# Patient Record
Sex: FEMALE | Race: WHITE | HISPANIC OR LATINO | Employment: FULL TIME | URBAN - METROPOLITAN AREA
[De-identification: names, ages, dates, MRNs, and addresses within clinical notes are randomized per-mention and may not be internally consistent; named-entity substitution may affect disease eponyms.]

---

## 2006-12-06 LAB — EXTERNAL HIV SCREEN: NORMAL

## 2019-04-25 ENCOUNTER — OFFICE VISIT (OUTPATIENT)
Dept: FAMILY MEDICINE CLINIC | Facility: CLINIC | Age: 34
End: 2019-04-25

## 2019-04-25 VITALS
HEART RATE: 75 BPM | TEMPERATURE: 97.4 F | SYSTOLIC BLOOD PRESSURE: 142 MMHG | RESPIRATION RATE: 18 BRPM | BODY MASS INDEX: 34.6 KG/M2 | WEIGHT: 188 LBS | HEIGHT: 62 IN | OXYGEN SATURATION: 97 % | DIASTOLIC BLOOD PRESSURE: 90 MMHG

## 2019-04-25 DIAGNOSIS — E66.9 OBESITY, UNSPECIFIED CLASSIFICATION, UNSPECIFIED OBESITY TYPE, UNSPECIFIED WHETHER SERIOUS COMORBIDITY PRESENT: ICD-10-CM

## 2019-04-25 DIAGNOSIS — R03.0 ELEVATED SYSTOLIC BLOOD PRESSURE READING WITHOUT DIAGNOSIS OF HYPERTENSION: ICD-10-CM

## 2019-04-25 DIAGNOSIS — K21.9 GASTROESOPHAGEAL REFLUX DISEASE WITHOUT ESOPHAGITIS: Primary | ICD-10-CM

## 2019-04-25 PROCEDURE — 99213 OFFICE O/P EST LOW 20 MIN: CPT | Performed by: FAMILY MEDICINE

## 2019-04-25 RX ORDER — PANTOPRAZOLE SODIUM 20 MG/1
20 TABLET, DELAYED RELEASE ORAL DAILY
Qty: 30 TABLET | Refills: 2 | Status: SHIPPED | OUTPATIENT
Start: 2019-04-25 | End: 2019-12-02

## 2019-04-25 RX ORDER — PANTOPRAZOLE SODIUM 20 MG/1
20 TABLET, DELAYED RELEASE ORAL DAILY
Qty: 30 TABLET | Refills: 2 | Status: SHIPPED | OUTPATIENT
Start: 2019-04-25 | End: 2019-04-25 | Stop reason: SDUPTHER

## 2019-04-26 ENCOUNTER — APPOINTMENT (OUTPATIENT)
Dept: LAB | Facility: CLINIC | Age: 34
End: 2019-04-26

## 2019-04-26 DIAGNOSIS — E66.9 OBESITY, UNSPECIFIED CLASSIFICATION, UNSPECIFIED OBESITY TYPE, UNSPECIFIED WHETHER SERIOUS COMORBIDITY PRESENT: ICD-10-CM

## 2019-04-26 LAB
ALBUMIN SERPL BCP-MCNC: 4 G/DL (ref 3.5–5)
ALP SERPL-CCNC: 53 U/L (ref 46–116)
ALT SERPL W P-5'-P-CCNC: 28 U/L (ref 12–78)
ANION GAP SERPL CALCULATED.3IONS-SCNC: 4 MMOL/L (ref 4–13)
AST SERPL W P-5'-P-CCNC: 14 U/L (ref 5–45)
BASOPHILS # BLD AUTO: 0.06 THOUSANDS/ΜL (ref 0–0.1)
BASOPHILS NFR BLD AUTO: 1 % (ref 0–1)
BILIRUB SERPL-MCNC: 0.43 MG/DL (ref 0.2–1)
BUN SERPL-MCNC: 13 MG/DL (ref 5–25)
CALCIUM SERPL-MCNC: 8.3 MG/DL (ref 8.3–10.1)
CHLORIDE SERPL-SCNC: 104 MMOL/L (ref 100–108)
CHOLEST SERPL-MCNC: 218 MG/DL (ref 50–200)
CO2 SERPL-SCNC: 29 MMOL/L (ref 21–32)
CREAT SERPL-MCNC: 0.7 MG/DL (ref 0.6–1.3)
EOSINOPHIL # BLD AUTO: 0.12 THOUSAND/ΜL (ref 0–0.61)
EOSINOPHIL NFR BLD AUTO: 3 % (ref 0–6)
ERYTHROCYTE [DISTWIDTH] IN BLOOD BY AUTOMATED COUNT: 12.3 % (ref 11.6–15.1)
GFR SERPL CREATININE-BSD FRML MDRD: 113 ML/MIN/1.73SQ M
GLUCOSE P FAST SERPL-MCNC: 90 MG/DL (ref 65–99)
HCT VFR BLD AUTO: 43.5 % (ref 34.8–46.1)
HDLC SERPL-MCNC: 48 MG/DL (ref 40–60)
HGB BLD-MCNC: 14.4 G/DL (ref 11.5–15.4)
IMM GRANULOCYTES # BLD AUTO: 0.01 THOUSAND/UL (ref 0–0.2)
IMM GRANULOCYTES NFR BLD AUTO: 0 % (ref 0–2)
LDLC SERPL CALC-MCNC: 141 MG/DL (ref 0–100)
LYMPHOCYTES # BLD AUTO: 1.67 THOUSANDS/ΜL (ref 0.6–4.47)
LYMPHOCYTES NFR BLD AUTO: 39 % (ref 14–44)
MCH RBC QN AUTO: 30.6 PG (ref 26.8–34.3)
MCHC RBC AUTO-ENTMCNC: 33.1 G/DL (ref 31.4–37.4)
MCV RBC AUTO: 92 FL (ref 82–98)
MONOCYTES # BLD AUTO: 0.34 THOUSAND/ΜL (ref 0.17–1.22)
MONOCYTES NFR BLD AUTO: 8 % (ref 4–12)
NEUTROPHILS # BLD AUTO: 2.06 THOUSANDS/ΜL (ref 1.85–7.62)
NEUTS SEG NFR BLD AUTO: 49 % (ref 43–75)
NONHDLC SERPL-MCNC: 170 MG/DL
NRBC BLD AUTO-RTO: 0 /100 WBCS
PLATELET # BLD AUTO: 242 THOUSANDS/UL (ref 149–390)
PMV BLD AUTO: 10.3 FL (ref 8.9–12.7)
POTASSIUM SERPL-SCNC: 3.9 MMOL/L (ref 3.5–5.3)
PROT SERPL-MCNC: 7.7 G/DL (ref 6.4–8.2)
RBC # BLD AUTO: 4.71 MILLION/UL (ref 3.81–5.12)
SODIUM SERPL-SCNC: 137 MMOL/L (ref 136–145)
TRIGL SERPL-MCNC: 143 MG/DL
TSH SERPL DL<=0.05 MIU/L-ACNC: 1.12 UIU/ML (ref 0.36–3.74)
WBC # BLD AUTO: 4.26 THOUSAND/UL (ref 4.31–10.16)

## 2019-04-26 PROCEDURE — 36415 COLL VENOUS BLD VENIPUNCTURE: CPT

## 2019-04-26 PROCEDURE — 80061 LIPID PANEL: CPT

## 2019-04-26 PROCEDURE — 80053 COMPREHEN METABOLIC PANEL: CPT

## 2019-04-26 PROCEDURE — 84443 ASSAY THYROID STIM HORMONE: CPT

## 2019-04-26 PROCEDURE — 85025 COMPLETE CBC W/AUTO DIFF WBC: CPT

## 2019-05-01 ENCOUNTER — OFFICE VISIT (OUTPATIENT)
Dept: OBGYN CLINIC | Facility: CLINIC | Age: 34
End: 2019-05-01

## 2019-05-01 VITALS
WEIGHT: 185 LBS | BODY MASS INDEX: 34.04 KG/M2 | DIASTOLIC BLOOD PRESSURE: 90 MMHG | HEIGHT: 62 IN | SYSTOLIC BLOOD PRESSURE: 140 MMHG

## 2019-05-01 DIAGNOSIS — Z12.4 SCREENING FOR CERVICAL CANCER: ICD-10-CM

## 2019-05-01 DIAGNOSIS — Z11.3 SCREENING EXAMINATION FOR SEXUALLY TRANSMITTED DISEASE: ICD-10-CM

## 2019-05-01 DIAGNOSIS — R10.2 PELVIC PAIN: ICD-10-CM

## 2019-05-01 DIAGNOSIS — Z01.419 ENCOUNTER FOR ANNUAL ROUTINE GYNECOLOGICAL EXAMINATION: Primary | ICD-10-CM

## 2019-05-01 PROCEDURE — G0145 SCR C/V CYTO,THINLAYER,RESCR: HCPCS | Performed by: FAMILY MEDICINE

## 2019-05-01 PROCEDURE — 99385 PREV VISIT NEW AGE 18-39: CPT | Performed by: FAMILY MEDICINE

## 2019-05-01 PROCEDURE — 87591 N.GONORRHOEAE DNA AMP PROB: CPT | Performed by: FAMILY MEDICINE

## 2019-05-01 PROCEDURE — 87624 HPV HI-RISK TYP POOLED RSLT: CPT | Performed by: FAMILY MEDICINE

## 2019-05-01 PROCEDURE — 87491 CHLMYD TRACH DNA AMP PROBE: CPT | Performed by: FAMILY MEDICINE

## 2019-05-02 LAB
C TRACH DNA SPEC QL NAA+PROBE: NEGATIVE
N GONORRHOEA DNA SPEC QL NAA+PROBE: NEGATIVE

## 2019-05-03 LAB
HPV HR 12 DNA CVX QL NAA+PROBE: POSITIVE
HPV16 DNA CVX QL NAA+PROBE: NEGATIVE
HPV18 DNA CVX QL NAA+PROBE: NEGATIVE

## 2019-05-06 LAB
LAB AP GYN PRIMARY INTERPRETATION: NORMAL
LAB AP LMP: NORMAL
Lab: NORMAL
PATH INTERP SPEC-IMP: NORMAL

## 2019-05-09 ENCOUNTER — OFFICE VISIT (OUTPATIENT)
Dept: FAMILY MEDICINE CLINIC | Facility: CLINIC | Age: 34
End: 2019-05-09

## 2019-05-09 VITALS
BODY MASS INDEX: 34.2 KG/M2 | HEART RATE: 82 BPM | RESPIRATION RATE: 16 BRPM | SYSTOLIC BLOOD PRESSURE: 146 MMHG | DIASTOLIC BLOOD PRESSURE: 90 MMHG | WEIGHT: 187 LBS | TEMPERATURE: 97.1 F | OXYGEN SATURATION: 98 %

## 2019-05-09 DIAGNOSIS — I10 HYPERTENSION, UNSPECIFIED TYPE: Primary | ICD-10-CM

## 2019-05-09 PROBLEM — E78.5 HYPERLIPIDEMIA: Status: ACTIVE | Noted: 2019-05-09

## 2019-05-09 PROBLEM — M54.9 BACK PAIN: Status: ACTIVE | Noted: 2019-05-09

## 2019-05-09 PROCEDURE — 99213 OFFICE O/P EST LOW 20 MIN: CPT | Performed by: FAMILY MEDICINE

## 2019-05-09 RX ORDER — AMLODIPINE BESYLATE 5 MG/1
5 TABLET ORAL DAILY
Qty: 30 TABLET | Refills: 2 | Status: SHIPPED | OUTPATIENT
Start: 2019-05-09 | End: 2019-08-22 | Stop reason: SDUPTHER

## 2019-05-24 ENCOUNTER — OFFICE VISIT (OUTPATIENT)
Dept: OBGYN CLINIC | Facility: CLINIC | Age: 34
End: 2019-05-24

## 2019-05-24 VITALS — DIASTOLIC BLOOD PRESSURE: 78 MMHG | BODY MASS INDEX: 34.39 KG/M2 | SYSTOLIC BLOOD PRESSURE: 120 MMHG | WEIGHT: 188 LBS

## 2019-05-24 DIAGNOSIS — N92.0 MENORRHAGIA WITH REGULAR CYCLE: Primary | ICD-10-CM

## 2019-05-24 PROCEDURE — 99213 OFFICE O/P EST LOW 20 MIN: CPT | Performed by: NURSE PRACTITIONER

## 2019-05-24 RX ORDER — NORETHINDRONE ACETATE AND ETHINYL ESTRADIOL 1MG-20(21)
1 KIT ORAL DAILY
Qty: 28 TABLET | Refills: 3 | Status: SHIPPED | OUTPATIENT
Start: 2019-05-24 | End: 2019-07-16 | Stop reason: CLARIF

## 2019-06-10 ENCOUNTER — OFFICE VISIT (OUTPATIENT)
Dept: FAMILY MEDICINE CLINIC | Facility: CLINIC | Age: 34
End: 2019-06-10

## 2019-06-10 VITALS
HEIGHT: 62 IN | RESPIRATION RATE: 18 BRPM | TEMPERATURE: 97.5 F | HEART RATE: 83 BPM | SYSTOLIC BLOOD PRESSURE: 122 MMHG | BODY MASS INDEX: 34.87 KG/M2 | OXYGEN SATURATION: 98 % | DIASTOLIC BLOOD PRESSURE: 80 MMHG | WEIGHT: 189.5 LBS

## 2019-06-10 DIAGNOSIS — G47.00 INSOMNIA, UNSPECIFIED TYPE: Primary | ICD-10-CM

## 2019-06-10 PROCEDURE — 99213 OFFICE O/P EST LOW 20 MIN: CPT | Performed by: FAMILY MEDICINE

## 2019-06-10 RX ORDER — LANOLIN ALCOHOL/MO/W.PET/CERES
3 CREAM (GRAM) TOPICAL
Qty: 30 TABLET | Refills: 2 | Status: SHIPPED | OUTPATIENT
Start: 2019-06-10 | End: 2019-07-16 | Stop reason: CLARIF

## 2019-07-05 ENCOUNTER — APPOINTMENT (EMERGENCY)
Dept: RADIOLOGY | Facility: HOSPITAL | Age: 34
End: 2019-07-05

## 2019-07-05 ENCOUNTER — HOSPITAL ENCOUNTER (EMERGENCY)
Facility: HOSPITAL | Age: 34
Discharge: HOME | End: 2019-07-05
Attending: EMERGENCY MEDICINE | Admitting: EMERGENCY MEDICINE

## 2019-07-05 VITALS
SYSTOLIC BLOOD PRESSURE: 109 MMHG | BODY MASS INDEX: 36.4 KG/M2 | WEIGHT: 199 LBS | OXYGEN SATURATION: 98 % | TEMPERATURE: 98.2 F | RESPIRATION RATE: 19 BRPM | HEART RATE: 62 BPM | DIASTOLIC BLOOD PRESSURE: 75 MMHG

## 2019-07-05 DIAGNOSIS — R07.89 CHEST DISCOMFORT: ICD-10-CM

## 2019-07-05 DIAGNOSIS — R91.1 LUNG NODULE: ICD-10-CM

## 2019-07-05 DIAGNOSIS — R07.9 CHEST PAIN: Primary | ICD-10-CM

## 2019-07-05 LAB
ALBUMIN SERPL BCP-MCNC: 3.9 G/DL (ref 3.5–5)
ALP SERPL-CCNC: 54 U/L (ref 46–116)
ALT SERPL W P-5'-P-CCNC: 23 U/L (ref 12–78)
ANION GAP SERPL CALCULATED.3IONS-SCNC: 6 MMOL/L (ref 4–13)
AST SERPL W P-5'-P-CCNC: 9 U/L (ref 5–45)
ATRIAL RATE: 64 BPM
ATRIAL RATE: 64 BPM
BASOPHILS # BLD AUTO: 0.06 THOUSANDS/ΜL (ref 0–0.1)
BASOPHILS NFR BLD AUTO: 1 % (ref 0–1)
BILIRUB SERPL-MCNC: 0.31 MG/DL (ref 0.2–1)
BUN SERPL-MCNC: 14 MG/DL (ref 5–25)
CALCIUM SERPL-MCNC: 8.8 MG/DL (ref 8.3–10.1)
CHLORIDE SERPL-SCNC: 107 MMOL/L (ref 100–108)
CO2 SERPL-SCNC: 24 MMOL/L (ref 21–32)
CREAT SERPL-MCNC: 0.62 MG/DL (ref 0.6–1.3)
EOSINOPHIL # BLD AUTO: 0.08 THOUSAND/ΜL (ref 0–0.61)
EOSINOPHIL NFR BLD AUTO: 2 % (ref 0–6)
ERYTHROCYTE [DISTWIDTH] IN BLOOD BY AUTOMATED COUNT: 12.1 % (ref 11.6–15.1)
GFR SERPL CREATININE-BSD FRML MDRD: 117 ML/MIN/1.73SQ M
GLUCOSE SERPL-MCNC: 95 MG/DL (ref 65–140)
HCT VFR BLD AUTO: 41.2 % (ref 34.8–46.1)
HGB BLD-MCNC: 14.3 G/DL (ref 11.5–15.4)
IMM GRANULOCYTES # BLD AUTO: 0.01 THOUSAND/UL (ref 0–0.2)
IMM GRANULOCYTES NFR BLD AUTO: 0 % (ref 0–2)
LYMPHOCYTES # BLD AUTO: 2.08 THOUSANDS/ΜL (ref 0.6–4.47)
LYMPHOCYTES NFR BLD AUTO: 39 % (ref 14–44)
MCH RBC QN AUTO: 31.2 PG (ref 26.8–34.3)
MCHC RBC AUTO-ENTMCNC: 34.7 G/DL (ref 31.4–37.4)
MCV RBC AUTO: 90 FL (ref 82–98)
MONOCYTES # BLD AUTO: 0.44 THOUSAND/ΜL (ref 0.17–1.22)
MONOCYTES NFR BLD AUTO: 8 % (ref 4–12)
NEUTROPHILS # BLD AUTO: 2.63 THOUSANDS/ΜL (ref 1.85–7.62)
NEUTS SEG NFR BLD AUTO: 50 % (ref 43–75)
NRBC BLD AUTO-RTO: 0 /100 WBCS
P AXIS: 26 DEGREES
P AXIS: 39 DEGREES
PLATELET # BLD AUTO: 232 THOUSANDS/UL (ref 149–390)
PMV BLD AUTO: 10 FL (ref 8.9–12.7)
POTASSIUM SERPL-SCNC: 4 MMOL/L (ref 3.5–5.3)
PR INTERVAL: 138 MS
PR INTERVAL: 152 MS
PROT SERPL-MCNC: 7.6 G/DL (ref 6.4–8.2)
QRS AXIS: 76 DEGREES
QRS AXIS: 77 DEGREES
QRSD INTERVAL: 72 MS
QRSD INTERVAL: 78 MS
QT INTERVAL: 398 MS
QT INTERVAL: 398 MS
QTC INTERVAL: 410 MS
QTC INTERVAL: 410 MS
RBC # BLD AUTO: 4.59 MILLION/UL (ref 3.81–5.12)
SODIUM SERPL-SCNC: 137 MMOL/L (ref 136–145)
T WAVE AXIS: 16 DEGREES
T WAVE AXIS: 20 DEGREES
TROPONIN I SERPL-MCNC: <0.02 NG/ML
TROPONIN I SERPL-MCNC: <0.02 NG/ML
VENTRICULAR RATE: 64 BPM
VENTRICULAR RATE: 64 BPM
WBC # BLD AUTO: 5.3 THOUSAND/UL (ref 4.31–10.16)

## 2019-07-05 PROCEDURE — 36415 COLL VENOUS BLD VENIPUNCTURE: CPT

## 2019-07-05 PROCEDURE — 71046 X-RAY EXAM CHEST 2 VIEWS: CPT

## 2019-07-05 PROCEDURE — 84484 ASSAY OF TROPONIN QUANT: CPT | Performed by: EMERGENCY MEDICINE

## 2019-07-05 PROCEDURE — 80053 COMPREHEN METABOLIC PANEL: CPT | Performed by: EMERGENCY MEDICINE

## 2019-07-05 PROCEDURE — 85025 COMPLETE CBC W/AUTO DIFF WBC: CPT | Performed by: EMERGENCY MEDICINE

## 2019-07-05 PROCEDURE — 93005 ELECTROCARDIOGRAM TRACING: CPT

## 2019-07-05 PROCEDURE — 99284 EMERGENCY DEPT VISIT MOD MDM: CPT | Performed by: EMERGENCY MEDICINE

## 2019-07-05 PROCEDURE — 99284 EMERGENCY DEPT VISIT MOD MDM: CPT

## 2019-07-05 PROCEDURE — 93010 ELECTROCARDIOGRAM REPORT: CPT | Performed by: INTERNAL MEDICINE

## 2019-07-05 RX ORDER — HYDROCHLOROTHIAZIDE 12.5 MG/1
12.5 TABLET ORAL ONCE
Status: DISCONTINUED | OUTPATIENT
Start: 2019-07-05 | End: 2019-07-05

## 2019-07-05 NOTE — ED PROVIDER NOTES
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 Taking? amLODIPine (NORVASC) 5 mg tablet 7/5/2019 at Unknown time  No Yes   Sig: Take 1 tablet (5 mg total) by mouth daily   melatonin 3 mg 7/4/2019 at Unknown time  No Yes   Sig: Take 1 tablet (3 mg total) by mouth daily at bedtime   norethindrone-ethinyl estradiol (JUNEL FE 1/20) 1-20 MG-MCG per tablet Not Taking at Unknown time  No No   Sig: Take 1 tablet by mouth daily   Patient not taking: Reported on 7/5/2019   pantoprazole (PROTONIX) 20 mg tablet Not Taking at Unknown time  No No   Sig: Take 1 tablet (20 mg total) by mouth daily   Patient not taking: Reported on 7/5/2019      Facility-Administered Medications: None       Past Medical History:   Diagnosis Date    GERD (gastroesophageal reflux disease)     Hypertension        Past Surgical History:   Procedure Laterality Date    CARPAL TUNNEL RELEASE      TUBAL LIGATION         Family History   Problem Relation Age of Onset    Asthma Mother     Breast cancer Neg Hx     Cancer Neg Hx      I have reviewed and agree with the history as documented      Social History     Tobacco Use    Smoking status: Never Smoker    Smokeless tobacco: Never Used   Substance Use Topics    Alcohol use: Yes     Frequency: 2-4 times a month     Drinks per session: 1 or 2    Drug use: Never        Review of Systems   Constitutional: Negative for chills, fatigue and fever  HENT: Negative for sore throat  Eyes: Negative for redness and visual disturbance  Respiratory: Negative for cough and shortness of breath  Cardiovascular: Negative for chest pain  Gastrointestinal: Negative for abdominal pain, diarrhea and nausea  Genitourinary: Negative for difficulty urinating, dysuria and pelvic pain  Musculoskeletal: Negative for back pain  Skin: Negative for rash  Neurological: Negative for syncope, weakness and headaches  All other systems reviewed and are negative  Physical Exam  ED Triage Vitals   Temperature Pulse Respirations Blood Pressure SpO2   07/05/19 1032 07/05/19 1032 07/05/19 1032 07/05/19 1032 07/05/19 1032   98 2 °F (36 8 °C) 84 18 146/94 96 %      Temp Source Heart Rate Source Patient Position - Orthostatic VS BP Location FiO2 (%)   07/05/19 1032 07/05/19 1130 07/05/19 1032 07/05/19 1032 --   Oral Monitor Sitting Right arm       Pain Score       07/05/19 1032       3             Orthostatic Vital Signs  Vitals:    07/05/19 1128 07/05/19 1130 07/05/19 1230 07/05/19 1330   BP: 112/75 122/78 114/74 109/75   Pulse:  68 66 62   Patient Position - Orthostatic VS:  Sitting  Lying       Physical Exam   Constitutional: She is oriented to person, place, and time  She appears well-developed and well-nourished  No distress  HENT:   Head: Normocephalic and atraumatic  Eyes: Pupils are equal, round, and reactive to light  Conjunctivae are normal    Neck: Normal range of motion  Cardiovascular: Normal rate, regular rhythm and normal heart sounds  No murmur heard  Pulmonary/Chest: Effort normal and breath sounds normal  No respiratory distress  Abdominal: Soft   Bowel sounds are normal  There is no tenderness  Musculoskeletal: Normal range of motion  Neurological: She is alert and oriented to person, place, and time  No cranial nerve deficit or sensory deficit  She exhibits normal muscle tone  Coordination normal    Skin: Skin is warm and dry  No rash noted  Psychiatric: She has a normal mood and affect  Nursing note and vitals reviewed        ED Medications  Medications - No data to display    Diagnostic Studies  Results Reviewed     Procedure Component Value Units Date/Time    Troponin I [[id number]]  (Normal) Collected:  07/05/19 1324    Lab Status:  Final result Specimen:  Blood from Arm, Right Updated:  07/05/19 1350     Troponin I <0 02 ng/mL     Troponin I [[id number]]  (Normal) Collected:  07/05/19 1100    Lab Status:  Final result Specimen:  Blood from Arm, Left Updated:  07/05/19 1127     Troponin I <0 02 ng/mL     Comprehensive metabolic panel [[id number]] Collected:  07/05/19 1100    Lab Status:  Final result Specimen:  Blood from Arm, Left Updated:  07/05/19 1126     Sodium 137 mmol/L      Potassium 4 0 mmol/L      Chloride 107 mmol/L      CO2 24 mmol/L      ANION GAP 6 mmol/L      BUN 14 mg/dL      Creatinine 0 62 mg/dL      Glucose 95 mg/dL      Calcium 8 8 mg/dL      AST 9 U/L      ALT 23 U/L      Alkaline Phosphatase 54 U/L      Total Protein 7 6 g/dL      Albumin 3 9 g/dL      Total Bilirubin 0 31 mg/dL      eGFR 117 ml/min/1 73sq m     Narrative:       [place] guidelines for Chronic Kidney Disease (CKD):     Stage 1 with normal or high GFR (GFR > 90 mL/min/1 73 square meters)    Stage 2 Mild CKD (GFR = 60-89 mL/min/1 73 square meters)    Stage 3A Moderate CKD (GFR = 45-59 mL/min/1 73 square meters)    Stage 3B Moderate CKD (GFR = 30-44 mL/min/1 73 square meters)    Stage 4 Severe CKD (GFR = 15-29 mL/min/1 73 square meters)    Stage 5 End Stage CKD (GFR <15 mL/min/1 73 square meters)  Note: GFR calculation is accurate only with a steady state creatinine    CBC and differential [[id number]] Collected:  07/05/19 1100    Lab Status:  Final result Specimen:  Blood from Arm, Left Updated:  07/05/19 1108     WBC 5 30 Thousand/uL      RBC 4 59 Million/uL      Hemoglobin 14 3 g/dL      Hematocrit 41 2 %      MCV 90 fL      MCH 31 2 pg      MCHC 34 7 g/dL      RDW 12 1 %      MPV 10 0 fL      Platelets 922 Thousands/uL      nRBC 0 /100 WBCs      Neutrophils Relative 50 %      Immat GRANS % 0 %      Lymphocytes Relative 39 %      Monocytes Relative 8 %      Eosinophils Relative 2 %      Basophils Relative 1 %      Neutrophils Absolute 2 63 Thousands/µL      Immature Grans Absolute 0 01 Thousand/uL      Lymphocytes Absolute 2 08 Thousands/µL      Monocytes Absolute 0 44 Thousand/µL      Eosinophils Absolute 0 08 Thousand/µL      Basophils Absolute 0 06 Thousands/µL                  XR chest 2 views   ED Interpretation by Crys Jarrell MD (07/05 1133)   No acute cardiopulmonary pathology      Final Result by Angi Tinsley MD (07/05 1209)      No acute cardiopulmonary disease  Anterior right upper lung nodular density  Recommend CT  The study was marked in EPIC for significant notification  Workstation performed: [id number]               Procedures  Procedures    ECG 12 Lead Documentation  Date/Time: today/date: 7/5/2019      ECG reviewed by me, the ED Provider: yes    Patient location:  ED   Previous ECG:  Compared to current, no change   Rate:  ECG rate assessment: normal    Rhythm: sinus rhythm    Ectopy:  : none    QRS axis:  Normal  Intervals: normal   Q waves: None   ST segments:  Normal  T waves: normal      Impression: Normal EKG    ED Course  ED Course as of Jul 05 1853 Fri Jul 05, 2019   1132 Troponin I: <0 02   1408 Troponin I: <0 02           [facility]    15-year-old female who presents to the ED for evaluation of chest discomfort as well as episode of possible hypertension in the setting of misplaced blood pressure cuff    Patient currently asymptomatic, refusing any analgesia  Patient will get delta troponin EKG  EKG troponin negative x2  Patient will be referred for stress test   Follow up with symptoms Cardiology  Patient also will be given referral for PCP  The patient was instructed to follow up as documented  Strict return precautions were discussed with the patient and the patient was instructed to return to the emergency department immediately if symptoms worsen  The patient/patient family member acknowledged and were in agreement with plan  Disposition  Final diagnoses:   Chest pain   Chest discomfort     Time reflects when diagnosis was documented in both MDM as applicable and the Disposition within this note     Time User Action Codes Description Comment    7/5/2019  3:01 PM Reece Dixon, [address] [R07 9] Chest pain     7/5/2019  3:02 PM Vinay Cooper Add [R07 89] Chest discomfort       ED Disposition     ED Disposition Condition Date/Time Comment    Discharge Stable Fri Jul 5, 2019  3:01 PM [id number] [place] [address] discharge to home/self care  Follow-up Information     Follow up With Specialties Details Why Contact Info Additional Information    [address] [facility] Cardiology Schedule an appointment as soon as possible for a visit in 1 week For follow up regarding your symptoms and recheck after stress test [address] [address]  [facility], [address], [place], [address], PA-C Internal Medicine, Physician Assistant Schedule an appointment as soon as possible for a visit in 3 days For follow up regarding your xray to get a CT scan  Or please go to your preferred PCP  [address]   16  [address]  [address] [facility] Emergency Department Emergency Medicine Go to  If symptoms worsen [address] [facility] ED, [address], [place], [place], [address]          Discharge Medication List as of 7/5/2019  3:09 PM      CONTINUE these medications which have NOT CHANGED    Details   amLODIPine (NORVASC) 5 mg tablet Take 1 tablet (5 mg total) by mouth daily, Starting Thu 5/9/2019, Normal      melatonin 3 mg Take 1 tablet (3 mg total) by mouth daily at bedtime, Starting Mon 6/10/2019, Normal      norethindrone-ethinyl estradiol (JUNEL FE 1/20) 1-20 MG-MCG per tablet Take 1 tablet by mouth daily, Starting Fri 5/24/2019, Normal      pantoprazole (PROTONIX) 20 mg tablet Take 1 tablet (20 mg total) by mouth daily, Starting Thu 4/25/2019, Normal           Outpatient Discharge Orders   Stress test only, exercise   Standing Status: Future Standing Exp  Date: 07/05/23       ED Provider  Attending physically available and evaluated Callie Betty SILVERIO managed the patient along with the ED Attending      Electronically Signed by         Manuela Sandoval MD  07/05/19 0555

## 2019-07-05 NOTE — ED ATTENDING ATTESTATION
Graciela Rodrigues MD, saw and evaluated the patient  All available labs and X-rays were ordered by me or the resident and have been reviewed by myself  I discussed the patient with the resident / non-physician and agree with the resident's / non-physician practitioner's findings and plan as documented in the resident's / non-physician practicitioner's note, except where noted  At this point, I agree with the current assessment done in the ED  I was present during key portions of all procedures performed unless otherwise stated  Chief Complaint   Patient presents with    High Blood Pressure     Patient states that she has hx of high BP, reports taking her 5mg of Amlodipine this morning at around 0700 with BP still in 200's  this is a 39-year-old female presenting for evaluation of chest heaviness  The patient states that she is restarted on blood pressure medication about 2 weeks ago  She has been taking it without missing any doses  She states that today she took her wrist blood pressure and found that was rather elevated and so came in for evaluation  No fevers chills nausea vomiting shortness of breath  She has some chest pain that was there yesterday and can use continues to today so wanted that evaluated  No shortness of breath dizziness lightheadedness no falls or injuries  No heavy lifting  No exertional component discomfort  PMH:  - amlopdipine  PSH:  - None  Denies smoking, drinking, drugs  PE:  Vitals:    07/05/19 1128 07/05/19 1130 07/05/19 1230 07/05/19 1330   BP: 112/75 122/78 114/74 109/75   BP Location:  Left arm Left arm Left arm   Pulse:  68 66 62   Resp:  17 18 19   Temp:       TempSrc:       SpO2:  98% 98% 98%   Weight:       General: VSS, NAD, awake, alert  Well-nourished, well-developed  Appears stated age  Speaking normally in full sentences  Head: Normocephalic, atraumatic, nontender  Eyes: PERRL, EOM-I  No diplopia  No hyphema     No subconjunctival hemorrhages  Symmetrical lids  ENT: Atraumatic external nose and ears  MMM  No malocclusion  No stridor  Normal phonation  No drooling  Normal swallowing  Neck: Symmetric, trachea midline  No JVD  CV: RRR  +S1/S2  No murmurs or gallops  Peripheral pulses +2 throughout  No chest wall tenderness  Lungs:   Unlabored No retractions  CTAB, lungs sounds equal bilateral    No tachypnea  Abd: +BS, soft, NT/ND    MSK:   FROM   Back:   No rashes  Skin: Dry, intact  Neuro: AAOx3, GCS 15, CN II-XII grossly intact  Motor grossly intact  Psychiatric/Behavioral: Appropriate mood and affect   Exam: deferred  A:  - Chest heaviness  P:  - cardiac  - DC  - 13 point ROS was performed and all are normal unless stated in the history above  - Nursing note reviewed  Vitals reviewed  - Orders placed by myself and/or advanced practitioner / resident     - Previous chart was reviewed  - No language barrier    - History obtained from patient  - There are no limitations to the history obtained  - Critical care time: Not applicable for this patient  Final Diagnosis:  1  Chest pain    2  Chest discomfort    3  Lung nodule           Medications - No data to display  XR chest 2 views   ED Interpretation   No acute cardiopulmonary pathology      Final Result      No acute cardiopulmonary disease  Anterior right upper lung nodular density  Recommend CT  The study was marked in EPIC for significant notification        Workstation performed: [id number]           Orders Placed This Encounter   Procedures    XR chest 2 views    CBC and differential    Comprehensive metabolic panel    Troponin I    Troponin I    Continuous cardiac monitoring    Continuous pulse oximetry    Stress test only, exercise    ECG 12 lead    ECG 12 lead    ECG 12 lead    ECG 12 lead     Labs Reviewed   TROPONIN I - Normal       Result Value Ref Range Status    Troponin I <0 02  <=0 04 ng/mL Final    Comment:   [employer] analyzer 99% cutoff is > 0 04 ng/mL in network labs     o cTnI 99% cutoff is useful only when applied to patients in the clinical setting of myocardial ischemia   o cTnI 99% cutoff should be interpreted in the context of clinical history, ECG findings and possibly cardiac imaging to establish correct diagnosis  o cTnI 99% cutoff may be suggestive but clearly not indicative of a coronary event without the clinical setting of myocardial ischemia  TROPONIN I - Normal    Troponin I <0 02  <=0 04 ng/mL Final    Comment:   [employer] analyzer 99% cutoff is > 0 04 ng/mL in network labs     o cTnI 99% cutoff is useful only when applied to patients in the clinical setting of myocardial ischemia   o cTnI 99% cutoff should be interpreted in the context of clinical history, ECG findings and possibly cardiac imaging to establish correct diagnosis  o cTnI 99% cutoff may be suggestive but clearly not indicative of a coronary event without the clinical setting of myocardial ischemia       CBC AND DIFFERENTIAL    WBC 5 30  4 31 - 10 16 Thousand/uL Final    RBC 4 59  3 81 - 5 12 Million/uL Final    Hemoglobin 14 3  11 5 - 15 4 g/dL Final    Hematocrit 41 2  34 8 - 46 1 % Final    MCV 90  82 - 98 fL Final    MCH 31 2  26 8 - 34 3 pg Final    MCHC 34 7  31 4 - 37 4 g/dL Final    RDW 12 1  11 6 - 15 1 % Final    MPV 10 0  8 9 - 12 7 fL Final    Platelets 922  921 - 390 Thousands/uL Final    nRBC 0  /100 WBCs Final    Neutrophils Relative 50  43 - 75 % Final    Immat GRANS % 0  0 - 2 % Final    Lymphocytes Relative 39  14 - 44 % Final    Monocytes Relative 8  4 - 12 % Final    Eosinophils Relative 2  0 - 6 % Final    Basophils Relative 1  0 - 1 % Final    Neutrophils Absolute 2 63  1 85 - 7 62 Thousands/µL Final    Immature Grans Absolute 0 01  0 00 - 0 20 Thousand/uL Final    Lymphocytes Absolute 2 08  0 60 - 4 47 Thousands/µL Final    Monocytes Absolute 0 44  0 17 - 1 22 Thousand/µL Final    Eosinophils Absolute 0 08 0 00 - 0 61 Thousand/µL Final    Basophils Absolute 0 06  0 00 - 0 10 Thousands/µL Final   COMPREHENSIVE METABOLIC PANEL    Sodium 993  136 - 145 mmol/L Final    Potassium 4 0  3 5 - 5 3 mmol/L Final    Chloride 107  100 - 108 mmol/L Final    CO2 24  21 - 32 mmol/L Final    ANION GAP 6  4 - 13 mmol/L Final    BUN 14  5 - 25 mg/dL Final    Creatinine 0 62  0 60 - 1 30 mg/dL Final    Comment: Standardized to IDMS reference method    Glucose 95  65 - 140 mg/dL Final    Comment:   If the patient is fasting, the ADA then defines impaired fasting glucose as > 100 mg/dL and diabetes as > or equal to 123 mg/dL  Specimen collection should occur prior to Sulfasalazine administration due to the potential for falsely depressed results  Specimen collection should occur prior to Sulfapyridine administration due to the potential for falsely elevated results  Calcium 8 8  8 3 - 10 1 mg/dL Final    AST 9  5 - 45 U/L Final    Comment:   Specimen collection should occur prior to Sulfasalazine administration due to the potential for falsely depressed results  ALT 23  12 - 78 U/L Final    Comment:   Specimen collection should occur prior to Sulfasalazine and/or Sulfapyridine administration due to the potential for falsely depressed results       Alkaline Phosphatase 54  46 - 116 U/L Final    Total Protein 7 6  6 4 - 8 2 g/dL Final    Albumin 3 9  3 5 - 5 0 g/dL Final    Total Bilirubin 0 31  0 20 - 1 00 mg/dL Final    eGFR 117  ml/min/1 73sq m Final    Narrative:     [place] guidelines for Chronic Kidney Disease (CKD):     Stage 1 with normal or high GFR (GFR > 90 mL/min/1 73 square meters)    Stage 2 Mild CKD (GFR = 60-89 mL/min/1 73 square meters)    Stage 3A Moderate CKD (GFR = 45-59 mL/min/1 73 square meters)    Stage 3B Moderate CKD (GFR = 30-44 mL/min/1 73 square meters)    Stage 4 Severe CKD (GFR = 15-29 mL/min/1 73 square meters)    Stage 5 End Stage CKD (GFR <15 mL/min/1 73 square meters)  Note: GFR calculation is accurate only with a steady state creatinine     Time reflects when diagnosis was documented in both MDM as applicable and the Disposition within this note     Time User Action Codes Description Comment    7/5/2019  3:01 PM Lilbryannae Randale Add [R07 9] Chest pain     7/5/2019  3:02 PM Lilbryannae Randale Add [R07 89] Chest discomfort     7/6/2019  7:02 AM [place] Hove Add [R91 1] Lung nodule       ED Disposition     ED Disposition Condition Date/Time Comment    Discharge Stable Fri Jul 5, 2019  3:01 PM [id number] [place] [address] discharge to home/self care  Follow-up Information     Follow up With Specialties Details Why Contact Info Additional Information    [address] [facility] Cardiology Schedule an appointment as soon as possible for a visit in 1 week For follow up regarding your symptoms and recheck after stress test [address] [facility] [address]  [address], [address] [facility], [place], [place], [address], PA-C Internal Medicine, Physician Assistant Schedule an appointment as soon as possible for a visit in 3 days For follow up regarding your xray to get a CT scan  Or please go to your preferred PCP  [address] [facility]  16  [phone]  [address] [facility] Emergency Department Emergency Medicine Go to  If symptoms worsen [address]  [phone]  ED, [address] [place] [address], [address]        Discharge Medication List as of 7/5/2019  3:09 PM      CONTINUE these medications which have NOT CHANGED    Details   amLODIPine (NORVASC) 5 mg tablet Take 1 tablet (5 mg total) by mouth daily, Starting Thu 5/9/2019, Normal      melatonin 3 mg Take 1 tablet (3 mg total) by mouth daily at bedtime, Starting Mon 6/10/2019, Normal      norethindrone-ethinyl estradiol (JUNEL FE 1/20) 1-20 MG-MCG per tablet Take 1 tablet by mouth daily, Starting Fri 5/24/2019, Normal      pantoprazole (PROTONIX) 20 mg tablet Take 1 tablet (20 mg total) by mouth daily, Starting Thu 4/25/2019, Normal           Outpatient Discharge Orders   Stress test only, exercise   Standing Status: Future Standing Exp  Date: 07/05/23     Prior to Admission Medications   Prescriptions Last Dose Informant Patient Reported? Taking? amLODIPine (NORVASC) 5 mg tablet 7/5/2019 at Unknown time  No Yes   Sig: Take 1 tablet (5 mg total) by mouth daily   melatonin 3 mg 7/4/2019 at Unknown time  No Yes   Sig: Take 1 tablet (3 mg total) by mouth daily at bedtime   norethindrone-ethinyl estradiol (JUNEL FE 1/20) 1-20 MG-MCG per tablet Not Taking at Unknown time  No No   Sig: Take 1 tablet by mouth daily   Patient not taking: Reported on 7/5/2019   pantoprazole (PROTONIX) 20 mg tablet Not Taking at Unknown time  No No   Sig: Take 1 tablet (20 mg total) by mouth daily   Patient not taking: Reported on 7/5/2019      Facility-Administered Medications: None       Portions of the record may have been created with voice recognition software  Occasional wrong word or "sound a like" substitutions may have occurred due to the inherent limitations of voice recognition software  Read the chart carefully and recognize, using context, where substitutions have occurred      Electronically signed by:  Myles Layne

## 2019-07-09 ENCOUNTER — HOSPITAL ENCOUNTER (OUTPATIENT)
Dept: NON INVASIVE DIAGNOSTICS | Facility: CLINIC | Age: 34
Discharge: HOME | End: 2019-07-09
Attending: EMERGENCY MEDICINE

## 2019-07-09 DIAGNOSIS — R07.9 CHEST PAIN: ICD-10-CM

## 2019-07-09 LAB
CHEST PAIN STATEMENT: NORMAL
MAX DIASTOLIC BP: 92 MMHG
MAX HEART RATE: 164 BPM
MAX PREDICTED HEART RATE: 185 BPM
MAX. SYSTOLIC BP: 178 MMHG
PROTOCOL NAME: NORMAL
TARGET HR FORMULA: NORMAL
TEST INDICATION: NORMAL
TIME IN EXERCISE PHASE: NORMAL

## 2019-07-09 PROCEDURE — 93016 CV STRESS TEST SUPVJ ONLY: CPT | Performed by: INTERNAL MEDICINE

## 2019-07-09 PROCEDURE — 93017 CV STRESS TEST TRACING ONLY: CPT

## 2019-07-09 PROCEDURE — 93018 CV STRESS TEST I&R ONLY: CPT | Performed by: INTERNAL MEDICINE

## 2019-07-16 ENCOUNTER — OFFICE VISIT (OUTPATIENT)
Dept: CARDIOLOGY CLINIC | Facility: CLINIC | Age: 34
End: 2019-07-16

## 2019-07-16 VITALS
HEIGHT: 62 IN | SYSTOLIC BLOOD PRESSURE: 124 MMHG | OXYGEN SATURATION: 99 % | DIASTOLIC BLOOD PRESSURE: 94 MMHG | WEIGHT: 187.3 LBS | HEART RATE: 76 BPM | BODY MASS INDEX: 34.47 KG/M2

## 2019-07-16 DIAGNOSIS — I10 HYPERTENSION, UNSPECIFIED TYPE: Primary | ICD-10-CM

## 2019-07-16 DIAGNOSIS — E66.09 CLASS 1 OBESITY DUE TO EXCESS CALORIES WITH BODY MASS INDEX (BMI) OF 34.0 TO 34.9 IN ADULT, UNSPECIFIED WHETHER SERIOUS COMORBIDITY PRESENT: ICD-10-CM

## 2019-07-16 DIAGNOSIS — E78.5 HYPERLIPIDEMIA, UNSPECIFIED HYPERLIPIDEMIA TYPE: ICD-10-CM

## 2019-07-16 PROCEDURE — 99243 OFF/OP CNSLTJ NEW/EST LOW 30: CPT | Performed by: NURSE PRACTITIONER

## 2019-07-16 RX ORDER — IBUPROFEN 200 MG
TABLET ORAL EVERY 6 HOURS PRN
COMMUNITY
End: 2019-07-16 | Stop reason: CLARIF

## 2019-07-16 NOTE — PATIENT INSTRUCTIONS
2gm sodium low fat low cholesterol diet, eating fresh is best, fresh fruits, fresh vegetables, avoid red meats,consume chicken and fish, slat free nuts, legumes    Exercise for 30 minutes daily  Track calories for weight loss with an I phone ALISON

## 2019-07-16 NOTE — PROGRESS NOTES
Cardiology Consultation     Marivel Burt  [id number]  1985  [facility] [address] [address]    Chest pain    Ms Tonja Quiroga presnted to the ED on 7/05/19 with chest pain  She was at home and experienced chest heaviness  She was anxious and took her BP at home; SBP >220  She called her PCP and was instructed to present to the ED  BP in /94  Troponin negative X 2  She was referred for a stress test and instructed to follow up with Cardiology  On 7/09/19 Rashid Tang underwent an exercise stress test which showed exercise 9 minutes target heart rate achieved  Resting hypertension with appropriate blood pressure response to stress  The patient experienced chest pain during stress pain resolved spontaneously  ECG was negative for ischemia  Normal study after maximal exercise with reproduction of symptoms  Ms Tonja Quiroga presents to our office for a cardiology consultations after the ED visit with CP and Hypertension  She continues to complain of fatigue, insomnia and left sided chest pressure  According to Rashid Tang the left sided chest pressure is most likely related to anxiety  I have reviewed stress test results and lipid panel results        HTN  HLD 4/26/19 Cholesterol 218, Triglycerides 143, HDL 48,   Patient Active Problem List   Diagnosis    GERD (gastroesophageal reflux disease)    Obesity    Encounter for annual routine gynecological examination    Hypertension    Hyperlipidemia    Back pain    Insomnia     Past Medical History:   Diagnosis Date    Chest pain     GERD (gastroesophageal reflux disease)     Hypertension      Social History     Socioeconomic History    Marital status: Single     Spouse name: Not on file    Number of children: Not on file    Years of education: Not on file    Highest education level: Not on file   Occupational History  Not on file   Social Needs    Financial resource strain: Not on file    Food insecurity:     Worry: Not on file     Inability: Not on file    Transportation needs:     Medical: Not on file     Non-medical: Not on file   Tobacco Use    Smoking status: Never Smoker    Smokeless tobacco: Never Used   Substance and Sexual Activity    Alcohol use: Yes     Frequency: 2-4 times a month     Drinks per session: 1 or 2    Drug use: Never    Sexual activity: Yes     Partners: Male     Birth control/protection: Female Sterilization   Lifestyle    Physical activity:     Days per week: Not on file     Minutes per session: Not on file    Stress: Not on file   Relationships    Social connections:     Talks on phone: Not on file     Gets together: Not on file     Attends [employer] service: Not on file     Active member of club or organization: Not on file     Attends meetings of clubs or organizations: Not on file     Relationship status: Not on file    Intimate partner violence:     Fear of current or ex partner: Not on file     Emotionally abused: Not on file     Physically abused: Not on file     Forced sexual activity: Not on file   Other Topics Concern    Not on file   Social History Narrative    Not on file      Family History   Problem Relation Age of Onset    Asthma Mother     Hypertension Mother     Breast cancer Neg Hx     Cancer Neg Hx      Past Surgical History:   Procedure Laterality Date    CARPAL TUNNEL RELEASE      TUBAL LIGATION     Denies family history of CAD  Denies Tobacco use  Admits to social ETOH use     Current Outpatient Medications:     amLODIPine (NORVASC) 5 mg tablet, Take 1 tablet (5 mg total) by mouth daily, Disp: 30 tablet, Rfl: 2    melatonin 3 mg, Take 1 tablet (3 mg total) by mouth daily at bedtime, Disp: 30 tablet, Rfl: 2    norethindrone-ethinyl estradiol (JUNEL FE 1/20) 1-20 MG-MCG per tablet, Take 1 tablet by mouth daily (Patient not taking: Reported on 7/5/2019), Disp: 28 tablet, Rfl: 3    pantoprazole (PROTONIX) 20 mg tablet, Take 1 tablet (20 mg total) by mouth daily (Patient not taking: Reported on 7/5/2019), Disp: 30 tablet, Rfl: 2  No Known Allergies  There were no vitals filed for this visit  Labs:  Hospital Outpatient Visit on 07/09/2019   Component Date Value    Protocol Name 07/09/2019 Karlo Balbuena Time In Exercise Phase 07/09/2019 00:09:00     MAX   SYSTOLIC BP 62/52/3361 520     Max Diastolic Bp 08/01/8070 92     Max Heart Rate 07/09/2019 164     Max Predicted Heart Rate 07/09/2019 185     Reason for Termination 07/09/2019                      Value:Target Heart Rate Achieved  Fatigue      Test Indication 07/09/2019 Screening for CAD     Target Hr Formular 07/09/2019 (220 - Age)*100%     Chest Pain Statement 07/09/2019 non-limiting    Admission on 07/05/2019, Discharged on 07/05/2019   Component Date Value    WBC 07/05/2019 5 30     RBC 07/05/2019 4 59     Hemoglobin 07/05/2019 14 3     Hematocrit 07/05/2019 41 2     MCV 07/05/2019 90     MCH 07/05/2019 31 2     MCHC 07/05/2019 34 7     RDW 07/05/2019 12 1     MPV 07/05/2019 10 0     Platelets 18/51/7037 232     nRBC 07/05/2019 0     Neutrophils Relative 07/05/2019 50     Immat GRANS % 07/05/2019 0     Lymphocytes Relative 07/05/2019 39     Monocytes Relative 07/05/2019 8     Eosinophils Relative 07/05/2019 2     Basophils Relative 07/05/2019 1     Neutrophils Absolute 07/05/2019 2 63     Immature Grans Absolute 07/05/2019 0 01     Lymphocytes Absolute 07/05/2019 2 08     Monocytes Absolute 07/05/2019 0 44     Eosinophils Absolute 07/05/2019 0 08     Basophils Absolute 07/05/2019 0 06     Sodium 07/05/2019 137     Potassium 07/05/2019 4 0     Chloride 07/05/2019 107     CO2 07/05/2019 24     ANION GAP 07/05/2019 6     BUN 07/05/2019 14     Creatinine 07/05/2019 0 62     Glucose 07/05/2019 95     Calcium 07/05/2019 8 8     AST 07/05/2019 9     ALT 07/05/2019 23     Alkaline Phosphatase 07/05/2019 54     Total Protein 07/05/2019 7 6     Albumin 07/05/2019 3 9     Total Bilirubin 07/05/2019 0 31     eGFR 07/05/2019 117     Troponin I 07/05/2019 <0 02     Troponin I 07/05/2019 <0 02     Ventricular Rate 07/05/2019 64     Atrial Rate 07/05/2019 64     SD Interval 07/05/2019 138     QRSD Interval 07/05/2019 72     QT Interval 07/05/2019 398     QTC Interval 07/05/2019 410     P Axis 07/05/2019 26     QRS Axis 07/05/2019 76     T Wave Axis 07/05/2019 16     Ventricular Rate 07/05/2019 64     Atrial Rate 07/05/2019 64     SD Interval 07/05/2019 152     QRSD Interval 07/05/2019 78     QT Interval 07/05/2019 398     QTC Interval 07/05/2019 410     P Axis 07/05/2019 39     QRS Axis 07/05/2019 77     T Wave Axis 07/05/2019 20      Imaging: Xr Chest 2 Views    Result Date: 7/5/2019  Narrative: CHEST INDICATION:   Chest Pain  COMPARISON:  None EXAM PERFORMED/VIEWS:  XR CHEST PA & LATERAL FINDINGS:  Anterior right upper lobe 9 mm slightly irregular nodular density  Cardiomediastinal silhouette appears upper normal in size  Slight right base atelectasis  The lungs are otherwise clear  No pneumothorax or pleural effusion  Osseous structures appear within normal limits for patient age  Impression: No acute cardiopulmonary disease  Anterior right upper lung nodular density  Recommend CT  The study was marked in EPIC for significant notification  Workstation performed: [id number]       Review of Systems:  Review of Systems   Constitutional: Positive for fatigue  Cardiovascular: Positive for chest pain  Psychiatric/Behavioral: Positive for dysphoric mood and sleep disturbance  All other systems reviewed and are negative  Physical Exam:  Physical Exam   Constitutional: She is oriented to person, place, and time  She appears well-developed  HENT:   Head: Normocephalic  Eyes: Pupils are equal, round, and reactive to light     Neck: Normal range of motion  Neck supple  Cardiovascular: Normal rate, regular rhythm and normal heart sounds  Pulmonary/Chest: Effort normal and breath sounds normal    Abdominal: Soft  Bowel sounds are normal    Obese    Musculoskeletal: Normal range of motion  Neurological: She is alert and oriented to person, place, and time  Skin: Skin is warm and dry  Capillary refill takes less than 2 seconds  Psychiatric: She has a normal mood and affect  Vitals reviewed  Discussion/Summary:  1  Hypertension- continue on Norvasc 5mg daily, hand out information provided on a 2 g sodium diet reading food labels, stop NSAID and  Melatonin  These medications can contribute to hypertension  2  Hyperlipidemia- -  Instructed on heart healthy diet handout information provided counseling on eating fresh is best using extra virgin olive oil  3  Obesity BMI 34 2- Calorie control, using an I Phone ALISON to control calorie consumption and aid in weight loss  Exercise 30 minutes 5 times a week

## 2019-08-07 ENCOUNTER — OFFICE VISIT (OUTPATIENT)
Dept: FAMILY MEDICINE CLINIC | Facility: CLINIC | Age: 34
End: 2019-08-07

## 2019-08-07 ENCOUNTER — HOSPITAL ENCOUNTER (EMERGENCY)
Facility: HOSPITAL | Age: 34
Discharge: HOME | End: 2019-08-07
Attending: EMERGENCY MEDICINE | Admitting: EMERGENCY MEDICINE

## 2019-08-07 ENCOUNTER — TRANSCRIBE ORDERS (OUTPATIENT)
Dept: ADMINISTRATIVE | Facility: HOSPITAL | Age: 34
End: 2019-08-07

## 2019-08-07 VITALS
SYSTOLIC BLOOD PRESSURE: 118 MMHG | DIASTOLIC BLOOD PRESSURE: 76 MMHG | HEIGHT: 62 IN | RESPIRATION RATE: 16 BRPM | WEIGHT: 185.8 LBS | OXYGEN SATURATION: 98 % | BODY MASS INDEX: 34.19 KG/M2 | HEART RATE: 91 BPM | TEMPERATURE: 98.6 F

## 2019-08-07 VITALS
TEMPERATURE: 98.5 F | OXYGEN SATURATION: 98 % | BODY MASS INDEX: 34.31 KG/M2 | DIASTOLIC BLOOD PRESSURE: 99 MMHG | WEIGHT: 187.61 LBS | RESPIRATION RATE: 18 BRPM | SYSTOLIC BLOOD PRESSURE: 137 MMHG | HEART RATE: 97 BPM

## 2019-08-07 DIAGNOSIS — N64.4 BREAST PAIN, RIGHT: Primary | ICD-10-CM

## 2019-08-07 LAB
EXT PREG TEST URINE: NEGATIVE
EXT. CONTROL ED NAV: NORMAL

## 2019-08-07 PROCEDURE — 99283 EMERGENCY DEPT VISIT LOW MDM: CPT | Performed by: EMERGENCY MEDICINE

## 2019-08-07 PROCEDURE — 99283 EMERGENCY DEPT VISIT LOW MDM: CPT

## 2019-08-07 PROCEDURE — 99213 OFFICE O/P EST LOW 20 MIN: CPT | Performed by: PHYSICIAN ASSISTANT

## 2019-08-07 PROCEDURE — 81025 URINE PREGNANCY TEST: CPT | Performed by: STUDENT IN AN ORGANIZED HEALTH CARE EDUCATION/TRAINING PROGRAM

## 2019-08-07 RX ORDER — COVID-19 ANTIGEN TEST
220 KIT MISCELLANEOUS 2 TIMES DAILY PRN
COMMUNITY
End: 2020-03-02

## 2019-08-07 NOTE — ED PROVIDER NOTES
History  Chief Complaint   Patient presents with    Breast Pain     Patient complains of gradual increase pain in her right breast       This is a 68-year-old female with a past medical history of GERD and hypertension who presents to the emergency department this morning with right breast pain that started yesterday  Patient states that the pain came on gradually and she denies any trauma to the area  The pain is got to the point where she can no longer sleep on her stomach and has difficulty wearing a seatbelt because of the pain  She denies any fevers, chills, nausea, vomiting, chest pain, shortness of breath  Patient was seen here last month for chest pain states that this feels completely different  Patient states that her menstrual period is due in four days and she denies any possibility of pregnancy  Patient does not take any birth control  She denies any previous history of breast problems and does not do any self breast exams although she does get the annual exam at her gynecologist   Patient denies any history of breast cancer or ovarian cancer in the family  She denies any nipple discharge  Prior to Admission Medications   Prescriptions Last Dose Informant Patient Reported? Taking?    amLODIPine (NORVASC) 5 mg tablet 8/6/2019 at Unknown time Self No Yes   Sig: Take 1 tablet (5 mg total) by mouth daily   pantoprazole (PROTONIX) 20 mg tablet Unknown at Unknown time Self No No   Sig: Take 1 tablet (20 mg total) by mouth daily   Patient taking differently: Take 20 mg by mouth as needed       Facility-Administered Medications: None       Past Medical History:   Diagnosis Date    Chest pain     GERD (gastroesophageal reflux disease)     Hypertension        Past Surgical History:   Procedure Laterality Date    CARPAL TUNNEL RELEASE      TUBAL LIGATION         Family History   Problem Relation Age of Onset    Asthma Mother     Hypertension Mother     Breast cancer Neg Hx     Cancer Neg Hx      I have reviewed and agree with the history as documented  Social History     Tobacco Use    Smoking status: Never Smoker    Smokeless tobacco: Never Used   Substance Use Topics    Alcohol use: Not Currently     Frequency: 2-4 times a month     Drinks per session: 1 or 2    Drug use: Never        Review of Systems   Constitutional: Negative for chills, fatigue and fever  HENT: Negative for congestion, rhinorrhea, sinus pressure and sore throat  Eyes: Negative for visual disturbance  Respiratory: Negative for cough and shortness of breath  Cardiovascular: Negative for chest pain  Gastrointestinal: Negative for abdominal pain, constipation, diarrhea, nausea and vomiting  Genitourinary: Negative for dysuria, frequency, hematuria and urgency  Breast pain on the right   Musculoskeletal: Negative for arthralgias and myalgias  Skin: Negative for color change and rash  Neurological: Negative for dizziness, light-headedness and numbness  Physical Exam  ED Triage Vitals   Temperature Pulse Respirations Blood Pressure SpO2   08/07/19 0742 08/07/19 0744 08/07/19 0744 08/07/19 0744 08/07/19 0744   98 5 °F (36 9 °C) 97 18 137/99 98 %      Temp Source Heart Rate Source Patient Position - Orthostatic VS BP Location FiO2 (%)   08/07/19 0742 08/07/19 0744 08/07/19 0744 08/07/19 0744 --   Oral Monitor Sitting Right arm       Pain Score       08/07/19 0744       Worst Possible Pain             Orthostatic Vital Signs  Vitals:    08/07/19 0744   BP: 137/99   Pulse: 97   Patient Position - Orthostatic VS: Sitting       Physical Exam   Constitutional: She is oriented to person, place, and time  She appears well-developed and well-nourished  No distress  HENT:   Head: Normocephalic and atraumatic  Eyes: Pupils are equal, round, and reactive to light  Conjunctivae and EOM are normal  Right eye exhibits no discharge  Left eye exhibits no discharge  No scleral icterus     Neck: Normal range of motion  Neck supple  No JVD present  Cardiovascular: Normal rate, regular rhythm and normal heart sounds  Exam reveals no gallop and no friction rub  No murmur heard  Pulmonary/Chest: Effort normal and breath sounds normal  No stridor  No respiratory distress  She has no wheezes  She has no rales  Abdominal: Soft  Bowel sounds are normal  She exhibits no distension  There is no tenderness  There is no guarding  Genitourinary:   Genitourinary Comments: Breast exam done with female ER nurse in the room as a standby  The bilateral breasts appear symmetric with no nipple inversion or changes  No axillary lymph nodes on the right side and no obvious nodules but she is very tender to palpation around the right nipple  No fluctuance  Musculoskeletal: Normal range of motion  She exhibits no edema, tenderness or deformity  Neurological: She is alert and oriented to person, place, and time  No cranial nerve deficit or sensory deficit  She exhibits normal muscle tone  Skin: Skin is warm and dry  No rash noted  She is not diaphoretic  No erythema  No pallor  No overlying skin changes on either breast    Psychiatric: She has a normal mood and affect  Her behavior is normal    Nursing note and vitals reviewed  ED Medications  Medications - No data to display    Diagnostic Studies  Results Reviewed     Procedure Component Value Units Date/Time    POCT pregnancy, urine [[id number]]  (Normal) Resulted:  08/07/19 0818    Lab Status:  Final result Updated:  08/07/19 0818     EXT PREG TEST UR (Ref: Negative) negative     Control valid                 No orders to display         Procedures  Procedures        ED Course                               MDM  Number of Diagnoses or Management Options  Breast pain, right:   Diagnosis management comments: Patient's breast pain likely secondary to fibrocystic changes of the breast   Do not suspect abscess at this time    Patient was discharged home in good condition with instructions to follow up with the breast clinic in [facility]  Patient expressed understanding and agreement with the plan and she was given return precautions should she develop any new or otherwise concerning symptoms  Disposition  Final diagnoses:   Breast pain, right     Time reflects when diagnosis was documented in both MDM as applicable and the Disposition within this note     Time User Action Codes Description Comment    8/7/2019  8:42 AM Donita Wolfe Add [N64 4] Breast pain, right       ED Disposition     ED Disposition Condition Date/Time Comment    Discharge Stable Wed Aug 7, 2019  8:42 AM Watson Kincaid discharge to home/self care  Follow-up Information     Follow up With Specialties Details Why Contact Info Additional Information    [address] [place] [address] Emergency Department Emergency Medicine   [address] [facility] [address]  [phone]  ED, [address], [place], [address] Lab   [address] [facility] [address]  [address] [address]  [address] LAB [facility], [place], [address]          Discharge Medication List as of 8/7/2019  8:49 AM      CONTINUE these medications which have NOT CHANGED    Details   amLODIPine (NORVASC) 5 mg tablet Take 1 tablet (5 mg total) by mouth daily, Starting Thu 5/9/2019, Normal      pantoprazole (PROTONIX) 20 mg tablet Take 1 tablet (20 mg total) by mouth daily, Starting Thu 4/25/2019, Normal           No discharge procedures on file  ED Provider  Attending physically available and evaluated Watson Kincaid I managed the patient along with the ED Attending      Electronically Signed by         Mikayla Valdez MD  08/07/19 [id number]

## 2019-08-07 NOTE — PROGRESS NOTES
Assessment/Plan:    No problem-specific Assessment & Plan notes found for this encounter  Problem List Items Addressed This Visit     None      Visit Diagnoses     Breast pain, right    -  Primary    Relevant Orders    US breast right limited (diagnostic)        continue with Aleve recommend 2 tablets twice a day  Discussed trying to avoid caffeine to help with breast pain  She will schedule ultrasound  Subjective:      Patient ID: Wing Maki is a 26 y o  female  HPI  22-year-old female here for a follow-up from the emergency room today for right breast pain  Pain began yesterday  She denies any nipple discharge or rashes  She is due for her menstrual cycle another 4 days  She took 3 tablets of Aleve to help with the pain  She feels that tenderness to the breast at 5 o'clock  She feels like the breasts have year there  She also notes she has been feeling tired for last 2 days  The following portions of the patient's history were reviewed and updated as appropriate:   She  has a past medical history of Chest pain, GERD (gastroesophageal reflux disease), and Hypertension  She   Patient Active Problem List    Diagnosis Date Noted    Insomnia 06/10/2019    Hypertension 05/09/2019    Hyperlipidemia 05/09/2019    Back pain 05/09/2019    Encounter for annual routine gynecological examination 05/01/2019    GERD (gastroesophageal reflux disease) 04/25/2019    Obesity 04/25/2019     She  has a past surgical history that includes Tubal ligation and Carpal tunnel release  Her family history includes Asthma in her mother; Hypertension in her mother  She  reports that she has never smoked  She has never used smokeless tobacco  She reports that she drank alcohol  She reports that she does not use drugs    Current Outpatient Medications   Medication Sig Dispense Refill    amLODIPine (NORVASC) 5 mg tablet Take 1 tablet (5 mg total) by mouth daily 30 tablet 2    Naproxen Sodium (ALEVE) 220 MG CAPS Take 220 mg by mouth 2 (two) times a day as needed (does not take everyday; 2 tablets at a time)      pantoprazole (PROTONIX) 20 mg tablet Take 1 tablet (20 mg total) by mouth daily (Patient taking differently: Take 20 mg by mouth as needed ) 30 tablet 2     No current facility-administered medications for this visit  Current Outpatient Medications on File Prior to Visit   Medication Sig    amLODIPine (NORVASC) 5 mg tablet Take 1 tablet (5 mg total) by mouth daily    Naproxen Sodium (ALEVE) 220 MG CAPS Take 220 mg by mouth 2 (two) times a day as needed (does not take everyday; 2 tablets at a time)    pantoprazole (PROTONIX) 20 mg tablet Take 1 tablet (20 mg total) by mouth daily (Patient taking differently: Take 20 mg by mouth as needed )     No current facility-administered medications on file prior to visit  She has No Known Allergies       Review of Systems   Constitutional: Positive for fatigue  Negative for chills and fever  Respiratory: Negative for shortness of breath  Cardiovascular: Negative for chest pain  Genitourinary: Negative for menstrual problem  Skin: Positive for rash  Neurological: Positive for headaches  Hematological: Negative for adenopathy  Objective:      /76 (BP Location: Right arm, Patient Position: Sitting, Cuff Size: Adult)   Pulse 91   Temp 98 6 °F (37 °C) (Tympanic)   Resp 16   Ht 5' 2" (1 575 m)   Wt 84 3 kg (185 lb 12 8 oz)   SpO2 98%   BMI 33 98 kg/m²          Physical Exam   Constitutional: She is oriented to person, place, and time  She appears well-developed and well-nourished  No distress  HENT:   Head: Normocephalic and atraumatic  Right Ear: External ear normal    Left Ear: External ear normal    Eyes: Conjunctivae are normal    Neck: Normal range of motion  Neck supple  No thyromegaly present  Cardiovascular: Normal rate, regular rhythm and normal heart sounds  No murmur heard    Pulmonary/Chest: Effort normal and breath sounds normal  No respiratory distress  She has no wheezes  Genitourinary:   Genitourinary Comments: Breast tenderness without mass at 5 o'clock right breast nipple   Lymphadenopathy:     She has no cervical adenopathy  Neurological: She is alert and oriented to person, place, and time  Skin: Skin is warm  Rash noted  Psychiatric: She has a normal mood and affect  Her behavior is normal    Nursing note and vitals reviewed

## 2019-08-08 ENCOUNTER — TELEPHONE (OUTPATIENT)
Dept: FAMILY MEDICINE CLINIC | Facility: CLINIC | Age: 34
End: 2019-08-08

## 2019-08-08 DIAGNOSIS — N64.4 BREAST PAIN: Primary | ICD-10-CM

## 2019-08-08 RX ORDER — SULFAMETHOXAZOLE AND TRIMETHOPRIM 800; 160 MG/1; MG/1
1 TABLET ORAL EVERY 12 HOURS SCHEDULED
Qty: 20 TABLET | Refills: 0 | Status: SHIPPED | OUTPATIENT
Start: 2019-08-08 | End: 2019-08-18

## 2019-08-08 NOTE — TELEPHONE ENCOUNTER
I am calling in bactrim for her to start taking   It is possible she may have infection under the skin that is causing the pain, if she wants work note for today and tomorrow I can write that out too

## 2019-08-08 NOTE — TELEPHONE ENCOUNTER
Patient called stating she has a red spot on her right breast now  Yesterday it was hot but not red  Now its red and painful and she wants to know what should she do  She would like a call back   She works two jobs and its hard for her with this pain    Call her at [phone]

## 2019-08-12 ENCOUNTER — HOSPITAL ENCOUNTER (OUTPATIENT)
Dept: MAMMOGRAPHY | Facility: CLINIC | Age: 34
Discharge: HOME | End: 2019-08-12

## 2019-08-12 ENCOUNTER — HOSPITAL ENCOUNTER (OUTPATIENT)
Dept: ULTRASOUND IMAGING | Facility: CLINIC | Age: 34
Discharge: HOME | End: 2019-08-12

## 2019-08-12 VITALS — WEIGHT: 185 LBS | BODY MASS INDEX: 34.04 KG/M2 | HEIGHT: 62 IN

## 2019-08-12 DIAGNOSIS — N64.4 BREAST PAIN: ICD-10-CM

## 2019-08-12 DIAGNOSIS — N64.4 BREAST PAIN, RIGHT: ICD-10-CM

## 2019-08-12 PROCEDURE — 76642 ULTRASOUND BREAST LIMITED: CPT

## 2019-08-12 PROCEDURE — G0279 TOMOSYNTHESIS, MAMMO: HCPCS

## 2019-08-12 PROCEDURE — 77066 DX MAMMO INCL CAD BI: CPT

## 2019-08-16 NOTE — RESULT ENCOUNTER NOTE
Breast ultrasound came back normal   It is possible she could be been getting infection and that was the pain she was getting  Has it resolved now?

## 2019-08-21 DIAGNOSIS — N64.4 BREAST PAIN, RIGHT: Primary | ICD-10-CM

## 2019-08-21 RX ORDER — SULFAMETHOXAZOLE AND TRIMETHOPRIM 800; 160 MG/1; MG/1
1 TABLET ORAL EVERY 12 HOURS SCHEDULED
Qty: 10 TABLET | Refills: 0 | Status: SHIPPED | OUTPATIENT
Start: 2019-08-21 | End: 2019-08-22 | Stop reason: SDUPTHER

## 2019-08-22 DIAGNOSIS — N64.4 BREAST PAIN, RIGHT: ICD-10-CM

## 2019-08-22 DIAGNOSIS — I10 HYPERTENSION, UNSPECIFIED TYPE: ICD-10-CM

## 2019-08-22 NOTE — TELEPHONE ENCOUNTER
Pt stated she had requested [facility] pharmacy be deleted from chart  It has been deleted  Can you please resend antibiotic to [facility]?  Thanks so much

## 2019-08-23 RX ORDER — AMLODIPINE BESYLATE 5 MG/1
TABLET ORAL
Qty: 30 TABLET | Refills: 2 | Status: SHIPPED | OUTPATIENT
Start: 2019-08-23 | End: 2019-12-24 | Stop reason: SDUPTHER

## 2019-08-23 RX ORDER — SULFAMETHOXAZOLE AND TRIMETHOPRIM 800; 160 MG/1; MG/1
1 TABLET ORAL EVERY 12 HOURS SCHEDULED
Qty: 10 TABLET | Refills: 0 | Status: SHIPPED | OUTPATIENT
Start: 2019-08-23 | End: 2019-08-28

## 2019-09-08 NOTE — PROGRESS NOTES
Assessment/Plan:    Breast pain, right  Treat with Keflex 500mg BID  Warm Compresses  Refer to breast surgeon for possible drainage  Reassured patient that mammo and US do not show evidence of malignancy  Continue with Tylenol for pain control  Diagnoses and all orders for this visit:    Breast pain, right  -     cephalexin (KEFLEX) 500 mg capsule; Take 1 capsule (500 mg total) by mouth every 12 (twelve) hours for 7 days  -     Ambulatory referral to General Surgery; Future          Subjective:      Patient ID: Khoa Alves is a 26 y o  female  Patient is a 42-year-old female with past medical history significant for HLD, GERD, obesity, HTN who presents for follow-up  R Breast pain  She has been worked up by her PCP via mammograms and ultrasound (both completed in August and both been normal)  Patient was then started on Bactrim for breast lesion which she also completed however patient continues to report breast discomfort and pain  Denies nipple discharge  Denies personal or Fhx of breast cancer  Now reports fevers, chills, accompanied with intermittent nausea  Denies vomiting  Review of Systems   Constitutional: Negative for chills and fever  Respiratory: Negative for cough and shortness of breath  Cardiovascular: Negative for chest pain and palpitations  Gastrointestinal: Positive for nausea  Endocrine: Negative for polyphagia and polyuria  Musculoskeletal: Negative for back pain  Neurological: Negative for light-headedness, numbness and headaches  Objective:      /80 (BP Location: Right arm, Patient Position: Sitting, Cuff Size: Large)   Pulse 85   Temp 98 °F (36 7 °C) (Temporal)   Resp 16   Wt 87 1 kg (192 lb)   SpO2 98%   BMI 35 12 kg/m²          Physical Exam   Constitutional: She appears well-developed and well-nourished  HENT:   Head: Normocephalic and atraumatic  Eyes: EOM are normal    Neck: Normal range of motion  Neck supple  Cardiovascular: Normal rate and normal heart sounds  Pulmonary/Chest: Effort normal and breath sounds normal  No respiratory distress  Right breast exhibits mass and tenderness  Right breast exhibits no nipple discharge  Left breast exhibits no inverted nipple, no mass and no nipple discharge  There is breast swelling  Breast exam performed with chaperone Dr Jessica Luo     Right breast with engorged, erythematous and cracked nipple  Underneath the nipple palpated 0 5 x 0 5 cm soft lesion  No evidence of nipple discharge    Left breast:  No evidence of erythema, discharge or engorged nipple  Abdominal: Soft  Bowel sounds are normal  She exhibits no distension  Neurological: She is alert  Skin: Skin is warm and dry  Psychiatric: She has a normal mood and affect

## 2019-09-09 ENCOUNTER — OFFICE VISIT (OUTPATIENT)
Dept: FAMILY MEDICINE CLINIC | Facility: CLINIC | Age: 34
End: 2019-09-09

## 2019-09-09 VITALS
RESPIRATION RATE: 16 BRPM | WEIGHT: 192 LBS | TEMPERATURE: 98 F | OXYGEN SATURATION: 98 % | SYSTOLIC BLOOD PRESSURE: 112 MMHG | HEART RATE: 85 BPM | BODY MASS INDEX: 35.12 KG/M2 | DIASTOLIC BLOOD PRESSURE: 80 MMHG

## 2019-09-09 DIAGNOSIS — N64.4 BREAST PAIN, RIGHT: Primary | ICD-10-CM

## 2019-09-09 PROBLEM — M54.9 BACK PAIN: Status: RESOLVED | Noted: 2019-05-09 | Resolved: 2019-09-09

## 2019-09-09 PROBLEM — Z01.419 ENCOUNTER FOR ANNUAL ROUTINE GYNECOLOGICAL EXAMINATION: Status: RESOLVED | Noted: 2019-05-01 | Resolved: 2019-09-09

## 2019-09-09 PROCEDURE — 99213 OFFICE O/P EST LOW 20 MIN: CPT | Performed by: FAMILY MEDICINE

## 2019-09-09 RX ORDER — CEPHALEXIN 500 MG/1
500 CAPSULE ORAL EVERY 12 HOURS SCHEDULED
Qty: 14 CAPSULE | Refills: 0 | Status: SHIPPED | OUTPATIENT
Start: 2019-09-09 | End: 2019-09-16

## 2019-09-09 NOTE — ASSESSMENT & PLAN NOTE
Treat with Keflex 500mg BID  Warm Compresses  Refer to breast surgeon for possible drainage  Reassured patient that mammo and US do not show evidence of malignancy  Continue with Tylenol for pain control

## 2019-09-16 ENCOUNTER — CONSULT (OUTPATIENT)
Dept: SURGERY | Facility: CLINIC | Age: 34
End: 2019-09-16

## 2019-09-16 VITALS
TEMPERATURE: 98.7 F | DIASTOLIC BLOOD PRESSURE: 78 MMHG | WEIGHT: 188.2 LBS | HEIGHT: 62 IN | BODY MASS INDEX: 34.63 KG/M2 | HEART RATE: 80 BPM | RESPIRATION RATE: 16 BRPM | SYSTOLIC BLOOD PRESSURE: 118 MMHG

## 2019-09-16 DIAGNOSIS — K58.9 IBS (IRRITABLE BOWEL SYNDROME): ICD-10-CM

## 2019-09-16 DIAGNOSIS — R10.9 ABDOMINAL PAIN, UNSPECIFIED ABDOMINAL LOCATION: ICD-10-CM

## 2019-09-16 DIAGNOSIS — N64.4 BREAST PAIN, RIGHT: Primary | ICD-10-CM

## 2019-09-16 PROCEDURE — 99244 OFF/OP CNSLTJ NEW/EST MOD 40: CPT | Performed by: PHYSICIAN ASSISTANT

## 2019-09-16 NOTE — PROGRESS NOTES
Assessment/Plan:   Melanie Louie is a 26 y  o female who is here for a breast concern  Patient with bilateral breast pain for 1 month  Right worse than left   Right breast with skin changes and redness  Patient was started on oral antibiotics with improvement  On exam and upon review of recent breast imaging: [facility] 1, no abnormality noted  Some fibrous cystic changes both breasts but no discrete mass    Patient also c/o abdominal bloating and loose stool after eating  Occasionally associated with nausea    Plan: Probable mastitis that is resolving  Continue to monitor for changes if reoccurs return to office with follow up    GI referal for evaluation of possible acid reflux and irritable bowel syndrome    Physical Exam   Pulmonary/Chest:           ___________  HPI:  Melanie Louie is a 26 y  o female who was referred for evaluation of No chief complaint on file       Currently: breast pain  Duration symptoms:1 month     Symptoms:   negative for breast lumps    Most recent mammogram: Bi-RAD[facility] 1    Abdominal pain associated with nausea, abdominal bloating and loose stool    Previous breast biopsy: None reported    Family history: None    ROS:  General ROS: negative  negative for - chills, fatigue, fever or night sweats, weight loss  Respiratory ROS: no cough, shortness of breath, or wheezing  Cardiovascular ROS: no chest pain or dyspnea on exertion  Genito-Urinary ROS: no dysuria, trouble voiding, or hematuria  Musculoskeletal ROS: negative for - gait disturbance, joint pain or muscle pain  Neurological ROS: no TIA or stroke symptoms  Breast ROS: see HPI  GI ROS: see HPI  Skin ROS: no new rashes or lesions   Lymphatic ROS: no new adenopathy noted by pt     GYN ROS: see HPI, no new GYN history or bleeding noted  Psy ROS: no new mental or behavioral disturbances               Patient Active Problem List   Diagnosis    GERD (gastroesophageal reflux disease)    Obesity    Hypertension    Hyperlipidemia  Insomnia    Breast pain, right         Allergies: Patient has no known allergies  Meds:   Current Outpatient Medications:     amLODIPine (NORVASC) 5 mg tablet, TAKE 1 TABLET BY MOUTH EVERY DAY, Disp: 30 tablet, Rfl: 2    cephalexin (KEFLEX) 500 mg capsule, Take 1 capsule (500 mg total) by mouth every 12 (twelve) hours for 7 days, Disp: 14 capsule, Rfl: 0    Naproxen Sodium (ALEVE) 220 MG CAPS, Take 220 mg by mouth 2 (two) times a day as needed (does not take everyday; 2 tablets at a time), Disp: , Rfl:     pantoprazole (PROTONIX) 20 mg tablet, Take 1 tablet (20 mg total) by mouth daily (Patient not taking: Reported on 9/9/2019), Disp: 30 tablet, Rfl: 2    PMH:   Past Medical History:   Diagnosis Date    Chest pain     GERD (gastroesophageal reflux disease)     Hypertension        PSHx:   Past Surgical History:   Procedure Laterality Date    CARPAL TUNNEL RELEASE      TUBAL LIGATION         Family History:   Family History   Problem Relation Age of Onset    Asthma Mother     Hypertension Mother     No Known Problems Sister     No Known Problems Daughter     No Known Problems Maternal Grandmother     No Known Problems Paternal Grandmother     No Known Problems Maternal Aunt     No Known Problems Maternal Aunt     No Known Problems Paternal Aunt     No Known Problems Paternal Aunt     No Known Problems Paternal Aunt     Diabetes Paternal Aunt     Breast cancer Neg Hx     Cancer Neg Hx        Social History:  reports that she has never smoked  She has never used smokeless tobacco  She reports that she drank alcohol  She reports that she does not use drugs  Imaging: No new pertinent imaging studies         Lab Results   Component Value Date    WBC 5 30 07/05/2019    HGB 14 3 07/05/2019    HCT 41 2 07/05/2019    MCV 90 07/05/2019     07/05/2019     Lab Results   Component Value Date    K 4 0 07/05/2019     07/05/2019    CO2 24 07/05/2019    BUN 14 07/05/2019 CREATININE 0 62 07/05/2019    GLUF 90 04/26/2019    CALCIUM 8 8 07/05/2019    AST 9 07/05/2019    ALT 23 07/05/2019    ALKPHOS 54 07/05/2019    EGFR 117 07/05/2019       Vitals:    09/16/19 0800   BP: 118/78   Pulse: 80   Resp: 16   Temp: 98 7 °F (37 1 °C)          PHYSICAL EXAM  General Appearance:    Alert, cooperative, no distress,   Head:    Normocephalic without obvious abnormality   Eyes:    PERRL, conjunctiva/corneas clear, EOM's intact        Neck:   Supple, no adenopathy, no JVD   Back:     Symmetric, no spinal or CVA tenderness   Lungs:     Clear to auscultation bilaterally, no wheezing or rhonchi   Heart:  Breast:    Regular rate and rhythm, S1 and S2 normal, no murmur    normal appearance, no masses or tenderness, breasts appear normal, no suspicious masses, no skin or nipple changes or axillary nodes, symmetric fibrous changes in both upper outer quadrants  Abdomen:     Soft, nontender   Extremities:   Extremities normal  No clubbing, cyanosis or edema   Psych:   Normal Affect, AOx3  Neurologic:  Skin:   CNII-XII intact  Strength symmetric, speech intact    Warm, dry, intact, no visible rashes or lesions                       Signature:     Bhavin Alexander MD    Date: 9/16/2019 Time: 8:49 AM                 Some portions of this record may have been generated with voice recognition software  There may be translation, syntax,  or grammatical errors  Occasional wrong word or "sound-a-like" substitutions may have occurred due to the inherent limitations of the voice recognition software  Read the chart carefully and recognize, using context, where substitutions may have occurred  If you have any questions, please contact the dictating provider for clarification or correction, as needed  This encounter has been coded by a physician, non certified coder

## 2019-10-18 NOTE — PROGRESS NOTES
Assessment/Plan:    Breast pain  US to r/o abscess specifically to R breast 12 o clock position  Treat with Clindamycin 4 times daily for 14 days  Take with probiotics  Warned about C  Diff  Consult Gen  Surgery  Diagnoses and all orders for this visit:    Breast pain  -     US breast right limited (diagnostic); Future  -     US breast left limited (diagnostic); Future  -     clindamycin (CLEOCIN) 300 MG capsule; Take 1 capsule (300 mg total) by mouth 4 (four) times a day for 14 days  -     Ambulatory referral to General Surgery; Future  -     saccharomyces boulardii (FLORASTOR) 250 mg capsule; Take 1 capsule (250 mg total) by mouth 2 (two) times a day          Subjective:      Patient ID: Atiya Schaffer is a 26 y o  female  Patient is a 18-year-old female who returns to office with chief complaint of right and left breast pain  Right breast pain  Patient was seen by me in September, 2019 due to this problem  Patient was previously seen by her PCP and given Bactrim for right breast infection  She also underwent MRI in August, 2019 which did not show evidence of cancer  After patient saw me in September, 2019 patient was also referred to General surgery for evaluation  Review General surgery's note:  Resolving mastitis and continue with antibiotics as prescribed  Bilateral breast pain  Today patient reports pain and warmth to both breasts  Reports completing the course of Keflex  Denies any injury or trauma to the breast   Reports pain wakes her up at night  Denies fevers, chills  + nausea  Denies vomiting  Denies risky sexual behavior  Does not breast feed  Review of Systems   Constitutional: Negative for chills and fever  Respiratory: Negative for cough and shortness of breath  Cardiovascular: Negative for chest pain and palpitations  Endocrine: Negative for polyphagia and polyuria  Musculoskeletal: Negative for back pain     Neurological: Negative for light-headedness, numbness and headaches  Objective:      /80 (BP Location: Right arm, Patient Position: Sitting, Cuff Size: Standard)   Pulse 80   Temp 97 5 °F (36 4 °C) (Temporal)   Resp 18   Ht 5' 2" (1 575 m)   Wt 86 2 kg (190 lb)   LMP 10/19/2019 (Exact Date)   SpO2 98%   BMI 34 75 kg/m²          Physical Exam   Constitutional: She appears well-developed and well-nourished  HENT:   Head: Normocephalic and atraumatic  Eyes: EOM are normal    Pulmonary/Chest: Effort normal  No respiratory distress  Breast exam performed with chaperone July    Right breast  Tenderness to palpation at 12:00 position with hard induration  +warmth and erythema of nipple  No nipple discharge/dimpling or streaking  Left breast  No TTP  +warmth and erythema of nipple  No nipple discharge/dimpling or streaking  Neurological: She is alert  Skin: Skin is warm  Capillary refill takes less than 2 seconds

## 2019-10-21 ENCOUNTER — OFFICE VISIT (OUTPATIENT)
Dept: FAMILY MEDICINE CLINIC | Facility: CLINIC | Age: 34
End: 2019-10-21

## 2019-10-21 VITALS
WEIGHT: 190 LBS | OXYGEN SATURATION: 98 % | HEART RATE: 80 BPM | SYSTOLIC BLOOD PRESSURE: 122 MMHG | RESPIRATION RATE: 18 BRPM | HEIGHT: 62 IN | DIASTOLIC BLOOD PRESSURE: 80 MMHG | BODY MASS INDEX: 34.96 KG/M2 | TEMPERATURE: 97.5 F

## 2019-10-21 DIAGNOSIS — N64.4 BREAST PAIN: Primary | ICD-10-CM

## 2019-10-21 PROCEDURE — 99213 OFFICE O/P EST LOW 20 MIN: CPT | Performed by: FAMILY MEDICINE

## 2019-10-21 RX ORDER — CLINDAMYCIN HYDROCHLORIDE 300 MG/1
300 CAPSULE ORAL 4 TIMES DAILY
Qty: 56 CAPSULE | Refills: 0 | Status: SHIPPED | OUTPATIENT
Start: 2019-10-21 | End: 2019-11-04

## 2019-10-21 RX ORDER — SACCHAROMYCES BOULARDII 250 MG
250 CAPSULE ORAL 2 TIMES DAILY
Qty: 60 CAPSULE | Refills: 0 | Status: SHIPPED | OUTPATIENT
Start: 2019-10-21 | End: 2019-11-20

## 2019-10-21 NOTE — ASSESSMENT & PLAN NOTE
US to r/o abscess specifically to R breast 12 o clock position  Treat with Clindamycin 4 times daily for 14 days  Take with probiotics  Warned about C  Diff  Consult Gen  Surgery

## 2019-10-23 ENCOUNTER — OFFICE VISIT (OUTPATIENT)
Dept: GASTROENTEROLOGY | Facility: CLINIC | Age: 34
End: 2019-10-23

## 2019-10-23 VITALS
HEIGHT: 62 IN | HEART RATE: 73 BPM | TEMPERATURE: 98.7 F | DIASTOLIC BLOOD PRESSURE: 95 MMHG | WEIGHT: 190.4 LBS | SYSTOLIC BLOOD PRESSURE: 139 MMHG | BODY MASS INDEX: 35.04 KG/M2

## 2019-10-23 DIAGNOSIS — K58.9 IBS (IRRITABLE BOWEL SYNDROME): ICD-10-CM

## 2019-10-23 PROCEDURE — 99204 OFFICE O/P NEW MOD 45 MIN: CPT | Performed by: INTERNAL MEDICINE

## 2019-10-23 NOTE — PROGRESS NOTES
[facility] - Outpatient Consultation  Hardeep Leon 26 y o  female MRN: [id number]  Encounter: [id number]          ASSESSMENT AND PLAN:      20-year-old with history of hyperlipidemia, GERD, obesity, hypertension presents for evaluation of altered bowel function  1  Irritable bowel syndrome constipation type  Patient has no alarm signs or symptoms and her labs are normal   Will direct management to words relief of constipation since that helps relieve other symptoms as well  Counseled patient to increase fiber in diet by taking green leafy vegetables like spinach and lettuce and avoid red meats and fried foods  She should walk 30-60 minutes daily  Help passage of stool by getting squatty potty, taking at least 8-10 cups of water per day to improve hydration and take Dulcolax  Probiotics and peppermint could help with bloating and abdominal pain symptoms as well as avoiding lactose containing dairy products     Patient agreeable with plan of care  2  GERD  It is occasional symptoms and patient takes Pepcid p r n     Continue current management as well as help loose weight which should relieve heartburn symptoms as well  3  Obesity   Counseled patient to increase fiber in diet by taking green leafy vegetables like spinach and lettuce and avoid red meats and fried foods  She should walk 30-60 minutes daily  4  Headaches  Likely tension type or stress related  Counseled patient to change lifestyle as given above  And to avoid NSAIDs as much as possible  She can take Tylenol as needed to relieve headache but keep daily dose less than 3-4 g       Preventative healthcare  Will discuss getting screening for hepatitis C per USPSTF of guidelines at next visit  Encourage patient to get flu shot  Patient seen and staffed with attending, Dr Clay Bahena MD  Gastroenterology Fellow  [address]  Date: October 23, 2019      ______________________________________________________________________    HPI: 61-year-old with history of hyperlipidemia, GERD, obesity, hypertension presents for evaluation of altered bowel function  Patient says that for the last 5-6 months she has been having abdominal pain which is in the periumbilical area and radiates to the epigastrium and to the back  The episodes happen daily and unrelated to meal intake  She also feels bloated during the episodes  She has difficulty passing stool because they are hard consistency  No blood in stools  She takes MiraLax as needed which sometimes helps her  She denies any nausea, vomiting, weight loss  She has occasional heartburn which is controlled with Pepcid at on as-needed basis  She has bloating and diarrhea when she takes dairy products  She denies any family history of GI or liver cancer  She takes Advil about 4 times every week for headaches  She denies smoking cigarettes or drinking alcohol or using recreational drugs  No previous endoscopies done  On review of records patient has normal CBC and CMP done in July 2019  TSH was normal in April 2019  REVIEW OF SYSTEMS:    CONSTITUTIONAL: Denies any fever, chills, rigors, and weight loss  HEENT: No earache or tinnitus  Denies hearing loss or visual disturbances  CARDIOVASCULAR: No chest pain or palpitations  RESPIRATORY: Denies any cough, hemoptysis, shortness of breath or dyspnea on exertion  GASTROINTESTINAL: As noted in the History of Present Illness  GENITOURINARY: No problems with urination  Denies any hematuria or dysuria  NEUROLOGIC: No dizziness or vertigo, denies headaches  MUSCULOSKELETAL: Denies any muscle or joint pain  SKIN: Denies skin rashes or itching  ENDOCRINE: Denies excessive thirst  Denies intolerance to heat or cold  PSYCHOSOCIAL: Denies depression or anxiety  Denies any recent memory loss         Historical Information   Past Medical History: Diagnosis Date    Chest pain     GERD (gastroesophageal reflux disease)     Hypertension      Past Surgical History:   Procedure Laterality Date    CARPAL TUNNEL RELEASE      TUBAL LIGATION       Social History   Social History     Substance and Sexual Activity   Alcohol Use Not Currently    Frequency: 2-4 times a month    Drinks per session: 1 or 2     Social History     Substance and Sexual Activity   Drug Use Never     Social History     Tobacco Use   Smoking Status Never Smoker   Smokeless Tobacco Never Used     Family History   Problem Relation Age of Onset    Asthma Mother     Hypertension Mother     No Known Problems Sister     No Known Problems Daughter     No Known Problems Maternal Grandmother     No Known Problems Paternal Grandmother     No Known Problems Maternal Aunt     No Known Problems Maternal Aunt     No Known Problems Paternal Aunt     No Known Problems Paternal Aunt     No Known Problems Paternal Aunt     Diabetes Paternal Aunt     Breast cancer Neg Hx     Cancer Neg Hx        Meds/Allergies       Current Outpatient Medications:     amLODIPine (NORVASC) 5 mg tablet    clindamycin (CLEOCIN) 300 MG capsule    saccharomyces boulardii (FLORASTOR) 250 mg capsule    Naproxen Sodium (ALEVE) 220 MG CAPS    pantoprazole (PROTONIX) 20 mg tablet    Allergies   Allergen Reactions    No Known Allergies            Objective     Blood pressure 139/95, pulse 73, temperature 98 7 °F (37 1 °C), temperature source Tympanic, height 5' 2" (1 575 m), weight 86 4 kg (190 lb 6 4 oz), last menstrual period 10/19/2019, not currently breastfeeding  Body mass index is 34 82 kg/m²          PHYSICAL EXAM:      General Appearance:   Alert, cooperative, no distress   HEENT:   Normocephalic, atraumatic, anicteric      Neck:  Supple, symmetrical, trachea midline   Lungs:   Clear to auscultation bilaterally; no rales, rhonchi or wheezing; respirations unlabored    Heart[de-identified]   Regular rate and rhythm; no murmur, rub, or gallop  Abdomen:   Soft, non-tender, non-distended; normal bowel sounds; no masses, no organomegaly    Genitalia:   Deferred    Rectal:   Deferred    Extremities:  No cyanosis, clubbing or edema    Pulses:  2+ and symmetric    Skin:  No jaundice, rashes, or lesions    Lymph nodes:  No palpable cervical lymphadenopathy        Lab Results:   No visits with results within 1 Day(s) from this visit  Latest known visit with results is:   Admission on 08/07/2019, Discharged on 08/07/2019   Component Date Value    EXT PREG TEST UR (Ref: N* 08/07/2019 negative     Control 08/07/2019 valid          Radiology Results:   No results found

## 2019-11-01 ENCOUNTER — HOSPITAL ENCOUNTER (OUTPATIENT)
Dept: ULTRASOUND IMAGING | Facility: CLINIC | Age: 34
Discharge: HOME | End: 2019-11-01
Payer: COMMERCIAL

## 2019-11-01 ENCOUNTER — APPOINTMENT (OUTPATIENT)
Dept: ULTRASOUND IMAGING | Facility: CLINIC | Age: 34
End: 2019-11-01
Payer: COMMERCIAL

## 2019-11-01 DIAGNOSIS — N64.4 BREAST PAIN: ICD-10-CM

## 2019-11-01 PROCEDURE — 76642 ULTRASOUND BREAST LIMITED: CPT

## 2019-11-07 ENCOUNTER — OFFICE VISIT (OUTPATIENT)
Dept: SURGERY | Facility: CLINIC | Age: 34
End: 2019-11-07

## 2019-11-07 VITALS
WEIGHT: 190 LBS | HEART RATE: 85 BPM | DIASTOLIC BLOOD PRESSURE: 74 MMHG | HEIGHT: 62 IN | SYSTOLIC BLOOD PRESSURE: 116 MMHG | BODY MASS INDEX: 34.96 KG/M2

## 2019-11-07 DIAGNOSIS — N64.4 BREAST PAIN: Primary | ICD-10-CM

## 2019-11-07 PROBLEM — N60.19 FIBROCYSTIC BREAST DISEASE: Status: ACTIVE | Noted: 2019-11-07

## 2019-11-07 PROCEDURE — 99213 OFFICE O/P EST LOW 20 MIN: CPT | Performed by: SURGERY

## 2019-11-07 NOTE — PROGRESS NOTES
Assessment/Plan:  Fibrocystic change and breast pain bilaterally  Explained her the results of her ultrasound and mammogram, no concern for malignancy  On exam she does have fibrous breast consistent with fibrocystic change  Advised that there is no surgical treatment for this disease but lifestyle modifications can help including weight loss, limiting of caffeine and homeopathic remedies such as evening Primrose oil  She can follow up with her primary care physician, no surgical intervention necessary     Diagnoses and all orders for this visit:    Breast pain          Subjective:      Patient ID: Atiya Schaffer is a 26 y o  female  Ms Manuel Montana presents with breast pain since July  Initially the pain was mainly on the right side and was associated with erythema and pain  She saw her primary care doctor and was given a prescription for antibiotics  She saw a different family doctor and was given another prescription of antibiotics for an approximately 2 week course  She says the pain is not significantly improved but she does feel something on the left side as well as the right  The pain varies throughout the month and the lump in her breasts also change throughout the month  She was sent for a mammogram and bilateral ultrasounds which did not reveal any suspicious findings, both were BI-RADS 1  She denies any family history of breast cancer  She does say that she gained some weight recently and thinks the pain may have gotten worse since that time  The following portions of the patient's history were reviewed and updated as appropriate: allergies, current medications, past family history, past medical history, past social history, past surgical history and problem list     Review of Systems   Skin: Negative for color change and wound  All other systems reviewed and are negative  Imaging results were reviewed as well as previous office visit notes    Objective:      /74 (BP Location: Right arm, Patient Position: Sitting, Cuff Size: Standard)   Pulse 85   Ht 5' 2" (1 575 m)   Wt 86 2 kg (190 lb)   LMP 10/19/2019 (Exact Date)   Breastfeeding? No   BMI 34 75 kg/m²          Physical Exam   Constitutional: She is oriented to person, place, and time  She appears well-developed and well-nourished  HENT:   Head: Normocephalic  Right Ear: External ear normal    Left Ear: External ear normal    Nose: Nose normal    Mouth/Throat: Oropharynx is clear and moist    Eyes: Pupils are equal, round, and reactive to light  Conjunctivae and EOM are normal    Neck: Normal range of motion  Neck supple  Cardiovascular: Normal rate and regular rhythm  Pulmonary/Chest: Effort normal and breath sounds normal  No respiratory distress  She exhibits no deformity  Right breast exhibits no inverted nipple, no mass, no nipple discharge and no skin change  Left breast exhibits no inverted nipple, no mass, no nipple discharge and no skin change  No breast swelling or tenderness  Breasts are symmetrical        Abdominal: Soft  She exhibits no distension  There is no tenderness  Neurological: She is alert and oriented to person, place, and time  Skin: Skin is warm and dry  Psychiatric: She has a normal mood and affect   Her behavior is normal

## 2019-12-02 ENCOUNTER — OFFICE VISIT (OUTPATIENT)
Dept: FAMILY MEDICINE CLINIC | Facility: CLINIC | Age: 34
End: 2019-12-02

## 2019-12-02 VITALS
HEART RATE: 95 BPM | OXYGEN SATURATION: 98 % | SYSTOLIC BLOOD PRESSURE: 118 MMHG | RESPIRATION RATE: 18 BRPM | TEMPERATURE: 97.6 F | BODY MASS INDEX: 35.7 KG/M2 | HEIGHT: 62 IN | DIASTOLIC BLOOD PRESSURE: 72 MMHG | WEIGHT: 194 LBS

## 2019-12-02 DIAGNOSIS — J00 ACUTE RHINITIS: Primary | ICD-10-CM

## 2019-12-02 PROCEDURE — 99213 OFFICE O/P EST LOW 20 MIN: CPT | Performed by: FAMILY MEDICINE

## 2019-12-02 RX ORDER — FLUTICASONE PROPIONATE 50 MCG
1 SPRAY, SUSPENSION (ML) NASAL DAILY
Qty: 1 BOTTLE | Refills: 0 | Status: SHIPPED | OUTPATIENT
Start: 2019-12-02 | End: 2020-01-24

## 2019-12-02 NOTE — ASSESSMENT & PLAN NOTE
Flonase QD; 3 weeks on, 1 week off  Discussed potential s/e of steroids  Advised allergy precautions such as vaccum cleaning the carpets weekly, washing bed sheets and changing pillows yearly  No soft toys in bed  Advised to shower immediately after coming home from outside for the day

## 2019-12-02 NOTE — PROGRESS NOTES
Assessment/Plan:    Acute rhinitis    Flonase QD; 3 weeks on, 1 week off  Discussed potential s/e of steroids  Advised allergy precautions such as vaccum cleaning the carpets weekly, washing bed sheets and changing pillows yearly  No soft toys in bed  Advised to shower immediately after coming home from outside for the day  Diagnoses and all orders for this visit:    Acute rhinitis  -     fluticasone (FLONASE) 50 mcg/act nasal spray; 1 spray into each nostril daily          Subjective:      Patient ID: Halle Rainey is a 26 y o  female  Patient is a 38-year-old female with no significant past medical history who returns for follow-up  Bilateral breast pain  Patient was seen by me previously in regards to breast pain  She was started on antibiotics and referred to General surgery  Today patient reports feeling a lot better  Denies any complaints regards to her breast pain  Sore throat/snoring  Patient is reporting 2 week history of snoring in her sleep  The symptoms are accompanied with sore throat and difficulty swallowing  She is also reporting runny nose and cough that is nonproductive  Denies fevers, chills  Denies any sick contacts  Denies prolonged history of snoring or choking in her sleep  Denies daytime fatigue  Review of Systems   Constitutional: Negative for chills and fever  HENT: Positive for rhinorrhea and sore throat  Respiratory: Negative for cough and shortness of breath  Cardiovascular: Negative for chest pain and palpitations  Endocrine: Negative for polyphagia and polyuria  Musculoskeletal: Negative for back pain  Neurological: Negative for light-headedness, numbness and headaches           Objective:      /72 (BP Location: Right arm, Patient Position: Sitting, Cuff Size: Standard)   Pulse 95   Temp 97 6 °F (36 4 °C) (Temporal)   Resp 18   Ht 5' 2" (1 575 m)   Wt 88 kg (194 lb)   LMP 11/10/2019 (Approximate)   SpO2 98% BMI 35 48 kg/m²          Physical Exam   Constitutional: She appears well-developed and well-nourished  HENT:   Head: Normocephalic and atraumatic  Right Ear: Hearing normal    Left Ear: Hearing normal    Nose: Mucosal edema and rhinorrhea present  Mouth/Throat: Uvula is midline, oropharynx is clear and moist and mucous membranes are normal  No oropharyngeal exudate  Erythematous, boggy nasal mucosa bilaterally  Clear nasal discharge  No facial tenderness on palpation    Unable to visualize bilateral ears due to near complete wax impaction   Eyes: EOM are normal    Neck: Normal range of motion  Neck supple  Cardiovascular: Normal rate and normal heart sounds  Pulmonary/Chest: Effort normal and breath sounds normal  No respiratory distress  Abdominal: Soft  Bowel sounds are normal  She exhibits no distension  Neurological: She is alert  Skin: Skin is warm and dry  Psychiatric: She has a normal mood and affect

## 2019-12-24 DIAGNOSIS — I10 HYPERTENSION, UNSPECIFIED TYPE: ICD-10-CM

## 2019-12-24 RX ORDER — AMLODIPINE BESYLATE 5 MG/1
TABLET ORAL
Qty: 30 TABLET | Refills: 2 | Status: SHIPPED | OUTPATIENT
Start: 2019-12-24 | End: 2020-03-25

## 2020-01-24 DIAGNOSIS — J00 ACUTE RHINITIS: ICD-10-CM

## 2020-01-24 RX ORDER — FLUTICASONE PROPIONATE 50 MCG
SPRAY, SUSPENSION (ML) NASAL
Qty: 16 ML | Refills: 1 | Status: SHIPPED | OUTPATIENT
Start: 2020-01-24 | End: 2020-03-24

## 2020-03-02 ENCOUNTER — HOSPITAL ENCOUNTER (EMERGENCY)
Facility: HOSPITAL | Age: 35
Discharge: HOME | End: 2020-03-02
Attending: EMERGENCY MEDICINE | Admitting: EMERGENCY MEDICINE

## 2020-03-02 ENCOUNTER — APPOINTMENT (EMERGENCY)
Dept: CT IMAGING | Facility: HOSPITAL | Age: 35
End: 2020-03-02

## 2020-03-02 VITALS
OXYGEN SATURATION: 99 % | DIASTOLIC BLOOD PRESSURE: 80 MMHG | SYSTOLIC BLOOD PRESSURE: 142 MMHG | HEART RATE: 87 BPM | RESPIRATION RATE: 16 BRPM | TEMPERATURE: 97.3 F | WEIGHT: 195.77 LBS | BODY MASS INDEX: 35.81 KG/M2

## 2020-03-02 DIAGNOSIS — R11.2 NAUSEA AND VOMITING: ICD-10-CM

## 2020-03-02 DIAGNOSIS — R10.31 RIGHT LOWER QUADRANT ABDOMINAL PAIN: Primary | ICD-10-CM

## 2020-03-02 LAB
ALBUMIN SERPL BCP-MCNC: 3.9 G/DL (ref 3.5–5)
ALP SERPL-CCNC: 56 U/L (ref 46–116)
ALT SERPL W P-5'-P-CCNC: 30 U/L (ref 12–78)
ANION GAP SERPL CALCULATED.3IONS-SCNC: 9 MMOL/L (ref 4–13)
AST SERPL W P-5'-P-CCNC: 19 U/L (ref 5–45)
BACTERIA UR QL AUTO: ABNORMAL /HPF
BASOPHILS # BLD AUTO: 0.06 THOUSANDS/ΜL (ref 0–0.1)
BASOPHILS NFR BLD AUTO: 1 % (ref 0–1)
BILIRUB SERPL-MCNC: 0.44 MG/DL (ref 0.2–1)
BILIRUB UR QL STRIP: NEGATIVE
BUN SERPL-MCNC: 11 MG/DL (ref 5–25)
CALCIUM SERPL-MCNC: 8.8 MG/DL (ref 8.3–10.1)
CHLORIDE SERPL-SCNC: 101 MMOL/L (ref 100–108)
CLARITY UR: CLEAR
CO2 SERPL-SCNC: 27 MMOL/L (ref 21–32)
COLOR UR: YELLOW
COLOR, POC: YELLOW
CREAT SERPL-MCNC: 0.65 MG/DL (ref 0.6–1.3)
EOSINOPHIL # BLD AUTO: 0.1 THOUSAND/ΜL (ref 0–0.61)
EOSINOPHIL NFR BLD AUTO: 2 % (ref 0–6)
ERYTHROCYTE [DISTWIDTH] IN BLOOD BY AUTOMATED COUNT: 11.9 % (ref 11.6–15.1)
EXT PREG TEST URINE: NEGATIVE
EXT. CONTROL ED NAV: NORMAL
GFR SERPL CREATININE-BSD FRML MDRD: 115 ML/MIN/1.73SQ M
GLUCOSE SERPL-MCNC: 104 MG/DL (ref 65–140)
GLUCOSE UR STRIP-MCNC: NEGATIVE MG/DL
HCT VFR BLD AUTO: 41.3 % (ref 34.8–46.1)
HGB BLD-MCNC: 13.9 G/DL (ref 11.5–15.4)
HGB UR QL STRIP.AUTO: ABNORMAL
IMM GRANULOCYTES # BLD AUTO: 0.01 THOUSAND/UL (ref 0–0.2)
IMM GRANULOCYTES NFR BLD AUTO: 0 % (ref 0–2)
KETONES UR STRIP-MCNC: NEGATIVE MG/DL
LEUKOCYTE ESTERASE UR QL STRIP: ABNORMAL
LIPASE SERPL-CCNC: 147 U/L (ref 73–393)
LYMPHOCYTES # BLD AUTO: 1.74 THOUSANDS/ΜL (ref 0.6–4.47)
LYMPHOCYTES NFR BLD AUTO: 32 % (ref 14–44)
MCH RBC QN AUTO: 30.1 PG (ref 26.8–34.3)
MCHC RBC AUTO-ENTMCNC: 33.7 G/DL (ref 31.4–37.4)
MCV RBC AUTO: 89 FL (ref 82–98)
MONOCYTES # BLD AUTO: 0.47 THOUSAND/ΜL (ref 0.17–1.22)
MONOCYTES NFR BLD AUTO: 9 % (ref 4–12)
NEUTROPHILS # BLD AUTO: 3.02 THOUSANDS/ΜL (ref 1.85–7.62)
NEUTS SEG NFR BLD AUTO: 56 % (ref 43–75)
NITRITE UR QL STRIP: NEGATIVE
NON-SQ EPI CELLS URNS QL MICRO: ABNORMAL /HPF
NRBC BLD AUTO-RTO: 0 /100 WBCS
PH UR STRIP.AUTO: 7 [PH] (ref 4.5–8)
PLATELET # BLD AUTO: 252 THOUSANDS/UL (ref 149–390)
PMV BLD AUTO: 10.1 FL (ref 8.9–12.7)
POTASSIUM SERPL-SCNC: 4.1 MMOL/L (ref 3.5–5.3)
PROT SERPL-MCNC: 7.6 G/DL (ref 6.4–8.2)
PROT UR STRIP-MCNC: NEGATIVE MG/DL
RBC # BLD AUTO: 4.62 MILLION/UL (ref 3.81–5.12)
RBC #/AREA URNS AUTO: ABNORMAL /HPF
SODIUM SERPL-SCNC: 137 MMOL/L (ref 136–145)
SP GR UR STRIP.AUTO: 1.01 (ref 1–1.03)
UROBILINOGEN UR QL STRIP.AUTO: 0.2 E.U./DL
WBC # BLD AUTO: 5.4 THOUSAND/UL (ref 4.31–10.16)
WBC #/AREA URNS AUTO: ABNORMAL /HPF

## 2020-03-02 PROCEDURE — 96375 TX/PRO/DX INJ NEW DRUG ADDON: CPT

## 2020-03-02 PROCEDURE — 99284 EMERGENCY DEPT VISIT MOD MDM: CPT | Performed by: EMERGENCY MEDICINE

## 2020-03-02 PROCEDURE — 36415 COLL VENOUS BLD VENIPUNCTURE: CPT | Performed by: EMERGENCY MEDICINE

## 2020-03-02 PROCEDURE — 96361 HYDRATE IV INFUSION ADD-ON: CPT

## 2020-03-02 PROCEDURE — 74177 CT ABD & PELVIS W/CONTRAST: CPT

## 2020-03-02 PROCEDURE — 81001 URINALYSIS AUTO W/SCOPE: CPT

## 2020-03-02 PROCEDURE — 80053 COMPREHEN METABOLIC PANEL: CPT | Performed by: EMERGENCY MEDICINE

## 2020-03-02 PROCEDURE — 83690 ASSAY OF LIPASE: CPT | Performed by: EMERGENCY MEDICINE

## 2020-03-02 PROCEDURE — 85025 COMPLETE CBC W/AUTO DIFF WBC: CPT | Performed by: EMERGENCY MEDICINE

## 2020-03-02 PROCEDURE — 99284 EMERGENCY DEPT VISIT MOD MDM: CPT

## 2020-03-02 PROCEDURE — 96374 THER/PROPH/DIAG INJ IV PUSH: CPT

## 2020-03-02 PROCEDURE — 81025 URINE PREGNANCY TEST: CPT | Performed by: EMERGENCY MEDICINE

## 2020-03-02 RX ORDER — DICYCLOMINE HCL 20 MG
20 TABLET ORAL 4 TIMES DAILY PRN
Qty: 12 TABLET | Refills: 0 | Status: SHIPPED | OUTPATIENT
Start: 2020-03-02 | End: 2020-04-23

## 2020-03-02 RX ORDER — ONDANSETRON 2 MG/ML
4 INJECTION INTRAMUSCULAR; INTRAVENOUS ONCE
Status: COMPLETED | OUTPATIENT
Start: 2020-03-02 | End: 2020-03-02

## 2020-03-02 RX ORDER — KETOROLAC TROMETHAMINE 30 MG/ML
15 INJECTION, SOLUTION INTRAMUSCULAR; INTRAVENOUS ONCE
Status: COMPLETED | OUTPATIENT
Start: 2020-03-02 | End: 2020-03-02

## 2020-03-02 RX ORDER — ONDANSETRON 4 MG/1
4 TABLET, ORALLY DISINTEGRATING ORAL EVERY 6 HOURS PRN
Qty: 8 TABLET | Refills: 0 | Status: SHIPPED | OUTPATIENT
Start: 2020-03-02 | End: 2020-04-23

## 2020-03-02 RX ORDER — FAMOTIDINE 20 MG/1
20 TABLET, FILM COATED ORAL 2 TIMES DAILY
Qty: 28 TABLET | Refills: 0 | Status: SHIPPED | OUTPATIENT
Start: 2020-03-02 | End: 2020-04-23

## 2020-03-02 RX ADMIN — ONDANSETRON 4 MG: 2 INJECTION INTRAMUSCULAR; INTRAVENOUS at 10:14

## 2020-03-02 RX ADMIN — KETOROLAC TROMETHAMINE 15 MG: 30 INJECTION, SOLUTION INTRAMUSCULAR at 10:14

## 2020-03-02 RX ADMIN — IOHEXOL 100 ML: 350 INJECTION, SOLUTION INTRAVENOUS at 11:06

## 2020-03-02 RX ADMIN — SODIUM CHLORIDE 1000 ML: 0.9 INJECTION, SOLUTION INTRAVENOUS at 10:15

## 2020-03-02 NOTE — ED PROVIDER NOTES
History  Chief Complaint   Patient presents with    Abdominal Pain     Pt c/o RLQ abdominal pain and nausea for past 2-3 days, worse today  Denies V/D  History provided by:  Patient  Abdominal Pain   Pain location:  RLQ  Pain quality: aching    Pain radiates to:  Does not radiate  Pain severity:  Moderate  Onset quality:  Gradual  Duration:  3 days  Timing:  Constant  Progression:  Worsening  Chronicity:  New  Relieved by:  Nothing  Worsened by:  Nothing  Ineffective treatments:  NSAIDs  Associated symptoms: nausea    Associated symptoms: no chest pain, no chills, no cough, no diarrhea, no dysuria, no fever, no hematemesis, no hematochezia, no hematuria, no shortness of breath, no sore throat and no vomiting    Risk factors: has not had multiple surgeries (BTL)        Prior to Admission Medications   Prescriptions Last Dose Informant Patient Reported? Taking?    amLODIPine (NORVASC) 5 mg tablet 3/2/2020 at Unknown time  No Yes   Sig: TAKE 1 TABLET BY MOUTH EVERY DAY   fluticasone (FLONASE) 50 mcg/act nasal spray   No Yes   Sig: SPRAY 1 SPRAY INTO EACH NOSTRIL EVERY DAY      Facility-Administered Medications: None       Past Medical History:   Diagnosis Date    Chest pain     GERD (gastroesophageal reflux disease)     Hypertension        Past Surgical History:   Procedure Laterality Date    CARPAL TUNNEL RELEASE      TUBAL LIGATION         Family History   Problem Relation Age of Onset    Asthma Mother     Hypertension Mother     No Known Problems Sister     No Known Problems Daughter     No Known Problems Maternal Grandmother     No Known Problems Paternal Grandmother     No Known Problems Maternal Aunt     No Known Problems Maternal Aunt     No Known Problems Paternal Aunt     No Known Problems Paternal Aunt     No Known Problems Paternal Aunt     Diabetes Paternal Aunt     Breast cancer Neg Hx     Cancer Neg Hx      I have reviewed and agree with the history as documented  E-Cigarette/Vaping    E-Cigarette Use Never User      E-Cigarette/Vaping Substances     Social History     Tobacco Use    Smoking status: Never Smoker    Smokeless tobacco: Never Used   Substance Use Topics    Alcohol use: Not Currently     Frequency: 2-4 times a month     Drinks per session: 1 or 2    Drug use: Never       Review of Systems   Constitutional: Positive for appetite change  Negative for chills and fever  HENT: Negative for sore throat  Respiratory: Negative for cough, shortness of breath and wheezing  Cardiovascular: Negative for chest pain and palpitations  Gastrointestinal: Positive for abdominal pain and nausea  Negative for diarrhea, hematemesis, hematochezia and vomiting  Genitourinary: Negative for dysuria and hematuria  Musculoskeletal: Negative for neck pain  Skin: Negative for rash  Neurological: Negative for dizziness, weakness and headaches  Psychiatric/Behavioral: Negative for suicidal ideas  All other systems reviewed and are negative  Physical Exam  Physical Exam   Abdominal: Soft  Bowel sounds are normal  There is tenderness in the right lower quadrant  There is no rigidity, no rebound and no guarding  Nursing note and vitals reviewed        Vital Signs  ED Triage Vitals [03/02/20 0901]   Temperature Pulse Respirations Blood Pressure SpO2   (!) 97 3 °F (36 3 °C) 92 18 148/87 98 %      Temp Source Heart Rate Source Patient Position - Orthostatic VS BP Location FiO2 (%)   Oral Monitor Sitting Left arm --      Pain Score       8           Vitals:    03/02/20 0901   BP: 148/87   Pulse: 92   Patient Position - Orthostatic VS: Sitting         Visual Acuity      ED Medications  Medications   ondansetron (ZOFRAN) injection 4 mg (4 mg Intravenous Given 3/2/20 1014)   ketorolac (TORADOL) injection 15 mg (15 mg Intravenous Given 3/2/20 1014)   sodium chloride 0 9 % bolus 1,000 mL (1,000 mL Intravenous New Bag 3/2/20 1015)   iohexol (OMNIPAQUE) 350 MG/ML injection (SINGLE-DOSE) 100 mL (100 mL Intravenous Given 3/2/20 1106)       Diagnostic Studies  Results Reviewed     Procedure Component Value Units Date/Time    Comprehensive metabolic panel [[id number]] Collected:  03/02/20 1016    Lab Status:  Final result Specimen:  Blood from Arm, Left Updated:  03/02/20 1047     Sodium 137 mmol/L      Potassium 4 1 mmol/L      Chloride 101 mmol/L      CO2 27 mmol/L      ANION GAP 9 mmol/L      BUN 11 mg/dL      Creatinine 0 65 mg/dL      Glucose 104 mg/dL      Calcium 8 8 mg/dL      AST 19 U/L      ALT 30 U/L      Alkaline Phosphatase 56 U/L      Total Protein 7 6 g/dL      Albumin 3 9 g/dL      Total Bilirubin 0 44 mg/dL      eGFR 115 ml/min/1 73sq m     Narrative:       National Kidney Disease Foundation guidelines for Chronic Kidney Disease (CKD):     Stage 1 with normal or high GFR (GFR > 90 mL/min/1 73 square meters)    Stage 2 Mild CKD (GFR = 60-89 mL/min/1 73 square meters)    Stage 3A Moderate CKD (GFR = 45-59 mL/min/1 73 square meters)    Stage 3B Moderate CKD (GFR = 30-44 mL/min/1 73 square meters)    Stage 4 Severe CKD (GFR = 15-29 mL/min/1 73 square meters)    Stage 5 End Stage CKD (GFR <15 mL/min/1 73 square meters)  Note: GFR calculation is accurate only with a steady state creatinine    Lipase [[id number]]  (Normal) Collected:  03/02/20 1016    Lab Status:  Final result Specimen:  Blood from Arm, Left Updated:  03/02/20 1047     Lipase 147 u/L     CBC and differential [[id number]] Collected:  03/02/20 1016    Lab Status:  Final result Specimen:  Blood from Arm, Left Updated:  03/02/20 1023     WBC 5 40 Thousand/uL      RBC 4 62 Million/uL      Hemoglobin 13 9 g/dL      Hematocrit 41 3 %      MCV 89 fL      MCH 30 1 pg      MCHC 33 7 g/dL      RDW 11 9 %      MPV 10 1 fL      Platelets 215 Thousands/uL      nRBC 0 /100 WBCs      Neutrophils Relative 56 %      Immat GRANS % 0 %      Lymphocytes Relative 32 %      Monocytes Relative 9 %      Eosinophils Relative 2 %      Basophils Relative 1 %      Neutrophils Absolute 3 02 Thousands/µL      Immature Grans Absolute 0 01 Thousand/uL      Lymphocytes Absolute 1 74 Thousands/µL      Monocytes Absolute 0 47 Thousand/µL      Eosinophils Absolute 0 10 Thousand/µL      Basophils Absolute 0 06 Thousands/µL     Urine Microscopic [[id number]]  (Abnormal) Collected:  03/02/20 0906    Lab Status:  Final result Specimen:  Urine, Clean Catch Updated:  03/02/20 0936     RBC, UA 2-4 /hpf      WBC, UA 4-10 /hpf      Epithelial Cells Occasional /hpf      Bacteria, UA Occasional /hpf     POCT urinalysis dipstick [[id number]]  (Abnormal) Resulted:  03/02/20 0908    Lab Status:  Final result Updated:  03/02/20 0908     Color, UA yellow    POCT pregnancy, urine [[id number]]  (Normal) Resulted:  03/02/20 0908    Lab Status:  Final result Updated:  03/02/20 0908     EXT PREG TEST UR (Ref: Negative) negative     Control valid    Urine Macroscopic, POC [[id number]]  (Abnormal) Collected:  03/02/20 0906    Lab Status:  Final result Specimen:  Urine Updated:  03/02/20 0907     Color, UA Yellow     Clarity, UA Clear     pH, UA 7 0     Leukocytes, UA Moderate     Nitrite, UA Negative     Protein, UA Negative mg/dl      Glucose, UA Negative mg/dl      Ketones, UA Negative mg/dl      Urobilinogen, UA 0 2 E U /dl      Bilirubin, UA Negative     Blood, UA Trace     Specific [place], UA 1 010    Narrative:       [facility] RESULT                 CT abdomen pelvis with contrast   Final Result by Latoya Pereira MD (03/02 1130)      No acute abnormality within the abdomen or pelvis  Workstation performed: [id number]                    Procedures  Procedures         ED Course  ED Course as of Mar 02 1149   Mon Mar 02, 2020   1132 Normal CT, no surgical findings   CT abdomen pelvis with contrast   1147 Reviewed results with patient at bedside and updated on the plan   Discomfort is improved, no findings on CT requiring intervention, she can be treated symptomatically and discussed return precautions                                  MDM      Disposition  Final diagnoses:   Right lower quadrant abdominal pain   Nausea and vomiting     Time reflects when diagnosis was documented in both MDM as applicable and the Disposition within this note     Time User Action Codes Description Comment    3/2/2020 11:47 AM Kary Eth L Add [R10 31] Right lower quadrant abdominal pain     3/2/2020 11:47 AM Kary Eth L Add [R11 2] Nausea and vomiting       ED Disposition     ED Disposition Condition Date/Time Comment    Discharge Good Mon Mar 2, 2020 11:48 AM Radha Flores discharge to home/self care  Follow-up Information     Follow up With Specialties Details Why Contact Info    [employer]  Call  For followup [phone]            Patient's Medications   Discharge Prescriptions    DICYCLOMINE (BENTYL) 20 MG TABLET    Take 1 tablet (20 mg total) by mouth 4 (four) times a day as needed (abdominal cramping, diarrhea)       Start Date: 3/2/2020  End Date: --       Order Dose: 20 mg       Quantity: 12 tablet    Refills: 0    FAMOTIDINE (PEPCID) 20 MG TABLET    Take 1 tablet (20 mg total) by mouth 2 (two) times a day       Start Date: 3/2/2020  End Date: --       Order Dose: 20 mg       Quantity: 28 tablet    Refills: 0    ONDANSETRON (ZOFRAN-ODT) 4 MG DISINTEGRATING TABLET    Take 1 tablet (4 mg total) by mouth every 6 (six) hours as needed for nausea or vomiting       Start Date: 3/2/2020  End Date: --       Order Dose: 4 mg       Quantity: 8 tablet    Refills: 0     No discharge procedures on file      PDMP Review     None          ED Provider  Electronically Signed by           Jus Hernandez MD  03/02/20 5033

## 2020-03-11 ENCOUNTER — OFFICE VISIT (OUTPATIENT)
Dept: FAMILY MEDICINE CLINIC | Facility: CLINIC | Age: 35
End: 2020-03-11

## 2020-03-11 VITALS
SYSTOLIC BLOOD PRESSURE: 140 MMHG | TEMPERATURE: 98.2 F | DIASTOLIC BLOOD PRESSURE: 80 MMHG | RESPIRATION RATE: 18 BRPM | WEIGHT: 196 LBS | HEART RATE: 88 BPM | OXYGEN SATURATION: 99 % | BODY MASS INDEX: 35.85 KG/M2

## 2020-03-11 DIAGNOSIS — R10.31 RIGHT LOWER QUADRANT ABDOMINAL PAIN: ICD-10-CM

## 2020-03-11 DIAGNOSIS — Z23 NEED FOR VACCINATION: Primary | ICD-10-CM

## 2020-03-11 PROCEDURE — 90471 IMMUNIZATION ADMIN: CPT | Performed by: FAMILY MEDICINE

## 2020-03-11 PROCEDURE — 3079F DIAST BP 80-89 MM HG: CPT | Performed by: FAMILY MEDICINE

## 2020-03-11 PROCEDURE — 3077F SYST BP >= 140 MM HG: CPT | Performed by: FAMILY MEDICINE

## 2020-03-11 PROCEDURE — 90686 IIV4 VACC NO PRSV 0.5 ML IM: CPT | Performed by: FAMILY MEDICINE

## 2020-03-11 PROCEDURE — 1036F TOBACCO NON-USER: CPT | Performed by: FAMILY MEDICINE

## 2020-03-11 PROCEDURE — 99213 OFFICE O/P EST LOW 20 MIN: CPT | Performed by: FAMILY MEDICINE

## 2020-03-11 NOTE — PROGRESS NOTES
Assessment/Plan:    Right lower quadrant abdominal pain  Resolved - most likely secondary to viral infection given sick contacts at home  Lab work including CT scan discussed with patient  She is already tolerating diet  Advised to remain hydrated  Diagnoses and all orders for this visit:    Need for vaccination  -     influenza vaccine, [id number], quadrivalent, 0 5 mL, preservative-free, for adult and pediatric patients 6 mos+ (AFLURIA, FLUARIX, FLULAVAL, FLUZONE)    Right lower quadrant abdominal pain          Subjective:      Patient ID: Chapis Hendricks is a 37 y o  female  Patient is a 78-year-old female who is presenting to office for ER follow-up  She was seen in emergency room yesterday with complaint of right side abdominal pain in the setting of nausea and vomiting  Lab work discussed with patient today in depth including normal lipase, normal CBC and CMP  She also underwent CT scan of her abdomen with contrast which was also WNL  Today she is reporting feeling well  Denies any episodes of nausea or vomiting  She is tolerating diet well  Denies fevers or chills  States son at home had also similar symptoms was also feeling better now  Denies any other complaints  Review of Systems   Constitutional: Negative for chills and fever  Respiratory: Negative for cough and shortness of breath  Cardiovascular: Negative for chest pain and palpitations  Endocrine: Negative for polyphagia and polyuria  Musculoskeletal: Negative for back pain  Neurological: Negative for light-headedness, numbness and headaches  Objective:      /80 (BP Location: Left arm, Patient Position: Sitting, Cuff Size: Adult)   Pulse 88   Temp 98 2 °F (36 8 °C) (Temporal)   Resp 18   Wt 88 9 kg (196 lb)   LMP 02/20/2020   SpO2 99%   BMI 35 85 kg/m²          Physical Exam   Constitutional: She appears well-developed and well-nourished  HENT:   Head: Normocephalic and atraumatic  Eyes: EOM are normal    Neck: Normal range of motion  Neck supple  Cardiovascular: Normal rate and normal heart sounds  Pulmonary/Chest: Effort normal and breath sounds normal  No respiratory distress  Abdominal: Soft  Bowel sounds are normal  She exhibits no distension  There is no tenderness  There is no guarding  Neurological: She is alert  Skin: Skin is warm and dry  Psychiatric: She has a normal mood and affect

## 2020-03-11 NOTE — ASSESSMENT & PLAN NOTE
Resolved - most likely secondary to viral infection given sick contacts at home  Lab work including CT scan discussed with patient  She is already tolerating diet  Advised to remain hydrated

## 2020-03-24 DIAGNOSIS — J00 ACUTE RHINITIS: ICD-10-CM

## 2020-03-24 RX ORDER — FLUTICASONE PROPIONATE 50 MCG
SPRAY, SUSPENSION (ML) NASAL
Qty: 16 ML | Refills: 1 | Status: SHIPPED | OUTPATIENT
Start: 2020-03-24 | End: 2020-04-23

## 2020-03-25 DIAGNOSIS — I10 HYPERTENSION, UNSPECIFIED TYPE: ICD-10-CM

## 2020-03-25 RX ORDER — AMLODIPINE BESYLATE 5 MG/1
TABLET ORAL
Qty: 30 TABLET | Refills: 2 | Status: SHIPPED | OUTPATIENT
Start: 2020-03-25 | End: 2022-01-25

## 2020-04-20 ENCOUNTER — TELEPHONE (OUTPATIENT)
Dept: FAMILY MEDICINE CLINIC | Facility: CLINIC | Age: 35
End: 2020-04-20

## 2020-04-20 ENCOUNTER — TELEMEDICINE (OUTPATIENT)
Dept: FAMILY MEDICINE CLINIC | Facility: CLINIC | Age: 35
End: 2020-04-20

## 2020-04-20 DIAGNOSIS — M54.42 ACUTE LEFT-SIDED LOW BACK PAIN WITH LEFT-SIDED SCIATICA: Primary | ICD-10-CM

## 2020-04-20 PROBLEM — N64.4 BREAST PAIN: Status: RESOLVED | Noted: 2019-09-09 | Resolved: 2020-04-20

## 2020-04-20 PROCEDURE — G2012 BRIEF CHECK IN BY MD/QHP: HCPCS | Performed by: FAMILY MEDICINE

## 2020-04-21 ENCOUNTER — TELEMEDICINE (OUTPATIENT)
Dept: FAMILY MEDICINE CLINIC | Facility: CLINIC | Age: 35
End: 2020-04-21

## 2020-04-21 DIAGNOSIS — J39.2 DRY THROAT: ICD-10-CM

## 2020-04-21 DIAGNOSIS — R00.2 INTERMITTENT PALPITATIONS: Primary | ICD-10-CM

## 2020-04-21 PROCEDURE — G2012 BRIEF CHECK IN BY MD/QHP: HCPCS | Performed by: FAMILY MEDICINE

## 2020-04-23 ENCOUNTER — TELEMEDICINE (OUTPATIENT)
Dept: FAMILY MEDICINE CLINIC | Facility: CLINIC | Age: 35
End: 2020-04-23

## 2020-04-23 DIAGNOSIS — R00.2 INTERMITTENT PALPITATIONS: Primary | ICD-10-CM

## 2020-04-23 PROBLEM — J39.2 DRY THROAT: Status: RESOLVED | Noted: 2020-04-21 | Resolved: 2020-04-23

## 2020-04-23 PROBLEM — R10.31 RIGHT LOWER QUADRANT ABDOMINAL PAIN: Status: RESOLVED | Noted: 2020-03-11 | Resolved: 2020-04-23

## 2020-04-23 PROCEDURE — G2012 BRIEF CHECK IN BY MD/QHP: HCPCS | Performed by: INTERNAL MEDICINE

## 2020-04-24 ENCOUNTER — TELEMEDICINE (OUTPATIENT)
Dept: OBGYN CLINIC | Facility: CLINIC | Age: 35
End: 2020-04-24

## 2020-04-24 ENCOUNTER — TELEPHONE (OUTPATIENT)
Dept: FAMILY MEDICINE CLINIC | Facility: CLINIC | Age: 35
End: 2020-04-24

## 2020-04-24 DIAGNOSIS — L29.2 VULVAR ITCHING: ICD-10-CM

## 2020-04-24 DIAGNOSIS — N89.8 VAGINAL DISCHARGE: Primary | ICD-10-CM

## 2020-04-24 PROCEDURE — G2012 BRIEF CHECK IN BY MD/QHP: HCPCS | Performed by: NURSE PRACTITIONER

## 2020-04-24 RX ORDER — FLUCONAZOLE 150 MG/1
150 TABLET ORAL ONCE
Qty: 1 TABLET | Refills: 1 | Status: SHIPPED | OUTPATIENT
Start: 2020-04-24 | End: 2020-04-24

## 2020-04-27 ENCOUNTER — APPOINTMENT (OUTPATIENT)
Dept: LAB | Facility: HOSPITAL | Age: 35
End: 2020-04-27

## 2020-04-27 DIAGNOSIS — R00.2 INTERMITTENT PALPITATIONS: ICD-10-CM

## 2020-04-27 LAB — TSH SERPL DL<=0.05 MIU/L-ACNC: 3.1 UIU/ML (ref 0.47–4.68)

## 2020-04-27 PROCEDURE — 36415 COLL VENOUS BLD VENIPUNCTURE: CPT

## 2020-04-27 PROCEDURE — 84443 ASSAY THYROID STIM HORMONE: CPT

## 2020-04-30 ENCOUNTER — TELEMEDICINE (OUTPATIENT)
Dept: FAMILY MEDICINE CLINIC | Facility: CLINIC | Age: 35
End: 2020-04-30

## 2020-04-30 DIAGNOSIS — F33.0 MILD EPISODE OF RECURRENT MAJOR DEPRESSIVE DISORDER (HCC): ICD-10-CM

## 2020-04-30 DIAGNOSIS — R06.83 SNORING: Primary | ICD-10-CM

## 2020-04-30 PROBLEM — N89.8 VAGINAL DISCHARGE: Status: RESOLVED | Noted: 2020-04-24 | Resolved: 2020-04-30

## 2020-04-30 PROBLEM — L29.2 VULVAR ITCHING: Status: RESOLVED | Noted: 2020-04-24 | Resolved: 2020-04-30

## 2020-04-30 PROCEDURE — G2012 BRIEF CHECK IN BY MD/QHP: HCPCS | Performed by: FAMILY MEDICINE

## 2020-04-30 RX ORDER — FLUOXETINE HYDROCHLORIDE 20 MG/1
20 CAPSULE ORAL DAILY
Qty: 30 CAPSULE | Refills: 0 | Status: SHIPPED | OUTPATIENT
Start: 2020-04-30 | End: 2020-05-22

## 2020-05-18 ENCOUNTER — TELEMEDICINE (OUTPATIENT)
Dept: FAMILY MEDICINE CLINIC | Facility: CLINIC | Age: 35
End: 2020-05-18

## 2020-05-18 DIAGNOSIS — F33.0 MILD EPISODE OF RECURRENT MAJOR DEPRESSIVE DISORDER (HCC): Primary | ICD-10-CM

## 2020-05-18 PROCEDURE — G2012 BRIEF CHECK IN BY MD/QHP: HCPCS | Performed by: INTERNAL MEDICINE

## 2020-05-22 DIAGNOSIS — F33.0 MILD EPISODE OF RECURRENT MAJOR DEPRESSIVE DISORDER (HCC): ICD-10-CM

## 2020-05-22 RX ORDER — FLUOXETINE HYDROCHLORIDE 20 MG/1
CAPSULE ORAL
Qty: 30 CAPSULE | Refills: 0 | Status: SHIPPED | OUTPATIENT
Start: 2020-05-22 | End: 2020-06-08

## 2020-06-01 ENCOUNTER — TELEMEDICINE (OUTPATIENT)
Dept: FAMILY MEDICINE CLINIC | Facility: CLINIC | Age: 35
End: 2020-06-01

## 2020-06-01 DIAGNOSIS — F33.0 MILD EPISODE OF RECURRENT MAJOR DEPRESSIVE DISORDER (HCC): Primary | ICD-10-CM

## 2020-06-01 PROCEDURE — 99213 OFFICE O/P EST LOW 20 MIN: CPT | Performed by: FAMILY MEDICINE

## 2020-06-04 ENCOUNTER — TELEPHONE (OUTPATIENT)
Dept: FAMILY MEDICINE CLINIC | Facility: CLINIC | Age: 35
End: 2020-06-04

## 2020-06-05 DIAGNOSIS — F33.0 MILD EPISODE OF RECURRENT MAJOR DEPRESSIVE DISORDER (HCC): ICD-10-CM

## 2020-06-08 RX ORDER — FLUOXETINE HYDROCHLORIDE 20 MG/1
CAPSULE ORAL
Qty: 90 CAPSULE | Refills: 1 | Status: SHIPPED | OUTPATIENT
Start: 2020-06-08 | End: 2020-07-20

## 2020-07-09 ENCOUNTER — TELEPHONE (OUTPATIENT)
Dept: OBGYN CLINIC | Facility: CLINIC | Age: 35
End: 2020-07-09

## 2020-07-10 ENCOUNTER — ANNUAL EXAM (OUTPATIENT)
Dept: OBGYN CLINIC | Facility: CLINIC | Age: 35
End: 2020-07-10

## 2020-07-10 VITALS
HEART RATE: 67 BPM | SYSTOLIC BLOOD PRESSURE: 134 MMHG | HEIGHT: 62 IN | DIASTOLIC BLOOD PRESSURE: 95 MMHG | TEMPERATURE: 97.6 F | WEIGHT: 189.8 LBS | BODY MASS INDEX: 34.93 KG/M2

## 2020-07-10 DIAGNOSIS — Z01.419 ENCOUNTER FOR ANNUAL ROUTINE GYNECOLOGICAL EXAMINATION: Primary | ICD-10-CM

## 2020-07-10 PROCEDURE — G0145 SCR C/V CYTO,THINLAYER,RESCR: HCPCS | Performed by: PATHOLOGY

## 2020-07-10 PROCEDURE — 87624 HPV HI-RISK TYP POOLED RSLT: CPT | Performed by: NURSE PRACTITIONER

## 2020-07-10 PROCEDURE — 99395 PREV VISIT EST AGE 18-39: CPT | Performed by: NURSE PRACTITIONER

## 2020-07-10 PROCEDURE — G0124 SCREEN C/V THIN LAYER BY MD: HCPCS | Performed by: PATHOLOGY

## 2020-07-10 NOTE — PATIENT INSTRUCTIONS
PAP results will be available in 2 weeks  Call with needs or concerns  Return in 1 year    COVID-19 Instructions    If you are having any of the following:  Cough   Shortness of breath   Fever  If traveled within past 2 weeks internationally or to high risk [place] states  Or been in contact with someone that has     Please call either:   Your PCP office  [phone], option [phone]    They will screen you over the phone and direct you to the nearest appropriate testing location    DO NOT go to your PCP or OB office without calling first

## 2020-07-10 NOTE — PROGRESS NOTES
Annual Exam    Assessment   1  Encounter for annual routine gynecological examination  Liquid-based pap, screening     well woman       Plan       All questions answered  Breast self exam technique reviewed and patient encouraged to perform self-exam monthly  Contraception: tubal ligation  Discussed healthy lifestyle modifications  Follow up in 1 year  Thin prep Pap smear  Patient Instructions   PAP results will be available in 2 weeks  Call with needs or concerns  Return in 1 year      Pt verbalized understanding of all discussed  Subjective      Carmen Malcolm is a 37 y o   female who presents for annual well woman exam  Periods are regular every 28-30 days, lasting 3 days  No intermenstrual bleeding, spotting, or discharge  Last PAP 2019 WNL, HPV high risk other positive  1 partner x 10 years, denies domestic violence    Current contraception: tubal ligation  History of abnormal Pap smear: yes - WNL PAP HPV other positive  Family history of uterine or ovarian cancer: no  Regular self breast exam: yes  History of abnormal mammogram:  no  Family history of breast cancer: no  History of abnormal lipids: unknown  Menstrual History:     Menarche age: 6  No LMP recorded  2020       The following portions of the patient's history were reviewed and updated as appropriate: allergies, current medications, past family history, past medical history, past social history, past surgical history and problem list   Last PAP WNL ansd positive high risk other 2019  1 Partner x 10 years, denies domestic violence     Review of Systems  Pertinent items are noted in HPI        Objective      /95 (BP Location: Left arm, Patient Position: Sitting, Cuff Size: Adult)   Pulse 67   Temp 97 6 °F (36 4 °C) (Tympanic)   Ht 5' 2" (1 575 m)   Wt 86 1 kg (189 lb 12 8 oz)   BMI 34 71 kg/m²     General: alert and oriented, in no acute distress, alert, appears stated age and cooperative   Heart: regular rate and rhythm, S1, S2 normal, no murmur, click, rub or gallop   Lungs: clear to auscultation bilaterally, WNL respiratory effort, negative cough or SOB   Thyroid: Negative masses   Abdomen: soft, non-tender, without masses or organomegaly   Vulva: normal   Vagina: normal mucosa   Cervix: no bleeding following Pap, no cervical motion tenderness and no lesions   Uterus: normal size, non-tender, normal shape and consistency   Adnexa: normal adnexa   Urethra: normal   Breasts: NT,negative masses, discharge, or dimpling  Negative fever

## 2020-07-17 PROBLEM — J00 ACUTE RHINITIS: Status: RESOLVED | Noted: 2019-12-02 | Resolved: 2020-07-17

## 2020-07-17 NOTE — PROGRESS NOTES
Assessment/Plan:    Bilateral impacted cerumen  Significant cerumen observed in right and left ears on otoscopy  No pain or bleeding  No signs of inflammation      - Ear cerumen removal procedure done on both left and right ears  - Patient told to use Debrox in each ear every night  Hypertension  Patient's blood pressure 130/82 during today's visit  She has no complaints  - Continue Norvasc 5 mg PO OD  Left otitis media  Noted in left ear  - Told to use Flonase once a day  Left temporal headache  Present for last 3 months  - Use Tylenol or Advil as needed  Diagnoses and all orders for this visit:    Left temporal headache    Ear pain, left  -     fluticasone (FLONASE) 50 mcg/act nasal spray; 1 spray into each nostril daily  -     Ear cerumen removal    Bilateral impacted cerumen    Class 1 obesity due to excess calories with body mass index (BMI) of 34 0 to 34 9 in adult, unspecified whether serious comorbidity present    Essential hypertension          Subjective:      Patient ID: Clara Tomlinson is a 37 y o  female  A 37year old female presented to the office with a complaint of left ear pain 3 weeks ago one day after swimming in a pool, which has now resolved after she used ear drops  She said she has had decreased hearing in her left ear since then  However, she now complains of a left-sided temporal headache that started at the same time  She describes the pain as 7/10 but sometimes a 10/10  She says it starts in the morning and comes and goes throughout the day  It is pulsating in nature, and relieved by laying down on her left side and taking 3 Advil in the evening  She has not had any previous episodes of ear pain or headache  She said her previous PCP Dr Alanna Lopez prescribed her Flonase which she stopped using  She has complaints of fever, nausea, vomiting, rash, chest pain, cough, sore throat, shortness of breath, or edema  She has no history of allergies  Review of Systems   Constitutional: Negative for activity change, appetite change, chills, fatigue and fever  HENT: Positive for ear pain (occurred 3 weeks ago, says it has now resolved ) and hearing loss (decreased hearing in left ear  )  Negative for congestion, drooling, ear discharge, facial swelling, postnasal drip, rhinorrhea, sinus pressure, sinus pain, sneezing, sore throat, trouble swallowing and voice change  Respiratory: Negative for apnea, cough, choking, shortness of breath and wheezing  Cardiovascular: Negative for chest pain and leg swelling  Palpitations: occasionally  Gastrointestinal: Negative for constipation, diarrhea, nausea and vomiting  Genitourinary: Negative for difficulty urinating  Musculoskeletal: Negative for arthralgias, myalgias and neck pain  Allergic/Immunologic: Negative for environmental allergies, food allergies and immunocompromised state  Neurological: Positive for headaches (left temporal region  )  Negative for dizziness, seizures, speech difficulty and light-headedness  Hematological: Negative for adenopathy  Objective:      /82 (BP Location: Left arm, Patient Position: Sitting, Cuff Size: Large)   Pulse 91   Temp (!) 96 5 °F (35 8 °C) (Temporal)   Resp 20   Ht 5' 2" (1 575 m)   Wt 84 9 kg (187 lb 1 6 oz)   LMP 07/13/2020   SpO2 99%   BMI 34 22 kg/m²          Physical Exam   Constitutional: She is oriented to person, place, and time  She appears well-developed and well-nourished  No distress  HENT:   Head: Normocephalic and atraumatic  Mouth/Throat: No oropharyngeal exudate  Unable to visualize bilateral ears due to near complete wax impaction  Mucosal edema present in the nose  No facial tenderness on palpation  No mastoid tenderness on palpation  Eyes: Conjunctivae and EOM are normal    Neck: Normal range of motion  Neck supple  Cardiovascular: Normal rate, regular rhythm, normal heart sounds and intact distal pulses  Exam reveals no gallop and no friction rub  No murmur heard  Pulmonary/Chest: Effort normal and breath sounds normal  No respiratory distress  She has no wheezes  She has no rales  Abdominal: Soft  Bowel sounds are normal  She exhibits no distension and no mass  There is no tenderness  There is no guarding  Musculoskeletal: Normal range of motion  She exhibits no edema, tenderness or deformity  Lymphadenopathy:     She has no cervical adenopathy  Neurological: She is alert and oriented to person, place, and time  Skin: Skin is warm and dry  She is not diaphoretic  Psychiatric: She has a normal mood and affect  Vitals reviewed  Ear cerumen removal  Date/Time: 7/20/2020 8:40 AM  Performed by: Miguel Henriquez  Authorized by: Miguel Henriquez     Patient location:  Clinic  Other Assisting Provider: Yes (comment)    Consent:     Consent obtained:  Verbal    Consent given by:  Patient    Risks discussed:  Dizziness  Universal protocol:     Procedure explained and questions answered to patient or proxy's satisfaction: yes    Procedure details:     Local anesthetic:  None    Location:  L ear and R ear    Procedure type: irrigation with instrumentation      Instrumentation: curette      Approach:  External  Post-procedure details:     Post-procedure ear inspection: vertigo and nausea  Hearing quality:  Normal    Patient tolerance of procedure: Tolerated well, no immediate complications  Comments:      Hydrogen peroxide placed in each ear 15 minutes prior to procedure  Excessive amounts of cerumen removed from both ears  The cerumen in right ear was not able to be completely removed as patient experienced vertigo and nausea  The right ear was slightly erythematous  Both eardrums were dull and scarred when visualized via otoscope

## 2020-07-20 ENCOUNTER — OFFICE VISIT (OUTPATIENT)
Dept: FAMILY MEDICINE CLINIC | Facility: CLINIC | Age: 35
End: 2020-07-20

## 2020-07-20 VITALS
HEIGHT: 62 IN | OXYGEN SATURATION: 99 % | RESPIRATION RATE: 20 BRPM | SYSTOLIC BLOOD PRESSURE: 130 MMHG | BODY MASS INDEX: 34.43 KG/M2 | WEIGHT: 187.1 LBS | HEART RATE: 91 BPM | DIASTOLIC BLOOD PRESSURE: 82 MMHG | TEMPERATURE: 96.5 F

## 2020-07-20 DIAGNOSIS — H61.23 BILATERAL IMPACTED CERUMEN: ICD-10-CM

## 2020-07-20 DIAGNOSIS — I10 ESSENTIAL HYPERTENSION: ICD-10-CM

## 2020-07-20 DIAGNOSIS — E66.09 CLASS 1 OBESITY DUE TO EXCESS CALORIES WITH BODY MASS INDEX (BMI) OF 34.0 TO 34.9 IN ADULT, UNSPECIFIED WHETHER SERIOUS COMORBIDITY PRESENT: ICD-10-CM

## 2020-07-20 DIAGNOSIS — H92.02 EAR PAIN, LEFT: ICD-10-CM

## 2020-07-20 DIAGNOSIS — R51.9 LEFT TEMPORAL HEADACHE: Primary | ICD-10-CM

## 2020-07-20 PROBLEM — H66.92 LEFT OTITIS MEDIA: Status: ACTIVE | Noted: 2020-07-20

## 2020-07-20 PROBLEM — F33.0 MILD EPISODE OF RECURRENT MAJOR DEPRESSIVE DISORDER (HCC): Status: RESOLVED | Noted: 2020-04-30 | Resolved: 2020-07-20

## 2020-07-20 LAB
LAB AP GYN PRIMARY INTERPRETATION: ABNORMAL
Lab: ABNORMAL
PATH INTERP SPEC-IMP: ABNORMAL

## 2020-07-20 PROCEDURE — 3079F DIAST BP 80-89 MM HG: CPT | Performed by: INTERNAL MEDICINE

## 2020-07-20 PROCEDURE — 3075F SYST BP GE 130 - 139MM HG: CPT | Performed by: INTERNAL MEDICINE

## 2020-07-20 PROCEDURE — 99213 OFFICE O/P EST LOW 20 MIN: CPT | Performed by: INTERNAL MEDICINE

## 2020-07-20 PROCEDURE — 3008F BODY MASS INDEX DOCD: CPT | Performed by: INTERNAL MEDICINE

## 2020-07-20 PROCEDURE — 1036F TOBACCO NON-USER: CPT | Performed by: INTERNAL MEDICINE

## 2020-07-20 PROCEDURE — 69210 REMOVE IMPACTED EAR WAX UNI: CPT | Performed by: INTERNAL MEDICINE

## 2020-07-20 RX ORDER — FLUTICASONE PROPIONATE 50 MCG
1 SPRAY, SUSPENSION (ML) NASAL DAILY
Qty: 1 BOTTLE | Refills: 1 | Status: SHIPPED | OUTPATIENT
Start: 2020-07-20 | End: 2021-04-03 | Stop reason: ALTCHOICE

## 2020-07-20 NOTE — ASSESSMENT & PLAN NOTE
Patient's blood pressure 130/82 during today's visit  She has no complaints  - Continue Norvasc 5 mg PO OD

## 2020-07-20 NOTE — ASSESSMENT & PLAN NOTE
Significant cerumen observed in right and left ears on otoscopy  No pain or bleeding  No signs of inflammation      - Ear cerumen removal procedure done on both left and right ears  - Patient told to use Debrox in each ear every night

## 2020-07-20 NOTE — PATIENT INSTRUCTIONS
Otitis Media   WHAT YOU NEED TO KNOW:   Otitis media is an ear infection  DISCHARGE INSTRUCTIONS:     * Use Flonase nasal spray, one spray in each nostril, once a day  * Use Debrox in each ear every night  Medicines:  · Ibuprofen or acetaminophen  helps decrease your pain and fever  They are available without a doctor's order  Ask your healthcare provider which medicine is right for you  Ask how much to take and how often to take it  These medicines can cause stomach bleeding if not taken correctly  Ibuprofen can cause kidney damage  Do not take ibuprofen if you have kidney disease, an ulcer, or allergies to aspirin  Acetaminophen can cause liver damage  Do not drink alcohol if you take acetaminophen  Ear drops  help treat your ear pain  · Take your medicine as directed  Contact your healthcare provider if you think your medicine is not helping or if you have side effects  Tell him or her if you are allergic to any medicine  Keep a list of the medicines, vitamins, and herbs you take  Include the amounts, and when and why you take them  Bring the list or the pill bottles to follow-up visits  Carry your medicine list with you in case of an emergency  Heat or ice:   · Heat  may be used to decrease your pain  Place a warm, moist washcloth on your ear  Apply for 15 to 20 minutes, 3 to 4 times a day    · Ice  helps decrease swelling and pain  Use an ice pack or put crushed ice in a plastic bag  Cover the ice pack with a towel and place it on your ear for 15 to 20 minutes, 3 to 4 times a day for 2 days  Prevent otitis media:   · Wash your hands often  Use soap and water  Wash your hands after you use the bathroom, change a child's diapers, or sneeze  Wash your hands before you prepare or eat food  · Stay away from people who are ill  Some germs are easily and quickly spread through contact  Return to work or school: You may return to work or school when your fever is gone     Follow up with your healthcare provider as directed:  Write down your questions so you remember to ask them during your visits  Contact your healthcare provider if:   · Your ear pain gets worse or does not go away, even after treatment  · The outside of your ear is red or swollen  · You have vomiting or diarrhea  · You have fluid coming from your ear  · You have questions or concerns about your condition or care  Return to the emergency department if:   · You have a seizure  · You have a fever and a stiff neck  © 2017 [facility] [facility] Information is for End User's use only and may not be sold, redistributed or otherwise used for commercial purposes  All illustrations and images included in CareNotes® are the copyrighted property of A D A M , Inc  or Jeromy Elias  The above information is an  only  It is not intended as medical advice for individual conditions or treatments  Talk to your doctor, nurse or pharmacist before following any medical regimen to see if it is safe and effective for you

## 2020-07-21 ENCOUNTER — TELEPHONE (OUTPATIENT)
Dept: OBGYN CLINIC | Facility: CLINIC | Age: 35
End: 2020-07-21

## 2020-07-21 NOTE — RESULT ENCOUNTER NOTE
Abnormal pap smear: ASCUS and HPV positive  Recommend colpsocopy  Please call patient to notify her and arrange appointment, thank you

## 2020-07-22 ENCOUNTER — TELEPHONE (OUTPATIENT)
Dept: OBGYN CLINIC | Facility: CLINIC | Age: 35
End: 2020-07-22

## 2020-07-22 NOTE — TELEPHONE ENCOUNTER
Spoke to patient on the phone regarding pap results, she is to get a colpo done  She currently does not have insurance  [address] [place] [address],[address] are going to call the [place] [facility] to get the colpo covered   Colpo scheduled for 8/12

## 2020-08-03 ENCOUNTER — OFFICE VISIT (OUTPATIENT)
Dept: FAMILY MEDICINE CLINIC | Facility: CLINIC | Age: 35
End: 2020-08-03

## 2020-08-03 VITALS
WEIGHT: 187.1 LBS | DIASTOLIC BLOOD PRESSURE: 80 MMHG | HEART RATE: 86 BPM | TEMPERATURE: 97.7 F | OXYGEN SATURATION: 99 % | SYSTOLIC BLOOD PRESSURE: 114 MMHG | RESPIRATION RATE: 18 BRPM | HEIGHT: 62 IN | BODY MASS INDEX: 34.43 KG/M2

## 2020-08-03 DIAGNOSIS — F41.9 ANXIETY: Primary | ICD-10-CM

## 2020-08-03 DIAGNOSIS — N60.19 FIBROCYSTIC BREAST DISEASE (FCBD), UNSPECIFIED LATERALITY: ICD-10-CM

## 2020-08-03 DIAGNOSIS — H92.02 LEFT EAR PAIN: ICD-10-CM

## 2020-08-03 DIAGNOSIS — N64.4 BREAST PAIN, LEFT: ICD-10-CM

## 2020-08-03 DIAGNOSIS — Z13.31 DEPRESSION SCREEN: ICD-10-CM

## 2020-08-03 PROCEDURE — 3074F SYST BP LT 130 MM HG: CPT | Performed by: PHYSICIAN ASSISTANT

## 2020-08-03 PROCEDURE — 3008F BODY MASS INDEX DOCD: CPT | Performed by: PHYSICIAN ASSISTANT

## 2020-08-03 PROCEDURE — 99214 OFFICE O/P EST MOD 30 MIN: CPT | Performed by: PHYSICIAN ASSISTANT

## 2020-08-03 PROCEDURE — 3079F DIAST BP 80-89 MM HG: CPT | Performed by: PHYSICIAN ASSISTANT

## 2020-08-03 PROCEDURE — 1036F TOBACCO NON-USER: CPT | Performed by: PHYSICIAN ASSISTANT

## 2020-08-03 NOTE — PROGRESS NOTES
Assessment/Plan:    Left ear pain  - Symptoms have resolved  Advised to call the office if symptoms recur  Left breast pain  - No evidence of infection on physical exam today  - Explained to patient her symptoms are most likely due to her fibrocystic breast disease  Explained to patient her increased anxiety can also be contributing to her symptoms   - Advised to call the office if symptoms worsen or new symptoms arise such as redness, warmth, or swelling  Anxiety  - Symptoms have increased recently secondary to issues with her boyfriend and his condescending personality     - Patient does not wish to restart anxiety medication at this time because she would rather work through her anxiety on her own  - Offered patient referral to behavior health therapy, but patient declines at this time  Depression Screen  - Depression Screening Follow-up Plan: Patient's depression screening was positive with a PHQ-2 score of 5  Their PHQ-9 score was 8  Patient assessed for underlying major depression  They have no active suicidal ideations  Brief counseling provided and recommend additional follow-up/re-evaluation next office visit  Diagnoses and all orders for this visit:    Anxiety    Breast pain, left    Depression screen    Fibrocystic breast disease (FCBD), unspecified laterality    Left ear pain          All of patients questions were answered  Patient understands and agrees with the above plan  Return as needed  Patrice Ochoa PA-C  08/03/20  [facility] [place]           Subjective:     Patient ID: Nuria Clarity  is a 37 y o  female with known PMH of hypertension, fibrocystic breast disease, anxiety who presents today in office for ear pain follow-up  - Patient is a 37 y o  female who presents today for ear pain follow-up  Patient was last seen in office on 07/20/2020 with a different provider due to left ear pain    Patient was found to have impacted cerumen bilaterally upon exam   Patient had her ears flushed during that office visit  Today, patient notes she is no longer experiencing any ear pain  Patient denies any discharge from the ears  - Patient notes for the past few days she has been experiencing some left breast pain  Patient denies any warmth, redness, bruising of the overlying skin  Patient denies any palpable lumps or bumps of the breast   Patient notes she is due to have her next menstrual cycle starting on 08/13/2020  Patient does have history of fibrocystic breast disease  Patient also has history of probable mastitis  Patient had previous mammogram and ultrasound completed bilateral breasts which revealed fibrocystic breast disease     - Patient notes recently she has been experiencing more stress and anxiety than previously  Patient was previously taking Prozac to help with her anxiety, but patient does not want to restart medications at this time  Patient notes she would rather work through her anxiety on her own instead of relying on medication to improve her symptoms  Patient notes she currently lives with her boyfriend of 10 years along with her 3 kids  Patient notes her current boyfriend is the father of one of the kids and the other two kids are from a different father  Patient notes her boyfriend is very condescending and is constantly criticizing the patient, specifically about her weight  Patient notes she does have an exercise bike and weights at home  Patient notes she does like to exercise at home, but she feels offered exercising at home when her boyfriend is air  Patient notes previously her boyfriend was working 3:30 p m -11 p m , so she was able to come home after work and be on her own without anybody at home  Patient notes that was her time to be to herself and she could cook and exercise without having anyone judging her  However, patient notes her boyfriend has now switched his work shift to overnight    Therefore, patient notes her boyfriend is now home all the time when she is there  Patient notes she has really been actively trying to lose weight, but it is difficult to do so if she feels uncomfortable exercising at home  Patient notes she is able to go on walks outside, but she notes it does not have the same effect as doing weights and going on the exercise bike  Patient notes she has realized that she does not need her boyfriend in order to be happy and in order to live  Patient notes she has tried to stay with him and has given him any chances to make it work because she does not want to be a single parent  However, patient notes she basically is a single parent now because she does all of the work  Patient denies any suicidal or homicidal ideations  The following portions of the patient's history were reviewed and updated as appropriate: allergies, current medications, past family history, past medical history, past social history, past surgical history and problem list         Review of Systems   Constitutional: Negative for chills, fatigue and fever  HENT: Negative for congestion, ear discharge, ear pain, rhinorrhea and sore throat  Eyes: Negative for pain and visual disturbance  Respiratory: Negative for cough, chest tightness and shortness of breath  Cardiovascular: Negative for chest pain  Gastrointestinal: Negative for abdominal pain, constipation, diarrhea, nausea and vomiting  Genitourinary: Negative for difficulty urinating and dysuria  Musculoskeletal: Negative for arthralgias  Skin: Negative for rash  Left breast pain   Neurological: Negative for dizziness  Psychiatric/Behavioral: Negative for behavioral problems and suicidal ideas  The patient is nervous/anxious                    Objective:   Vitals:    08/03/20 1027   BP: 114/80   BP Location: Left arm   Patient Position: Sitting   Cuff Size: Large   Pulse: 86   Resp: 18   Temp: 97 7 °F (36 5 °C)   TempSrc: Temporal   SpO2: 99%   Weight: 84 9 kg (187 lb 1 6 oz)   Height: 5' 2"         Physical Exam   Constitutional: She is oriented to person, place, and time  She appears well-developed  HENT:   Head: Normocephalic and atraumatic  Right Ear: Tympanic membrane, external ear and ear canal normal    Left Ear: Tympanic membrane, external ear and ear canal normal    Nose: Nose normal    Mouth/Throat: Uvula is midline  Eyes: Conjunctivae are normal    Neck: Normal range of motion  Neck supple  Cardiovascular: Normal rate, regular rhythm and normal heart sounds  No murmur heard  Pulmonary/Chest: Effort normal and breath sounds normal  She has no wheezes  Right breast exhibits no mass, no nipple discharge, no skin change and no tenderness  Left breast exhibits no mass, no nipple discharge, no skin change and no tenderness (Currently nontender to palpation  )  Neurological: She is alert and oriented to person, place, and time  Skin: Skin is warm and dry  Psychiatric: Her speech is normal and behavior is normal  Mood normal    Nursing note and vitals reviewed  PHQ-9 Depression Screening    PHQ-9:    Frequency of the following problems over the past two weeks:       Little interest or pleasure in doing things:  2 - more than half the days  Feeling down, depressed, or hopeless:  3 - nearly every day  Trouble falling or staying asleep, or sleeping too much:  2 - more than half the days  Feeling tired or having little energy:  0 - not at all  Poor appetite or overeatin - not at all  Feeling bad about yourself - or that you are a failure or have let yourself or your family down:  1 - several days  Trouble concentrating on things, such as reading the newspaper or watching television:  0 - not at all  Moving or speaking so slowly that other people could have noticed   Or the opposite - being so fidgety or restless that you have been moving around a lot more than usual:  0 - not at all  Thoughts that you would be better off dead, or of hurting yourself in some way:  0 - not at all  PHQ-2 Score:  5  PHQ-9 Score:  8

## 2020-08-25 ENCOUNTER — TELEPHONE (OUTPATIENT)
Dept: OBGYN CLINIC | Facility: CLINIC | Age: 35
End: 2020-08-25

## 2020-08-26 ENCOUNTER — PROCEDURE VISIT (OUTPATIENT)
Dept: OBGYN CLINIC | Facility: CLINIC | Age: 35
End: 2020-08-26

## 2020-08-26 VITALS
BODY MASS INDEX: 33.8 KG/M2 | SYSTOLIC BLOOD PRESSURE: 147 MMHG | TEMPERATURE: 98.2 F | HEART RATE: 79 BPM | WEIGHT: 184.8 LBS | DIASTOLIC BLOOD PRESSURE: 84 MMHG

## 2020-08-26 DIAGNOSIS — R87.610 ATYPICAL SQUAMOUS CELLS OF UNDETERMINED SIGNIFICANCE ON CYTOLOGIC SMEAR OF CERVIX (ASC-US): Primary | ICD-10-CM

## 2020-08-26 PROCEDURE — 88305 TISSUE EXAM BY PATHOLOGIST: CPT | Performed by: PATHOLOGY

## 2020-08-26 PROCEDURE — 3079F DIAST BP 80-89 MM HG: CPT | Performed by: OBSTETRICS & GYNECOLOGY

## 2020-08-26 PROCEDURE — 3077F SYST BP >= 140 MM HG: CPT | Performed by: OBSTETRICS & GYNECOLOGY

## 2020-08-26 PROCEDURE — 1036F TOBACCO NON-USER: CPT | Performed by: OBSTETRICS & GYNECOLOGY

## 2020-08-26 PROCEDURE — 57454 BX/CURETT OF CERVIX W/SCOPE: CPT | Performed by: OBSTETRICS & GYNECOLOGY

## 2020-08-26 PROCEDURE — 99214 OFFICE O/P EST MOD 30 MIN: CPT | Performed by: OBSTETRICS & GYNECOLOGY

## 2020-08-26 NOTE — PROGRESS NOTES
Colposcopy  Nicki Washburn is a 35y/o  who presents for colposcopy  She reports that she is feeling well at this time  Risks and benefits of procedure were discussed with patient and all questions were answered  Her pap history is as follows:    2019: Pap WNL, HPV other+  2020: ASCUS (cannot exclude HSIL), HPV other +     Colposcopy    Date/Time: 2020 9:15 PM  Performed by: Richard King MD  Authorized by: Richard King MD     Consent:     Consent obtained:  Verbal and written    Consent given by:  Patient    Procedural risks discussed:  Bleeding, failure rate, repeat procedure and infection    Patient questions answered: yes      Patient agrees, verbalizes understanding, and wants to proceed: yes    Pre-procedure:     Prepped with: acetic acid    Indication:     Indication:  ASC-US  Procedure:     Procedure: Colposcopy w/ cervical biopsy and ECC      Casco speculum was placed in the vagina: yes      Under colposcopic examination the transition zone was seen in entirety: yes      Endocervix was curetted using a Kevorkian curette: yes      Cervical biopsy performed with a cervical biopsy punch: yes      Monsel's solution was applied: yes      Biopsy(s): yes      Location:  12 and 7    Specimen to pathology: yes    Post-procedure:     Findings: Bleeding, Mosaicism and White epithelium      Impression: Low grade cervical dysplasia      Patient tolerance of procedure:   Tolerated well, no immediate complications    Richard King MD, PGY-3  2020  9:16 PM

## 2020-08-31 ENCOUNTER — TELEPHONE (OUTPATIENT)
Dept: OBGYN CLINIC | Facility: CLINIC | Age: 35
End: 2020-08-31

## 2020-08-31 DIAGNOSIS — N89.8 VAGINA ITCHING: Primary | ICD-10-CM

## 2020-08-31 DIAGNOSIS — N89.8 VAGINAL ODOR: ICD-10-CM

## 2020-08-31 RX ORDER — FLUCONAZOLE 150 MG/1
150 TABLET ORAL ONCE
Qty: 1 TABLET | Refills: 0 | Status: SHIPPED | OUTPATIENT
Start: 2020-08-31 | End: 2020-08-31

## 2020-08-31 RX ORDER — METRONIDAZOLE 500 MG/1
500 TABLET ORAL EVERY 12 HOURS SCHEDULED
Qty: 14 TABLET | Refills: 0 | Status: SHIPPED | OUTPATIENT
Start: 2020-08-31 | End: 2020-08-31

## 2020-08-31 RX ORDER — METRONIDAZOLE 500 MG/1
500 TABLET ORAL EVERY 12 HOURS SCHEDULED
Qty: 14 TABLET | Refills: 0 | Status: SHIPPED | OUTPATIENT
Start: 2020-08-31 | End: 2020-09-07

## 2020-08-31 NOTE — TELEPHONE ENCOUNTER
Spoke to patient on the phone, she is having vaginal discharge, foul odor and itching  She was here for a colpo last week  Discussed with Dr Laquita Roberson  We will send Flagyl and fluconazole to the pharmacy for her

## 2020-09-10 ENCOUNTER — OFFICE VISIT (OUTPATIENT)
Dept: OBGYN CLINIC | Facility: CLINIC | Age: 35
End: 2020-09-10

## 2020-09-10 VITALS
HEART RATE: 92 BPM | DIASTOLIC BLOOD PRESSURE: 90 MMHG | TEMPERATURE: 98.2 F | WEIGHT: 186.2 LBS | SYSTOLIC BLOOD PRESSURE: 149 MMHG | BODY MASS INDEX: 34.06 KG/M2

## 2020-09-10 DIAGNOSIS — F41.9 ANXIETY: Primary | ICD-10-CM

## 2020-09-10 DIAGNOSIS — N87.1 CIN II (CERVICAL INTRAEPITHELIAL NEOPLASIA II): ICD-10-CM

## 2020-09-10 PROCEDURE — 99213 OFFICE O/P EST LOW 20 MIN: CPT | Performed by: OBSTETRICS & GYNECOLOGY

## 2020-09-10 NOTE — ASSESSMENT & PLAN NOTE
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

## 2020-09-10 NOTE — PATIENT INSTRUCTIONS
Procedimiento de escisión electroquirúrgica con asa   CUIDADO AMBULATORIO:   Procedimiento de escisión electroquirúrgica con asa (LEEP)  El LEEP es un procedimiento para extirpar el tejido anormal del odilia uterino o la vagina  El tejido se puede evaluar para detectar cáncer o infección  Puede que necesite un LEEP si en otras pruebas se barnes encontrado células anormales en el odilia uterino o en la vagina  Cómo prepararse para un LEEP:  Owens médico hablará con usted acerca de cómo prepararse para owens procedimiento  No realice lavados vaginales, ni use tampones ni tenga relaciones sexuales 24 horas antes del procedimiento  No se coloque medicamentos vaginales 24 horas antes del procedimiento  Llame a owens médico si tiene owens período menstrual el día del procedimiento  Deberá esperar hasta que termine owens período para someterse a beverly procedimiento  [place] a alguien que lo lleve a casa y se quede con usted después de owens procedimiento  Reinier How ade un LEEP:   · Owens médico le colocará un espéculo dentro de la vagina  Un espéculo es un instrumento que mantiene la vagina abierta para que el médico pueda jaki el odilia uterino  Se le administrará anestesia local para adormecer el odilia del útero  Con la anestesia local, usted todavía podría sentir presión o molestias ade el procedimiento, nicky no debería sentir dolor  · Owens médico insertará en owens útero un tubo pequeño con abdias cámara en la punta  Utilizará beverly instrumento para extraer Sharolyn [place] de tejido del odilia uterino  Usted puede sentir dolor o calambres cuando se merry la [place]  Es posible también que se sienta mareado  Dígale a woens médico si el [employer]  El médico puede usar abdias pasta o herramientas para controlar el sangrado  Qué sucederá después de un LEEP:  El médico la controlará si tiene sangrado abundante  Puede tener calambres, sangrado o flujo marrón oscuro después del procedimiento  Los síntomas podrían durar hasta 4 semanas     Riesgos de un LEEP:  Usted podría sangrar más de lo esperado o contraer abdias infección  Kavitha períodos [employer] pueden ser más dolorosos después del procedimiento  Puede tener problemas para quedar embarazada o tener riesgo de aborto espontáneo o parto prematuro  Si [facility], owens bebé puede estar bajo de [place]  Busque atención médica de inmediato si:   · Tiene dolor intenso en la parte inferior del abdomen  · Usted empapa 1 toalla sanitaria en 1 hora o menos  · Usted se siente mareado, débil o tiene sensación de [place]  Pregúntele a owens Cherylann Bimler vitaminas y minerales son adecuados para usted  · [place], escalofríos o secreción de mal olor  · Tiene sangrado con coágulos  · El sangrado es más abundante que owens período menstrual      · Owens dolor empeora o no mejora después de duane el medicamento para el dolor  · Usted tiene preguntas o inquietudes acerca de owens condición o cuidado  Medicamentos:   · AINEs (Analgésicos antiinflamatorios no esteroides) ruchi el ibuprofeno, ayudan a disminuir la inflamación, el dolor y la fiebre  Los AINEs pueden causar sangrado estomacal o problemas renales en ciertas personas  Si usted merry un medicamento anticoagulante, siempre pregúntele a owens médico si los FAWN son seguros para usted  Siempre kiley la etiqueta de beverly medicamento y [place] instrucciones  · [place] kavitha medicamentos ruchi se le haya indicado  Consulte con owens médico si usted sandhya que owens medicamento no le está ayudando o si presenta efectos secundarios  Infórmele si es alérgico a cualquier medicamento  Mantenga abdias lista actualizada de los [place], las vitaminas y los productos herbales que merry  Incluya los siguientes datos de los medicamentos: cantidad, frecuencia y motivo de administración  Traiga con usted la lista o los envases de la píldoras a kavitha citas de seguimiento  Lleve la lista de los medicamentos con usted en patrick de abdias emergencia    Actividad:  [place] reposo ade 50 horas o según lo indicado  No rd ejercicio ni practique deportes ni levante nada que pese más de 5 libras  No vaya a nadar ni use abdias bañera de hidromasaje  Pregunte a owens médico cuándo puede regresar a realizar kavitha actividades normales  Baño:  Solamente dúchese después de owens procedimiento  No tome susanne de inmersión  Los susanne de inmersión pueden aumentar owens riesgo de contraer abdias infección  Pregunte a owens médico por cuánto tiempo necesita seguir estas indicaciones  No introduzca nada en owens vagina ade 2 semanas:  No tome duchas vaginales, ni use medicamentos vaginales ni tenga relaciones sexuales  No use tampones  En cambio, use abdias almohadilla sanitaria para absorber el sangrado  Programe un Papanicolaou según las indicaciones  Abdias prueba de Papanicolaou puede ayudar a diagnosticar el cáncer de odilia uterino temprano  El cáncer de odilia uterino que se diagnostica a tiempo es más fácil de tratar  No fume:  La nicotina y otros químicos de los cigarrillos y cigarros pueden aumentar el riesgo de cáncer de odilia uterino  Pida información a owens médico si usted actualmente fuma y necesita ayuda para dejar de fumar  Los cigarrillos electrónicos o tabaco sin humo todavía contienen nicotina  Consulte con owens médico antes de [employer]  Practique relaciones sexuales seguras:  El sexo seguro puede ayudar a disminuir el riesgo de infecciones de transmisión sexual (ITS)  Las ITS pueden causar cáncer de odilia uterino  Limite la cantidad de parejas sexuales  Use condones y métodos de barker en todo tipo de contacto sexual  Use un condón nuevo o abdias barker de látex cada vez que tenga relaciones sexuales  [place] incluye el sexo oral, vaginal y anal  Asegúrese que el condón se ajuste y esté viry colocado  Acuda a kavitha consultas de control con owens médico según le indicaron  Anote kavitha preguntas para que se acuerde de hacerlas ade kavitha visitas     © 2017 2600 Jaquan Mathew Information is for End User's use only and may not be sold, redistributed or otherwise used for commercial purposes  All illustrations and images included in CareNotes® are the copyrighted property of A D A M , Inc  or Jeromy Elias  Esta información es sólo para uso en educación  Owens intención no es darle un consejo médico sobre enfermedades o tratamientos  Colsulte con owens Charna Heckler farmacéutico antes de seguir cualquier régimen médico para saber si es seguro y efectivo para usted

## 2020-09-10 NOTE — PROGRESS NOTES
Gynecology   Raina Ye 37 y o  female MRN: [id number]  Unit/Bed#:  Encounter: [id number]      History of Present Illness     HPI:  Raina Ye is a 37 y o  female who presents to discuss results from her recent Colposcopy  She has not had any issues  Review of Systems   Constitutional: Negative for chills and fever  Respiratory: Negative for shortness of breath  Cardiovascular: Negative for chest pain and palpitations  Gastrointestinal: Negative for abdominal pain, constipation, diarrhea, nausea and vomiting  Genitourinary: Negative for difficulty urinating and vaginal bleeding  Neurological: Negative for dizziness, light-headedness and headaches         Historical Information   Past Medical History:   Diagnosis Date    Chest pain - Resolved (anxiety related)      GERD (gastroesophageal reflux disease)- no medications     Hypertension - Norvasc      Past Surgical History:   Procedure Laterality Date    CARPAL TUNNEL RELEASE      TUBAL LIGATION       OB History    Para Term  AB Living   3 3 3 0   3   SAB TAB Ectopic Multiple Live Births   0 0 0 0 3      # Outcome Date GA Lbr Lucio/2nd Weight Sex Delivery Anes PTL Lv   3 Term            2 Term            1 Term              Family History   Problem Relation Age of Onset    Asthma Mother     Hypertension Mother     No Known Problems Sister     No Known Problems Daughter     No Known Problems Maternal Grandmother     No Known Problems Paternal Grandmother     No Known Problems Maternal Aunt     No Known Problems Maternal Aunt     No Known Problems Paternal Aunt     No Known Problems Paternal Aunt     No Known Problems Paternal Aunt     Diabetes Paternal Aunt     Breast cancer Neg Hx     Cancer Neg Hx      Social History   Social History     Substance and Sexual Activity   Alcohol Use Not Currently    Frequency: 2-4 times a month    Drinks per session: 1 or 2   No more don't want it   Social History Substance and Sexual Activity   Drug Use Never     Social History     Tobacco Use   Smoking Status Never Smoker   Smokeless Tobacco Never Used       Meds/Allergies   (Not in a hospital admission)    Allergies   Allergen Reactions    No Known Allergies        Objective   /90   Pulse 92   Temp 98 2 °F (36 8 °C)   Wt 84 5 kg (186 lb 3 2 oz)   LMP 09/04/2020 (Exact Date)   BMI 34 06 kg/m²     Physical Exam  Constitutional:       General: She is not in acute distress  Appearance: She is well-developed  She is not diaphoretic  HENT:      Head: Normocephalic and atraumatic  Cardiovascular:      Rate and Rhythm: Normal rate and regular rhythm  Heart sounds: Normal heart sounds  No murmur  No friction rub  No gallop  Pulmonary:      Effort: Pulmonary effort is normal  No respiratory distress  Breath sounds: Normal breath sounds  No wheezing or rales  Abdominal:      General: There is no distension  Palpations: Abdomen is soft  Tenderness: There is no abdominal tenderness  There is no guarding or rebound  Skin:     General: Skin is warm and dry  Neurological:      Mental Status: She is alert and oriented to person, place, and time  Psychiatric:         Behavior: Behavior normal          Thought Content: Thought content normal          Judgment: Judgment normal          Lab Results:   No visits with results within 1 Day(s) from this visit     Latest known visit with results is:   Procedure visit on 08/26/2020   Component Date Value    Case Report 08/26/2020                      Value:Surgical Pathology Report                         Case: [id number]                                   Authorizing Provider:  Summer Harris MD         Collected:           08/26/2020 1435              Ordering Location:     [facility]       Received:            08/26/2020 [address] [place] [address] Pathologist:           Naveed Barcenas MD                                                         Specimens:   A) - Cervix, 12 o'clock                                                                             B) - Cervix, 7 o'clock                                                                              C) - Endocervical, ECC                                                                     Final Diagnosis 08/26/2020                      Value: This result contains rich text formatting which cannot be displayed here   Additional Information 08/26/2020                      Value: This result contains rich text formatting which cannot be displayed here  Huy Perez Description 08/26/2020                      Value: This result contains rich text formatting which cannot be displayed here  Assessment/Plan     Problem List Items Addressed This Visit        Unprioritized    Anxiety - Primary     Discussed her anxiety today  She has had many recent changes including losing her job due to [employer] and 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

## 2020-09-10 NOTE — ASSESSMENT & PLAN NOTE
Disease history:   7/10/20 - HPV other pos, ASCUS  5/2019 - HPV other pos, NILM   8/26/20 - HSIL, RONALDO II     Discussed results today and consented for LEEP  Informed consent obtained and consent form signed

## 2020-09-15 ENCOUNTER — DOCUMENTATION (OUTPATIENT)
Dept: OBGYN CLINIC | Facility: CLINIC | Age: 35
End: 2020-09-15

## 2020-09-15 NOTE — PROGRESS NOTES
[facility]  [address]  Phone# [phone]  Fax# [phone]    September 15, 2020    Letter of Medical Necessity:     Patient: Mark Burgos   MRN: [id number]   YOB: 1985       To whom it may concern: On behalf of the OB/GYN team, it is recommended that the patient undergo loop electrical excision procedure due to her current diagnosis of RONALDO II  She has a history of positive high risk HPV test since 5/2019 at which time her pap test was negative for intraepithelial lesion or malignancy  In 7/2020, she was found to still be high risk HPV positive with atypical squamous cells of undetermined significance, cannot exclude high-grade squamous intraepithelial lesion on pap  Subsequent colposcopy on 8/2020 revealed RONALDO II/ moderate dysplasia  She was therefore consented for LEEP per ASCCP guidelines  This is medically necessary as without treatment this condition could progress to cervical cancer  Thank you for your consideration,           Leslie Carranza MD  OB/GYN  9/15/2020

## 2020-09-22 ENCOUNTER — TELEPHONE (OUTPATIENT)
Dept: OBGYN CLINIC | Facility: CLINIC | Age: 35
End: 2020-09-22

## 2020-09-22 ENCOUNTER — PREP FOR PROCEDURE (OUTPATIENT)
Dept: OBGYN CLINIC | Facility: CLINIC | Age: 35
End: 2020-09-22

## 2020-09-22 DIAGNOSIS — N87.1 CIN II (CERVICAL INTRAEPITHELIAL NEOPLASIA II): Primary | ICD-10-CM

## 2020-09-22 DIAGNOSIS — Z01.818 PRE-OP TESTING: ICD-10-CM

## 2020-09-22 NOTE — TELEPHONE ENCOUNTER
Patient was informed of her surgery date 10/9/20  H&P completed  Lab slip mailed to patient  Patient aware to have labs done 5 to 7 days prior to surgery  Left a message on  [facility] voice mail concerning coverage for leep procedure

## 2020-09-28 ENCOUNTER — TELEPHONE (OUTPATIENT)
Dept: OBGYN CLINIC | Facility: CLINIC | Age: 35
End: 2020-09-28

## 2020-09-30 ENCOUNTER — TELEPHONE (OUTPATIENT)
Dept: LABOR AND DELIVERY | Facility: HOSPITAL | Age: 35
End: 2020-09-30

## 2020-09-30 ENCOUNTER — APPOINTMENT (OUTPATIENT)
Dept: LAB | Facility: HOSPITAL | Age: 35
End: 2020-09-30
Attending: OBSTETRICS & GYNECOLOGY

## 2020-09-30 ENCOUNTER — TELEPHONE (OUTPATIENT)
Dept: OBGYN CLINIC | Facility: CLINIC | Age: 35
End: 2020-09-30

## 2020-09-30 DIAGNOSIS — N87.1 CIN II (CERVICAL INTRAEPITHELIAL NEOPLASIA II): ICD-10-CM

## 2020-09-30 DIAGNOSIS — Z01.818 PRE-OP TESTING: ICD-10-CM

## 2020-09-30 LAB
ANION GAP SERPL CALCULATED.3IONS-SCNC: 9 MMOL/L (ref 4–13)
BASOPHILS # BLD AUTO: 0.06 THOUSANDS/ΜL (ref 0–0.1)
BASOPHILS NFR BLD AUTO: 1 % (ref 0–1)
BUN SERPL-MCNC: 12 MG/DL (ref 5–25)
CALCIUM SERPL-MCNC: 8.8 MG/DL (ref 8.3–10.1)
CHLORIDE SERPL-SCNC: 101 MMOL/L (ref 100–108)
CO2 SERPL-SCNC: 27 MMOL/L (ref 21–32)
CREAT SERPL-MCNC: 0.59 MG/DL (ref 0.6–1.3)
EOSINOPHIL # BLD AUTO: 0.11 THOUSAND/ΜL (ref 0–0.61)
EOSINOPHIL NFR BLD AUTO: 2 % (ref 0–6)
ERYTHROCYTE [DISTWIDTH] IN BLOOD BY AUTOMATED COUNT: 12.4 % (ref 11.6–15.1)
GFR SERPL CREATININE-BSD FRML MDRD: 136 ML/MIN/1.73SQ M
GLUCOSE SERPL-MCNC: 88 MG/DL (ref 65–140)
HCT VFR BLD AUTO: 43.4 % (ref 34.8–46.1)
HGB BLD-MCNC: 14.4 G/DL (ref 11.5–15.4)
IMM GRANULOCYTES # BLD AUTO: 0.03 THOUSAND/UL (ref 0–0.2)
IMM GRANULOCYTES NFR BLD AUTO: 1 % (ref 0–2)
LYMPHOCYTES # BLD AUTO: 2.31 THOUSANDS/ΜL (ref 0.6–4.47)
LYMPHOCYTES NFR BLD AUTO: 36 % (ref 14–44)
MCH RBC QN AUTO: 30.2 PG (ref 26.8–34.3)
MCHC RBC AUTO-ENTMCNC: 33.2 G/DL (ref 31.4–37.4)
MCV RBC AUTO: 91 FL (ref 82–98)
MONOCYTES # BLD AUTO: 0.48 THOUSAND/ΜL (ref 0.17–1.22)
MONOCYTES NFR BLD AUTO: 8 % (ref 4–12)
NEUTROPHILS # BLD AUTO: 3.39 THOUSANDS/ΜL (ref 1.85–7.62)
NEUTS SEG NFR BLD AUTO: 52 % (ref 43–75)
NRBC BLD AUTO-RTO: 0 /100 WBCS
PLATELET # BLD AUTO: 261 THOUSANDS/UL (ref 149–390)
PMV BLD AUTO: 10 FL (ref 8.9–12.7)
POTASSIUM SERPL-SCNC: 3.8 MMOL/L (ref 3.5–5.3)
RBC # BLD AUTO: 4.77 MILLION/UL (ref 3.81–5.12)
SODIUM SERPL-SCNC: 137 MMOL/L (ref 136–145)
WBC # BLD AUTO: 6.38 THOUSAND/UL (ref 4.31–10.16)

## 2020-09-30 PROCEDURE — 36415 COLL VENOUS BLD VENIPUNCTURE: CPT

## 2020-09-30 PROCEDURE — 80048 BASIC METABOLIC PNL TOTAL CA: CPT

## 2020-09-30 PROCEDURE — 85025 COMPLETE CBC W/AUTO DIFF WBC: CPT

## 2020-09-30 RX ORDER — IBUPROFEN 200 MG
TABLET ORAL EVERY 6 HOURS PRN
Status: ON HOLD | COMMUNITY
End: 2020-10-02 | Stop reason: SDUPTHER

## 2020-09-30 NOTE — TELEPHONE ENCOUNTER
Pt called requesting "please remove my entire cervix [during LEEP]  " I explained we will use fluid to detect abnormal areas and will attempt to remove a "larger" portion per her request, but complete removal is not possible  Pt is satisfied with this and will come for surgery as scheduled on 10/2/2020

## 2020-10-01 ENCOUNTER — ANESTHESIA EVENT (OUTPATIENT)
Dept: PERIOP | Facility: HOSPITAL | Age: 35
End: 2020-10-01

## 2020-10-02 ENCOUNTER — HOSPITAL ENCOUNTER (OUTPATIENT)
Facility: HOSPITAL | Age: 35
Setting detail: OUTPATIENT SURGERY
Discharge: HOME | End: 2020-10-02
Attending: OBSTETRICS & GYNECOLOGY | Admitting: OBSTETRICS & GYNECOLOGY
Payer: COMMERCIAL

## 2020-10-02 ENCOUNTER — ANESTHESIA (OUTPATIENT)
Dept: PERIOP | Facility: HOSPITAL | Age: 35
End: 2020-10-02

## 2020-10-02 VITALS — HEART RATE: 73 BPM

## 2020-10-02 VITALS
BODY MASS INDEX: 33.86 KG/M2 | HEIGHT: 62 IN | DIASTOLIC BLOOD PRESSURE: 76 MMHG | WEIGHT: 184 LBS | OXYGEN SATURATION: 99 % | HEART RATE: 74 BPM | RESPIRATION RATE: 13 BRPM | SYSTOLIC BLOOD PRESSURE: 120 MMHG | TEMPERATURE: 97.9 F

## 2020-10-02 DIAGNOSIS — Z98.890 S/P LEEP (LOOP ELECTROSURGICAL EXCISION PROCEDURE): Primary | ICD-10-CM

## 2020-10-02 DIAGNOSIS — N87.1 CIN II (CERVICAL INTRAEPITHELIAL NEOPLASIA II): ICD-10-CM

## 2020-10-02 LAB
EXT PREGNANCY TEST URINE: NEGATIVE
EXT. CONTROL: NORMAL

## 2020-10-02 PROCEDURE — 81025 URINE PREGNANCY TEST: CPT | Performed by: ANESTHESIOLOGY

## 2020-10-02 PROCEDURE — 88307 TISSUE EXAM BY PATHOLOGIST: CPT | Performed by: PATHOLOGY

## 2020-10-02 PROCEDURE — 57522 CONIZATION OF CERVIX: CPT | Performed by: OBSTETRICS & GYNECOLOGY

## 2020-10-02 RX ORDER — MIDAZOLAM HYDROCHLORIDE 2 MG/2ML
INJECTION, SOLUTION INTRAMUSCULAR; INTRAVENOUS AS NEEDED
Status: DISCONTINUED | OUTPATIENT
Start: 2020-10-02 | End: 2020-10-02

## 2020-10-02 RX ORDER — KETOROLAC TROMETHAMINE 30 MG/ML
INJECTION, SOLUTION INTRAMUSCULAR; INTRAVENOUS AS NEEDED
Status: DISCONTINUED | OUTPATIENT
Start: 2020-10-02 | End: 2020-10-02

## 2020-10-02 RX ORDER — SENNOSIDES 8.6 MG
650 CAPSULE ORAL EVERY 8 HOURS PRN
Qty: 30 TABLET | Refills: 0 | Status: SHIPPED | OUTPATIENT
Start: 2020-10-02

## 2020-10-02 RX ORDER — DEXAMETHASONE SODIUM PHOSPHATE 10 MG/ML
INJECTION, SOLUTION INTRAMUSCULAR; INTRAVENOUS AS NEEDED
Status: DISCONTINUED | OUTPATIENT
Start: 2020-10-02 | End: 2020-10-02

## 2020-10-02 RX ORDER — FENTANYL CITRATE/PF 50 MCG/ML
25 SYRINGE (ML) INJECTION
Status: DISCONTINUED | OUTPATIENT
Start: 2020-10-02 | End: 2020-10-02 | Stop reason: HOSPADM

## 2020-10-02 RX ORDER — ACETAMINOPHEN 325 MG/1
650 TABLET ORAL EVERY 6 HOURS PRN
Status: DISCONTINUED | OUTPATIENT
Start: 2020-10-02 | End: 2020-10-02 | Stop reason: HOSPADM

## 2020-10-02 RX ORDER — IBUPROFEN 200 MG
600 TABLET ORAL EVERY 6 HOURS PRN
Qty: 30 TABLET | Refills: 0 | Status: SHIPPED | OUTPATIENT
Start: 2020-10-02 | End: 2021-04-03 | Stop reason: ALTCHOICE

## 2020-10-02 RX ORDER — IBUPROFEN 600 MG/1
600 TABLET ORAL EVERY 6 HOURS PRN
Status: DISCONTINUED | OUTPATIENT
Start: 2020-10-02 | End: 2020-10-02 | Stop reason: HOSPADM

## 2020-10-02 RX ORDER — IODINE SOLUTION STRONG 5% (LUGOL'S) 5 %
SOLUTION ORAL AS NEEDED
Status: DISCONTINUED | OUTPATIENT
Start: 2020-10-02 | End: 2020-10-02 | Stop reason: HOSPADM

## 2020-10-02 RX ORDER — ONDANSETRON 2 MG/ML
4 INJECTION INTRAMUSCULAR; INTRAVENOUS ONCE AS NEEDED
Status: DISCONTINUED | OUTPATIENT
Start: 2020-10-02 | End: 2020-10-02 | Stop reason: HOSPADM

## 2020-10-02 RX ORDER — ONDANSETRON 2 MG/ML
INJECTION INTRAMUSCULAR; INTRAVENOUS AS NEEDED
Status: DISCONTINUED | OUTPATIENT
Start: 2020-10-02 | End: 2020-10-02

## 2020-10-02 RX ORDER — LIDOCAINE HYDROCHLORIDE 20 MG/ML
INJECTION, SOLUTION EPIDURAL; INFILTRATION; INTRACAUDAL; PERINEURAL AS NEEDED
Status: DISCONTINUED | OUTPATIENT
Start: 2020-10-02 | End: 2020-10-02

## 2020-10-02 RX ORDER — LIDOCAINE HYDROCHLORIDE 10 MG/ML
INJECTION, SOLUTION EPIDURAL; INFILTRATION; INTRACAUDAL; PERINEURAL AS NEEDED
Status: DISCONTINUED | OUTPATIENT
Start: 2020-10-02 | End: 2020-10-02

## 2020-10-02 RX ORDER — FENTANYL CITRATE 50 UG/ML
INJECTION, SOLUTION INTRAMUSCULAR; INTRAVENOUS AS NEEDED
Status: DISCONTINUED | OUTPATIENT
Start: 2020-10-02 | End: 2020-10-02

## 2020-10-02 RX ORDER — MAGNESIUM HYDROXIDE 1200 MG/15ML
LIQUID ORAL AS NEEDED
Status: DISCONTINUED | OUTPATIENT
Start: 2020-10-02 | End: 2020-10-02 | Stop reason: HOSPADM

## 2020-10-02 RX ORDER — ONDANSETRON 2 MG/ML
4 INJECTION INTRAMUSCULAR; INTRAVENOUS EVERY 6 HOURS PRN
Status: CANCELLED | OUTPATIENT
Start: 2020-10-02

## 2020-10-02 RX ORDER — PROPOFOL 10 MG/ML
INJECTION, EMULSION INTRAVENOUS AS NEEDED
Status: DISCONTINUED | OUTPATIENT
Start: 2020-10-02 | End: 2020-10-02

## 2020-10-02 RX ORDER — SODIUM CHLORIDE 9 MG/ML
125 INJECTION, SOLUTION INTRAVENOUS CONTINUOUS
Status: DISCONTINUED | OUTPATIENT
Start: 2020-10-02 | End: 2020-10-02 | Stop reason: HOSPADM

## 2020-10-02 RX ADMIN — FENTANYL CITRATE 25 MCG: 50 INJECTION, SOLUTION INTRAMUSCULAR; INTRAVENOUS at 12:46

## 2020-10-02 RX ADMIN — PROPOFOL 200 MG: 10 INJECTION, EMULSION INTRAVENOUS at 12:42

## 2020-10-02 RX ADMIN — FENTANYL CITRATE 25 MCG: 50 INJECTION, SOLUTION INTRAMUSCULAR; INTRAVENOUS at 12:50

## 2020-10-02 RX ADMIN — FENTANYL CITRATE 25 MCG: 50 INJECTION, SOLUTION INTRAMUSCULAR; INTRAVENOUS at 12:48

## 2020-10-02 RX ADMIN — KETOROLAC TROMETHAMINE 30 MG: 30 INJECTION, SOLUTION INTRAMUSCULAR at 12:54

## 2020-10-02 RX ADMIN — ONDANSETRON 4 MG: 2 INJECTION INTRAMUSCULAR; INTRAVENOUS at 12:54

## 2020-10-02 RX ADMIN — DEXAMETHASONE SODIUM PHOSPHATE 8 MG: 10 INJECTION, SOLUTION INTRAMUSCULAR; INTRAVENOUS at 12:54

## 2020-10-02 RX ADMIN — SODIUM CHLORIDE 125 ML/HR: 0.9 INJECTION, SOLUTION INTRAVENOUS at 08:32

## 2020-10-02 RX ADMIN — LIDOCAINE HYDROCHLORIDE 5 ML: 20 INJECTION, SOLUTION EPIDURAL; INFILTRATION; INTRACAUDAL; PERINEURAL at 12:41

## 2020-10-02 RX ADMIN — MIDAZOLAM 2 MG: 1 INJECTION INTRAMUSCULAR; INTRAVENOUS at 12:40

## 2020-10-02 RX ADMIN — SODIUM CHLORIDE: 0.9 INJECTION, SOLUTION INTRAVENOUS at 13:02

## 2020-10-04 ENCOUNTER — ANESTHESIA (EMERGENCY)
Dept: PERIOP | Facility: HOSPITAL | Age: 35
End: 2020-10-04

## 2020-10-04 ENCOUNTER — HOSPITAL ENCOUNTER (OUTPATIENT)
Facility: HOSPITAL | Age: 35
Setting detail: OUTPATIENT SURGERY
Discharge: HOME | End: 2020-10-04
Attending: EMERGENCY MEDICINE | Admitting: OBSTETRICS & GYNECOLOGY
Payer: COMMERCIAL

## 2020-10-04 ENCOUNTER — ANESTHESIA EVENT (EMERGENCY)
Dept: PERIOP | Facility: HOSPITAL | Age: 35
End: 2020-10-04

## 2020-10-04 VITALS
WEIGHT: 184.75 LBS | SYSTOLIC BLOOD PRESSURE: 110 MMHG | OXYGEN SATURATION: 98 % | RESPIRATION RATE: 18 BRPM | BODY MASS INDEX: 33.79 KG/M2 | HEART RATE: 69 BPM | TEMPERATURE: 97.4 F | DIASTOLIC BLOOD PRESSURE: 69 MMHG

## 2020-10-04 VITALS — HEART RATE: 87 BPM

## 2020-10-04 DIAGNOSIS — Z98.890 S/P LEEP: ICD-10-CM

## 2020-10-04 DIAGNOSIS — N93.9 VAGINAL BLEEDING: Primary | ICD-10-CM

## 2020-10-04 LAB
ABO GROUP BLD: NORMAL
ABO GROUP BLD: NORMAL
ANION GAP SERPL CALCULATED.3IONS-SCNC: 10 MMOL/L (ref 4–13)
BASOPHILS # BLD AUTO: 0.06 THOUSANDS/ΜL (ref 0–0.1)
BASOPHILS NFR BLD AUTO: 1 % (ref 0–1)
BLD GP AB SCN SERPL QL: NEGATIVE
BUN SERPL-MCNC: 11 MG/DL (ref 5–25)
CALCIUM SERPL-MCNC: 8.3 MG/DL (ref 8.3–10.1)
CHLORIDE SERPL-SCNC: 98 MMOL/L (ref 100–108)
CO2 SERPL-SCNC: 27 MMOL/L (ref 21–32)
CREAT SERPL-MCNC: 0.78 MG/DL (ref 0.6–1.3)
EOSINOPHIL # BLD AUTO: 0.05 THOUSAND/ΜL (ref 0–0.61)
EOSINOPHIL NFR BLD AUTO: 1 % (ref 0–6)
ERYTHROCYTE [DISTWIDTH] IN BLOOD BY AUTOMATED COUNT: 12.6 % (ref 11.6–15.1)
ERYTHROCYTE [DISTWIDTH] IN BLOOD BY AUTOMATED COUNT: 12.6 % (ref 11.6–15.1)
GFR SERPL CREATININE-BSD FRML MDRD: 113 ML/MIN/1.73SQ M
GLUCOSE SERPL-MCNC: 134 MG/DL (ref 65–140)
HCT VFR BLD AUTO: 37.9 % (ref 34.8–46.1)
HCT VFR BLD AUTO: 38.2 % (ref 34.8–46.1)
HGB BLD-MCNC: 12.6 G/DL (ref 11.5–15.4)
HGB BLD-MCNC: 12.7 G/DL (ref 11.5–15.4)
IMM GRANULOCYTES # BLD AUTO: 0.03 THOUSAND/UL (ref 0–0.2)
IMM GRANULOCYTES NFR BLD AUTO: 0 % (ref 0–2)
LYMPHOCYTES # BLD AUTO: 2.92 THOUSANDS/ΜL (ref 0.6–4.47)
LYMPHOCYTES NFR BLD AUTO: 30 % (ref 14–44)
MCH RBC QN AUTO: 30.2 PG (ref 26.8–34.3)
MCH RBC QN AUTO: 30.3 PG (ref 26.8–34.3)
MCHC RBC AUTO-ENTMCNC: 33 G/DL (ref 31.4–37.4)
MCHC RBC AUTO-ENTMCNC: 33.5 G/DL (ref 31.4–37.4)
MCV RBC AUTO: 91 FL (ref 82–98)
MCV RBC AUTO: 92 FL (ref 82–98)
MONOCYTES # BLD AUTO: 0.47 THOUSAND/ΜL (ref 0.17–1.22)
MONOCYTES NFR BLD AUTO: 5 % (ref 4–12)
NEUTROPHILS # BLD AUTO: 6.06 THOUSANDS/ΜL (ref 1.85–7.62)
NEUTS SEG NFR BLD AUTO: 63 % (ref 43–75)
NRBC BLD AUTO-RTO: 0 /100 WBCS
PLATELET # BLD AUTO: 248 THOUSANDS/UL (ref 149–390)
PLATELET # BLD AUTO: 289 THOUSANDS/UL (ref 149–390)
PMV BLD AUTO: 9.6 FL (ref 8.9–12.7)
PMV BLD AUTO: 9.8 FL (ref 8.9–12.7)
POTASSIUM SERPL-SCNC: 3.2 MMOL/L (ref 3.5–5.3)
RBC # BLD AUTO: 4.17 MILLION/UL (ref 3.81–5.12)
RBC # BLD AUTO: 4.19 MILLION/UL (ref 3.81–5.12)
RH BLD: POSITIVE
RH BLD: POSITIVE
SODIUM SERPL-SCNC: 135 MMOL/L (ref 136–145)
SPECIMEN EXPIRATION DATE: NORMAL
WBC # BLD AUTO: 10.58 THOUSAND/UL (ref 4.31–10.16)
WBC # BLD AUTO: 9.59 THOUSAND/UL (ref 4.31–10.16)

## 2020-10-04 PROCEDURE — 99282 EMERGENCY DEPT VISIT SF MDM: CPT | Performed by: PHYSICIAN ASSISTANT

## 2020-10-04 PROCEDURE — 86850 RBC ANTIBODY SCREEN: CPT | Performed by: OBSTETRICS & GYNECOLOGY

## 2020-10-04 PROCEDURE — 85025 COMPLETE CBC W/AUTO DIFF WBC: CPT | Performed by: PHYSICIAN ASSISTANT

## 2020-10-04 PROCEDURE — 99284 EMERGENCY DEPT VISIT MOD MDM: CPT

## 2020-10-04 PROCEDURE — 99214 OFFICE O/P EST MOD 30 MIN: CPT | Performed by: OBSTETRICS & GYNECOLOGY

## 2020-10-04 PROCEDURE — 86900 BLOOD TYPING SEROLOGIC ABO: CPT | Performed by: OBSTETRICS & GYNECOLOGY

## 2020-10-04 PROCEDURE — 96365 THER/PROPH/DIAG IV INF INIT: CPT

## 2020-10-04 PROCEDURE — 80048 BASIC METABOLIC PNL TOTAL CA: CPT | Performed by: PHYSICIAN ASSISTANT

## 2020-10-04 PROCEDURE — 86901 BLOOD TYPING SEROLOGIC RH(D): CPT | Performed by: OBSTETRICS & GYNECOLOGY

## 2020-10-04 PROCEDURE — 36415 COLL VENOUS BLD VENIPUNCTURE: CPT | Performed by: PHYSICIAN ASSISTANT

## 2020-10-04 PROCEDURE — 85027 COMPLETE CBC AUTOMATED: CPT | Performed by: OBSTETRICS & GYNECOLOGY

## 2020-10-04 PROCEDURE — 57410 PELVIC EXAMINATION: CPT | Performed by: OBSTETRICS & GYNECOLOGY

## 2020-10-04 RX ORDER — SUCCINYLCHOLINE/SOD CL,ISO/PF 100 MG/5ML
SYRINGE (ML) INTRAVENOUS AS NEEDED
Status: DISCONTINUED | OUTPATIENT
Start: 2020-10-04 | End: 2020-10-04

## 2020-10-04 RX ORDER — ONDANSETRON 2 MG/ML
4 INJECTION INTRAMUSCULAR; INTRAVENOUS EVERY 6 HOURS PRN
Status: DISCONTINUED | OUTPATIENT
Start: 2020-10-04 | End: 2020-10-05 | Stop reason: HOSPADM

## 2020-10-04 RX ORDER — FENTANYL CITRATE/PF 50 MCG/ML
25 SYRINGE (ML) INJECTION
Status: DISCONTINUED | OUTPATIENT
Start: 2020-10-04 | End: 2020-10-04 | Stop reason: HOSPADM

## 2020-10-04 RX ORDER — ACETAMINOPHEN 325 MG/1
650 TABLET ORAL EVERY 6 HOURS PRN
Status: DISCONTINUED | OUTPATIENT
Start: 2020-10-04 | End: 2020-10-05 | Stop reason: HOSPADM

## 2020-10-04 RX ORDER — FENTANYL CITRATE 50 UG/ML
INJECTION, SOLUTION INTRAMUSCULAR; INTRAVENOUS AS NEEDED
Status: DISCONTINUED | OUTPATIENT
Start: 2020-10-04 | End: 2020-10-04

## 2020-10-04 RX ORDER — ONDANSETRON 2 MG/ML
INJECTION INTRAMUSCULAR; INTRAVENOUS AS NEEDED
Status: DISCONTINUED | OUTPATIENT
Start: 2020-10-04 | End: 2020-10-04

## 2020-10-04 RX ORDER — PROPOFOL 10 MG/ML
INJECTION, EMULSION INTRAVENOUS AS NEEDED
Status: DISCONTINUED | OUTPATIENT
Start: 2020-10-04 | End: 2020-10-04

## 2020-10-04 RX ORDER — OXYCODONE HYDROCHLORIDE 5 MG/1
5 TABLET ORAL ONCE AS NEEDED
Status: DISCONTINUED | OUTPATIENT
Start: 2020-10-04 | End: 2020-10-05 | Stop reason: HOSPADM

## 2020-10-04 RX ORDER — LIDOCAINE HYDROCHLORIDE 20 MG/ML
INJECTION, SOLUTION EPIDURAL; INFILTRATION; INTRACAUDAL; PERINEURAL AS NEEDED
Status: DISCONTINUED | OUTPATIENT
Start: 2020-10-04 | End: 2020-10-04

## 2020-10-04 RX ORDER — SODIUM CHLORIDE, SODIUM LACTATE, POTASSIUM CHLORIDE, CALCIUM CHLORIDE 600; 310; 30; 20 MG/100ML; MG/100ML; MG/100ML; MG/100ML
INJECTION, SOLUTION INTRAVENOUS CONTINUOUS PRN
Status: DISCONTINUED | OUTPATIENT
Start: 2020-10-04 | End: 2020-10-04

## 2020-10-04 RX ORDER — IBUPROFEN 600 MG/1
600 TABLET ORAL EVERY 6 HOURS PRN
Status: DISCONTINUED | OUTPATIENT
Start: 2020-10-04 | End: 2020-10-05 | Stop reason: HOSPADM

## 2020-10-04 RX ORDER — MIDAZOLAM HYDROCHLORIDE 2 MG/2ML
INJECTION, SOLUTION INTRAMUSCULAR; INTRAVENOUS AS NEEDED
Status: DISCONTINUED | OUTPATIENT
Start: 2020-10-04 | End: 2020-10-04

## 2020-10-04 RX ADMIN — FERRIC SUBSULFATE: 259 SOLUTION TOPICAL at 16:57

## 2020-10-04 RX ADMIN — SODIUM CHLORIDE, SODIUM LACTATE, POTASSIUM CHLORIDE, AND CALCIUM CHLORIDE 1000 ML: .6; .31; .03; .02 INJECTION, SOLUTION INTRAVENOUS at 18:23

## 2020-10-04 RX ADMIN — SODIUM CHLORIDE, SODIUM LACTATE, POTASSIUM CHLORIDE, AND CALCIUM CHLORIDE: .6; .31; .03; .02 INJECTION, SOLUTION INTRAVENOUS at 19:22

## 2020-10-04 RX ADMIN — ONDANSETRON 4 MG: 2 INJECTION INTRAMUSCULAR; INTRAVENOUS at 19:27

## 2020-10-04 RX ADMIN — ONDANSETRON 4 MG: 2 INJECTION INTRAMUSCULAR; INTRAVENOUS at 20:00

## 2020-10-04 RX ADMIN — Medication 100 MG: at 19:27

## 2020-10-04 RX ADMIN — LIDOCAINE HYDROCHLORIDE 100 MG: 20 INJECTION, SOLUTION EPIDURAL; INFILTRATION; INTRACAUDAL; PERINEURAL at 19:27

## 2020-10-04 RX ADMIN — FENTANYL CITRATE 100 MCG: 50 INJECTION, SOLUTION INTRAMUSCULAR; INTRAVENOUS at 19:27

## 2020-10-04 RX ADMIN — SILVER NITRATE APPLICATORS 1 APPLICATOR: 25; 75 STICK TOPICAL at 16:57

## 2020-10-04 RX ADMIN — PROPOFOL 200 MG: 10 INJECTION, EMULSION INTRAVENOUS at 19:27

## 2020-10-04 RX ADMIN — MIDAZOLAM 2 MG: 1 INJECTION INTRAMUSCULAR; INTRAVENOUS at 19:23

## 2020-10-04 RX ADMIN — IBUPROFEN 600 MG: 600 TABLET ORAL at 21:38

## 2020-10-08 ENCOUNTER — OFFICE VISIT (OUTPATIENT)
Dept: OBGYN CLINIC | Facility: CLINIC | Age: 35
End: 2020-10-08

## 2020-10-08 VITALS
BODY MASS INDEX: 33.29 KG/M2 | HEART RATE: 88 BPM | DIASTOLIC BLOOD PRESSURE: 78 MMHG | SYSTOLIC BLOOD PRESSURE: 127 MMHG | TEMPERATURE: 98.2 F | WEIGHT: 182 LBS

## 2020-10-08 DIAGNOSIS — Z98.890 S/P LEEP (LOOP ELECTROSURGICAL EXCISION PROCEDURE): Primary | ICD-10-CM

## 2020-10-08 PROCEDURE — 99213 OFFICE O/P EST LOW 20 MIN: CPT | Performed by: OBSTETRICS & GYNECOLOGY

## 2020-10-09 ENCOUNTER — TELEPHONE (OUTPATIENT)
Dept: OBGYN CLINIC | Facility: CLINIC | Age: 35
End: 2020-10-09

## 2020-10-22 ENCOUNTER — OFFICE VISIT (OUTPATIENT)
Dept: OBGYN CLINIC | Facility: CLINIC | Age: 35
End: 2020-10-22

## 2020-10-22 VITALS
DIASTOLIC BLOOD PRESSURE: 86 MMHG | BODY MASS INDEX: 33.58 KG/M2 | WEIGHT: 183.6 LBS | HEART RATE: 81 BPM | TEMPERATURE: 98.7 F | SYSTOLIC BLOOD PRESSURE: 119 MMHG

## 2020-10-22 DIAGNOSIS — D06.9 HIGH GRADE SQUAMOUS INTRAEPITHELIAL LESION (HGSIL), GRADE 3 CIN, ON BIOPSY OF CERVIX: Primary | ICD-10-CM

## 2020-10-22 DIAGNOSIS — Z98.890 S/P LEEP (LOOP ELECTROSURGICAL EXCISION PROCEDURE): ICD-10-CM

## 2020-10-22 PROCEDURE — 99213 OFFICE O/P EST LOW 20 MIN: CPT | Performed by: OBSTETRICS & GYNECOLOGY

## 2020-10-22 PROCEDURE — 90651 9VHPV VACCINE 2/3 DOSE IM: CPT

## 2020-10-22 PROCEDURE — 90471 IMMUNIZATION ADMIN: CPT

## 2020-11-20 ENCOUNTER — TELEPHONE (OUTPATIENT)
Dept: OBGYN CLINIC | Facility: CLINIC | Age: 35
End: 2020-11-20

## 2020-11-23 ENCOUNTER — TELEPHONE (OUTPATIENT)
Dept: OBGYN CLINIC | Facility: CLINIC | Age: 35
End: 2020-11-23

## 2020-11-24 ENCOUNTER — TELEPHONE (OUTPATIENT)
Dept: OBGYN CLINIC | Facility: CLINIC | Age: 35
End: 2020-11-24

## 2020-11-25 ENCOUNTER — CLINICAL SUPPORT (OUTPATIENT)
Dept: OBGYN CLINIC | Facility: CLINIC | Age: 35
End: 2020-11-25

## 2020-11-25 VITALS
BODY MASS INDEX: 33.47 KG/M2 | WEIGHT: 183 LBS | SYSTOLIC BLOOD PRESSURE: 130 MMHG | HEART RATE: 71 BPM | DIASTOLIC BLOOD PRESSURE: 80 MMHG

## 2020-11-25 DIAGNOSIS — Z23 HUMAN PAPILLOMA VIRUS (HPV) TYPE 9 VACCINE ADMINISTERED: Primary | ICD-10-CM

## 2020-11-25 PROCEDURE — 90471 IMMUNIZATION ADMIN: CPT

## 2020-11-25 PROCEDURE — 90651 9VHPV VACCINE 2/3 DOSE IM: CPT

## 2021-01-11 ENCOUNTER — TELEPHONE (OUTPATIENT)
Dept: OBGYN CLINIC | Facility: CLINIC | Age: 36
End: 2021-01-11

## 2021-01-12 ENCOUNTER — CLINICAL SUPPORT (OUTPATIENT)
Dept: OBGYN CLINIC | Facility: CLINIC | Age: 36
End: 2021-01-12

## 2021-01-12 VITALS
DIASTOLIC BLOOD PRESSURE: 93 MMHG | HEART RATE: 90 BPM | HEIGHT: 62 IN | BODY MASS INDEX: 34.3 KG/M2 | SYSTOLIC BLOOD PRESSURE: 139 MMHG | WEIGHT: 186.4 LBS

## 2021-01-12 DIAGNOSIS — Z00.00 HEALTH CARE MAINTENANCE: Primary | ICD-10-CM

## 2021-01-12 PROCEDURE — 90471 IMMUNIZATION ADMIN: CPT

## 2021-01-12 PROCEDURE — 96372 THER/PROPH/DIAG INJ SC/IM: CPT

## 2021-01-12 PROCEDURE — 90651 9VHPV VACCINE 2/3 DOSE IM: CPT

## 2021-02-10 ENCOUNTER — OFFICE VISIT (OUTPATIENT)
Dept: FAMILY MEDICINE CLINIC | Facility: CLINIC | Age: 36
End: 2021-02-10

## 2021-02-10 VITALS
WEIGHT: 189.9 LBS | OXYGEN SATURATION: 99 % | RESPIRATION RATE: 20 BRPM | TEMPERATURE: 98 F | DIASTOLIC BLOOD PRESSURE: 88 MMHG | HEIGHT: 62 IN | SYSTOLIC BLOOD PRESSURE: 140 MMHG | HEART RATE: 85 BPM | BODY MASS INDEX: 34.95 KG/M2

## 2021-02-10 DIAGNOSIS — R10.9 ABDOMINAL CRAMPS: ICD-10-CM

## 2021-02-10 DIAGNOSIS — M54.50 ACUTE BILATERAL LOW BACK PAIN WITHOUT SCIATICA: ICD-10-CM

## 2021-02-10 DIAGNOSIS — E66.09 CLASS 1 OBESITY DUE TO EXCESS CALORIES WITH BODY MASS INDEX (BMI) OF 34.0 TO 34.9 IN ADULT, UNSPECIFIED WHETHER SERIOUS COMORBIDITY PRESENT: ICD-10-CM

## 2021-02-10 DIAGNOSIS — F41.9 ANXIETY: Primary | ICD-10-CM

## 2021-02-10 PROCEDURE — 99213 OFFICE O/P EST LOW 20 MIN: CPT | Performed by: PHYSICIAN ASSISTANT

## 2021-02-10 NOTE — ASSESSMENT & PLAN NOTE
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

## 2021-02-10 NOTE — PROGRESS NOTES
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  Patient does not have much time to herself  - Offered patient medications to take to help control the anxiety, but patient declines  - Offered patient referral to Behavioral therapy, but patient declines  Acute bilateral low back pain/abdominal cramps   - Symptoms started after patient underwent LEEP procedure in October 2020   - Advised to continue taking Tylenol as needed for pain  - Advised patient to apply heating pad / warm washcloth as needed to the affected areas  - Continue follow-up with OBGYN as scheduled for next week  Diagnoses and all orders for this visit:    Anxiety    Acute bilateral low back pain without sciatica    Abdominal cramps    Class 1 obesity due to excess calories with body mass index (BMI) of 34 0 to 34 9 in adult, unspecified whether serious comorbidity present          All of patients questions were answered  Patient understands and agrees with the above plan  Return in about 6 months (around 8/10/2021) for Next scheduled follow up anxiety, back pain  Abel Handy PA-C  02/10/21  [facility]           Subjective:     Patient ID: Kaelyn Alejo  is a 38 y o  female  has a past medical history of Anxiety, Chest pain, Dental crown present, GERD (gastroesophageal reflux disease), Hypertension, PONV (postoperative nausea and vomiting), and Precancerous changes of the cervix  who presents today in office for anxiety follow up  - Patient is a 38 y o  female who presents today for anxiety follow up  Patient notes she continues to have anxiety  Patient notes her children have been completing virtual schooling, so they have been home  Patient notes she has to help her 10year-old daughter with her studies every day    Patient notes as soon as she is finished with this, she then has to go to work  Patient notes she then comes home around 10:30 p m  [facility] Patient notes she has to work 7 days a week currently since she is unable to work full days Monday -Friday due to helping her daughter with school  Patient notes she never has time to herself and does not have much time to relax  -  In addition, patient has been experiencing lower back pain in addition to abdominal cramping and nausea  Patient notes the symptoms started after she had LEEP procedure completed in October 2020  Two days after her procedure, patient was taken to the OR due to vaginal bleeding  Patient notes she has been taking high-dose Tylenol to help with the pain  Patient notes she experiences the abdominal pain and back pain every day  Patient notes coughing makes the back pain worse  Patient notes also it is difficult to lay down and twist due to pain  Patient notes she does have an appointment next week with her OBGYN and she will discuss her symptoms further at that time  Patient denies any abnormal vaginal bleeding  Patient notes her menstrual cycle is regular  Patient notes the pain could sometimes be even worse when she has her menstrual cycle  Patient notes prior to having the LEEP procedure completed, she never experienced pain like this  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, 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, push, and pull using her lower back  The following portions of the patient's history were reviewed and updated as appropriate: allergies, current medications, past family history, past medical history, past social history, past surgical history and problem list         Review of Systems   Constitutional: Negative for chills and fever  HENT: Negative for congestion, ear discharge, ear pain, rhinorrhea and sore throat  Eyes: Negative for pain  Respiratory: Negative for cough, chest tightness and shortness of breath  Cardiovascular: Negative for chest pain     Gastrointestinal: Positive for abdominal pain and nausea  Negative for constipation, diarrhea and vomiting  Genitourinary: Negative for difficulty urinating, dysuria and vaginal bleeding  Musculoskeletal: Positive for back pain  Skin: Negative for rash  Neurological: Negative for dizziness  Psychiatric/Behavioral: Negative for behavioral problems and suicidal ideas  The patient is nervous/anxious  BMI Counseling: Body mass index is 34 73 kg/m²  The BMI is above normal  Nutrition recommendations include decreasing portion sizes, encouraging healthy choices of fruits and vegetables, consuming healthier snacks, limiting drinks that contain sugar, moderation in carbohydrate intake and reducing intake of cholesterol  Exercise recommendations include moderate physical activity 150 minutes/week  No pharmacotherapy was ordered  Objective:   Vitals:    02/10/21 1129   BP: 140/88   BP Location: Right arm   Patient Position: Sitting   Cuff Size: Standard   Pulse: 85   Resp: 20   Temp: 98 °F (36 7 °C)   TempSrc: Temporal   SpO2: 99%   Weight: 86 1 kg (189 lb 14 4 oz)   Height: 5' 2" (1 575 m)         Physical Exam  Vitals signs and nursing note reviewed  Constitutional:       Appearance: She is well-developed  HENT:      Head: Normocephalic and atraumatic  Right Ear: External ear normal       Left Ear: External ear normal       Nose: Nose normal       Mouth/Throat:      Pharynx: Uvula midline  Eyes:      Conjunctiva/sclera: Conjunctivae normal    Neck:      Musculoskeletal: Normal range of motion and neck supple  Cardiovascular:      Rate and Rhythm: Normal rate and regular rhythm  Heart sounds: Normal heart sounds  No murmur  Pulmonary:      Effort: Pulmonary effort is normal       Breath sounds: Normal breath sounds  No wheezing  Abdominal:      General: Bowel sounds are normal       Palpations: Abdomen is soft  Tenderness: There is no abdominal tenderness     Musculoskeletal: Normal range of motion  Lumbar back: She exhibits tenderness (Diffuse lower back)  She exhibits normal range of motion and no edema  Skin:     General: Skin is warm and dry  Neurological:      Mental Status: She is alert and oriented to person, place, and time     Psychiatric:         Mood and Affect: Mood normal          Speech: Speech normal          Behavior: Behavior normal

## 2021-02-16 ENCOUNTER — OFFICE VISIT (OUTPATIENT)
Dept: OBGYN CLINIC | Facility: CLINIC | Age: 36
End: 2021-02-16

## 2021-02-16 VITALS
SYSTOLIC BLOOD PRESSURE: 134 MMHG | HEART RATE: 79 BPM | DIASTOLIC BLOOD PRESSURE: 85 MMHG | BODY MASS INDEX: 34.73 KG/M2 | HEIGHT: 62 IN

## 2021-02-16 DIAGNOSIS — R10.2 PELVIC PAIN: Primary | ICD-10-CM

## 2021-02-16 PROCEDURE — 99395 PREV VISIT EST AGE 18-39: CPT | Performed by: OBSTETRICS & GYNECOLOGY

## 2021-02-16 RX ORDER — NORETHINDRONE ACETATE AND ETHINYL ESTRADIOL 1; .02 MG/1; MG/1
1 TABLET ORAL DAILY
Qty: 28 TABLET | Refills: 4 | Status: SHIPPED | OUTPATIENT
Start: 2021-02-16 | End: 2021-05-18 | Stop reason: SDUPTHER

## 2021-02-16 NOTE — PROGRESS NOTES
Subjective Sheryle Handing is a 38 y o  female who presents for annual well woman exam  Periods are regular every 28-30 days, lasting 5 days  No intermenstrual bleeding, spotting, or discharge  Patient states she has had daily pelvic pain and cramping since LEEP in October 2020  She takes Tylenol arthritis, which will sometimes help improve pain  She also is using heating pad and ice to alleviate symptoms  She states she sometimes has dizziness/lightheadedness associated with the pain  Pelvic pain is unrelated to diet  Patient states in the past she was told she had ovarian cysts  She has been unable to get an ultrasound because she does not have insurance  She has questions whether her pain is secondary to enlarging cysts  She previously was on hormonal contraception in the past for pelvic pain and cysts  GYN:   Denies vaginal discharge, labial erythema or lesions, dyspareunia  Menarche at 4  Menses are regular, q 30 days, lasting 5 days  Contraception: tubal in 2014 November   Patient is sexually active with one partner  LEEP in October 2020 - RONALDO III, ectocervical margin negative for dysplasia  She is scheduled for repeat pap in October 2020 2014 tubal ligation  OB:   [id number] female   Pregnancies were uncomplicated  :   Denies dysuria, urinary frequency or urgency  Denies hematuria, flank pain, incontinence  Breast:   Denies breast mass, skin changes, dimpling, reddening, nipple retraction  Denies breast discharge  Last mammogram was in 2019 secondary to breast pain  Results were normal  She does not have any recurrent breast pain symptoms  Bilateral breast ultrasounds in November 2019 were benign   Patient does not have a family history of breast, endometrial, or ovarian ca  General:   Diet: sometimes    Patient reports increased anxiety and depression  Decreased energy secondary to pelvic pain   Gained 5lbs since surgery    Exercise: none   Work: [employer] provider   ETOH use: no   Tobacco use: no   Recreational drug use: none    Screening:   Cervical cancer: see above   Breast cancer: future mammograms not indicated at this time until routine screening age   Colon cancer: screening not indicated at this age   STD screening: declines    Review of Systems   Constitutional: Positive for fatigue  Negative for chills and fever  HENT: Negative  Eyes: Negative for visual disturbance  Respiratory: Negative for chest tightness, shortness of breath and wheezing  Cardiovascular: Negative for chest pain and palpitations  Gastrointestinal: Positive for abdominal pain, nausea and vomiting  Negative for blood in stool, constipation and diarrhea  Endocrine: Negative  Genitourinary: Positive for frequency  Negative for vaginal bleeding, vaginal discharge and vaginal pain  Musculoskeletal: Positive for arthralgias and back pain  Skin: Negative  Neurological: Positive for dizziness, light-headedness and headaches  Hematological: Negative  Psychiatric/Behavioral: Positive for sleep disturbance  The patient is nervous/anxious  Objective      /85   Pulse 79   LMP 01/25/2021 (Within Days)     Physical Exam  Vitals signs and nursing note reviewed  Exam conducted with a chaperone present  Constitutional:       General: She is not in acute distress  Appearance: She is well-developed  HENT:      Head: Normocephalic and atraumatic  Eyes:      Conjunctiva/sclera: Conjunctivae normal    Neck:      Musculoskeletal: Neck supple  Cardiovascular:      Rate and Rhythm: Normal rate and regular rhythm  Heart sounds: No murmur  Pulmonary:      Effort: Pulmonary effort is normal  No respiratory distress  Breath sounds: Normal breath sounds  Abdominal:      General: Abdomen is flat  There is no distension  Palpations: Abdomen is soft  There is no mass  Tenderness: There is no abdominal tenderness  There is no guarding  Genitourinary:     General: Normal vulva  Uterus: Normal  Not enlarged and not tender  Adnexa: Right adnexa normal and left adnexa normal         Right: No mass or tenderness  Left: No mass or tenderness  Skin:     General: Skin is warm and dry  Neurological:      General: No focal deficit present  Mental Status: She is alert and oriented to person, place, and time  Psychiatric:         Mood and Affect: Mood normal          Behavior: Behavior normal          Bedside TAUS: uterus appears normal  Mildly-thickened endometrial stripe consistent with impending menstrual cycle  Bilateral ovaries appear normal      Assessment/Plan:     1) Well woman exam  I have discussed the importance of monthly self-breast exams, exercise and healthy diet as well as adequate intake of calcium and vitamin D  The patient declines  STD testing  I emphasized the importance of an annual pelvic and breast exam  She has received the Gardasil vaccine series  A yearly mammogram is recommended for breast cancer screening starting at age 36  All questions have been answered to her satisfaction   - Repeat pap with co-testing in October 2020    2) Pelvic Pain  - Reviewed with patient that ovaries appeared grossly normal on brief bedside ultrasound exam  Discussed that pelvic pain could be secondary to ovulation, but source of pelvic pain may not be identified at this time  - Discussed with patient that hormonal therapy could control symptoms associated with cyclic hormones related to menstrual cycle  Patient would like to start OCPs at this time    - Microgestin sent to pharmacy    F/u in 3 months for revaluation of pelvic pain       D/w Dr Jw Salazar MD  OB/GYN PGY-1  2:11 PM  02/16/21

## 2021-03-31 DIAGNOSIS — Z23 ENCOUNTER FOR IMMUNIZATION: ICD-10-CM

## 2021-04-03 ENCOUNTER — HOSPITAL ENCOUNTER (EMERGENCY)
Facility: HOSPITAL | Age: 36
Discharge: HOME | End: 2021-04-03
Attending: EMERGENCY MEDICINE

## 2021-04-03 VITALS
SYSTOLIC BLOOD PRESSURE: 181 MMHG | OXYGEN SATURATION: 99 % | DIASTOLIC BLOOD PRESSURE: 100 MMHG | TEMPERATURE: 97.8 F | HEART RATE: 89 BPM | RESPIRATION RATE: 17 BRPM

## 2021-04-03 DIAGNOSIS — B37.3 YEAST VAGINITIS: Primary | ICD-10-CM

## 2021-04-03 LAB
BACTERIA UR QL AUTO: ABNORMAL /HPF
BILIRUB UR QL STRIP: NEGATIVE
CLARITY UR: CLEAR
COLOR UR: YELLOW
EXT PREG TEST URINE: NEGATIVE
EXT. CONTROL ED NAV: NORMAL
GLUCOSE UR STRIP-MCNC: NEGATIVE MG/DL
HGB UR QL STRIP.AUTO: ABNORMAL
KETONES UR STRIP-MCNC: NEGATIVE MG/DL
LEUKOCYTE ESTERASE UR QL STRIP: ABNORMAL
NITRITE UR QL STRIP: NEGATIVE
NON-SQ EPI CELLS URNS QL MICRO: ABNORMAL /HPF
PH UR STRIP.AUTO: 6 [PH] (ref 4.5–8)
PROT UR STRIP-MCNC: NEGATIVE MG/DL
RBC #/AREA URNS AUTO: ABNORMAL /HPF
SP GR UR STRIP.AUTO: <=1.005 (ref 1–1.03)
UROBILINOGEN UR QL STRIP.AUTO: 0.2 E.U./DL
WBC #/AREA URNS AUTO: ABNORMAL /HPF

## 2021-04-03 PROCEDURE — 87491 CHLMYD TRACH DNA AMP PROBE: CPT | Performed by: EMERGENCY MEDICINE

## 2021-04-03 PROCEDURE — 99283 EMERGENCY DEPT VISIT LOW MDM: CPT

## 2021-04-03 PROCEDURE — 81001 URINALYSIS AUTO W/SCOPE: CPT

## 2021-04-03 PROCEDURE — 81025 URINE PREGNANCY TEST: CPT | Performed by: EMERGENCY MEDICINE

## 2021-04-03 PROCEDURE — 99284 EMERGENCY DEPT VISIT MOD MDM: CPT | Performed by: EMERGENCY MEDICINE

## 2021-04-03 PROCEDURE — 87086 URINE CULTURE/COLONY COUNT: CPT

## 2021-04-03 PROCEDURE — 87591 N.GONORRHOEAE DNA AMP PROB: CPT | Performed by: EMERGENCY MEDICINE

## 2021-04-03 RX ORDER — FLUCONAZOLE 150 MG/1
150 TABLET ORAL ONCE
Status: COMPLETED | OUTPATIENT
Start: 2021-04-03 | End: 2021-04-03

## 2021-04-03 RX ADMIN — FLUCONAZOLE 150 MG: 150 TABLET ORAL at 12:33

## 2021-04-03 NOTE — ED PROVIDER NOTES
History  Chief Complaint   Patient presents with    Vaginal Itching     pt reports vaginal itching since thursday has tried over the counter ointment without relief  Pt also reports suprapubic pain  38 y o  F p/w vaginal itching x 2 days  Having vaginal itching and burning  Has appt on Monday with OBGYN for the same complaint but didn't want to wait  Pt states she bought a cream to put inside her vagina but it didn't help  Having urinary frequency and suprapubic discomfort  Denies F/C, N/V  History provided by:  Patient   used: No    Vaginal Itching  Location:  Vagina  Duration:  2 days  Timing:  Constant  Progression:  Unchanged  Chronicity:  New  Relieved by:  Nothing  Worsened by:  Nothing  Ineffective treatments:  Tylenol, vaginal cream  Associated symptoms: abdominal pain (Suprapubic)    Associated symptoms: no diarrhea, no fever, no nausea and no vomiting        Prior to Admission Medications   Prescriptions Last Dose Informant Patient Reported? Taking?   acetaminophen (TYLENOL) 650 mg CR tablet 4/3/2021 at 0900  No Yes   Sig: Take 1 tablet (650 mg total) by mouth every 8 (eight) hours as needed for mild pain   amLODIPine (NORVASC) 5 mg tablet 4/3/2021 at 0900  No Yes   Sig: TAKE 1 TABLET BY MOUTH EVERY DAY   norethindrone-ethinyl estradiol (MICROGESTIN 1/20) 1-20 MG-MCG per tablet 4/3/2021 at 0900  No Yes   Sig: Take 1 tablet by mouth daily      Facility-Administered Medications: None       Past Medical History:   Diagnosis Date    Anxiety     Chest pain     pt denies today//saw cardiology and everything was "normal"/saw cardiology 7/2019 Dr CHARLES Bustos/BENITO MENESES    Dental crown present     GERD (gastroesophageal reflux disease)     Hypertension     PONV (postoperative nausea and vomiting)     Precancerous changes of the cervix        Past Surgical History:   Procedure Laterality Date    CARPAL TUNNEL RELEASE Right     EXAMINATION UNDER ANESTHESIA N/A 10/4/2020    Procedure: EXAM UNDER ANESTHESIA (EUA), CAUTERIZATION OF CERVIX;  Surgeon: Juan Manuel Guevara MD;  [facility];  Service: Gynecology    WV COLPOSCOPY,CERVIX W/ADJ VAG,W/LOOP BX N/A 10/2/2020    Procedure: BIOPSY LEEP CERVIX;  Surgeon: Cassy Rey MD;  [facility];  Service: Gynecology    TUBAL LIGATION         Family History   Problem Relation Age of Onset    Asthma Mother     Hypertension Mother     No Known Problems Sister     No Known Problems Daughter     No Known Problems Maternal Grandmother     No Known Problems Paternal Grandmother     No Known Problems Maternal Aunt     No Known Problems Maternal Aunt     No Known Problems Paternal Aunt     No Known Problems Paternal Aunt     No Known Problems Paternal Aunt     Diabetes Paternal Aunt     Breast cancer Neg Hx     Cancer Neg Hx      I have reviewed and agree with the history as documented  E-Cigarette/Vaping    E-Cigarette Use Never User      E-Cigarette/Vaping Substances    Nicotine No     THC No     CBD No     Flavoring No     Other No     Unknown No      Social History     Tobacco Use    Smoking status: Never Smoker    Smokeless tobacco: Never Used   Substance Use Topics    Alcohol use: Not Currently     Frequency: 2-4 times a month     Drinks per session: 1 or 2    Drug use: Never       Review of Systems   Constitutional: Negative for fever  Gastrointestinal: Positive for abdominal pain (Suprapubic)  Negative for diarrhea, nausea and vomiting  Genitourinary: Positive for frequency, vaginal discharge and vaginal pain  Negative for dysuria  Vaginal itching     All other systems reviewed and are negative  Physical Exam  Physical Exam  Vitals signs and nursing note reviewed  Constitutional:       General: She is not in acute distress  Appearance: Normal appearance  She is well-developed  She is not ill-appearing, toxic-appearing or diaphoretic     HENT:      Head: Normocephalic and atraumatic  Eyes:      General: No scleral icterus  Neck:      Musculoskeletal: Normal range of motion  Vascular: No JVD  Trachea: Trachea normal    Cardiovascular:      Rate and Rhythm: Normal rate and regular rhythm  Heart sounds: Normal heart sounds  No murmur  No friction rub  Pulmonary:      Effort: Pulmonary effort is normal  No accessory muscle usage or respiratory distress  Breath sounds: Normal breath sounds  No stridor  No wheezing, rhonchi or rales  Abdominal:      General: There is no distension  Palpations: Abdomen is soft  Abdomen is not rigid  There is no mass  Tenderness: There is no abdominal tenderness  There is no guarding or rebound  Negative signs include Apodaca's sign and McBurney's sign  Genitourinary:     Vagina: Vaginal discharge ([address] appearing) present  Comments: No external lesions  Skin:     General: Skin is warm and dry  Coloration: Skin is not pale  Findings: No rash  Neurological:      Mental Status: She is alert  GCS: GCS eye subscore is 4  GCS verbal subscore is 5  GCS motor subscore is 6     Psychiatric:         Behavior: Behavior normal          Vital Signs  ED Triage Vitals [04/03/21 1022]   Temperature Pulse Respirations Blood Pressure SpO2   97 8 °F (36 6 °C) 89 17 (!) 181/100 99 %      Temp Source Heart Rate Source Patient Position - Orthostatic VS BP Location FiO2 (%)   Oral Monitor Sitting Right arm --      Pain Score       --           Vitals:    04/03/21 1022   BP: (!) 181/100   Pulse: 89   Patient Position - Orthostatic VS: Sitting         Visual Acuity      ED Medications  Medications   fluconazole (DIFLUCAN) tablet 150 mg (150 mg Oral Given 4/3/21 1233)       Diagnostic Studies  Results Reviewed     Procedure Component Value Units Date/Time    Urine Microscopic [[id number]]  (Abnormal) Collected: 04/03/21 1042    Lab Status: Final result Specimen: Urine, Other Updated: 04/03/21 1201 RBC, UA 4-10 /hpf      WBC, UA 10-20 /hpf      Epithelial Cells Occasional /hpf      Bacteria, UA Occasional /hpf     Urine culture [[id number]] Collected: 04/03/21 1042    Lab Status: In process Specimen: Urine, Other Updated: 04/03/21 1201    [address] amplified DNA by PCR [[id number]] Collected: 04/03/21 1045    Lab Status: In process Specimen: Urine, Other Updated: 04/03/21 1125    POCT pregnancy, urine [[id number]]  (Normal) Resulted: 04/03/21 1044    Lab Status: Final result Updated: 04/03/21 1045     EXT PREG TEST UR (Ref: Negative) negative     Control valid    Urine Macroscopic, POC [[id number]]  (Abnormal) Collected: 04/03/21 1042    Lab Status: Final result Specimen: Urine Updated: 04/03/21 1043     Color, UA Yellow     Clarity, UA Clear     pH, UA 6 0     Leukocytes, UA Moderate     Nitrite, UA Negative     Protein, UA Negative mg/dl      Glucose, UA Negative mg/dl      Ketones, UA Negative mg/dl      Urobilinogen, UA 0 2 E U /dl      Bilirubin, UA Negative     Blood, UA Small     Specific [place], UA <=1 005    Narrative:      CLINITEK RESULT                 No orders to display              Procedures  Procedures         ED Course  ED Course as of Apr 03 1240   Sat Apr 03, 2021   1204 Updated pt on results  Will treat with Diflucan for yeast infection  Pt has f/u with OBGYN already scheduled  SBIRT 20yo+      Most Recent Value   SBIRT (24 yo +)   In order to provide better care to our patients, we are screening all of our patients for alcohol and drug use  Would it be okay to ask you these screening questions?   No Filed at: 04/03/2021 1036                    MDM    Disposition  Final diagnoses:   Yeast vaginitis     Time reflects when diagnosis was documented in both MDM as applicable and the Disposition within this note     Time User Action Codes Description Comment    4/3/2021 11:11 AM Petrona Henry Add [B37 3] Yeast vaginitis       ED Disposition     ED Disposition Condition Date/Time Comment    Discharge Stable Sat Apr 3, 2021 12:04 PM [facility] discharge to home/self care  Follow-up Information     Follow up With Specialties Details Why Contact Info    Selena Mix PA-C Family Medicine Schedule an appointment as soon as possible for a visit  To recheck your blood pressure [address]  [address] [place] [phone]      Your OBGYN  Go to  As scheduled           Discharge Medication List as of 4/3/2021 12:04 PM      CONTINUE these medications which have NOT CHANGED    Details   acetaminophen (TYLENOL) 650 mg CR tablet Take 1 tablet (650 mg total) by mouth every 8 (eight) hours as needed for mild pain, Starting Fri 10/2/2020, Normal      amLODIPine (NORVASC) 5 mg tablet TAKE 1 TABLET BY MOUTH EVERY DAY, Normal      norethindrone-ethinyl estradiol (MICROGESTIN 1/20) 1-20 MG-MCG per tablet Take 1 tablet by mouth daily, Starting Tue 2/16/2021, Normal           No discharge procedures on file      PDMP Review     None          ED Provider  Electronically Signed by           Tanika To, DO  04/03/21 2860

## 2021-04-03 NOTE — ED NOTES
Pt reports vaginal itching and burning  Pt denies vaginal discharge  Pt used OTC medication intended for yeast infection (Kyrgyz version of medication per pt), using yesterday and this morning but pt states pain persists  Pt reports pain with urination and itching all other times         Nikki Lombard, RN  04/03/21 4141

## 2021-04-04 LAB — BACTERIA UR CULT: NORMAL

## 2021-04-06 LAB
C TRACH DNA SPEC QL NAA+PROBE: NEGATIVE
N GONORRHOEA DNA SPEC QL NAA+PROBE: NEGATIVE

## 2021-04-16 ENCOUNTER — TELEPHONE (OUTPATIENT)
Dept: OBGYN CLINIC | Facility: CLINIC | Age: 36
End: 2021-04-16

## 2021-04-20 ENCOUNTER — TELEPHONE (OUTPATIENT)
Dept: FAMILY MEDICINE CLINIC | Facility: CLINIC | Age: 36
End: 2021-04-20

## 2021-04-20 NOTE — TELEPHONE ENCOUNTER
Patient is calling to see if it is safe to get the COVID vaccine - can someone call her to let her know   She is getting vaccinated 04/23    Please advise

## 2021-04-21 NOTE — TELEPHONE ENCOUNTER
Yes, I would recommend patient to receive the COVID-19 vaccine  Please make patient aware it is common to experience side effects such as fevers, body aches, fatigue, etc for a few days after receiving the injection  Thanks!

## 2021-05-18 ENCOUNTER — OFFICE VISIT (OUTPATIENT)
Dept: OBGYN CLINIC | Facility: CLINIC | Age: 36
End: 2021-05-18

## 2021-05-18 VITALS
WEIGHT: 192.4 LBS | DIASTOLIC BLOOD PRESSURE: 94 MMHG | HEART RATE: 74 BPM | BODY MASS INDEX: 35.19 KG/M2 | SYSTOLIC BLOOD PRESSURE: 144 MMHG

## 2021-05-18 DIAGNOSIS — R10.2 PELVIC PAIN: ICD-10-CM

## 2021-05-18 PROCEDURE — 99213 OFFICE O/P EST LOW 20 MIN: CPT | Performed by: OBSTETRICS & GYNECOLOGY

## 2021-05-18 RX ORDER — NORETHINDRONE ACETATE AND ETHINYL ESTRADIOL 1; .02 MG/1; MG/1
1 TABLET ORAL DAILY
Qty: 28 TABLET | Refills: 3 | Status: SHIPPED | OUTPATIENT
Start: 2021-05-18 | End: 2021-08-15

## 2021-05-18 NOTE — PROGRESS NOTES
OB/GYN VISIT  Jessiekarie Saucedo ESTRELLA Meade  2021  11:45 AM      Subjective:     Heather Lockhart is a 38 y o   female who presents for 3 month follow up for OCPs and pelvic pain  Patient states her pelvic pain has significantly improved however, her periods have not improved  On further questioning it sounds like the patient is not taking the OCPs as prescribed  She does not take them at the same time every day and when she misses a dose she does not take a second pill the next day  She has missed more than 1 pill once and did not restart a pill pack  Patient also complains of vulvar irritation  She does shave, uses scented pads and vagisil  She denies new sexual partners, painful lesions or sores, vaginal discharge  She also noted a "bump" at her introitus that she would like to be examined  Objective:    Vitals: Blood pressure 144/94, pulse 74, weight 87 3 kg (192 lb 6 4 oz), last menstrual period 2021, not currently breastfeeding  Body mass index is 35 19 kg/m²  Past Medical History:   Diagnosis Date    Anxiety     Chest pain     pt denies today//saw cardiology and everything was "normal"/saw cardiology 2019 Dr CHARLES Bustos/BENITO MENESES    Dental crown present     GERD (gastroesophageal reflux disease)     Hypertension     PONV (postoperative nausea and vomiting)     Precancerous changes of the cervix      Past Surgical History:   Procedure Laterality Date    CARPAL TUNNEL RELEASE Right     EXAMINATION UNDER ANESTHESIA N/A 10/4/2020    Procedure: EXAM UNDER ANESTHESIA (EUA), CAUTERIZATION OF CERVIX;  Surgeon: Miranda Levy MD;  [facility];  Service: Gynecology    WV COLPOSCOPY,CERVIX W/ADJ VAG,W/LOOP BX N/A 10/2/2020    Procedure: BIOPSY LEEP CERVIX;  Surgeon: Michael Michael MD;  [facility];  Service: Gynecology    TUBAL LIGATION         Physical Exam  Constitutional:       Appearance: Normal appearance     HENT:      Head: Normocephalic and atraumatic  Genitourinary:     Pubic Area: No rash  Labia:         Right: No rash, tenderness or lesion  Left: No rash, tenderness or lesion  Vagina: Normal       Cervix: Normal       Comments: Inclusion cyst noted at the introitus at the 6 o'clock position   Musculoskeletal: Normal range of motion  Neurological:      General: No focal deficit present  Mental Status: She is alert  Mental status is at baseline  Psychiatric:         Mood and Affect: Mood normal            Assessment/Plan:    Candace Hernandez is a 38 y o   female here for f/u on pelvic pain and pill check  Pelvic Pain  Patient's pain has improved with OCPs   We discussed taking the pill at the same time everyday  Setting an alarm on her phone would be helpful as she often forgets  We discussed if she misses a pill to take it ASAP  If it's the following day, she can take 2 pills together  If she misses more than 2 pills she should restart a pill pack  Will follow up in 3 months for pill check     Discussed vulvar hygiene including not using scented pads, changing her pad frequently when she's on her period, using unscented soaps, avoiding vagisil or pH balancing soaps, and wearing cotton underwear       D/w Dr Wendy Person MD  2021  11:45 AM

## 2021-08-09 ENCOUNTER — TELEPHONE (OUTPATIENT)
Dept: OBGYN CLINIC | Facility: CLINIC | Age: 36
End: 2021-08-09

## 2021-08-15 DIAGNOSIS — R10.2 PELVIC PAIN: ICD-10-CM

## 2021-08-15 RX ORDER — NORETHINDRONE ACETATE AND ETHINYL ESTRADIOL 1; 20 MG/1; UG/1
TABLET ORAL
Qty: 84 TABLET | Refills: 2 | Status: SHIPPED | OUTPATIENT
Start: 2021-08-15 | End: 2022-01-25

## 2021-09-23 ENCOUNTER — TELEPHONE (OUTPATIENT)
Dept: OBGYN CLINIC | Facility: CLINIC | Age: 36
End: 2021-09-23

## 2021-09-23 NOTE — TELEPHONE ENCOUNTER
Called patient to offer Annual appointment  Left message via voicemail asking patient to call office

## 2021-10-25 ENCOUNTER — ANNUAL EXAM (OUTPATIENT)
Dept: OBGYN CLINIC | Facility: CLINIC | Age: 36
End: 2021-10-25

## 2021-10-25 ENCOUNTER — PATIENT OUTREACH (OUTPATIENT)
Dept: OBGYN CLINIC | Facility: CLINIC | Age: 36
End: 2021-10-25

## 2021-10-25 VITALS
BODY MASS INDEX: 35.51 KG/M2 | DIASTOLIC BLOOD PRESSURE: 88 MMHG | HEART RATE: 73 BPM | SYSTOLIC BLOOD PRESSURE: 134 MMHG | HEIGHT: 62 IN | WEIGHT: 193 LBS

## 2021-10-25 DIAGNOSIS — F41.9 ANXIETY: ICD-10-CM

## 2021-10-25 DIAGNOSIS — Z98.890 S/P LEEP (LOOP ELECTROSURGICAL EXCISION PROCEDURE): ICD-10-CM

## 2021-10-25 DIAGNOSIS — Z13.31 POSITIVE DEPRESSION SCREENING: Primary | ICD-10-CM

## 2021-10-25 DIAGNOSIS — Z01.419 ENCOUNTER FOR ANNUAL ROUTINE GYNECOLOGICAL EXAMINATION: Primary | ICD-10-CM

## 2021-10-25 DIAGNOSIS — D06.9 CARCINOMA IN SITU OF CERVIX, UNSPECIFIED LOCATION: ICD-10-CM

## 2021-10-25 PROCEDURE — 99395 PREV VISIT EST AGE 18-39: CPT | Performed by: NURSE PRACTITIONER

## 2021-11-01 ENCOUNTER — PATIENT OUTREACH (OUTPATIENT)
Dept: OBGYN CLINIC | Facility: CLINIC | Age: 36
End: 2021-11-01

## 2021-11-08 ENCOUNTER — PATIENT OUTREACH (OUTPATIENT)
Dept: OBGYN CLINIC | Facility: CLINIC | Age: 36
End: 2021-11-08

## 2021-11-15 ENCOUNTER — PATIENT OUTREACH (OUTPATIENT)
Dept: OBGYN CLINIC | Facility: CLINIC | Age: 36
End: 2021-11-15

## 2022-01-25 ENCOUNTER — OFFICE VISIT (OUTPATIENT)
Dept: OBGYN CLINIC | Facility: CLINIC | Age: 37
End: 2022-01-25

## 2022-01-25 VITALS
HEART RATE: 79 BPM | HEIGHT: 62 IN | DIASTOLIC BLOOD PRESSURE: 84 MMHG | WEIGHT: 195 LBS | SYSTOLIC BLOOD PRESSURE: 131 MMHG | BODY MASS INDEX: 35.88 KG/M2

## 2022-01-25 DIAGNOSIS — R10.2 PELVIC PAIN: Primary | ICD-10-CM

## 2022-01-25 DIAGNOSIS — Z98.890 S/P LEEP (LOOP ELECTROSURGICAL EXCISION PROCEDURE): ICD-10-CM

## 2022-01-25 PROCEDURE — 3725F SCREEN DEPRESSION PERFORMED: CPT | Performed by: OBSTETRICS & GYNECOLOGY

## 2022-01-25 PROCEDURE — 3008F BODY MASS INDEX DOCD: CPT | Performed by: OBSTETRICS & GYNECOLOGY

## 2022-01-25 PROCEDURE — 99213 OFFICE O/P EST LOW 20 MIN: CPT | Performed by: OBSTETRICS & GYNECOLOGY

## 2022-01-25 PROCEDURE — 1036F TOBACCO NON-USER: CPT | Performed by: OBSTETRICS & GYNECOLOGY

## 2022-01-25 NOTE — PROGRESS NOTES
Assessment/Plan:     No problem-specific Assessment & Plan notes found for this encounter  Diagnoses and all orders for this visit:    Pelvic pain  -     US pelvis complete w transvaginal; Future    S/P LEEP (loop electrosurgical excision procedure)    RTO for results review  Subjective:      Patient ID: Erin Montaño is a 43 y o  female who presents for pelvic pain  She reports pain since her LEEP on  10/04/20  It is a low level pain not related to menses  She had a pap on 10/2521 and was negative with negative HPV  Periods are normal monthly and last 3 days  Tylenol and latasha tea make it better  She is with the same partner x 10 years  She has a tubal ligation for birth control  HPI    The following portions of the patient's history were reviewed and updated as appropriate: allergies, current medications, past family history, past medical history, past social history, past surgical history and problem list     Review of Systems   Gastrointestinal: Negative for constipation, diarrhea, nausea and vomiting  Genitourinary: Positive for pelvic pain  Negative for difficulty urinating, dysuria, urgency and vaginal bleeding  Objective:      /84   Pulse 79   Ht 5' 2" (1 575 m)   Wt 88 5 kg (195 lb)   LMP 01/01/2022 (Exact Date)   BMI 35 67 kg/m²          Physical Exam  Vitals and nursing note reviewed  Constitutional:       Appearance: Normal appearance  Pulmonary:      Effort: Pulmonary effort is normal    Abdominal:      Palpations: Abdomen is soft  Tenderness: There is abdominal tenderness  Neurological:      General: No focal deficit present  Mental Status: She is alert and oriented to person, place, and time     Psychiatric:         Mood and Affect: Mood normal          Behavior: Behavior normal

## 2022-01-28 ENCOUNTER — PATIENT OUTREACH (OUTPATIENT)
Dept: OBGYN CLINIC | Facility: CLINIC | Age: 37
End: 2022-01-28

## 2022-01-28 DIAGNOSIS — Z78.9 NEED FOR FOLLOW-UP BY SOCIAL WORKER: Primary | ICD-10-CM
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
## 2022-01-31 ENCOUNTER — TELEPHONE (OUTPATIENT)
Dept: OBGYN CLINIC | Facility: CLINIC | Age: 37
End: 2022-01-31

## 2022-02-02 ENCOUNTER — PATIENT OUTREACH (OUTPATIENT)
Dept: OBGYN CLINIC | Facility: CLINIC | Age: 37
End: 2022-02-02
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
## 2022-02-10 NOTE — TELEPHONE ENCOUNTER
Patient had her ultra sound completed at [facility]  Patient states the results are available and she does not understand them  Please review results and contact patient

## 2022-02-14 ENCOUNTER — PATIENT OUTREACH (OUTPATIENT)
Dept: OBGYN CLINIC | Facility: CLINIC | Age: 37
End: 2022-02-14

## 2022-02-22 ENCOUNTER — OFFICE VISIT (OUTPATIENT)
Dept: OBGYN CLINIC | Facility: CLINIC | Age: 37
End: 2022-02-22

## 2022-02-22 VITALS
HEIGHT: 62 IN | WEIGHT: 194 LBS | HEART RATE: 87 BPM | BODY MASS INDEX: 35.7 KG/M2 | SYSTOLIC BLOOD PRESSURE: 136 MMHG | DIASTOLIC BLOOD PRESSURE: 93 MMHG

## 2022-02-22 DIAGNOSIS — R10.2 PELVIC PAIN: ICD-10-CM

## 2022-02-22 DIAGNOSIS — Z71.2 ENCOUNTER TO DISCUSS TEST RESULTS: Primary | ICD-10-CM

## 2022-02-22 PROCEDURE — 99213 OFFICE O/P EST LOW 20 MIN: CPT | Performed by: OBSTETRICS & GYNECOLOGY

## 2022-02-22 PROCEDURE — 3008F BODY MASS INDEX DOCD: CPT | Performed by: OBSTETRICS & GYNECOLOGY

## 2022-02-22 RX ORDER — NORGESTIMATE AND ETHINYL ESTRADIOL 0.25-0.035
1 KIT ORAL DAILY
Qty: 28 TABLET | Refills: 11 | Status: SHIPPED | OUTPATIENT
Start: 2022-02-22 | End: 2022-07-06

## 2022-02-22 NOTE — PROGRESS NOTES
Assessment/Plan:     No problem-specific Assessment & Plan notes found for this encounter  Diagnoses and all orders for this visit:    Encounter to discuss test results    Pelvic pain  -     norgestimate-ethinyl estradiol (Sprintec 28) 0 25-35 MG-MCG per tablet; Take 1 tablet by mouth daily    RTO in 3 months for pill check and follow up  Subjective:      Patient ID: Alma Daly is a 43 y o  female presents for results review  Her US is below: The uterus measures 9 2 x 5 2 x 6 2 cm  Nabothian cysts seen measuring up to 0 9   cm  There are also appears to be a cluster of calcification measuring up to 1 5   x 0 7 x 0 8 cm within the uterus however no discrete fibroid visualized  Uterus   appears extremely heterogeneous  The uterus appears heterogeneous in   echotexture  The endometrial cavity echo measures 14 mm  There is no free fluid  The right adnexa measures 4 2 x 1 6 x 2 6 cm  There is arterial and venous blood   flow to the right adnexa  Corpus luteum cyst within the right ovary measures up   to 1 4 cm  The left adnexa measures 2 6 x 1 6 x 1 5 cm   There is arterial and venous blood   flow to the left adnexa  Prominent periuterine vessels noted within the left ovary  IMPRESSION:   IMPRESSION:   1  Endometrium measures up to 14 mm and is at the upper limits of normal    2  Heterogeneous appearance of the uterus with cluster of calcifications that   could represent dystrophic calcifications  No discrete focal fibroid identified   however given the limitations of this study, if clinically indicated, MRI can be   obtained for further more definitive evaluation  3  Nonspecific prominent periuterine vessels in the left adnexal region  She reports the same symptoms with no change  She states in the past she had improvement of her symptoms with OCPs but she discontinued them due to abnormal bleeding pattern  She is amenable to trying a different pill  She also c/o some labial and gluteal irritation from wearing a pad  She used OTC hydrocortisone  I advised to avoid scented products  HPI    The following portions of the patient's history were reviewed and updated as appropriate: allergies, current medications, past family history, past medical history, past social history, past surgical history and problem list     Review of Systems      Objective:      /93   Pulse 87   Ht 5' 2" (1 575 m)   Wt 88 kg (194 lb)   BMI 35 48 kg/m²          Physical Exam  Vitals and nursing note reviewed  Constitutional:       Appearance: Normal appearance  Pulmonary:      Effort: Pulmonary effort is normal    Neurological:      General: No focal deficit present  Mental Status: She is alert and oriented to person, place, and time     Psychiatric:         Mood and Affect: Mood normal          Behavior: Behavior normal

## 2022-02-23 ENCOUNTER — PATIENT OUTREACH (OUTPATIENT)
Dept: OBGYN CLINIC | Facility: CLINIC | Age: 37
End: 2022-02-23

## 2022-02-28 ENCOUNTER — PATIENT OUTREACH (OUTPATIENT)
Dept: OBGYN CLINIC | Facility: CLINIC | Age: 37
End: 2022-02-28

## 2022-04-01 ENCOUNTER — OFFICE VISIT (OUTPATIENT)
Dept: FAMILY MEDICINE CLINIC | Facility: CLINIC | Age: 37
End: 2022-04-01

## 2022-04-01 VITALS
DIASTOLIC BLOOD PRESSURE: 86 MMHG | BODY MASS INDEX: 35.7 KG/M2 | HEART RATE: 100 BPM | OXYGEN SATURATION: 98 % | TEMPERATURE: 98 F | HEIGHT: 62 IN | RESPIRATION RATE: 16 BRPM | SYSTOLIC BLOOD PRESSURE: 128 MMHG | WEIGHT: 194 LBS

## 2022-04-01 DIAGNOSIS — I10 PRIMARY HYPERTENSION: ICD-10-CM

## 2022-04-01 DIAGNOSIS — H61.22 IMPACTED CERUMEN OF LEFT EAR: ICD-10-CM

## 2022-04-01 DIAGNOSIS — R51.9 LEFT TEMPORAL HEADACHE: Primary | ICD-10-CM

## 2022-04-01 PROCEDURE — 99213 OFFICE O/P EST LOW 20 MIN: CPT | Performed by: FAMILY MEDICINE

## 2022-04-01 PROCEDURE — 69210 REMOVE IMPACTED EAR WAX UNI: CPT | Performed by: FAMILY MEDICINE

## 2022-04-01 RX ORDER — FAMOTIDINE 20 MG/1
20 TABLET, FILM COATED ORAL 2 TIMES DAILY
COMMUNITY
Start: 2021-11-19 | End: 2022-11-19

## 2022-04-01 RX ORDER — HYDROXYZINE PAMOATE 50 MG/1
50 CAPSULE ORAL 2 TIMES DAILY PRN
COMMUNITY
Start: 2022-01-19 | End: 2022-06-15 | Stop reason: SDUPTHER

## 2022-04-01 RX ORDER — OMEPRAZOLE 20 MG/1
1 CAPSULE, DELAYED RELEASE ORAL DAILY
COMMUNITY
Start: 2022-02-14

## 2022-04-01 RX ORDER — IBUPROFEN 400 MG/1
400 TABLET ORAL EVERY 8 HOURS PRN
Qty: 15 TABLET | Refills: 0 | Status: SHIPPED | OUTPATIENT
Start: 2022-04-01 | End: 2022-04-06

## 2022-04-01 RX ORDER — KETOROLAC TROMETHAMINE 30 MG/ML
15 INJECTION, SOLUTION INTRAMUSCULAR; INTRAVENOUS ONCE
Status: COMPLETED | OUTPATIENT
Start: 2022-04-01 | End: 2022-04-01

## 2022-04-01 RX ORDER — DIPHENOXYLATE HYDROCHLORIDE AND ATROPINE SULFATE 2.5; .025 MG/1; MG/1
1 TABLET ORAL DAILY
COMMUNITY

## 2022-04-01 RX ADMIN — KETOROLAC TROMETHAMINE 15 MG: 30 INJECTION, SOLUTION INTRAMUSCULAR; INTRAVENOUS at 15:37

## 2022-04-01 NOTE — PROGRESS NOTES
Assessment/Plan:    Hypertension  Thinking headache due to blood pressure however Blood Pressure: 128/86  Normal today and has not been on medications for more than a month  Recommended to continue to monitor with home BP readings and once remains under 130/80 then no pharmacological therapy needed at this time  May continue management with stress avoidance, LS diet  Left temporal headache  Present x 2 days  Tylenol taken with some relief  Tension like and located left temporal, retroorbital and occipital and radiating down neck  Under stress trying to provide financially to extended family  Denies light or auditory sensitivity  Neuro exam normal  Will give toradol 15mg IM and follow with ibuprofen 400mg TID/PRN  Advised stress reduction  Impacted cerumen of left ear  Irrigation done in office with complete removal  Well tolerated  Advised no qtips  Diagnoses and all orders for this visit:    Left temporal headache  -     ketorolac (TORADOL) 60 mg/2 mL IM injection 15 mg  -     ibuprofen (MOTRIN) 400 mg tablet; Take 1 tablet (400 mg total) by mouth every 8 (eight) hours as needed for mild pain for up to 5 days    Primary hypertension    Impacted cerumen of left ear    Other orders  -     omeprazole (PriLOSEC) 20 mg delayed release capsule; Take 1 capsule by mouth daily  -     famotidine (PEPCID) 20 mg tablet; Take 20 mg by mouth 2 (two) times a day  -     multivitamin (THERAGRAN) TABS; Take 1 tablet by mouth daily  -     hydrOXYzine pamoate (VISTARIL) 50 mg capsule; Take 50 mg by mouth 2 (two) times a day as needed          Subjective:      Patient ID: Amy Gayle is a 43 y o  female  Amy Gayle is a very pleasant 43 y o  female who presents today at same day clinic with complaints of left sided headache x 2 days  Denies fever, N?V, visual disturbance or sensitivity to light or sound  Admits to a lot of stress         The following portions of the patient's history were reviewed and updated as appropriate: allergies, current medications, past medical history, past social history and problem list     Review of Systems   Constitutional: Negative for fever  HENT: Positive for ear pain  Negative for congestion and sore throat  Eyes: Positive for pain (left)  Negative for visual disturbance  Respiratory: Negative for cough and shortness of breath  Cardiovascular: Negative for chest pain and palpitations  Gastrointestinal: Negative for nausea and vomiting  Musculoskeletal: Positive for neck pain  Negative for arthralgias and back pain  Skin: Negative for rash  Neurological: Positive for headaches  Negative for dizziness, syncope, weakness and numbness  Objective:      /86 (BP Location: Left arm, Patient Position: Sitting, Cuff Size: Large)   Pulse 100   Temp 98 °F (36 7 °C) (Temporal)   Resp 16   Ht 5' 2" (1 575 m)   Wt 88 kg (194 lb)   SpO2 98%   BMI 35 48 kg/m²          Physical Exam  Vitals reviewed  Constitutional:       General: She is not in acute distress  Appearance: She is obese  HENT:      Head: Atraumatic  Right Ear: Tympanic membrane, ear canal and external ear normal       Left Ear: External ear normal  There is impacted cerumen  Nose: Nose normal       Mouth/Throat:      Mouth: Mucous membranes are moist       Pharynx: Oropharynx is clear  Eyes:      Extraocular Movements: Extraocular movements intact  Conjunctiva/sclera: Conjunctivae normal       Pupils: Pupils are equal, round, and reactive to light  Cardiovascular:      Rate and Rhythm: Normal rate  Musculoskeletal:         General: Normal range of motion  Cervical back: Normal range of motion  Skin:     General: Skin is warm and dry  Findings: No rash  Neurological:      General: No focal deficit present  Mental Status: She is alert  Cranial Nerves: Cranial nerves are intact  Motor: Motor function is intact  No weakness  Gait: Gait is intact  Deep Tendon Reflexes: Reflexes normal    Psychiatric:         Behavior: Behavior normal        Ear cerumen removal    Date/Time: 4/1/2022 3:00 PM  Performed by: Rafi Reed MD  Authorized by: Lana Rowley MD   Universal Protocol:  Consent: Verbal consent obtained  Risks and benefits: risks, benefits and alternatives were discussed  Consent given by: patient  Patient understanding: patient states understanding of the procedure being performed  Patient consent: the patient's understanding of the procedure matches consent given  Patient identity confirmed: verbally with patient      Patient location:  Clinic  Procedure details:     Location:  L ear    Procedure type: irrigation with instrumentation      Instrumentation: curette      Approach:  External    Visualization (free text):  100% occlusion prior to procedure with complete removal of wax noted on otoscopy after procedure    Equipment used:  Plastic ear curette  Post-procedure details:     Complication:  None    Hearing quality:  Normal    Patient tolerance of procedure:   Tolerated well, no immediate complications

## 2022-04-02 PROBLEM — H61.22 IMPACTED CERUMEN OF LEFT EAR: Status: ACTIVE | Noted: 2020-07-20

## 2022-04-02 PROBLEM — H66.92 LEFT OTITIS MEDIA: Status: RESOLVED | Noted: 2020-07-20 | Resolved: 2022-04-02

## 2022-04-02 NOTE — ASSESSMENT & PLAN NOTE
Thinking headache due to blood pressure however Blood Pressure: 128/86  Normal today and has not been on medications for more than a month  Recommended to continue to monitor with home BP readings and once remains under 130/80 then no pharmacological therapy needed at this time  May continue management with stress avoidance, LS diet

## 2022-04-02 NOTE — ASSESSMENT & PLAN NOTE
Present x 2 days  Tylenol taken with some relief  Tension like and located left temporal, retroorbital and occipital and radiating down neck  Under stress trying to provide financially to extended family  Denies light or auditory sensitivity  Neuro exam normal  Will give toradol 15mg IM and follow with ibuprofen 400mg TID/PRN  Advised stress reduction

## 2022-05-31 ENCOUNTER — TELEPHONE (OUTPATIENT)
Dept: OBGYN CLINIC | Facility: CLINIC | Age: 37
End: 2022-05-31

## 2022-06-15 ENCOUNTER — OFFICE VISIT (OUTPATIENT)
Dept: FAMILY MEDICINE CLINIC | Facility: CLINIC | Age: 37
End: 2022-06-15

## 2022-06-15 VITALS
WEIGHT: 187 LBS | HEIGHT: 62 IN | RESPIRATION RATE: 18 BRPM | HEART RATE: 81 BPM | TEMPERATURE: 97.7 F | SYSTOLIC BLOOD PRESSURE: 136 MMHG | OXYGEN SATURATION: 98 % | DIASTOLIC BLOOD PRESSURE: 94 MMHG | BODY MASS INDEX: 34.41 KG/M2

## 2022-06-15 DIAGNOSIS — F41.9 ANXIETY: ICD-10-CM

## 2022-06-15 DIAGNOSIS — N93.9 VAGINAL BLEEDING: ICD-10-CM

## 2022-06-15 DIAGNOSIS — I10 PRIMARY HYPERTENSION: ICD-10-CM

## 2022-06-15 DIAGNOSIS — K64.4 EXTERNAL HEMORRHOID: ICD-10-CM

## 2022-06-15 DIAGNOSIS — G24.5 BLEPHAROSPASM: Primary | ICD-10-CM

## 2022-06-15 PROCEDURE — 99214 OFFICE O/P EST MOD 30 MIN: CPT

## 2022-06-15 RX ORDER — HYDROXYZINE PAMOATE 50 MG/1
50 CAPSULE ORAL 3 TIMES DAILY PRN
Qty: 30 CAPSULE | Refills: 1 | Status: SHIPPED | OUTPATIENT
Start: 2022-06-15

## 2022-06-15 RX ORDER — HYDROCORTISONE 25 MG/G
CREAM TOPICAL 2 TIMES DAILY
Qty: 28 G | Refills: 1 | Status: SHIPPED | OUTPATIENT
Start: 2022-06-15

## 2022-06-15 NOTE — ASSESSMENT & PLAN NOTE
Pt recently stopped BP medications due to good control however BP elevated today in office at 136/94, repeat 138/92  - Attempt to manage stressors, Vistaril to aid with sleep  - Home BP readings x2 weeks, pt to send readings through [facility] to assess need to resume medication

## 2022-06-15 NOTE — ASSESSMENT & PLAN NOTE
1 cm non-thrombosed external hemorrhoid  - Anusol 2 5% cream BID  - Witch hazel such as Tucks pads as needed

## 2022-06-15 NOTE — ASSESSMENT & PLAN NOTE
Pt with vaginal bleeding different in character from normal menses, states it is lighter in amount, darker in color  Pt missed last scheduled appt with OB/GYN on 5/31/22, will call to reschedule

## 2022-06-15 NOTE — ASSESSMENT & PLAN NOTE
Blepharospasm left eye, likely related to increased stressors and lack of sleep  Cranial nerve examination normal  Pt previously prescribed Vistaril 50 mg PRN for anxiety but does not currently have any of this medication   - Refill Vistaril, take before bed to promote sleep and during the day PRN for anxiety  Pt aware not to take before driving as the medication can cause drowsiness   - Reduce caffeine and any other stimulants

## 2022-06-15 NOTE — PROGRESS NOTES
Assessment/Plan:    Blepharospasm  Blepharospasm left eye, likely related to increased stressors and lack of sleep  Cranial nerve examination normal  Pt previously prescribed Vistaril 50 mg PRN for anxiety but does not currently have any of this medication   - Refill Vistaril, take before bed to promote sleep and during the day PRN for anxiety  Pt aware not to take before driving as the medication can cause drowsiness   - Reduce caffeine and any other stimulants  External hemorrhoid  1 cm non-thrombosed external hemorrhoid  - Anusol 2 5% cream BID  - Witch hazel such as Tucks pads as needed  Hypertension  Pt recently stopped BP medications due to good control however BP elevated today in office at 136/94, repeat 138/92  - Attempt to manage stressors, Vistaril to aid with sleep  - Home BP readings x2 weeks, pt to send readings through [employer] [facility] to assess need to resume medication  Vaginal bleeding  Pt with vaginal bleeding different in character from normal menses, states it is lighter in amount, darker in color  Pt missed last scheduled appt with OB/GYN on 5/31/22, will call to reschedule  Diagnoses and all orders for this visit:    Blepharospasm  -     hydrOXYzine pamoate (VISTARIL) 50 mg capsule; Take 1 capsule (50 mg total) by mouth 3 (three) times a day as needed for anxiety    Anxiety  -     hydrOXYzine pamoate (VISTARIL) 50 mg capsule; Take 1 capsule (50 mg total) by mouth 3 (three) times a day as needed for anxiety    External hemorrhoid  -     hydrocortisone (ANUSOL-HC) 2 5 % rectal cream; Apply topically 2 (two) times a day    Primary hypertension    Vaginal bleeding          Subjective:      Patient ID: Ruchi Negro is a 43 y o  female  HPI    [facility] presented to the office today for two concerns:  1  Intermittent left eyelid twitching started yesterday   Pt has had this problem before, states she was given a shot for anxiety in the past  No associated symptoms, no change in vision  Pt admits to numerous stressors in her life: money, problem with son, etc  Pt not sleeping well at night  2  Recent onset of pulsating pressure sensation around anus, especially after bowel movements  Denies pain or bleeding  No change in stools, typically has 4 soft, formed stools daily since she has been consuming Erendira Detox Tea  Does not strain to defecate  Reports mild, intermittent abdominal pain that varies in location and abdominal bloating that has improved since she started taking the tea  The following portions of the patient's history were reviewed and updated as appropriate: allergies, current medications, past family history, past medical history, past social history, past surgical history and problem list     Review of Systems   Constitutional: Negative for appetite change, chills, fatigue and fever  Eyes: Negative for photophobia, pain, discharge, redness, itching and visual disturbance  Left eyelid twitching since yesterday  Respiratory: Negative for cough, chest tightness and shortness of breath  Cardiovascular: Negative for chest pain and palpitations  Gastrointestinal: Positive for abdominal pain (intermittent, location varies)  Negative for anal bleeding, blood in stool, constipation, diarrhea, nausea, rectal pain (pulsating pressure sensation) and vomiting  Genitourinary: Positive for menstrual problem  Neurological: Negative for dizziness, tremors and headaches  Psychiatric/Behavioral: Positive for sleep disturbance  The patient is nervous/anxious  All other systems reviewed and are negative  Objective:      /94 (BP Location: Left arm, Patient Position: Sitting, Cuff Size: Standard)   Pulse 81   Temp 97 7 °F (36 5 °C) (Temporal)   Resp 18   Ht 5' 2" (1 575 m)   Wt 84 8 kg (187 lb)   LMP 05/20/2022 (Exact Date)   SpO2 98%   Breastfeeding No   BMI 34 20 kg/m²          Physical Exam  Vitals reviewed   Exam conducted with a chaperone present  Constitutional:       General: She is not in acute distress  Appearance: She is obese  She is not ill-appearing or diaphoretic  HENT:      Head: Normocephalic and atraumatic  Nose: Nose normal       Mouth/Throat:      Mouth: Mucous membranes are moist       Pharynx: Oropharynx is clear  Eyes:      General: Lids are normal  No visual field deficit  Extraocular Movements: Extraocular movements intact  Right eye: No nystagmus  Left eye: No nystagmus  Conjunctiva/sclera: Conjunctivae normal       Pupils: Pupils are equal, round, and reactive to light  Comments: One brief, 1-2 second episode of visible left eyelid twitching during exam     Cardiovascular:      Rate and Rhythm: Normal rate and regular rhythm  Heart sounds: Normal heart sounds  No murmur heard  Pulmonary:      Effort: Pulmonary effort is normal  No tachypnea  Breath sounds: Normal breath sounds  No decreased breath sounds or wheezing  Abdominal:      General: Bowel sounds are normal  There is no distension  Palpations: Abdomen is soft  There is no hepatomegaly or mass  Tenderness: There is abdominal tenderness (mild) in the right lower quadrant and left lower quadrant  There is no guarding or rebound  Genitourinary:     Exam position: Knee-chest position  Rectum: External hemorrhoid present  Musculoskeletal:      Right lower leg: No edema  Left lower leg: No edema  Skin:     General: Skin is warm and dry  Neurological:      General: No focal deficit present  Mental Status: She is alert and oriented to person, place, and time  Cranial Nerves: Cranial nerves are intact  No facial asymmetry  Motor: Motor function is intact  Gait: Gait is intact     Psychiatric:         Attention and Perception: Attention normal          Mood and Affect: Mood and affect normal          Speech: Speech normal          Behavior: Behavior normal          Thought Content:  Thought content normal

## 2022-06-23 ENCOUNTER — OFFICE VISIT (OUTPATIENT)
Dept: FAMILY MEDICINE CLINIC | Facility: CLINIC | Age: 37
End: 2022-06-23

## 2022-06-23 VITALS
TEMPERATURE: 97.8 F | OXYGEN SATURATION: 98 % | HEIGHT: 62 IN | HEART RATE: 88 BPM | RESPIRATION RATE: 18 BRPM | SYSTOLIC BLOOD PRESSURE: 132 MMHG | BODY MASS INDEX: 34.72 KG/M2 | DIASTOLIC BLOOD PRESSURE: 88 MMHG | WEIGHT: 188.7 LBS

## 2022-06-23 DIAGNOSIS — K21.9 GASTROESOPHAGEAL REFLUX DISEASE, UNSPECIFIED WHETHER ESOPHAGITIS PRESENT: ICD-10-CM

## 2022-06-23 DIAGNOSIS — E66.09 CLASS 1 OBESITY DUE TO EXCESS CALORIES WITH BODY MASS INDEX (BMI) OF 34.0 TO 34.9 IN ADULT, UNSPECIFIED WHETHER SERIOUS COMORBIDITY PRESENT: ICD-10-CM

## 2022-06-23 DIAGNOSIS — K64.4 EXTERNAL HEMORRHOID: ICD-10-CM

## 2022-06-23 DIAGNOSIS — F41.9 ANXIETY: Primary | ICD-10-CM

## 2022-06-23 DIAGNOSIS — I10 PRIMARY HYPERTENSION: ICD-10-CM

## 2022-06-23 PROCEDURE — 99214 OFFICE O/P EST MOD 30 MIN: CPT | Performed by: PHYSICIAN ASSISTANT

## 2022-06-23 NOTE — ASSESSMENT & PLAN NOTE
- Blood pressure currently stable without medication   - Will continue to hold blood pressure medication at this time  - Will continue to monitor

## 2022-06-23 NOTE — PROGRESS NOTES
Assessment/Plan:    Hypertension  - Blood pressure currently stable without medication   - Will continue to hold blood pressure medication at this time  - Will continue to monitor  Anxiety  - Patient has noticed slight improvement since starting hydroxyzine  Continue hydroxyzine daily bedtime as needed  - Continue coping methods  GERD (gastroesophageal reflux disease)  - Continue Pepcid as needed  - Reviewed the causes of heartburn  Discussed importance of diet and lifestyle modifications to control GERD symptoms  Avoid things which worsen heartburn (Ex: Caffiene, tomato based products, spicy foods, tobacco, alcohol, obesity, tight-fitting clothing ) Discussed importance of eating small frequent meals instead of large meals  While laying down/sleeping instructed to refrain from laying flat and instead, prop pillows up behind their back  Instructed to not lay down 2-3 hours following a meal      External hemorrhoid  - Pain has much improved since using hydrocortisone cream   - Continue hydrocortisone 2 5% rectal cream twice daily as needed  Diagnoses and all orders for this visit:    Anxiety    Class 1 obesity due to excess calories with body mass index (BMI) of 34 0 to 34 9 in adult, unspecified whether serious comorbidity present    Primary hypertension    Gastroesophageal reflux disease, unspecified whether esophagitis present    External hemorrhoid          All of patients questions were answered  Patient understands and agrees with the above plan  Return in about 2 months (around 8/23/2022) for Next scheduled follow up anxiety  Dee Dee Velasco PA-C  06/23/22  [facility] [address] [facility]          Subjective:     Patient ID: Abbi Wise  is a 43 y o  female with known PHM of hypertension, anxiety, GERD who presents today in office for blood pressure follow-up  - Patient is a 43 y o  female who presents today for blood pressure follow-up    Patient notes she has not been taking her blood pressure medication for a few weeks now  Patient denies any headaches, dizziness, chest pain  Patient notes she has started taking hydroxyzine at night which does help to calm her down and helps to sleep  Patient notes she does not take it during the day because it does cause her to be tired  - Patient notes when her anxiety is bad, she often over eats and then becomes sad because she ate some much  Patient notes since her anxiety has been under better control, she is not as hungry and drinks tea at night as an alternative  Patient does take Pepcid as needed  Patient notes she does have a hemorrhoid and has been using the hydrocortisone cream which has been helpful  The following portions of the patient's history were reviewed and updated as appropriate: allergies, current medications, past family history, past medical history, past social history, past surgical history and problem list         Review of Systems   Constitutional: Negative for chills, fever and unexpected weight change  HENT: Negative for congestion and sore throat  Eyes: Negative for pain  Respiratory: Negative for cough, chest tightness and shortness of breath  Cardiovascular: Negative for chest pain, palpitations and leg swelling  Gastrointestinal: Negative for abdominal pain, constipation, diarrhea, nausea and vomiting  Genitourinary: Negative for difficulty urinating and dysuria  Musculoskeletal: Negative for arthralgias  Skin: Negative for rash  Neurological: Negative for dizziness and headaches  Psychiatric/Behavioral: Negative for suicidal ideas  The patient is nervous/anxious  BMI Counseling: Body mass index is 34 51 kg/m²   The BMI is above normal  Nutrition recommendations include decreasing portion sizes, encouraging healthy choices of fruits and vegetables, consuming healthier snacks, limiting drinks that contain sugar, moderation in carbohydrate intake, increasing intake of lean protein and reducing intake of cholesterol  Exercise recommendations include moderate physical activity 150 minutes/week  No pharmacotherapy was ordered  Rationale for BMI follow-up plan is due to patient being overweight or obese  Objective:   Vitals:    06/23/22 0938   BP: 132/88   BP Location: Left arm   Patient Position: Sitting   Cuff Size: Adult   Pulse: 88   Resp: 18   Temp: 97 8 °F (36 6 °C)   TempSrc: Temporal   SpO2: 98%   Weight: 85 6 kg (188 lb 11 2 oz)   Height: 5' 2" (1 575 m)         Physical Exam  Vitals and nursing note reviewed  Constitutional:       General: She is not in acute distress  Appearance: She is well-developed  HENT:      Head: Normocephalic and atraumatic  Right Ear: External ear normal       Left Ear: External ear normal       Nose: Nose normal    Eyes:      Conjunctiva/sclera: Conjunctivae normal    Cardiovascular:      Rate and Rhythm: Normal rate and regular rhythm  Pulses: Normal pulses  Heart sounds: Normal heart sounds  Pulmonary:      Effort: Pulmonary effort is normal  No respiratory distress  Breath sounds: Normal breath sounds  No wheezing  Musculoskeletal:      Cervical back: Normal range of motion and neck supple  Skin:     General: Skin is warm and dry  Neurological:      Mental Status: She is alert and oriented to person, place, and time     Psychiatric:         Behavior: Behavior normal

## 2022-06-23 NOTE — ASSESSMENT & PLAN NOTE
- Patient has noticed slight improvement since starting hydroxyzine  Continue hydroxyzine daily bedtime as needed  - Continue coping methods

## 2022-06-24 ENCOUNTER — TELEPHONE (OUTPATIENT)
Dept: ADMINISTRATIVE | Facility: OTHER | Age: 37
End: 2022-06-24

## 2022-06-24 ENCOUNTER — TELEPHONE (OUTPATIENT)
Dept: FAMILY MEDICINE CLINIC | Facility: CLINIC | Age: 37
End: 2022-06-24

## 2022-06-24 NOTE — TELEPHONE ENCOUNTER
Upon review of the In Basket request we were able to locate, review, and update the patient chart as requested for HIV  Any additional questions or concerns should be emailed to the Practice Liaisons via [email or web address] email, please do not reply via In Basket      Thank you  Wilma Delgado

## 2022-06-24 NOTE — TELEPHONE ENCOUNTER
----- Message from Vicente Polo PA-C sent at 6/23/2022  5:18 PM EDT -----  Regarding: HIV Screening  06/23/22 5:18 PM    Hello, our patient Yousif Orona has had HIV completed/performed  Please assist in updating the patient chart by pulling the Care Everywhere (CE) document  The date of service is 12/6/2006       Thank you,  ARYAN Quinonez

## 2022-06-24 NOTE — ASSESSMENT & PLAN NOTE
- Continue Pepcid as needed  - Reviewed the causes of heartburn  Discussed importance of diet and lifestyle modifications to control GERD symptoms  Avoid things which worsen heartburn (Ex: Caffiene, tomato based products, spicy foods, tobacco, alcohol, obesity, tight-fitting clothing ) Discussed importance of eating small frequent meals instead of large meals  While laying down/sleeping instructed to refrain from laying flat and instead, prop pillows up behind their back   Instructed to not lay down 2-3 hours following a meal

## 2022-06-24 NOTE — ASSESSMENT & PLAN NOTE
- Pain has much improved since using hydrocortisone cream   - Continue hydrocortisone 2 5% rectal cream twice daily as needed

## 2022-07-06 ENCOUNTER — OFFICE VISIT (OUTPATIENT)
Dept: OBGYN CLINIC | Facility: CLINIC | Age: 37
End: 2022-07-06

## 2022-07-06 VITALS
DIASTOLIC BLOOD PRESSURE: 81 MMHG | WEIGHT: 188.8 LBS | BODY MASS INDEX: 34.53 KG/M2 | SYSTOLIC BLOOD PRESSURE: 121 MMHG | HEART RATE: 88 BPM

## 2022-07-06 DIAGNOSIS — R10.2 PELVIC PAIN: Primary | ICD-10-CM

## 2022-07-06 PROCEDURE — 99213 OFFICE O/P EST LOW 20 MIN: CPT | Performed by: OBSTETRICS & GYNECOLOGY

## 2022-07-06 NOTE — PROGRESS NOTES
Assessment/Plan:     No problem-specific Assessment & Plan notes found for this encounter  Diagnoses and all orders for this visit:    Pelvic pain      Reassured pt, no acute pathology suspected  Manages with tylenol prn  Subjective:      Patient ID: Bella Woodward is a 43 y o  female who presents for pelvic pain  She states she has had intermittent pain since her LEEP in 2020  I had done an US and we did a trial of OCPs but she bled twice per month on them  Her pain is not necessarily related to her menses  Her pap is UTD and negative  HPI    The following portions of the patient's history were reviewed and updated as appropriate: allergies, current medications, past family history, past medical history, past social history, past surgical history and problem list     Review of Systems   Gastrointestinal: Negative for constipation, diarrhea, nausea and vomiting  Genitourinary: Negative for difficulty urinating  Objective:      /81   Pulse 88   Wt 85 6 kg (188 lb 12 8 oz)   LMP 06/15/2022 (Within Days)   BMI 34 53 kg/m²          Physical Exam  Vitals and nursing note reviewed  Constitutional:       Appearance: Normal appearance  Pulmonary:      Effort: Pulmonary effort is normal    Abdominal:      Palpations: Abdomen is soft  Tenderness: There is abdominal tenderness in the suprapubic area  Neurological:      General: No focal deficit present  Mental Status: She is alert and oriented to person, place, and time     Psychiatric:         Mood and Affect: Mood normal          Behavior: Behavior normal

## 2022-09-20 ENCOUNTER — OFFICE VISIT (OUTPATIENT)
Dept: OBGYN CLINIC | Facility: CLINIC | Age: 37
End: 2022-09-20

## 2022-09-20 VITALS
HEART RATE: 65 BPM | DIASTOLIC BLOOD PRESSURE: 97 MMHG | WEIGHT: 185 LBS | HEIGHT: 62 IN | SYSTOLIC BLOOD PRESSURE: 149 MMHG | BODY MASS INDEX: 34.04 KG/M2

## 2022-09-20 DIAGNOSIS — R10.2 PELVIC PAIN: Primary | ICD-10-CM

## 2022-09-20 DIAGNOSIS — K64.9 HEMORRHOIDS, UNSPECIFIED HEMORRHOID TYPE: ICD-10-CM

## 2022-09-20 DIAGNOSIS — K59.00 CONSTIPATION, UNSPECIFIED CONSTIPATION TYPE: ICD-10-CM

## 2022-09-20 PROCEDURE — 99213 OFFICE O/P EST LOW 20 MIN: CPT | Performed by: OBSTETRICS & GYNECOLOGY

## 2022-09-20 RX ORDER — IBUPROFEN 200 MG
1 CAPSULE ORAL DAILY
COMMUNITY

## 2022-09-20 RX ORDER — POLYETHYLENE GLYCOL 3350 17 G/17G
17 POWDER, FOR SOLUTION ORAL DAILY
Qty: 17 G | Refills: 2 | Status: SHIPPED | OUTPATIENT
Start: 2022-09-20 | End: 2022-10-11

## 2022-09-20 NOTE — PROGRESS NOTES
PROBLEM GYNECOLOGICAL VISIT    Kris Coleman is a 43 y o  female who presents today with complaint of pelvic pain  Her general medical history has been reviewed and she reports it as follows:    Past Medical History:   Diagnosis Date    Anxiety     Chest pain     pt denies today//saw cardiology and everything was "normal"/saw cardiology 2019 Dr CHARLES Bustos/C   [address] [facility]    Dental crown present     GERD (gastroesophageal reflux disease)     Hypertension     PONV (postoperative nausea and vomiting)     Precancerous changes of the cervix      Past Surgical History:   Procedure Laterality Date    CARPAL TUNNEL RELEASE Right     EXAMINATION UNDER ANESTHESIA N/A 10/4/2020    Procedure: EXAM UNDER ANESTHESIA (EUA), CAUTERIZATION OF CERVIX;  Surgeon: Audrie Riedel, MD;  [facility];  Service: Gynecology    NM COLPOSCOPY,CERVIX W/ADJ VAG,W/LOOP BX N/A 10/2/2020    Procedure: BIOPSY LEEP CERVIX;  Surgeon: Flaquita Bernstein MD;  [facility];  Service: Gynecology    TUBAL LIGATION       OB History        3    Para   3    Term   3       0    AB        Living   3       SAB   0    IAB   0    Ectopic   0    Multiple   0    Live Births   3               Social History     Tobacco Use    Smoking status: Never Smoker    Smokeless tobacco: Never Used   Vaping Use    Vaping Use: Never used   Substance Use Topics    Alcohol use: Not Currently    Drug use: Never     Social History     Substance and Sexual Activity   Sexual Activity Yes    Partners: Male    Birth control/protection: Female Sterilization    Comment: Tubal       Current Outpatient Medications   Medication Instructions    acetaminophen (TYLENOL) 650 mg, Oral, Every 8 hours PRN    calcium citrate (CALCITRATE) 950 (200 Ca) MG tablet 1 tablet, Oral, Daily    famotidine (PEPCID) 20 mg, 2 times daily    GoodSense ClearLax 17 g, Oral, Daily    hydrocortisone (ANUSOL-HC) 2 5 % rectal cream Topical, 2 times daily    hydrOXYzine pamoate (VISTARIL) 50 mg, Oral, 3 times daily PRN    ibuprofen (MOTRIN) 400 mg, Oral, Every 8 hours PRN    multivitamin (THERAGRAN) TABS 1 tablet, Oral, Daily    omeprazole (PriLOSEC) 20 mg delayed release capsule 1 capsule, Daily       History of Present Illness:   Paint presents with c/o pelvic radiating from the right to the left and to her low back  Patient also states that she has been constipated for a couple of days when the pain started along with flare up of her hemorrhoids  Review of Systems:  Review of Systems   Gastrointestinal: Positive for constipation  Flare up of hemorrhoids   Genitourinary: Positive for pelvic pain  All other systems reviewed and are negative  Physical Exam:  /97   Pulse 65   Ht 5' 2" (1 575 m)   Wt 83 9 kg (185 lb)   LMP 08/01/2022 (Approximate)   BMI 33 84 kg/m²   Physical Exam  Constitutional:       Appearance: Normal appearance  Genitourinary:      Bladder and urethral meatus normal       No lesions in the vagina  Right Labia: No rash, tenderness or lesions  Left Labia: No tenderness, lesions or rash  No vaginal discharge, bleeding or ulceration  Right Adnexa: not tender, not full and no mass present  Left Adnexa: not tender, not full and no mass present  No cervical motion tenderness, discharge or lesion  Uterus is not tender  No uterine mass detected  No urethral tenderness or mass present  Neurological:      Mental Status: She is alert  Vitals reviewed  Discussion:  Discuss with patient after examination that her pain is from the chronic constipation  Recommend miralax and will get a pelvic sonogram     Assessment:   1  Pelvic pain   2  Constipation   3  Hemorrhoids    Plan:   1  Escribe miralax   2  Imaging ordered: pelvic sonogram   3  Return to office 2-3wks         Reviewed with patient that test results are available in [facility] immediately, but that they will not necessarily be reviewed by me immediately  Explained that I will review results at my earliest opportunity and contact patient appropriately

## 2022-09-20 NOTE — PATIENT INSTRUCTIONS
Thank you for your confidence in our team    We appreciate you and welcome your feedback  If you receive a survey from us, please take a few moments to let us know how we are doing     Sincerely,  [facility], DO

## 2022-09-22 ENCOUNTER — HOSPITAL ENCOUNTER (OUTPATIENT)
Dept: ULTRASOUND IMAGING | Facility: HOSPITAL | Age: 37
End: 2022-09-22
Payer: COMMERCIAL

## 2022-09-22 DIAGNOSIS — R10.2 PELVIC PAIN: ICD-10-CM

## 2022-09-22 PROCEDURE — 76830 TRANSVAGINAL US NON-OB: CPT

## 2022-09-22 PROCEDURE — 76856 US EXAM PELVIC COMPLETE: CPT

## 2022-10-06 ENCOUNTER — OFFICE VISIT (OUTPATIENT)
Dept: FAMILY MEDICINE CLINIC | Facility: CLINIC | Age: 37
End: 2022-10-06

## 2022-10-06 VITALS
OXYGEN SATURATION: 99 % | TEMPERATURE: 97.6 F | HEIGHT: 62 IN | HEART RATE: 70 BPM | BODY MASS INDEX: 33.8 KG/M2 | SYSTOLIC BLOOD PRESSURE: 130 MMHG | DIASTOLIC BLOOD PRESSURE: 78 MMHG | RESPIRATION RATE: 14 BRPM | WEIGHT: 183.7 LBS

## 2022-10-06 DIAGNOSIS — G47.00 INSOMNIA, UNSPECIFIED TYPE: ICD-10-CM

## 2022-10-06 DIAGNOSIS — F41.9 ANXIETY: Primary | ICD-10-CM

## 2022-10-06 DIAGNOSIS — R10.31 RIGHT LOWER QUADRANT ABDOMINAL PAIN: ICD-10-CM

## 2022-10-06 PROCEDURE — 99214 OFFICE O/P EST MOD 30 MIN: CPT | Performed by: PHYSICIAN ASSISTANT

## 2022-10-06 RX ORDER — TRAZODONE HYDROCHLORIDE 50 MG/1
50 TABLET ORAL
Qty: 60 TABLET | Refills: 2 | Status: SHIPPED | OUTPATIENT
Start: 2022-10-06

## 2022-10-06 NOTE — PROGRESS NOTES
Assessment/Plan:  - I have recommended that this patient have a flu shot but she declines at this time  I have discussed the risks and benefits of this examination with her  The patient verbalized understanding  Anxiety  - Patient self discontinued hydroxyzine due to feeling groggy in the morning  Patient does not wish to try SSRI at this time    - Recommend patient try to establish again with Psychiatry, but she declines at this time  Insomnia  - Patient self discontinued hydroxyzine due to feeling groggy in the morning  Will trial trazodone 50 mg daily bedtime  Right lower quadrant abdominal pain  - Low concern for appendicitis, however patient is nervous this could be causing her pain since her daughter had a similar experience recently  - Will order ultrasound pending X to further evaluate  - ED parameters discussed with patient  Diagnoses and all orders for this visit:    Anxiety    Insomnia, unspecified type  -     traZODone (DESYREL) 50 mg tablet; Take 1 tablet (50 mg total) by mouth daily at bedtime    Right lower quadrant abdominal pain  -     US appendix; Future          All of patients questions were answered  Patient understands and agrees with the above plan  Return in about 2 months (around 12/6/2022) for Next scheduled follow up anxiety, insomnia  Bellaashley Deenéstor ESTRELLA Morelos  10/06/22  [address] [place]          Subjective:     Patient ID: Leandro Sorensen  is a 43 y o  female with known PMH of anxiety, insomnia, hypertension, hyperlipidemia who presents today in office for anxiety follow up  - Patient is a 43 y o  female who presents today for anxiety follow up  Patient notes she continues to struggle with anxiety  Patient notes she does have to move but she currently does not have the money to do so  Patient notes she is still living with her ex-partner   Patient notes she has tried to start following with [facility] but they were asking a lot of questions which made patient feel uncomfortable so she hasnt gone back  Patient notes she does have 3 kids which makes it difficult as well  Patient notes she continues with difficulty sleeping  She did try hydroxyzine but made her groggy still in the morning  Patient notes her constipation is currently well controlled and denies any recent hemorrhoids flare ups  Patient notes she has been experiencing intermittent right lower qudrant abdominal pain for the past 3-4 weeks  Patient notes sometimes she has some nausea with it  Patient denies any fevers, chills, diarrhea  Patient notes her daughter recently had her appendix taken out and she is nervous her appendix is the reason for her pain  The following portions of the patient's history were reviewed and updated as appropriate: allergies, current medications, past family history, past medical history, past social history, past surgical history and problem list         Review of Systems   Constitutional: Negative for chills, fever and unexpected weight change  HENT: Negative for congestion and sore throat  Eyes: Negative for pain  Respiratory: Negative for cough, chest tightness and shortness of breath  Cardiovascular: Negative for chest pain, palpitations and leg swelling  Gastrointestinal: Positive for abdominal pain  Negative for constipation, diarrhea, nausea and vomiting  Genitourinary: Negative for difficulty urinating and dysuria  Musculoskeletal: Negative for arthralgias  Skin: Negative for rash  Neurological: Negative for dizziness and headaches  Psychiatric/Behavioral: Positive for sleep disturbance  Negative for self-injury and suicidal ideas  The patient is nervous/anxious                    Objective:   Vitals:    10/06/22 1006   BP: 130/78   BP Location: Right arm   Patient Position: Sitting   Cuff Size: Adult   Pulse: 70   Resp: 14   Temp: 97 6 °F (36 4 °C)   TempSrc: Temporal   SpO2: 99%   Weight: 83 3 kg (183 lb 11 2 oz)   Height: 5' 2" (1 575 m)         Physical Exam  Vitals and nursing note reviewed  Constitutional:       General: She is not in acute distress  Appearance: She is well-developed  HENT:      Head: Normocephalic and atraumatic  Right Ear: External ear normal       Left Ear: External ear normal       Nose: Nose normal    Eyes:      Conjunctiva/sclera: Conjunctivae normal    Cardiovascular:      Rate and Rhythm: Normal rate and regular rhythm  Pulses: Normal pulses  Heart sounds: Normal heart sounds  Pulmonary:      Effort: Pulmonary effort is normal  No respiratory distress  Breath sounds: Normal breath sounds  No wheezing  Abdominal:      General: Bowel sounds are normal       Palpations: Abdomen is soft  Tenderness: There is abdominal tenderness (RLQ)  There is no guarding or rebound  Musculoskeletal:      Cervical back: Normal range of motion and neck supple  Skin:     General: Skin is warm and dry  Neurological:      Mental Status: She is alert and oriented to person, place, and time     Psychiatric:         Behavior: Behavior normal

## 2022-10-07 ENCOUNTER — HOSPITAL ENCOUNTER (OUTPATIENT)
Dept: ULTRASOUND IMAGING | Facility: HOSPITAL | Age: 37
End: 2022-10-07
Payer: COMMERCIAL

## 2022-10-07 DIAGNOSIS — R10.31 RIGHT LOWER QUADRANT ABDOMINAL PAIN: ICD-10-CM

## 2022-10-07 PROCEDURE — 76705 ECHO EXAM OF ABDOMEN: CPT

## 2022-10-08 NOTE — ASSESSMENT & PLAN NOTE
- Patient self discontinued hydroxyzine due to feeling groggy in the morning  Patient does not wish to try SSRI at this time    - Recommend patient try to establish again with Psychiatry, but she declines at this time

## 2022-10-08 NOTE — ASSESSMENT & PLAN NOTE
- Patient self discontinued hydroxyzine due to feeling groggy in the morning  Will trial trazodone 50 mg daily bedtime

## 2022-10-11 ENCOUNTER — OFFICE VISIT (OUTPATIENT)
Dept: OBGYN CLINIC | Facility: CLINIC | Age: 37
End: 2022-10-11

## 2022-10-11 VITALS
BODY MASS INDEX: 34.12 KG/M2 | WEIGHT: 185.4 LBS | HEART RATE: 87 BPM | SYSTOLIC BLOOD PRESSURE: 165 MMHG | HEIGHT: 62 IN | DIASTOLIC BLOOD PRESSURE: 91 MMHG

## 2022-10-11 DIAGNOSIS — R93.89 THICKENED ENDOMETRIUM: ICD-10-CM

## 2022-10-11 DIAGNOSIS — D21.9 FIBROID: ICD-10-CM

## 2022-10-11 DIAGNOSIS — Z71.2 ENCOUNTER TO DISCUSS TEST RESULTS: Primary | ICD-10-CM

## 2022-10-11 PROBLEM — H61.22 IMPACTED CERUMEN OF LEFT EAR: Status: RESOLVED | Noted: 2020-07-20 | Resolved: 2022-10-11

## 2022-10-11 PROCEDURE — 88305 TISSUE EXAM BY PATHOLOGIST: CPT | Performed by: PATHOLOGY

## 2022-10-11 PROCEDURE — 58100 BIOPSY OF UTERUS LINING: CPT | Performed by: OBSTETRICS & GYNECOLOGY

## 2022-10-11 PROCEDURE — 99213 OFFICE O/P EST LOW 20 MIN: CPT | Performed by: OBSTETRICS & GYNECOLOGY

## 2022-10-11 NOTE — PROGRESS NOTES
PROBLEM GYNECOLOGICAL VISIT    Alma Daly is a 43 y o  female who presents today for follow up  Her general medical history has been reviewed and she reports it as follows:    Past Medical History:   Diagnosis Date   • Anxiety    • Chest pain     pt denies today//saw cardiology and everything was "normal"/saw cardiology 2019 Dr CHARLES Bustos/BENITO MENESES   • Dental crown present    • GERD (gastroesophageal reflux disease)    • Hypertension    • PONV (postoperative nausea and vomiting)    • Precancerous changes of the cervix      Past Surgical History:   Procedure Laterality Date   • CARPAL TUNNEL RELEASE Right    • EXAMINATION UNDER ANESTHESIA N/A 10/4/2020    Procedure: EXAM UNDER ANESTHESIA (EUA), CAUTERIZATION OF CERVIX;  Surgeon: Sukhi Camp MD;  [facility];  Service: Gynecology   • [place] COLPOSCOPY,CERVIX W/ADJ VAG,W/LOOP BX N/A 10/2/2020    Procedure: BIOPSY LEEP CERVIX;  Surgeon: Andre Becerra MD;  [facility];  Service: Gynecology   • TUBAL LIGATION       OB History        3    Para   3    Term   3       0    AB        Living   3       SAB   0    IAB   0    Ectopic   0    Multiple   0    Live Births   3               Social History     Tobacco Use   • Smoking status: Never Smoker   • Smokeless tobacco: Never Used   Vaping Use   • Vaping Use: Never used   Substance Use Topics   • Alcohol use: Not Currently   • Drug use: Never     Social History     Substance and Sexual Activity   Sexual Activity Yes   • Partners: Male   • Birth control/protection: Female Sterilization    Comment: Tubal       Current Outpatient Medications   Medication Instructions   • acetaminophen (TYLENOL) 650 mg, Oral, Every 8 hours PRN   • calcium citrate (CALCITRATE) 950 (200 Ca) MG tablet 1 tablet, Oral, Daily   • famotidine (PEPCID) 20 mg, 2 times daily   • GoodSense ClearLax 17 g, Oral, Daily   • hydrocortisone (ANUSOL-HC) 2 5 % rectal cream Topical, 2 times daily   • hydrOXYzine pamoate (VISTARIL) 50 mg, Oral, 3 times daily PRN   • ibuprofen (MOTRIN) 400 mg, Oral, Every 8 hours PRN   • multivitamin (THERAGRAN) TABS 1 tablet, Oral, Daily   • omeprazole (PriLOSEC) 20 mg delayed release capsule 1 capsule, Daily   • traZODone (DESYREL) 50 mg, Oral, Daily at bedtime       History of Present Illness:   Patient presents for follow up s/p pelvic sonogram for pelvic pain with no new complaints  Review of Systems:  Review of Systems   All other systems reviewed and are negative  Physical Exam:  /91   Pulse 87   Ht 5' 2" (1 575 m)   Wt 84 1 kg (185 lb 6 4 oz)   LMP 09/15/2022 (Within Days)   BMI 33 91 kg/m²   Physical Exam  Constitutional:       Appearance: Normal appearance  Genitourinary:      Bladder and urethral meatus normal       No lesions in the vagina  Right Labia: No rash, tenderness or lesions  Left Labia: No tenderness, lesions or rash  No vaginal discharge or bleeding  No urethral tenderness or mass present  Neurological:      Mental Status: She is alert  Vitals reviewed  Discussion:  Discuss with patient sonogram consist of thickened endometrium and small fibroid  Recommend an endometrial biopsy for further evaluation  Patient agrees with endometrial biopsy  Consent signed  Assessment:   1  Thickened Endometrium   2  Fibroid uterus    Plan:   1  Recommend endometrial biopsy   2  Endometrial tissue to pathology    3  Return to office 3wks  4  Patient's depression screening was assessed with a PHQ-2 score of 4  Their PHQ-9 score was 14  Reviewed with patient that test results are available in [facility] immediately, but that they will not necessarily be reviewed by me immediately  Explained that I will review results at my earliest opportunity and contact patient appropriately

## 2022-10-11 NOTE — PROGRESS NOTES
Endometrial biopsy    Date/Time: 10/11/2022 10:15 AM  Performed by: Josee Gatica DO  Authorized by: Josee Gatica DO   Universal Protocol:  Consent: Verbal consent obtained  Written consent obtained  Risks and benefits: risks, benefits and alternatives were discussed  Consent given by: patient  Time out: Immediately prior to procedure a "time out" was called to verify the correct patient, procedure, equipment, support staff and site/side marked as required  Timeout called at: 10/11/2022 10:05 AM   Patient understanding: patient states understanding of the procedure being performed  Patient consent: the patient's understanding of the procedure matches consent given  Procedure consent: procedure consent matches procedure scheduled  Relevant documents: relevant documents present and verified  Test results: test results available and properly labeled  Patient identity confirmed: verbally with patient and provided demographic data      Indication:     Indications:  Other disorder of menstruation and other abnormal bleeding from female genital tract    Procedure:     Procedure: endometrial biopsy with Pipelle      A bivalve speculum was placed in the vagina: yes      Cervix cleaned and prepped: yes      Uterus sounded: yes      Uterus sound depth (cm):  8    Specimen collected: specimen collected and sent to pathology

## 2022-10-13 ENCOUNTER — TELEPHONE (OUTPATIENT)
Dept: OBGYN CLINIC | Facility: CLINIC | Age: 37
End: 2022-10-13

## 2022-11-02 ENCOUNTER — OFFICE VISIT (OUTPATIENT)
Dept: OBGYN CLINIC | Facility: CLINIC | Age: 37
End: 2022-11-02

## 2022-11-02 VITALS
BODY MASS INDEX: 34.2 KG/M2 | WEIGHT: 187 LBS | DIASTOLIC BLOOD PRESSURE: 100 MMHG | HEART RATE: 80 BPM | SYSTOLIC BLOOD PRESSURE: 153 MMHG

## 2022-11-02 DIAGNOSIS — N93.9 ABNORMAL UTERINE BLEEDING DUE TO ENDOMETRIAL POLYP: ICD-10-CM

## 2022-11-02 DIAGNOSIS — N84.0 ABNORMAL UTERINE BLEEDING DUE TO ENDOMETRIAL POLYP: ICD-10-CM

## 2022-11-02 DIAGNOSIS — Z71.2 ENCOUNTER TO DISCUSS TEST RESULTS: Primary | ICD-10-CM

## 2022-11-02 NOTE — PATIENT INSTRUCTIONS
Thank you for your confidence in our team    We appreciate you and welcome your feedback  If you receive a survey from us, please take a few moments to let us know how we are doing     Sincerely,  Donovan Kendrick

## 2022-11-02 NOTE — PROGRESS NOTES
PROBLEM GYNECOLOGICAL VISIT    Kenisha Patel is a 43 y o  female who presents today for follow up  Her general medical history has been reviewed and she reports it as follows:    Past Medical History:   Diagnosis Date   • Anxiety    • Chest pain     pt denies today//saw cardiology and everything was "normal"/saw cardiology 2019 Dr CHARLES Bustos/BENITO MENESES   • Dental crown present    • GERD (gastroesophageal reflux disease)    • Hypertension    • PONV (postoperative nausea and vomiting)    • Precancerous changes of the cervix      Past Surgical History:   Procedure Laterality Date   • CARPAL TUNNEL RELEASE Right    • EXAMINATION UNDER ANESTHESIA N/A 10/4/2020    Procedure: EXAM UNDER ANESTHESIA (EUA), CAUTERIZATION OF CERVIX;  Surgeon: Jeannine Dugan MD;  [facility];  Service: Gynecology   • NM COLPOSCOPY,CERVIX W/ADJ VAG,W/LOOP BX N/A 10/2/2020    Procedure: BIOPSY LEEP CERVIX;  Surgeon: Fabienne Moore MD;  [facility];  Service: Gynecology   • TUBAL LIGATION       OB History        3    Para   3    Term   3       0    AB        Living   3       SAB   0    IAB   0    Ectopic   0    Multiple   0    Live Births   3               Social History     Tobacco Use   • Smoking status: Never Smoker   • Smokeless tobacco: Never Used   Vaping Use   • Vaping Use: Never used   Substance Use Topics   • Alcohol use: Not Currently   • Drug use: Never     Social History     Substance and Sexual Activity   Sexual Activity Yes   • Partners: Male   • Birth control/protection: Female Sterilization    Comment: Tubal       Current Outpatient Medications   Medication Instructions   • acetaminophen (TYLENOL) 650 mg, Oral, Every 8 hours PRN   • calcium citrate (CALCITRATE) 950 (200 Ca) MG tablet 1 tablet, Oral, Daily   • famotidine (PEPCID) 20 mg, 2 times daily   • GoodSense ClearLax 17 g, Oral, Daily   • hydrocortisone (ANUSOL-HC) 2 5 % rectal cream Topical, 2 times daily   • hydrOXYzine pamoate (VISTARIL) 50 mg, Oral, 3 times daily PRN   • ibuprofen (MOTRIN) 400 mg, Oral, Every 8 hours PRN   • multivitamin (THERAGRAN) TABS 1 tablet, Oral, Daily   • omeprazole (PriLOSEC) 20 mg delayed release capsule 1 capsule, Daily   • traZODone (DESYREL) 50 mg, Oral, Daily at bedtime       History of Present Illness:   Patient presents for follow up s/p EMB with no new complaints  Review of Systems:  Review of Systems   All other systems reviewed and are negative  Physical Exam:  /100   Pulse 80   Wt 84 8 kg (187 lb)   BMI 34 20 kg/m²   Physical Exam  Constitutional:       Appearance: Normal appearance  Neurological:      Mental Status: She is alert  Vitals reviewed  Discussion:  Discuss with patient pathology consist of endometrial polyps no dysplasia or carcinoma  Reviewed BP and informed patient that it was high will need to make appt with PCP  Recommendation treatment options for endometrial AUB caused by the polyps either OCP, IUD, or D&C hysteroscopy  Patient states will see how this up coming menses is prior to making a decision  Assessment:   1  Endometrial polyp    Plan:   1  Recommendations given   2  Return to office end of November of annual/pap  Reviewed with patient that [facility] immediately, but that they will not necessarily be reviewed by me immediately  Explained that I will review results at my earliest opportunity and contact patient appropriately

## 2023-01-18 ENCOUNTER — OFFICE VISIT (OUTPATIENT)
Dept: FAMILY MEDICINE CLINIC | Facility: CLINIC | Age: 38
End: 2023-01-18

## 2023-01-18 VITALS
OXYGEN SATURATION: 96 % | TEMPERATURE: 97.8 F | HEIGHT: 62 IN | HEART RATE: 74 BPM | WEIGHT: 185.6 LBS | RESPIRATION RATE: 16 BRPM | SYSTOLIC BLOOD PRESSURE: 118 MMHG | DIASTOLIC BLOOD PRESSURE: 72 MMHG | BODY MASS INDEX: 34.16 KG/M2

## 2023-01-18 DIAGNOSIS — G47.00 INSOMNIA, UNSPECIFIED TYPE: ICD-10-CM

## 2023-01-18 DIAGNOSIS — K21.9 GASTROESOPHAGEAL REFLUX DISEASE, UNSPECIFIED WHETHER ESOPHAGITIS PRESENT: Primary | ICD-10-CM

## 2023-01-18 DIAGNOSIS — M54.9 MID BACK PAIN ON LEFT SIDE: ICD-10-CM

## 2023-01-18 RX ORDER — OMEPRAZOLE 20 MG/1
20 CAPSULE, DELAYED RELEASE ORAL DAILY
Qty: 60 CAPSULE | Refills: 1 | Status: SHIPPED | OUTPATIENT
Start: 2023-01-18

## 2023-01-18 RX ORDER — BACLOFEN 10 MG/1
10 TABLET ORAL 3 TIMES DAILY PRN
Qty: 30 TABLET | Refills: 1 | Status: SHIPPED | OUTPATIENT
Start: 2023-01-18

## 2023-01-18 RX ORDER — TRAZODONE HYDROCHLORIDE 50 MG/1
50 TABLET ORAL
Qty: 90 TABLET | Refills: 1 | Status: SHIPPED | OUTPATIENT
Start: 2023-01-18

## 2023-01-18 NOTE — ASSESSMENT & PLAN NOTE
- We will prescribe baclofen 10 mg, to be taken twice daily as needed  - Advised to apply ice/heat pad/topical therapies as needed to affected area  - Provided list of exercises and stretches to perform daily

## 2023-01-18 NOTE — PROGRESS NOTES
Assessment/Plan:    Insomnia  - Much improved since starting trazodone   - Continue trazodone 50 mg daily at bedtime  GERD (gastroesophageal reflux disease)  - Currently taking Pepcid, but has not been helping  Will discontinue  - Will start omeprazole 20 mg daily   - Advised to avoid trigger foods  Mid back pain on left side  - We will prescribe baclofen 10 mg, to be taken twice daily as needed  - Advised to apply ice/heat pad/topical therapies as needed to affected area  - Provided list of exercises and stretches to perform daily  Diagnoses and all orders for this visit:    Gastroesophageal reflux disease, unspecified whether esophagitis present  -     omeprazole (PriLOSEC) 20 mg delayed release capsule; Take 1 capsule (20 mg total) by mouth daily    Mid back pain on left side  -     baclofen 10 mg tablet; Take 1 tablet (10 mg total) by mouth 3 (three) times a day as needed for muscle spasms    Insomnia, unspecified type  -     traZODone (DESYREL) 50 mg tablet; Take 1 tablet (50 mg total) by mouth daily at bedtime        All of patients questions were answered  Patient understands and agrees with the above plan  Return in about 2 months (around 3/18/2023) for Annual physical     Wilhemena Rush  01/18/23  [facility]          Subjective:     Patient ID: Matt Strauss  is a 42 y o  female with known PMH of anxiety, insomnia, hypertension, hyperlipidemia who presents today in office for anxiety follow-up  - Patient is a 42 y o  female who presents today for anxiety follow-up  Patient notes her anxiety has been improving  Patient notes she continues to have a lot of stress, but she has been trying to cope with it the best she could  Patient notes she has been sleeping much better since starting trazodone  Patient notes she is able to take it, fall asleep, and wakes up in the morning without feeling groggy    Patient notes she does take Pepcid for her acid reflux, but it is no longer helping     - Patient notes for the past year she has been experiencing pain of her left mid back area  Patient notes it radiates up into her left shoulder and into her chest and left rib area  Patient notes the pain is worse when she is using her arms a lot at work and with bending movements  Patient did take ibuprofen yesterday with little relief  Patient notes she also has been experiencing some neck pain which she believes is tension from the stress she is having  Patient did apply some Bengay which helped  The following portions of the patient's history were reviewed and updated as appropriate: allergies, current medications, past family history, past medical history, past social history, past surgical history and problem list         Review of Systems   Constitutional: Negative for chills, fever and unexpected weight change  HENT: Negative for congestion and sore throat  Eyes: Negative for pain  Respiratory: Negative for cough, chest tightness and shortness of breath  Cardiovascular: Negative for chest pain, palpitations and leg swelling  Gastrointestinal: Positive for abdominal pain  Negative for constipation, diarrhea, nausea and vomiting  Acid reflux   Genitourinary: Negative for difficulty urinating and dysuria  Musculoskeletal: Positive for back pain  Skin: Negative for rash  Neurological: Negative for dizziness and headaches  Psychiatric/Behavioral: Negative for self-injury, sleep disturbance and suicidal ideas  The patient is nervous/anxious (Improving)  Objective:   Vitals:    01/18/23 0933   BP: 118/72   BP Location: Left arm   Patient Position: Sitting   Cuff Size: Adult   Pulse: 74   Resp: 16   Temp: 97 8 °F (36 6 °C)   TempSrc: Temporal   SpO2: 96%   Weight: 84 2 kg (185 lb 9 6 oz)   Height: 5' 2" (1 575 m)         Physical Exam  Vitals and nursing note reviewed  Constitutional:       General: She is not in acute distress  Appearance: She is well-developed  HENT:      Head: Normocephalic and atraumatic  Right Ear: External ear normal       Left Ear: External ear normal       Nose: Nose normal    Eyes:      Conjunctiva/sclera: Conjunctivae normal    Cardiovascular:      Rate and Rhythm: Normal rate and regular rhythm  Pulses: Normal pulses  Heart sounds: Normal heart sounds  Pulmonary:      Effort: Pulmonary effort is normal  No respiratory distress  Breath sounds: Normal breath sounds  No wheezing  Musculoskeletal:      Cervical back: Normal range of motion and neck supple  Thoracic back: Tenderness (Left parathoracic area) present  No swelling  Normal range of motion  Skin:     General: Skin is warm and dry  Neurological:      Mental Status: She is alert and oriented to person, place, and time     Psychiatric:         Behavior: Behavior normal

## 2023-01-18 NOTE — ASSESSMENT & PLAN NOTE
- Currently taking Pepcid, but has not been helping  Will discontinue  - Will start omeprazole 20 mg daily   - Advised to avoid trigger foods

## 2023-01-31 ENCOUNTER — APPOINTMENT (OUTPATIENT)
Dept: LAB | Facility: CLINIC | Age: 38
End: 2023-01-31

## 2023-01-31 ENCOUNTER — ANNUAL EXAM (OUTPATIENT)
Dept: OBGYN CLINIC | Facility: CLINIC | Age: 38
End: 2023-01-31

## 2023-01-31 ENCOUNTER — APPOINTMENT (EMERGENCY)
Dept: CT IMAGING | Facility: HOSPITAL | Age: 38
End: 2023-01-31

## 2023-01-31 ENCOUNTER — HOSPITAL ENCOUNTER (EMERGENCY)
Facility: HOSPITAL | Age: 38
Discharge: HOME | End: 2023-01-31
Attending: EMERGENCY MEDICINE

## 2023-01-31 VITALS
WEIGHT: 184.8 LBS | HEART RATE: 76 BPM | SYSTOLIC BLOOD PRESSURE: 148 MMHG | DIASTOLIC BLOOD PRESSURE: 95 MMHG | BODY MASS INDEX: 34.01 KG/M2 | HEIGHT: 62 IN

## 2023-01-31 VITALS
HEART RATE: 72 BPM | DIASTOLIC BLOOD PRESSURE: 96 MMHG | OXYGEN SATURATION: 99 % | SYSTOLIC BLOOD PRESSURE: 157 MMHG | TEMPERATURE: 97.8 F | RESPIRATION RATE: 16 BRPM

## 2023-01-31 DIAGNOSIS — R10.9 ABDOMINAL PAIN: Primary | ICD-10-CM

## 2023-01-31 DIAGNOSIS — Z20.2 POSSIBLE EXPOSURE TO STD: ICD-10-CM

## 2023-01-31 DIAGNOSIS — Z11.3 SCREEN FOR STD (SEXUALLY TRANSMITTED DISEASE): ICD-10-CM

## 2023-01-31 DIAGNOSIS — N93.9 ABNORMAL UTERINE BLEEDING (AUB): ICD-10-CM

## 2023-01-31 DIAGNOSIS — K76.89 HEPATIC CYST: ICD-10-CM

## 2023-01-31 DIAGNOSIS — Z01.419 ROUTINE GYNECOLOGICAL EXAMINATION: Primary | ICD-10-CM

## 2023-01-31 DIAGNOSIS — N64.4 PAIN OF RIGHT BREAST: ICD-10-CM

## 2023-01-31 DIAGNOSIS — R11.0 NAUSEA: ICD-10-CM

## 2023-01-31 DIAGNOSIS — R10.2 PELVIC PAIN: ICD-10-CM

## 2023-01-31 LAB
ALBUMIN SERPL BCP-MCNC: 4.6 G/DL (ref 3.5–5)
ALP SERPL-CCNC: 44 U/L (ref 34–104)
ALT SERPL W P-5'-P-CCNC: 16 U/L (ref 7–52)
ANION GAP SERPL CALCULATED.3IONS-SCNC: 6 MMOL/L (ref 4–13)
AST SERPL W P-5'-P-CCNC: 14 U/L (ref 13–39)
BACTERIA UR QL AUTO: ABNORMAL /HPF
BASOPHILS # BLD AUTO: 0.05 THOUSANDS/ÂΜL (ref 0–0.1)
BASOPHILS NFR BLD AUTO: 1 % (ref 0–1)
BILIRUB SERPL-MCNC: 0.55 MG/DL (ref 0.2–1)
BILIRUB UR QL STRIP: NEGATIVE
BUN SERPL-MCNC: 10 MG/DL (ref 5–25)
CALCIUM SERPL-MCNC: 9.4 MG/DL (ref 8.4–10.2)
CHLORIDE SERPL-SCNC: 104 MMOL/L (ref 96–108)
CLARITY UR: CLEAR
CO2 SERPL-SCNC: 26 MMOL/L (ref 21–32)
COLOR UR: YELLOW
CREAT SERPL-MCNC: 0.68 MG/DL (ref 0.6–1.3)
EOSINOPHIL # BLD AUTO: 0.11 THOUSAND/ÂΜL (ref 0–0.61)
EOSINOPHIL NFR BLD AUTO: 2 % (ref 0–6)
ERYTHROCYTE [DISTWIDTH] IN BLOOD BY AUTOMATED COUNT: 12 % (ref 11.6–15.1)
EXT PREGNANCY TEST URINE: NEGATIVE
EXT. CONTROL: NORMAL
GFR SERPL CREATININE-BSD FRML MDRD: 110 ML/MIN/1.73SQ M
GLUCOSE SERPL-MCNC: 92 MG/DL (ref 65–140)
GLUCOSE UR STRIP-MCNC: NEGATIVE MG/DL
HBV SURFACE AG SER QL: NORMAL
HCT VFR BLD AUTO: 42.6 % (ref 34.8–46.1)
HCV AB SER QL: NORMAL
HGB BLD-MCNC: 14.9 G/DL (ref 11.5–15.4)
HGB UR QL STRIP.AUTO: NEGATIVE
IMM GRANULOCYTES # BLD AUTO: 0.02 THOUSAND/UL (ref 0–0.2)
IMM GRANULOCYTES NFR BLD AUTO: 0 % (ref 0–2)
KETONES UR STRIP-MCNC: NEGATIVE MG/DL
LEUKOCYTE ESTERASE UR QL STRIP: ABNORMAL
LIPASE SERPL-CCNC: 27 U/L (ref 11–82)
LYMPHOCYTES # BLD AUTO: 2.19 THOUSANDS/ÂΜL (ref 0.6–4.47)
LYMPHOCYTES NFR BLD AUTO: 39 % (ref 14–44)
MCH RBC QN AUTO: 31.3 PG (ref 26.8–34.3)
MCHC RBC AUTO-ENTMCNC: 35 G/DL (ref 31.4–37.4)
MCV RBC AUTO: 90 FL (ref 82–98)
MONOCYTES # BLD AUTO: 0.52 THOUSAND/ÂΜL (ref 0.17–1.22)
MONOCYTES NFR BLD AUTO: 9 % (ref 4–12)
NEUTROPHILS # BLD AUTO: 2.8 THOUSANDS/ÂΜL (ref 1.85–7.62)
NEUTS SEG NFR BLD AUTO: 49 % (ref 43–75)
NITRITE UR QL STRIP: NEGATIVE
NON-SQ EPI CELLS URNS QL MICRO: ABNORMAL /HPF
NRBC BLD AUTO-RTO: 0 /100 WBCS
PH UR STRIP.AUTO: 6 [PH] (ref 4.5–8)
PLATELET # BLD AUTO: 242 THOUSANDS/UL (ref 149–390)
PMV BLD AUTO: 9.9 FL (ref 8.9–12.7)
POTASSIUM SERPL-SCNC: 3.7 MMOL/L (ref 3.5–5.3)
PROT SERPL-MCNC: 7.5 G/DL (ref 6.4–8.4)
PROT UR STRIP-MCNC: NEGATIVE MG/DL
RBC # BLD AUTO: 4.76 MILLION/UL (ref 3.81–5.12)
RBC #/AREA URNS AUTO: ABNORMAL /HPF
SODIUM SERPL-SCNC: 136 MMOL/L (ref 135–147)
SP GR UR STRIP.AUTO: 1.02 (ref 1–1.03)
UROBILINOGEN UR QL STRIP.AUTO: 0.2 E.U./DL
WBC # BLD AUTO: 5.69 THOUSAND/UL (ref 4.31–10.16)
WBC #/AREA URNS AUTO: ABNORMAL /HPF

## 2023-01-31 RX ORDER — NAPROXEN 500 MG/1
500 TABLET ORAL 2 TIMES DAILY WITH MEALS
Qty: 30 TABLET | Refills: 0 | Status: SHIPPED | OUTPATIENT
Start: 2023-01-31

## 2023-01-31 RX ORDER — KETOROLAC TROMETHAMINE 30 MG/ML
15 INJECTION, SOLUTION INTRAMUSCULAR; INTRAVENOUS ONCE
Status: COMPLETED | OUTPATIENT
Start: 2023-01-31 | End: 2023-01-31

## 2023-01-31 RX ADMIN — KETOROLAC TROMETHAMINE 15 MG: 30 INJECTION, SOLUTION INTRAMUSCULAR; INTRAVENOUS at 11:58

## 2023-01-31 RX ADMIN — IOHEXOL 100 ML: 350 INJECTION, SOLUTION INTRAVENOUS at 13:15

## 2023-01-31 NOTE — ED PROVIDER NOTES
History  Chief Complaint   Patient presents with   • Abdominal Pain     Lower abdominal pain x3 months  Patient said OBGYN told her pain is d/t polyps in uterus  Referred to ed by gynecologist  C/o nausea     37y  o female with PMH of anxiety, GERD, HTN and precancerous changes of the cervix presents to the ER for ongoing LLQ pain  Patient reports pain for the last 3 months  She has been taking Tylenol for pain  She describes her pain as cramping and radiating to her back  Pain comes and goes  Associated symptoms: nausea  She denies fever, chills, URI symptoms, chest pain, dyspnea, vomiting, diarrhea, urinary symptoms, vaginal discharge/bleeding, weakness or paresthesias  Patient thought her symptoms were due to uterine polyps she has but she saw her OBGYN today who referred her to the ER because the patient states her OBGYN does not think symptoms are related to her uterus rather maybe her intestines  History provided by:  Patient   used: No        Prior to Admission Medications   Prescriptions Last Dose Informant Patient Reported?  Taking?   acetaminophen (TYLENOL) 650 mg CR tablet   No No   Sig: Take 1 tablet (650 mg total) by mouth every 8 (eight) hours as needed for mild pain   baclofen 10 mg tablet 1/30/2023  No Yes   Sig: Take 1 tablet (10 mg total) by mouth 3 (three) times a day as needed for muscle spasms   calcium citrate (CALCITRATE) 950 (200 Ca) MG tablet   Yes No   Sig: Take 1 tablet by mouth daily   hydrOXYzine pamoate (VISTARIL) 50 mg capsule   No No   Sig: Take 1 capsule (50 mg total) by mouth 3 (three) times a day as needed for anxiety   Patient not taking: No sig reported   hydrocortisone (ANUSOL-HC) 2 5 % rectal cream Not Taking  No No   Sig: Apply topically 2 (two) times a day   Patient not taking: Reported on 10/11/2022   ibuprofen (MOTRIN) 400 mg tablet   No No   Sig: Take 1 tablet (400 mg total) by mouth every 8 (eight) hours as needed for mild pain for up to 5 days multivitamin [facility]) TABS 1/30/2023  Yes Yes   Sig: Take 1 tablet by mouth daily   omeprazole (PriLOSEC) 20 mg delayed release capsule 1/30/2023  Yes Yes   Sig: Take 1 capsule by mouth daily   omeprazole (PriLOSEC) 20 mg delayed release capsule 1/30/2023  No Yes   Sig: Take 1 capsule (20 mg total) by mouth daily   polyethylene glycol (GoodSense ClearLax) 17 GM/SCOOP powder   No No   Sig: Take 17 g by mouth daily for 14 days   traZODone (DESYREL) 50 mg tablet 1/30/2023  No Yes   Sig: Take 1 tablet (50 mg total) by mouth daily at bedtime      Facility-Administered Medications: None       Past Medical History:   Diagnosis Date   • Anxiety    • Chest pain     pt denies today//saw cardiology and everything was "normal"/saw cardiology 7/2019 Dr CHARLES Bustos/BENITO MENESES   • Dental crown present    • GERD (gastroesophageal reflux disease)    • Hypertension    • PONV (postoperative nausea and vomiting)    • Precancerous changes of the cervix        Past Surgical History:   Procedure Laterality Date   • CARPAL TUNNEL RELEASE Right    • EXAMINATION UNDER ANESTHESIA N/A 10/4/2020    Procedure: EXAM UNDER ANESTHESIA (EUA), CAUTERIZATION OF CERVIX;  Surgeon: Milli Kamara MD;  Location: [facility];  Service: Gynecology   • ME COLPOSCOPY CERVIX VAG LOOP ELTRD BX CERVIX N/A 10/2/2020    Procedure: BIOPSY LEEP CERVIX;  Surgeon: Melissa Thorne MD;  Location: [facility];  Service: Gynecology   • TUBAL LIGATION         Family History   Problem Relation Age of Onset   • Asthma Mother    • Hypertension Mother    • No Known Problems Sister    • No Known Problems Daughter    • No Known Problems Maternal Grandmother    • No Known Problems Paternal Grandmother    • No Known Problems Maternal Aunt    • No Known Problems Maternal Aunt    • No Known Problems Paternal Aunt    • No Known Problems Paternal Aunt    • No Known Problems Paternal Aunt    • Diabetes Paternal Aunt    • Breast cancer Neg Hx    • Cancer Neg Hx I have reviewed and agree with the history as documented  E-Cigarette/Vaping   • E-Cigarette Use Never User      E-Cigarette/Vaping Substances   • Nicotine No    • THC No    • CBD No    • Flavoring No    • Other No    • Unknown No      Social History     Tobacco Use   • Smoking status: Never   • Smokeless tobacco: Never   Vaping Use   • Vaping Use: Never used   Substance Use Topics   • Alcohol use: Not Currently   • Drug use: Never       Review of Systems   Constitutional: Negative for activity change, appetite change, chills and fever  HENT: Negative for congestion, drooling, ear discharge, ear pain, facial swelling, rhinorrhea and sore throat  Eyes: Negative for redness  Respiratory: Negative for cough and shortness of breath  Cardiovascular: Negative for chest pain  Gastrointestinal: Positive for abdominal pain and nausea  Negative for diarrhea and vomiting  Genitourinary: Negative for dysuria, frequency, hematuria, urgency, vaginal bleeding and vaginal discharge  Musculoskeletal: Negative for neck stiffness  Skin: Negative for rash  Allergic/Immunologic: Negative for food allergies  Neurological: Negative for weakness and numbness  Physical Exam  Physical Exam  Vitals and nursing note reviewed  Constitutional:       General: She is not in acute distress  Appearance: She is not toxic-appearing  HENT:      Head: Normocephalic and atraumatic  Eyes:      Conjunctiva/sclera: Conjunctivae normal    Neck:      Trachea: No tracheal deviation  Cardiovascular:      Rate and Rhythm: Normal rate and regular rhythm  Heart sounds: Normal heart sounds, S1 normal and S2 normal  No murmur heard  No friction rub  No gallop  Pulmonary:      Effort: Pulmonary effort is normal  No respiratory distress  Breath sounds: Normal breath sounds  No decreased breath sounds, wheezing, rhonchi or rales  Chest:      Chest wall: No tenderness     Abdominal:      General: Bowel sounds are normal  There is no distension  Palpations: Abdomen is soft  Tenderness: There is abdominal tenderness in the left lower quadrant  There is no guarding or rebound  Musculoskeletal:      Cervical back: Normal range of motion and neck supple  Skin:     General: Skin is warm and dry  Findings: No rash  Neurological:      Mental Status: She is alert  GCS: GCS eye subscore is 4  GCS verbal subscore is 5  GCS motor subscore is 6     Psychiatric:         Mood and Affect: Mood normal          Vital Signs  ED Triage Vitals   Temperature Pulse Respirations Blood Pressure SpO2   01/31/23 1125 01/31/23 1125 01/31/23 1125 01/31/23 1125 01/31/23 1125   97 8 °F (36 6 °C) 84 18 (!) 170/117 99 %      Temp Source Heart Rate Source Patient Position - Orthostatic VS BP Location FiO2 (%)   01/31/23 1125 01/31/23 1125 01/31/23 1125 01/31/23 1125 --   Oral Monitor Sitting Right arm       Pain Score       01/31/23 1158       6           Vitals:    01/31/23 1125 01/31/23 1136 01/31/23 1233   BP: (!) 170/117 (!) 167/104 157/96   Pulse: 84  72   Patient Position - Orthostatic VS: Sitting           Visual Acuity      ED Medications  Medications   ketorolac (TORADOL) injection 15 mg (15 mg Intravenous Given 1/31/23 1158)   iohexol (OMNIPAQUE) 350 MG/ML injection (SINGLE-DOSE) 100 mL (100 mL Intravenous Given 1/31/23 1315)       Diagnostic Studies  Results Reviewed     Procedure Component Value Units Date/Time    Urine Microscopic [[id number]]  (Abnormal) Collected: 01/31/23 1146    Lab Status: Final result Specimen: Urine, Clean Catch Updated: 01/31/23 1246     RBC, UA 0-1 /hpf      WBC, UA 2-4 /hpf      Epithelial Cells Occasional /hpf      Bacteria, UA Occasional /hpf     Comprehensive metabolic panel [[id number]] Collected: 01/31/23 1157    Lab Status: Final result Specimen: Blood from Arm, Left Updated: 01/31/23 1237     Sodium 136 mmol/L      Potassium 3 7 mmol/L      Chloride 104 mmol/L      CO2 26 mmol/L ANION GAP 6 mmol/L      BUN 10 mg/dL      Creatinine 0 68 mg/dL      Glucose 92 mg/dL      Calcium 9 4 mg/dL      AST 14 U/L      ALT 16 U/L      Alkaline Phosphatase 44 U/L      Total Protein 7 5 g/dL      Albumin 4 6 g/dL      Total Bilirubin 0 55 mg/dL      eGFR 110 ml/min/1 73sq m     Narrative:      National Kidney Disease Foundation guidelines for Chronic Kidney Disease (CKD):   •  Stage 1 with normal or high GFR (GFR > 90 mL/min/1 73 square meters)  •  Stage 2 Mild CKD (GFR = 60-89 mL/min/1 73 square meters)  •  Stage 3A Moderate CKD (GFR = 45-59 mL/min/1 73 square meters)  •  Stage 3B Moderate CKD (GFR = 30-44 mL/min/1 73 square meters)  •  Stage 4 Severe CKD (GFR = 15-29 mL/min/1 73 square meters)  •  Stage 5 End Stage CKD (GFR <15 mL/min/1 73 square meters)  Note: GFR calculation is accurate only with a steady state creatinine    Lipase [[id number]]  (Normal) Collected: 01/31/23 1157    Lab Status: Final result Specimen: Blood from Arm, Left Updated: 01/31/23 1237     Lipase 27 u/L     CBC and differential [[id number]] Collected: 01/31/23 1157    Lab Status: Final result Specimen: Blood from Arm, Left Updated: 01/31/23 1213     WBC 5 69 Thousand/uL      RBC 4 76 Million/uL      Hemoglobin 14 9 g/dL      Hematocrit 42 6 %      MCV 90 fL      MCH 31 3 pg      MCHC 35 0 g/dL      RDW 12 0 %      MPV 9 9 fL      Platelets 174 Thousands/uL      nRBC 0 /100 WBCs      Neutrophils Relative 49 %      Immat GRANS % 0 %      Lymphocytes Relative 39 %      Monocytes Relative 9 %      Eosinophils Relative 2 %      Basophils Relative 1 %      Neutrophils Absolute 2 80 Thousands/µL      Immature Grans Absolute 0 02 Thousand/uL      Lymphocytes Absolute 2 19 Thousands/µL      Monocytes Absolute 0 52 Thousand/µL      Eosinophils Absolute 0 11 Thousand/µL      Basophils Absolute 0 05 Thousands/µL     POCT pregnancy, urine [[id number]]  (Normal) Resulted: 01/31/23 1149    Lab Status: Final result Updated: 01/31/23 1149 EXT Preg Test, Ur Negative     Control Valid    Urine Macroscopic, POC [[id number]]  (Abnormal) Collected: 01/31/23 1146    Lab Status: Final result Specimen: Urine Updated: 01/31/23 1147     Color, UA Yellow     Clarity, UA Clear     pH, UA 6 0     Leukocytes, UA Trace     Nitrite, UA Negative     Protein, UA Negative mg/dl      Glucose, UA Negative mg/dl      Ketones, UA Negative mg/dl      Urobilinogen, UA 0 2 E U /dl      Bilirubin, UA Negative     Occult Blood, UA Negative     Specific Gravity, UA 1 025    Narrative:      [facility] RESULT                 CT abdomen pelvis with contrast   Final Result by Trista Garnett MD (01/31 1407)      No acute abnormality in the abdomen or pelvis  Workstation performed: [id number]                    Procedures  Procedures         ED Course  ED Course as of 01/31/23 1439   Tue Jan 31, 2023   1225 WBC: 5 69   1225 Hemoglobin: 14 9   1225 Platelet Count: 777   1225 Leukocytes, UA(!): Trace   1225 Nitrite, UA: Negative   1225 Blood, UA: Negative   1225 PREGNANCY TEST URINE: Negative   1246 Lipase: 27   1246 Comprehensive metabolic panel  Normal    1252 Blood Pressure: 157/96  Improving  SBIRT 20yo+    Flowsheet Row Most Recent Value   SBIRT (25 yo +)    In order to provide better care to our patients, we are screening all of our patients for alcohol and drug use  Would it be okay to ask you these screening questions? Unable to answer at this time Filed at: 01/31/2023 1221                    Medical Decision Making  37y  o female presents to the ER for ongoing LLQ pain for the last 3 months  Patient hypertensive initially  Will monitor  Otherwise vitals are stable  On exam, lungs are clear  Heart is regular rate and rhythm  Abdomen is soft but tender in the LLQ  No distention, guarding or rigidity  No pulsatile masses palpated  Will check labs and imaging      1225 WBC: 5 69    1225 Hemoglobin: 14 9    1225 Platelet Count: 242    1225 Leukocytes, UA(!): Trace    1225 Nitrite, UA: Negative    1225 Blood, UA: Negative    1225 PREGNANCY TEST URINE: Negative    1246 Lipase: 27    1246 Comprehensive metabolic panel - Normal     1252 Blood Pressure: 157/96 - Improving  1400    Informed patient of lab and imaging findings  Patient reports improvement in symptoms with Toradol  Will discharge  Patient agreeable  The management plan was discussed in detail with the patient at bedside and all questions were answered  Prior to discharge, we provided both verbal and written instructions  We discussed with the patient the signs and symptoms for which to return to the emergency department  All questions were answered and patient was comfortable with the plan of care and discharged to home  Instructed the patient to follow up with the primary care provider and/or specialist provided and their written instructions  The patient verbalized understanding of our discussion and plan of care, and agrees to return to the Emergency Department for concerns and progression of illness  At discharge, I instructed the patient to:  -follow up with pcp  -take Naproxen as prescribed  -follow up with GI  -follow up with OBGYN  -rest and drink plenty of fluids  -return to the ER if symptoms worsened or new symptoms arose  Patient agreed to this plan and was stable at time of discharge  Abdominal pain: acute illness or injury  Hepatic cyst: acute illness or injury  Nausea: acute illness or injury  Amount and/or Complexity of Data Reviewed  Independent Historian:      Details: Patient is historian  Labs: ordered  Decision-making details documented in ED Course  Radiology: ordered  Risk  Prescription drug management            Disposition  Final diagnoses:   Abdominal pain   Nausea   Hepatic cyst     Time reflects when diagnosis was documented in both MDM as applicable and the Disposition within this note     Time User Action Codes Description Comment    1/31/2023  2:09 PM Nohemy Sep A Add [R10 9] Abdominal pain     1/31/2023  2:09 PM Nohemy Sep A Add [R11 0] Nausea     1/31/2023  2:10 PM Nohemy Sep A Add [K76 89] Hepatic cyst       ED Disposition     ED Disposition   Discharge    Condition   Stable    Date/Time   Tue Jan 31, 2023  2:09 PM    [place] discharge to home/self care  Follow-up Information     Follow up With Specialties Details Why Contact Info Additional Information    [facility] Schedule an appointment as soon as possible for a visit  As needed [address]  [address] [facility] [phone] [facility] Schedule an appointment as soon as possible for a visit   [address] [facility] [address]  [address] [facility] [address]  [address] [facility], [address] [facility] [address], [place], [place], [id number] [phone]          Patient's Medications   Discharge Prescriptions    NAPROXEN (NAPROSYN) 500 MG TABLET    Take 1 tablet (500 mg total) by mouth 2 (two) times a day with meals       Start Date: 1/31/2023 End Date: --       Order Dose: 500 mg       Quantity: 30 tablet    Refills: 0       No discharge procedures on file      PDMP Review     None          ED Provider  Electronically Signed by           Humera Vega PA-C  01/31/23 1743

## 2023-01-31 NOTE — DISCHARGE INSTRUCTIONS
DISCHARGE INSTRUCTIONS:    FOLLOW UP WITH YOUR PRIMARY CARE PROVIDER OR THE [address] [place] [address]  MAKE AN APPOINTMENT TO BE SEEN  FOLLOW UP WITH THE RECOMMENDED GASTROENTEROLOGIST  FOLLOW UP WITH YOUR OBGYN  TAKE MEDICATION AS PRESCRIBED FOR PAIN  IF RASH, SHORTNESS OF BREATH OR TROUBLE SWALLOWING, STOP TAKING THE MEDICATION AND BE SEEN  REST AND DRINK PLENTY OF FLUIDS  IF SYMPTOMS WORSEN OR NEW SYMPTOMS ARISE, RETURN TO THE ER TO BE SEEN

## 2023-01-31 NOTE — PATIENT INSTRUCTIONS
Higinio por owens confianza en nuestro equipo  Eulas Crooked y agradecemos kavitha comentarios  Si recibe abdias encuesta nuestra, tómese unos momentos para informarnos cómo estamos     Sinceramente,  [facility], DO

## 2023-01-31 NOTE — PROGRESS NOTES
Maulik Celestin is a 42 y o  female who presents today for annual GYN exam with c/o bad taste in her mouth, nausea, and pain across her lower abdomen radiating to her back and shooting pain down her left leg  Patient also states has been having right breast pain with heat sensation on the outer quadrant  Her last pap smear was performed  and result was negative  She reports no history of abnormal pap smears in her past     She reports menses as irrregular  Patient's last menstrual period was 2023 (exact date)  Her general medical history has been reviewed and she reports it as follows:    Past Medical History:   Diagnosis Date   • Anxiety    • Chest pain     pt denies today//saw cardiology and everything was "normal"/saw cardiology 2019 Dr CHARLES Bustos/BENITO MENESES   • Dental crown present    • GERD (gastroesophageal reflux disease)    • Hypertension    • PONV (postoperative nausea and vomiting)    • Precancerous changes of the cervix      Past Surgical History:   Procedure Laterality Date   • CARPAL TUNNEL RELEASE Right    • EXAMINATION UNDER ANESTHESIA N/A 10/4/2020    Procedure: EXAM UNDER ANESTHESIA (EUA), CAUTERIZATION OF CERVIX;  Surgeon: Milli Kamara MD;  [facility];  Service: Gynecology   • IA COLPOSCOPY CERVIX VAG LOOP ELTRD [address] CERVIX N/A 10/2/2020    Procedure: BIOPSY LEEP CERVIX;  Surgeon: Melissa Thorne MD;  Location: [facility];  Service: Gynecology   • TUBAL LIGATION       OB History        3    Para   3    Term   3       0    AB        Living   3       SAB   0    IAB   0    Ectopic   0    Multiple   0    Live Births   3               Social History     Tobacco Use   • Smoking status: Never   • Smokeless tobacco: Never   Vaping Use   • Vaping Use: Never used   Substance Use Topics   • Alcohol use: Not Currently   • Drug use: Never     Social History     Substance and Sexual Activity   Sexual Activity Not Currently   • Partners: Male   • Birth control/protection: Female Sterilization    Comment: Tubal     Cancer-related family history is negative for Breast cancer and Cancer  Current Outpatient Medications   Medication Instructions   • acetaminophen (TYLENOL) 650 mg, Oral, Every 8 hours PRN   • baclofen 10 mg, Oral, 3 times daily PRN   • calcium citrate (CALCITRATE) 950 (200 Ca) MG tablet 1 tablet, Oral, Daily   • GoodSense ClearLax 17 g, Oral, Daily   • hydrocortisone (ANUSOL-HC) 2 5 % rectal cream Topical, 2 times daily   • hydrOXYzine pamoate (VISTARIL) 50 mg, Oral, 3 times daily PRN   • ibuprofen (MOTRIN) 400 mg, Oral, Every 8 hours PRN   • multivitamin (THERAGRAN) TABS 1 tablet, Oral, Daily   • omeprazole (PriLOSEC) 20 mg delayed release capsule 1 capsule, Daily   • omeprazole (PRILOSEC) 20 mg, Oral, Daily   • traZODone (DESYREL) 50 mg, Oral, Daily at bedtime       Review of Systems:  Review of Systems   Gastrointestinal: Positive for abdominal pain and nausea  Genitourinary: Positive for pelvic pain  All other systems reviewed and are negative  Physical Exam:  /95   Pulse 76   Ht 5' 2" (1 575 m)   Wt 83 8 kg (184 lb 12 8 oz)   LMP 01/03/2023 (Exact Date)   BMI 33 80 kg/m²   Physical Exam  Constitutional:       Appearance: Normal appearance  Genitourinary:      Bladder and urethral meatus normal       No lesions in the vagina  Right Labia: No rash, tenderness or lesions  Left Labia: No tenderness, lesions or rash  No inguinal adenopathy present in the right or left side  No vaginal discharge or bleeding  Right Adnexa: not tender, not full and no mass present  Left Adnexa: not tender, not full and no mass present  No cervical motion tenderness, discharge or lesion  Uterus is not enlarged or tender  No uterine mass detected  No urethral tenderness or mass present  Breasts:     Right: Tenderness present   No swelling, inverted nipple, mass, nipple discharge or skin change  Left: No swelling, inverted nipple, mass, nipple discharge, skin change or tenderness  HENT:      Head: Normocephalic  Cardiovascular:      Rate and Rhythm: Normal rate and regular rhythm  Pulmonary:      Effort: Pulmonary effort is normal       Breath sounds: Normal breath sounds  Chest:       Abdominal:      General: Bowel sounds are normal  There is no distension  Palpations: Abdomen is soft  There is no mass  Tenderness: There is abdominal tenderness  Hernia: There is no hernia in the left inguinal area or right inguinal area  Musculoskeletal:         General: Normal range of motion  Cervical back: Normal range of motion  Lymphadenopathy:      Upper Body:      Right upper body: No supraclavicular or axillary adenopathy  Left upper body: No supraclavicular or axillary adenopathy  Lower Body: No right inguinal adenopathy  No left inguinal adenopathy  Neurological:      Mental Status: She is alert and oriented to person, place, and time  Skin:     General: Skin is warm and dry  Psychiatric:         Mood and Affect: Mood normal    Vitals reviewed  Assessment/Plan:   1  Normal well-woman GYN exam   2  Cervical cancer screening:  Normal cervical exam    3  STD screening:    Orders placed for vaginal GC/CT cultures  Orders placed for serum anti-HIV, anti-HCV, HbsAg, RPR, HSV  4  Breast cancer screening:  Tenderness on palpation of right breast  Order placed for right breast sonogram   Reviewed breast self-awareness  5  Depression Screening: Patient's depression screening was assessed with a PHQ-2 score of 2  Their PHQ-9 score was 6  Clinically patient does not have depression  No treatment is required  6  BMI Counseling: Body mass index is 33 8 kg/m²  Discussed the patient's BMI with her  The BMI is above normal  Nutrition recommendations include decreasing overall calorie intake     7  Contraception: Tubal   8  Pelvic sonogram   9  Return to office 3wks  Reviewed with patient that test results are available in [facility] immediately, but that they will not necessarily be reviewed by me immediately  Explained that I will review results at my earliest opportunity and contact patient appropriately

## 2023-01-31 NOTE — Clinical Note
Ty Galaviz was seen and treated in our emergency department on 1/31/2023  No restrictions            Diagnosis:     Tyra    She may return on this date: 02/01/2023         If you have any questions or concerns, please don't hesitate to call        Sheree Ramos PA-C    ______________________________           _______________          _______________  Hospital Representative                              Date                                Time

## 2023-02-01 LAB
C TRACH DNA SPEC QL NAA+PROBE: NEGATIVE
HIV 1+2 AB+HIV1 P24 AG SERPL QL IA: NORMAL
HIV 2 AB SERPL QL IA: NORMAL
HIV1 AB SERPL QL IA: NORMAL
HIV1 P24 AG SERPL QL IA: NORMAL
HSV1 IGG SER IA-ACNC: >62.2 INDEX (ref 0–0.9)
HSV2 IGG SER IA-ACNC: <0.91 INDEX (ref 0–0.9)
N GONORRHOEA DNA SPEC QL NAA+PROBE: NEGATIVE
RPR SER QL: NORMAL

## 2023-02-06 ENCOUNTER — LAB (OUTPATIENT)
Dept: LAB | Facility: CLINIC | Age: 38
End: 2023-02-06

## 2023-02-06 ENCOUNTER — OFFICE VISIT (OUTPATIENT)
Dept: FAMILY MEDICINE CLINIC | Facility: CLINIC | Age: 38
End: 2023-02-06

## 2023-02-06 VITALS
WEIGHT: 187 LBS | OXYGEN SATURATION: 98 % | TEMPERATURE: 98.6 F | HEART RATE: 86 BPM | BODY MASS INDEX: 34.41 KG/M2 | DIASTOLIC BLOOD PRESSURE: 80 MMHG | HEIGHT: 62 IN | RESPIRATION RATE: 18 BRPM | SYSTOLIC BLOOD PRESSURE: 112 MMHG

## 2023-02-06 DIAGNOSIS — Z00.00 HEALTH CARE MAINTENANCE: ICD-10-CM

## 2023-02-06 DIAGNOSIS — B37.89 CANDIDIASIS OF BREAST: ICD-10-CM

## 2023-02-06 DIAGNOSIS — M54.41 ACUTE BILATERAL LOW BACK PAIN WITH RIGHT-SIDED SCIATICA: ICD-10-CM

## 2023-02-06 DIAGNOSIS — K21.9 GASTROESOPHAGEAL REFLUX DISEASE, UNSPECIFIED WHETHER ESOPHAGITIS PRESENT: Primary | ICD-10-CM

## 2023-02-06 DIAGNOSIS — E04.1 THYROID NODULE: ICD-10-CM

## 2023-02-06 DIAGNOSIS — M54.9 MID BACK PAIN ON LEFT SIDE: ICD-10-CM

## 2023-02-06 DIAGNOSIS — G47.00 INSOMNIA, UNSPECIFIED TYPE: ICD-10-CM

## 2023-02-06 DIAGNOSIS — R10.11 RIGHT UPPER QUADRANT PAIN: ICD-10-CM

## 2023-02-06 LAB
25(OH)D3 SERPL-MCNC: 14.1 NG/ML (ref 30–100)
CHOLEST SERPL-MCNC: 236 MG/DL
EST. AVERAGE GLUCOSE BLD GHB EST-MCNC: 103 MG/DL
HBA1C MFR BLD: 5.2 %
HDLC SERPL-MCNC: 52 MG/DL
LDLC SERPL CALC-MCNC: 135 MG/DL (ref 0–100)
NONHDLC SERPL-MCNC: 184 MG/DL
TRIGL SERPL-MCNC: 244 MG/DL
TSH SERPL DL<=0.05 MIU/L-ACNC: 1.2 UIU/ML (ref 0.45–4.5)

## 2023-02-06 RX ORDER — NYSTATIN 100000 U/G
CREAM TOPICAL 2 TIMES DAILY
Qty: 30 G | Refills: 0 | Status: SHIPPED | OUTPATIENT
Start: 2023-02-06

## 2023-02-06 RX ORDER — BACLOFEN 10 MG/1
10 TABLET ORAL 3 TIMES DAILY PRN
Qty: 30 TABLET | Refills: 1 | Status: SHIPPED | OUTPATIENT
Start: 2023-02-06

## 2023-02-06 RX ORDER — FAMOTIDINE 20 MG/1
20 TABLET, FILM COATED ORAL 2 TIMES DAILY
Qty: 30 TABLET | Refills: 1 | Status: SHIPPED | OUTPATIENT
Start: 2023-02-06

## 2023-02-06 RX ORDER — SUCRALFATE ORAL 1 G/10ML
1 SUSPENSION ORAL 4 TIMES DAILY
Qty: 414 ML | Refills: 1 | Status: SHIPPED | OUTPATIENT
Start: 2023-02-06

## 2023-02-06 NOTE — PROGRESS NOTES
Name: Lucille Jolley      : 1985      MRN: [id number]  Encounter Provider: ZAIRA Fuller  Encounter Date: 2023   Encounter department: [address] [place] [address]     1  Gastroesophageal reflux disease, unspecified whether esophagitis present  -     sucralfate (CARAFATE) 1 g/10 mL suspension; Take 10 mL (1 g total) by mouth 4 (four) times a day  -     famotidine (PEPCID) 20 mg tablet; Take 1 tablet (20 mg total) by mouth 2 (two) times a day    2  Mid back pain on left side  -     baclofen 10 mg tablet; Take 1 tablet (10 mg total) by mouth 3 (three) times a day as needed for muscle spasms    3  Right upper quadrant pain  Assessment & Plan:  Continued RUQ pain  Left hepatic simple cyst on Ct scan   Will obtain an abdominal ultrasound    Orders:  -     US abdomen limited; Future; Expected date: 2023    4  Health care maintenance  -     TSH, 3rd generation with Free T4 reflex; Future  -     Hemoglobin A1C; Future  -     Lipid panel; Future  -     Vitamin D 25 hydroxy; Future    5  Thyroid nodule  -     TSH, 3rd generation with Free T4 reflex; Future  -     US thyroid; Future; Expected date: 2023    6  Candidiasis of breast  -     nystatin (MYCOSTATIN) cream; Apply topically 2 (two) times a day    7  Insomnia, unspecified type  Assessment & Plan:  Reports improvement with current treatment   Continue with Trazodone 50 mg      8  Acute bilateral low back pain with right-sided sciatica         Subjective      Tyra A Siria Paz 42 y o  female  has a past medical history of Anxiety, Chest pain, Dental crown present, GERD (gastroesophageal reflux disease), Hypertension, PONV (postoperative nausea and vomiting), and Precancerous changes of the cervix  Presents today for follow up post ED visit for abdominal 2023     CT impression as follows:   LOWER CHEST:  No clinically significant abnormality identified in the visualized lower chest    LIVER/BILIARY TREE:  Left hepatic simple cyst    GALLBLADDER:  No calcified gallstones  No pericholecystic inflammatory change    SPLEEN:  Unremarkable    PANCREAS:  Unremarkable   ADRENAL GLANDS:  Unremarkable    KIDNEYS/URETERS:  Unremarkable  No hydronephrosis    STOMACH AND BOWEL:  Unremarkable    APPENDIX:  No findings to suggest appendicitis    ABDOMINOPELVIC CAVITY:  No ascites  No pneumoperitoneum  No lymphadenopathy    VESSELS:  Unremarkable for patient's age    PELVIS   REPRODUCTIVE ORGANS:  Unremarkable for patient's age   [place] BLADDER:  Unremarkable    ABDOMINAL WALL/INGUINAL REGIONS:  Unremarkable    OSSEOUS STRUCTURES:  No acute fracture or destructive osseous lesion    Patient reports she continues to have RUQ pain radiating to abdominal midline  Patient is concerned about the hepatic cyst found on CT  Patient also concerned about her lower pelvic area pain, states she scheduled for pelvic ultrasound next week  Intermittent episodes of difficulty swallowing, discussed per description may be Global sensation related to GERD  Additionally, patient having pain with palpation of thyroid, on exam small nodule felt  Abdominal Pain  This is a recurrent problem  The current episode started more than 1 month ago  The onset quality is sudden  The problem occurs daily  The pain is located in the RUQ  The pain is at a severity of 7/10  The pain is moderate  The quality of the pain is colicky and aching  The abdominal pain radiates to the periumbilical region  Pertinent negatives include no arthralgias, constipation, diarrhea, dysuria, fever, hematuria, nausea or vomiting  Nothing aggravates the pain  The pain is relieved by nothing  She has tried nothing for the symptoms  Review of Systems   Constitutional: Positive for appetite change  Negative for chills and fever  HENT: Positive for trouble swallowing  Negative for ear pain and sore throat      Eyes: Negative for pain and visual disturbance  Respiratory: Negative for cough and shortness of breath  Cardiovascular: Negative for chest pain and palpitations  Gastrointestinal: Positive for abdominal pain  Negative for constipation, diarrhea, nausea and vomiting  Genitourinary: Negative for dysuria and hematuria  Musculoskeletal: Negative for arthralgias and back pain  Skin: Negative for color change and rash  Neurological: Negative for seizures and syncope  All other systems reviewed and are negative        Current Outpatient Medications on File Prior to Visit   Medication Sig   • acetaminophen (TYLENOL) 650 mg CR tablet Take 1 tablet (650 mg total) by mouth every 8 (eight) hours as needed for mild pain   • hydrocortisone (ANUSOL-HC) 2 5 % rectal cream Apply topically 2 (two) times a day (Patient not taking: Reported on 10/11/2022)   • ibuprofen (MOTRIN) 400 mg tablet Take 1 tablet (400 mg total) by mouth every 8 (eight) hours as needed for mild pain for up to 5 days   • multivitamin (THERAGRAN) TABS Take 1 tablet by mouth daily   • naproxen (NAPROSYN) 500 mg tablet Take 1 tablet (500 mg total) by mouth 2 (two) times a day with meals   • polyethylene glycol (GoodSense ClearLax) 17 GM/SCOOP powder Take 17 g by mouth daily for 14 days   • traZODone (DESYREL) 50 mg tablet Take 1 tablet (50 mg total) by mouth daily at bedtime   • [DISCONTINUED] baclofen 10 mg tablet Take 1 tablet (10 mg total) by mouth 3 (three) times a day as needed for muscle spasms   • [DISCONTINUED] calcium citrate (CALCITRATE) 950 (200 Ca) MG tablet Take 1 tablet by mouth daily   • [DISCONTINUED] hydrOXYzine pamoate (VISTARIL) 50 mg capsule Take 1 capsule (50 mg total) by mouth 3 (three) times a day as needed for anxiety (Patient not taking: No sig reported)   • [DISCONTINUED] omeprazole (PriLOSEC) 20 mg delayed release capsule Take 1 capsule by mouth daily   • [DISCONTINUED] omeprazole (PriLOSEC) 20 mg delayed release capsule Take 1 capsule (20 mg total) by mouth daily       Objective     /80 (BP Location: Left arm, Patient Position: Sitting, Cuff Size: Standard)   Pulse 86   Temp 98 6 °F (37 °C) (Temporal)   Resp 18   Ht 5' 2" (1 575 m)   Wt 84 8 kg (187 lb)   LMP 02/04/2023 (Exact Date)   SpO2 98%   BMI 34 20 kg/m²     Physical Exam  Vitals and nursing note reviewed  Constitutional:       General: She is not in acute distress  Appearance: Normal appearance  She is not ill-appearing  HENT:      Head: Normocephalic and atraumatic  Right Ear: Tympanic membrane, ear canal and external ear normal       Left Ear: Tympanic membrane, ear canal and external ear normal       Nose: Nose normal       Mouth/Throat:      Mouth: Mucous membranes are moist    Eyes:      General:         Right eye: No discharge  Left eye: No discharge  Pupils: Pupils are equal, round, and reactive to light  Neck:      Thyroid: Thyroid mass and thyroid tenderness present  Trachea: Trachea normal      Cardiovascular:      Rate and Rhythm: Normal rate and regular rhythm  Pulses: Normal pulses  Heart sounds: Normal heart sounds  Pulmonary:      Effort: Pulmonary effort is normal  No respiratory distress  Breath sounds: Normal breath sounds  No wheezing  Abdominal:      General: Bowel sounds are normal       Palpations: Abdomen is soft  Tenderness: There is no abdominal tenderness  There is no right CVA tenderness or left CVA tenderness  Musculoskeletal:         General: Normal range of motion  Cervical back: Normal range of motion  Skin:     General: Skin is warm and dry  Neurological:      General: No focal deficit present  Mental Status: She is alert and oriented to person, place, and time         ZAIRA Valdes

## 2023-02-07 DIAGNOSIS — E55.9 VITAMIN D DEFICIENCY: Primary | ICD-10-CM

## 2023-02-07 RX ORDER — ERGOCALCIFEROL 1.25 MG/1
50000 CAPSULE ORAL WEEKLY
Qty: 12 CAPSULE | Refills: 3 | Status: SHIPPED | OUTPATIENT
Start: 2023-02-07

## 2023-02-10 ENCOUNTER — HOSPITAL ENCOUNTER (OUTPATIENT)
Dept: ULTRASOUND IMAGING | Facility: HOSPITAL | Age: 38
Discharge: HOME | End: 2023-02-10

## 2023-02-10 DIAGNOSIS — R10.11 RIGHT UPPER QUADRANT PAIN: ICD-10-CM

## 2023-02-10 DIAGNOSIS — R10.2 PELVIC PAIN: ICD-10-CM

## 2023-02-10 DIAGNOSIS — E04.1 THYROID NODULE: ICD-10-CM

## 2023-02-10 DIAGNOSIS — N93.9 ABNORMAL UTERINE BLEEDING (AUB): ICD-10-CM

## 2023-02-20 NOTE — RESULT ENCOUNTER NOTE
Abdominal ultrasound -small benign cyst present otherwise normal study   Thyroid ultra sound - small nodule is benign no need to biopsy or further testing

## 2023-02-21 ENCOUNTER — OFFICE VISIT (OUTPATIENT)
Dept: OBGYN CLINIC | Facility: CLINIC | Age: 38
End: 2023-02-21

## 2023-02-21 VITALS
BODY MASS INDEX: 34.19 KG/M2 | HEART RATE: 73 BPM | DIASTOLIC BLOOD PRESSURE: 89 MMHG | SYSTOLIC BLOOD PRESSURE: 144 MMHG | HEIGHT: 62 IN | WEIGHT: 185.8 LBS

## 2023-02-21 DIAGNOSIS — Z71.2 ENCOUNTER TO DISCUSS TEST RESULTS: Primary | ICD-10-CM

## 2023-02-21 DIAGNOSIS — Z13.31 POSITIVE DEPRESSION SCREENING: ICD-10-CM

## 2023-02-21 DIAGNOSIS — R10.84 GENERALIZED ABDOMINAL PAIN: ICD-10-CM

## 2023-02-21 PROBLEM — M54.9 MID BACK PAIN ON LEFT SIDE: Status: RESOLVED | Noted: 2019-05-09 | Resolved: 2023-02-21

## 2023-02-21 PROBLEM — R10.11 RIGHT UPPER QUADRANT PAIN: Status: RESOLVED | Noted: 2023-02-06 | Resolved: 2023-02-21

## 2023-02-21 PROBLEM — N93.9 VAGINAL BLEEDING: Status: RESOLVED | Noted: 2020-10-04 | Resolved: 2023-02-21

## 2023-02-21 PROBLEM — B37.89 CANDIDIASIS OF BREAST: Status: RESOLVED | Noted: 2023-02-06 | Resolved: 2023-02-21

## 2023-02-21 NOTE — PROGRESS NOTES
PROBLEM GYNECOLOGICAL VISIT    Ilda Bob is a 42 y o  female who presents today for followup  Her general medical history has been reviewed and she reports it as follows:    Past Medical History:   Diagnosis Date   • Anxiety    • Chest pain     pt denies today//saw cardiology and everything was "normal"/saw cardiology 2019 Dr CHARLES Bustos/BENITO MENESES   • Dental crown present    • GERD (gastroesophageal reflux disease)    • Hypertension    • PONV (postoperative nausea and vomiting)    • Precancerous changes of the cervix      Past Surgical History:   Procedure Laterality Date   • CARPAL TUNNEL RELEASE Right    • EXAMINATION UNDER ANESTHESIA N/A 10/4/2020    Procedure: EXAM UNDER ANESTHESIA (EUA), CAUTERIZATION OF CERVIX;  Surgeon: Jw To MD;  [facility];  Service: Gynecology   • MD COLPOSCOPY CERVIX VAG LOOP ELTRD BX CERVIX N/A 10/2/2020    Procedure: BIOPSY LEEP CERVIX;  Surgeon: Ming Morales MD;  [facility];  Service: Gynecology   • TUBAL LIGATION       OB History        3    Para   3    Term   3       0    AB        Living   3       SAB   0    IAB   0    Ectopic   0    Multiple   0    Live Births   3               Social History     Tobacco Use   • Smoking status: Never   • Smokeless tobacco: Never   Vaping Use   • Vaping Use: Never used   Substance Use Topics   • Alcohol use: Not Currently   • Drug use: Never     Social History     Substance and Sexual Activity   Sexual Activity Not Currently   • Partners: Male   • Birth control/protection: Female Sterilization    Comment: Tubal       Current Outpatient Medications   Medication Instructions   • acetaminophen (TYLENOL) 650 mg, Oral, Every 8 hours PRN   • baclofen 10 mg, Oral, 3 times daily PRN   • ergocalciferol (VITAMIN D2) 50,000 Units, Oral, Weekly   • famotidine (PEPCID) 20 mg, Oral, 2 times daily   • GoodSense ClearLax 17 g, Oral, Daily   • hydrocortisone (ANUSOL-HC) 2 5 % rectal cream Topical, 2 times daily   • ibuprofen (MOTRIN) 400 mg, Oral, Every 8 hours PRN   • multivitamin (THERAGRAN) TABS 1 tablet, Oral, Daily   • naproxen (NAPROSYN) 500 mg, Oral, 2 times daily with meals   • nystatin (MYCOSTATIN) cream Topical, 2 times daily   • sucralfate (CARAFATE) 1 g, Oral, 4 times daily   • traZODone (DESYREL) 50 mg, Oral, Daily at bedtime       History of Present Illness:   Patient presents for followup s/p pelvic sonogram with c/o still having the abdominal pain and bloating was given carafate by her PCP  Review of Systems:  Review of Systems   Gastrointestinal: Positive for abdominal distention and abdominal pain  All other systems reviewed and are negative  Physical Exam:  /89   Pulse 73   Ht 5' 2" (1 575 m)   Wt 84 3 kg (185 lb 12 8 oz)   LMP 02/04/2023 (Exact Date)   BMI 33 98 kg/m²   Physical Exam  Constitutional:       Appearance: Normal appearance  Neurological:      Mental Status: She is alert  Vitals reviewed  Discussion:  Discuss with patient pelvic sonogram consist of a small ovarian cyst 13mm and mildly dilated adnexal veins which maybe considered as pelvic congestion syndrome which is not the cause of her abdominal pain and bloating will refer to GI for further evaluation  Patient agrees with above  Assessment:   1  Abdominal pain   2  Small right ovarian hemorrhagic cyst   3  Evolving pelvic congestion syndrome    Plan: 1  Refer to GI   2  Return to office prn    3  Patient's depression screening was assessed with a PHQ-2 score of 4  Their PHQ-9 score was 12  Referral placed for  consultation  Reviewed with patient that test results are available in [facility] immediately, but that they will not necessarily be reviewed by me immediately  Explained that I will review results at my earliest opportunity and contact patient appropriately

## 2023-02-21 NOTE — PATIENT INSTRUCTIONS
Thank you for your confidence in our team    We appreciate you and welcome your feedback  If you receive a survey from us, please take a few moments to let us know how we are doing     Sincerely,  Caden Waite, DO

## 2023-03-10 ENCOUNTER — PATIENT OUTREACH (OUTPATIENT)
Dept: OBGYN CLINIC | Facility: CLINIC | Age: 38
End: 2023-03-10

## 2023-03-14 DIAGNOSIS — K21.9 GASTROESOPHAGEAL REFLUX DISEASE, UNSPECIFIED WHETHER ESOPHAGITIS PRESENT: ICD-10-CM

## 2023-03-14 RX ORDER — SUCRALFATE ORAL 1 G/10ML
1 SUSPENSION ORAL 4 TIMES DAILY
Qty: 414 ML | Refills: 0 | Status: SHIPPED | OUTPATIENT
Start: 2023-03-14

## 2023-03-23 ENCOUNTER — TELEPHONE (OUTPATIENT)
Dept: GASTROENTEROLOGY | Facility: CLINIC | Age: 38
End: 2023-03-23

## 2023-03-23 NOTE — TELEPHONE ENCOUNTER
LVM to call the office and reschedule appointment on 3/28/23   Had to Cancell due to provider conflict

## 2023-03-23 NOTE — PROGRESS NOTES
GUILLERMO HANNA called pt today to f/u on a high PHQ-9 in the office on 2/21, pt denies any SI/HI, she is prescribed medications to help her sleep by her PCP however, does think including therapy would be beneficial  Confirmed she has [employer] as her only insurance  Pt drives herself, she works second shift at [employer] in [facility]  GUILLERMO HANNA sent her a list of therapists via email today  Will remain available as needed

## 2023-03-24 ENCOUNTER — HOSPITAL ENCOUNTER (EMERGENCY)
Facility: HOSPITAL | Age: 38
Discharge: HOME | End: 2023-03-25
Attending: EMERGENCY MEDICINE

## 2023-03-24 ENCOUNTER — APPOINTMENT (EMERGENCY)
Dept: CT IMAGING | Facility: HOSPITAL | Age: 38
End: 2023-03-24

## 2023-03-24 VITALS
OXYGEN SATURATION: 99 % | WEIGHT: 189.6 LBS | HEIGHT: 62 IN | TEMPERATURE: 98.5 F | DIASTOLIC BLOOD PRESSURE: 93 MMHG | SYSTOLIC BLOOD PRESSURE: 160 MMHG | RESPIRATION RATE: 18 BRPM | HEART RATE: 73 BPM | BODY MASS INDEX: 34.89 KG/M2

## 2023-03-24 DIAGNOSIS — B96.89 BV (BACTERIAL VAGINOSIS): ICD-10-CM

## 2023-03-24 DIAGNOSIS — N76.0 BV (BACTERIAL VAGINOSIS): ICD-10-CM

## 2023-03-24 DIAGNOSIS — R42 DIZZINESS: Primary | ICD-10-CM

## 2023-03-24 DIAGNOSIS — R51.9 HEADACHE: ICD-10-CM

## 2023-03-24 DIAGNOSIS — R00.2 PALPITATIONS: ICD-10-CM

## 2023-03-24 DIAGNOSIS — R03.0 ELEVATED BLOOD PRESSURE READING: ICD-10-CM

## 2023-03-24 LAB
ALBUMIN SERPL BCP-MCNC: 4.7 G/DL (ref 3.5–5)
ALP SERPL-CCNC: 47 U/L (ref 34–104)
ALT SERPL W P-5'-P-CCNC: 17 U/L (ref 7–52)
ANION GAP SERPL CALCULATED.3IONS-SCNC: 7 MMOL/L (ref 4–13)
AST SERPL W P-5'-P-CCNC: 13 U/L (ref 13–39)
BACTERIA UR QL AUTO: ABNORMAL /HPF
BASOPHILS # BLD AUTO: 0.08 THOUSANDS/ÂΜL (ref 0–0.1)
BASOPHILS NFR BLD AUTO: 1 % (ref 0–1)
BILIRUB SERPL-MCNC: 0.35 MG/DL (ref 0.2–1)
BILIRUB UR QL STRIP: NEGATIVE
BNP SERPL-MCNC: 10 PG/ML (ref 0–100)
BUN SERPL-MCNC: 14 MG/DL (ref 5–25)
CALCIUM SERPL-MCNC: 9.4 MG/DL (ref 8.4–10.2)
CARDIAC TROPONIN I PNL SERPL HS: 3 NG/L
CHLORIDE SERPL-SCNC: 103 MMOL/L (ref 96–108)
CLARITY UR: CLEAR
CO2 SERPL-SCNC: 27 MMOL/L (ref 21–32)
COLOR UR: YELLOW
CREAT SERPL-MCNC: 0.72 MG/DL (ref 0.6–1.3)
D DIMER PPP FEU-MCNC: <0.27 UG/ML FEU
EOSINOPHIL # BLD AUTO: 0.2 THOUSAND/ÂΜL (ref 0–0.61)
EOSINOPHIL NFR BLD AUTO: 3 % (ref 0–6)
ERYTHROCYTE [DISTWIDTH] IN BLOOD BY AUTOMATED COUNT: 11.9 % (ref 11.6–15.1)
EXT PREGNANCY TEST URINE: NEGATIVE
GFR SERPL CREATININE-BSD FRML MDRD: 105 ML/MIN/1.73SQ M
GLUCOSE SERPL-MCNC: 88 MG/DL (ref 65–140)
GLUCOSE UR STRIP-MCNC: NEGATIVE MG/DL
HCT VFR BLD AUTO: 41.7 % (ref 34.8–46.1)
HGB BLD-MCNC: 14.6 G/DL (ref 11.5–15.4)
HGB UR QL STRIP.AUTO: ABNORMAL
IMM GRANULOCYTES # BLD AUTO: 0.04 THOUSAND/UL (ref 0–0.2)
IMM GRANULOCYTES NFR BLD AUTO: 1 % (ref 0–2)
KETONES UR STRIP-MCNC: NEGATIVE MG/DL
LEUKOCYTE ESTERASE UR QL STRIP: ABNORMAL
LYMPHOCYTES # BLD AUTO: 2.99 THOUSANDS/ÂΜL (ref 0.6–4.47)
LYMPHOCYTES NFR BLD AUTO: 38 % (ref 14–44)
MAGNESIUM SERPL-MCNC: 2.3 MG/DL (ref 1.9–2.7)
MCH RBC QN AUTO: 31.5 PG (ref 26.8–34.3)
MCHC RBC AUTO-ENTMCNC: 35 G/DL (ref 31.4–37.4)
MCV RBC AUTO: 90 FL (ref 82–98)
MONOCYTES # BLD AUTO: 0.59 THOUSAND/ÂΜL (ref 0.17–1.22)
MONOCYTES NFR BLD AUTO: 8 % (ref 4–12)
NEUTROPHILS # BLD AUTO: 4.01 THOUSANDS/ÂΜL (ref 1.85–7.62)
NEUTS SEG NFR BLD AUTO: 49 % (ref 43–75)
NITRITE UR QL STRIP: NEGATIVE
NON-SQ EPI CELLS URNS QL MICRO: ABNORMAL /HPF
NRBC BLD AUTO-RTO: 0 /100 WBCS
PH UR STRIP.AUTO: 6 [PH]
PLATELET # BLD AUTO: 247 THOUSANDS/UL (ref 149–390)
PMV BLD AUTO: 9.9 FL (ref 8.9–12.7)
POTASSIUM SERPL-SCNC: 3.6 MMOL/L (ref 3.5–5.3)
PROT SERPL-MCNC: 7.5 G/DL (ref 6.4–8.4)
PROT UR STRIP-MCNC: NEGATIVE MG/DL
RBC # BLD AUTO: 4.63 MILLION/UL (ref 3.81–5.12)
RBC #/AREA URNS AUTO: ABNORMAL /HPF
SODIUM SERPL-SCNC: 137 MMOL/L (ref 135–147)
SP GR UR STRIP.AUTO: 1.01 (ref 1–1.03)
UROBILINOGEN UR QL STRIP.AUTO: 0.2 E.U./DL
WBC # BLD AUTO: 7.91 THOUSAND/UL (ref 4.31–10.16)
WBC #/AREA URNS AUTO: ABNORMAL /HPF

## 2023-03-24 RX ORDER — MECLIZINE HYDROCHLORIDE 25 MG/1
25 TABLET ORAL ONCE
Status: COMPLETED | OUTPATIENT
Start: 2023-03-24 | End: 2023-03-24

## 2023-03-24 RX ORDER — KETOROLAC TROMETHAMINE 30 MG/ML
30 INJECTION, SOLUTION INTRAMUSCULAR; INTRAVENOUS ONCE
Status: COMPLETED | OUTPATIENT
Start: 2023-03-24 | End: 2023-03-24

## 2023-03-24 RX ORDER — METRONIDAZOLE 500 MG/1
500 TABLET ORAL EVERY 12 HOURS SCHEDULED
Qty: 14 TABLET | Refills: 0 | Status: SHIPPED | OUTPATIENT
Start: 2023-03-24 | End: 2023-03-31

## 2023-03-24 RX ORDER — METRONIDAZOLE 500 MG/1
500 TABLET ORAL ONCE
Status: COMPLETED | OUTPATIENT
Start: 2023-03-24 | End: 2023-03-24

## 2023-03-24 RX ORDER — LIDOCAINE 50 MG/G
1 PATCH TOPICAL DAILY
Qty: 4 PATCH | Refills: 0 | Status: SHIPPED | OUTPATIENT
Start: 2023-03-24 | End: 2023-03-28

## 2023-03-24 RX ORDER — LIDOCAINE 50 MG/G
1 PATCH TOPICAL ONCE
Status: DISCONTINUED | OUTPATIENT
Start: 2023-03-24 | End: 2023-03-25 | Stop reason: HOSPADM

## 2023-03-24 RX ORDER — MAGNESIUM SULFATE HEPTAHYDRATE 40 MG/ML
2 INJECTION, SOLUTION INTRAVENOUS ONCE
Status: COMPLETED | OUTPATIENT
Start: 2023-03-24 | End: 2023-03-24

## 2023-03-24 RX ORDER — NAPROXEN 500 MG/1
500 TABLET ORAL 2 TIMES DAILY WITH MEALS
Qty: 6 TABLET | Refills: 0 | Status: SHIPPED | OUTPATIENT
Start: 2023-03-24 | End: 2023-03-27

## 2023-03-24 RX ORDER — ONDANSETRON 2 MG/ML
4 INJECTION INTRAMUSCULAR; INTRAVENOUS ONCE
Status: COMPLETED | OUTPATIENT
Start: 2023-03-24 | End: 2023-03-24

## 2023-03-24 RX ADMIN — ONDANSETRON 4 MG: 2 INJECTION INTRAMUSCULAR; INTRAVENOUS at 21:25

## 2023-03-24 RX ADMIN — SODIUM CHLORIDE, SODIUM LACTATE, POTASSIUM CHLORIDE, AND CALCIUM CHLORIDE 1000 ML: .6; .31; .03; .02 INJECTION, SOLUTION INTRAVENOUS at 21:22

## 2023-03-24 RX ADMIN — KETOROLAC TROMETHAMINE 30 MG: 30 INJECTION, SOLUTION INTRAMUSCULAR; INTRAVENOUS at 22:41

## 2023-03-24 RX ADMIN — MECLIZINE HYDROCHLORIDE 25 MG: 25 TABLET ORAL at 21:31

## 2023-03-24 RX ADMIN — LIDOCAINE 5% 1 PATCH: 700 PATCH TOPICAL at 23:50

## 2023-03-24 RX ADMIN — MAGNESIUM SULFATE HEPTAHYDRATE 2 G: 40 INJECTION, SOLUTION INTRAVENOUS at 21:26

## 2023-03-24 RX ADMIN — METRONIDAZOLE 500 MG: 500 TABLET ORAL at 22:42

## 2023-03-24 NOTE — Clinical Note
Jacquie Xiao was seen and treated in our emergency department on 3/24/2023  Diagnosis:     Bryan Nichole    She may return on this date: 03/27/2023         If you have any questions or concerns, please don't hesitate to call        Pooja Ramirez DO    ______________________________           _______________          _______________  Hospital Representative                              Date                                Time

## 2023-03-25 LAB
ATRIAL RATE: 76 BPM
P AXIS: 37 DEGREES
PR INTERVAL: 154 MS
QRS AXIS: 79 DEGREES
QRSD INTERVAL: 72 MS
QT INTERVAL: 380 MS
QTC INTERVAL: 427 MS
T WAVE AXIS: 3 DEGREES
VENTRICULAR RATE: 76 BPM

## 2023-03-25 NOTE — ED PROVIDER NOTES
History  Chief Complaint   Patient presents with   • Dizziness     Pt reports dizziness and nausea and pain in rear left head  +"sometimes I feel papiltations"    +irregular period     Patient is a 40-year-old female with a history of hypertension, GERD and anxiety coming in today with complaints of dizziness, left-sided headache and palpitations  Patient states that this started several days ago while has been intermittent in nature  She has no recent travel, sick contacts or recent antibiotic use  She has no fevers, chills, chest pain, shortness of breath, syncope or lower extremity edema  She states that she is having intermittent palpitations over the past several days  She has no history of DVT, PE, Joan Rose history of clotting disorders or birth control  She states that it comes and goes and believes that she is under a lot of stress  However she also notes that dizziness where the room is moving  There is no associated tinnitus, visual changes, emesis versus fugax  She also reports that the left side of her head just feels headache and pressure and points to the suboccipital region  There is no other sick contacts in the house        History provided by:  Medical records and patient  Dizziness  Quality:  Room spinning  Severity:  Mild  Onset quality:  Gradual  Timing:  Intermittent  Progression:  Waxing and waning  Chronicity:  New  Context: not when bending over, not with bowel movement, not with ear pain, not with eye movement, not with head movement, not with inactivity, not with loss of consciousness, not with medication, not with physical activity, not when standing up and not when urinating    Relieved by:  None tried  Worsened by:  Nothing  Ineffective treatments:  None tried  Associated symptoms: headaches, nausea and palpitations    Associated symptoms: no blood in stool, no chest pain, no diarrhea, no hearing loss, no shortness of breath, no syncope, no tinnitus, no vision changes, no vomiting and no weakness    Risk factors: no anemia, no heart disease, no hx of stroke, no hx of vertigo, no Meniere's disease, no multiple medications and no new medications        Prior to Admission Medications   Prescriptions Last Dose Informant Patient Reported? Taking?   acetaminophen (TYLENOL) 650 mg CR tablet   No No   Sig: Take 1 tablet (650 mg total) by mouth every 8 (eight) hours as needed for mild pain   baclofen 10 mg tablet   No No   Sig: Take 1 tablet (10 mg total) by mouth 3 (three) times a day as needed for muscle spasms   ergocalciferol (VITAMIN D2) 50,000 units   No No   Sig: Take 1 capsule (50,000 Units total) by mouth once a week   famotidine (PEPCID) 20 mg tablet   No No   Sig: Take 1 tablet (20 mg total) by mouth 2 (two) times a day   hydrocortisone (ANUSOL-HC) 2 5 % rectal cream   No No   Sig: Apply topically 2 (two) times a day   Patient not taking: Reported on 10/11/2022   ibuprofen (MOTRIN) 400 mg tablet   No No   Sig: Take 1 tablet (400 mg total) by mouth every 8 (eight) hours as needed for mild pain for up to 5 days   multivitamin (THERAGRAN) TABS   Yes No   Sig: Take 1 tablet by mouth daily   naproxen (NAPROSYN) 500 mg tablet   No No   Sig: Take 1 tablet (500 mg total) by mouth 2 (two) times a day with meals   Patient not taking: Reported on 2/21/2023   nystatin (MYCOSTATIN) cream   No No   Sig: Apply topically 2 (two) times a day   polyethylene glycol (GoodSense ClearLax) 17 GM/SCOOP powder   No No   Sig: Take 17 g by mouth daily for 14 days   sucralfate (CARAFATE) 1 g/10 mL suspension   No No   Sig: Take 10 mL (1 g total) by mouth 4 (four) times a day   traZODone (DESYREL) 50 mg tablet   No No   Sig: Take 1 tablet (50 mg total) by mouth daily at bedtime      Facility-Administered Medications: None       Past Medical History:   Diagnosis Date   • Anxiety    • Chest pain     pt denies today//saw cardiology and everything was "normal"/saw cardiology 7/2019 Dr CHARLES Bustos/BENITO Brady • Dental crown present    • GERD (gastroesophageal reflux disease)    • Hypertension    • PONV (postoperative nausea and vomiting)    • Precancerous changes of the cervix        Past Surgical History:   Procedure Laterality Date   • CARPAL TUNNEL RELEASE Right    • EXAMINATION UNDER ANESTHESIA N/A 10/4/2020    Procedure: EXAM UNDER ANESTHESIA (EUA), CAUTERIZATION OF CERVIX;  Surgeon: Kyleigh Hurd MD;  Location: [facility];  Service: Gynecology   • LA COLPOSCOPY CERVIX VAG LOOP QUOC [place] [address] N/A 10/2/2020    Procedure: BIOPSY LEEP CERVIX;  Surgeon: Jordan Roy MD;  Location: [facility];  Service: Gynecology   • TUBAL LIGATION         Family History   Problem Relation Age of Onset   • Asthma Mother    • Hypertension Mother    • No Known Problems Sister    • No Known Problems Daughter    • No Known Problems Maternal Grandmother    • No Known Problems Paternal Grandmother    • No Known Problems Maternal Aunt    • No Known Problems Maternal Aunt    • No Known Problems Paternal Aunt    • No Known Problems Paternal Aunt    • No Known Problems Paternal Aunt    • Diabetes Paternal Aunt    • Breast cancer Neg Hx    • Cancer Neg Hx      I have reviewed and agree with the history as documented  E-Cigarette/Vaping   • E-Cigarette Use Never User      E-Cigarette/Vaping Substances   • Nicotine No    • THC No    • CBD No    • Flavoring No    • Other No    • Unknown No      Social History     Tobacco Use   • Smoking status: Never   • Smokeless tobacco: Never   Vaping Use   • Vaping Use: Never used   Substance Use Topics   • Alcohol use: Not Currently   • Drug use: Never       Review of Systems   Constitutional: Negative  HENT: Negative  Negative for hearing loss and tinnitus  Respiratory: Negative  Negative for shortness of breath  Cardiovascular: Positive for palpitations  Negative for chest pain and syncope  Gastrointestinal: Positive for nausea   Negative for blood in stool, diarrhea and vomiting  Genitourinary: Negative  Musculoskeletal: Negative  Skin: Negative  Neurological: Positive for dizziness and headaches  Negative for weakness  Hematological: Negative  Psychiatric/Behavioral: Negative  All other systems reviewed and are negative  Physical Exam  Physical Exam  Vitals and nursing note reviewed  Constitutional:       General: She is not in acute distress  Appearance: She is well-developed  HENT:      Head: Normocephalic and atraumatic  Comments: Patient maintaining airway and secretions  No stridor   No brawniness under tongue  Mouth/Throat:      Mouth: Mucous membranes are moist    Eyes:      General: Lids are normal  Vision grossly intact  Extraocular Movements: Extraocular movements intact  Conjunctiva/sclera: Conjunctivae normal       Pupils: Pupils are equal, round, and reactive to light  Cardiovascular:      Rate and Rhythm: Normal rate and regular rhythm  Pulses:           Radial pulses are 2+ on the right side and 2+ on the left side  Dorsalis pedis pulses are 2+ on the right side and 2+ on the left side  Heart sounds: Normal heart sounds, S1 normal and S2 normal  No murmur heard  Pulmonary:      Effort: Pulmonary effort is normal  No respiratory distress  Breath sounds: Normal breath sounds  Abdominal:      Palpations: Abdomen is soft  Tenderness: There is no abdominal tenderness  Musculoskeletal:         General: No swelling  Normal range of motion  Cervical back: Neck supple  Right lower leg: No edema  Left lower leg: No edema  Skin:     General: Skin is warm and dry  Capillary Refill: Capillary refill takes less than 2 seconds  Neurological:      General: No focal deficit present  Mental Status: She is alert and oriented to person, place, and time  GCS: GCS eye subscore is 4  GCS verbal subscore is 5  GCS motor subscore is 6        Cranial Nerves: Cranial nerves 2-12 are intact  Sensory: Sensation is intact  Motor: Motor function is intact  Coordination: Coordination is intact  Gait: Gait is intact  Comments: Cranial nerves 2-12 grossly intact  EOMI  PERRLA  No slurred speech  No facial asymmetry  No tongue deviation  Negative pronator drift  No nystagmus  Patient can move bilateral upper extremities and lower extremities independently of each other pain-free with full active range of motion throughout the bilateral shoulders, elbows, wrists, hips, knees and ankles  Manual muscle grade 5/5 throughout the bilateral upper extremities and lower extremities  Psychiatric:         Mood and Affect: Mood normal          Behavior: Behavior normal          Thought Content:  Thought content normal          Judgment: Judgment normal          Vital Signs  ED Triage Vitals [03/24/23 1949]   Temperature Pulse Respirations Blood Pressure SpO2   98 5 °F (36 9 °C) 74 16 (!) 151/103 100 %      Temp Source Heart Rate Source Patient Position - Orthostatic VS BP Location FiO2 (%)   Oral Monitor Sitting Right arm --      Pain Score       7           Vitals:    03/24/23 1949 03/24/23 2200 03/24/23 2230 03/24/23 2330   BP: (!) 151/103 144/93 148/89 160/93   Pulse: 74 71 67 73   Patient Position - Orthostatic VS: Sitting   Lying         Visual Acuity      ED Medications  Medications   lidocaine (LIDODERM) 5 % patch 1 patch (1 patch Topical Medication Applied 3/24/23 2350)   lactated ringers bolus 1,000 mL (0 mL Intravenous Stopped 3/24/23 2222)   ondansetron (ZOFRAN) injection 4 mg (4 mg Intravenous Given 3/24/23 2125)   meclizine (ANTIVERT) tablet 25 mg (25 mg Oral Given 3/24/23 2131)   magnesium sulfate 2 g/50 mL IVPB (premix) 2 g (0 g Intravenous Stopped 3/24/23 2226)   metroNIDAZOLE (FLAGYL) tablet 500 mg (500 mg Oral Given 3/24/23 2242)   ketorolac (TORADOL) injection 30 mg (30 mg Intravenous Given 3/24/23 2241)       Diagnostic Studies  Results Reviewed Procedure Component Value Units Date/Time    HS Troponin 0hr (reflex protocol) [[id number]]  (Normal) Collected: 03/24/23 2123    Lab Status: Final result Specimen: Blood from Arm, Right Updated: 03/24/23 2202     hs TnI 0hr 3 ng/L     B-Type Natriuretic Peptide(BNP) [[id number]]  (Normal) Collected: 03/24/23 2123    Lab Status: Final result Specimen: Blood from Arm, Right Updated: 03/24/23 2201     BNP 10 pg/mL     Comprehensive metabolic panel [[id number]] Collected: 03/24/23 2123    Lab Status: Final result Specimen: Blood from Arm, Right Updated: 03/24/23 2157     Sodium 137 mmol/L      Potassium 3 6 mmol/L      Chloride 103 mmol/L      CO2 27 mmol/L      ANION GAP 7 mmol/L      BUN 14 mg/dL      Creatinine 0 72 mg/dL      Glucose 88 mg/dL      Calcium 9 4 mg/dL      AST 13 U/L      ALT 17 U/L      Alkaline Phosphatase 47 U/L      Total Protein 7 5 g/dL      Albumin 4 7 g/dL      Total Bilirubin 0 35 mg/dL      eGFR 105 ml/min/1 73sq m     Narrative:      [place] guidelines for Chronic Kidney Disease (CKD):   •  Stage 1 with normal or high GFR (GFR > 90 mL/min/1 73 square meters)  •  Stage 2 Mild CKD (GFR = 60-89 mL/min/1 73 square meters)  •  Stage 3A Moderate CKD (GFR = 45-59 mL/min/1 73 square meters)  •  Stage 3B Moderate CKD (GFR = 30-44 mL/min/1 73 square meters)  •  Stage 4 Severe CKD (GFR = 15-29 mL/min/1 73 square meters)  •  Stage 5 End Stage CKD (GFR <15 mL/min/1 73 square meters)  Note: GFR calculation is accurate only with a steady state creatinine    Magnesium [[id number]]  (Normal) Collected: 03/24/23 2123    Lab Status: Final result Specimen: Blood from Arm, Right Updated: 03/24/23 2157     Magnesium 2 3 mg/dL     D-Dimer [[id number]]  (Normal) Collected: 03/24/23 2123    Lab Status: Final result Specimen: Blood from Arm, Right Updated: 03/24/23 2156     D-Dimer, Quant <0 27 ug/ml FEU     Urine Microscopic [[id number]]  (Abnormal) Collected: 03/24/23 2109    Lab Status: Final result Specimen: Urine, Clean Catch Updated: 03/24/23 2139     RBC, UA 2-4 /hpf      WBC, UA 4-10 /hpf      Epithelial Cells Moderate /hpf      Bacteria, UA Occasional /hpf     CBC and differential [[id number]] Collected: 03/24/23 2123    Lab Status: Final result Specimen: Blood from Arm, Right Updated: 03/24/23 2137     WBC 7 91 Thousand/uL      RBC 4 63 Million/uL      Hemoglobin 14 6 g/dL      Hematocrit 41 7 %      MCV 90 fL      MCH 31 5 pg      MCHC 35 0 g/dL      RDW 11 9 %      MPV 9 9 fL      Platelets 185 Thousands/uL      nRBC 0 /100 WBCs      Neutrophils Relative 49 %      Immat GRANS % 1 %      Lymphocytes Relative 38 %      Monocytes Relative 8 %      Eosinophils Relative 3 %      Basophils Relative 1 %      Neutrophils Absolute 4 01 Thousands/µL      Immature Grans Absolute 0 04 Thousand/uL      Lymphocytes Absolute 2 99 Thousands/µL      Monocytes Absolute 0 59 Thousand/µL      Eosinophils Absolute 0 20 Thousand/µL      Basophils Absolute 0 08 Thousands/µL     UA (URINE) with reflex to Scope [[id number]]  (Abnormal) Collected: 03/24/23 2107    Lab Status: Final result Specimen: Urine, Clean Catch Updated: 03/24/23 2120     Color, UA Yellow     Clarity, UA Clear     Specific Gravity, UA 1 010     pH, UA 6 0     Leukocytes, UA Trace     Nitrite, UA Negative     Protein, UA Negative mg/dl      Glucose, UA Negative mg/dl      Ketones, UA Negative mg/dl      Urobilinogen, UA 0 2 E U /dl      Bilirubin, UA Negative     Occult Blood, UA Large    POCT pregnancy, urine [[id number]]  (Normal) Resulted: 03/24/23 2115    Lab Status: Final result Updated: 03/24/23 2116     EXT Preg Test, Ur Negative     Control --                 CT head without contrast   Final Result by Desean Coronado MD (03/24 2218)      No acute intracranial abnormality                    Workstation performed: [id number]                    Procedures  Procedures         ED Course  ED Course as of 03/25/23 0000   Fri Mar 24, 2023 2058 Patient is a 38-year-old female coming in today with left-sided headache, dizziness and palpitations  On exam she is well-appearing in no distress  NIH 0 with neuro intact with no focal deficits  Used on history and physical will check EKG, urine, urine pregnancy, CBC, CMP, troponin, D-dimer as well as CT head we will give Zofran, meclizine and magnesium for headache    Disclosure: Voice to text software was used in the preparation of this document and could have resulted in translational errors       Occasional wrong word or "sound a like" substitutions may have occurred due to the inherent limitations of voice recognition software   Read the chart carefully and recognize, using context, where substitutions have occurred         2132 Pregnancy negative    2220 Patient's pregnancy negative  Will give Toradol  She is updated on labs with a negative D-dimer  Will give Toradol  Patient states that she feels itching and foul sensation with sex and after sex  She has no whitish discharge but states there is a foul smell  We will treat empirically for BV as well   2318 Patient feels markedly improved  She has no dizziness headache and feels better  Discussion with her guarding work-up  She is agreeable for DC home    Will DC home with Zofran for nausea, naproxen and Lidoderm patches for headache as well as Flagyl for BV             HEART Risk Score    Flowsheet Row Most Recent Value   Heart Score Risk Calculator    History 0 Filed at: 03/24/2023 2206   ECG 1 Filed at: 03/24/2023 2206   Age 0 Filed at: 03/24/2023 2206   Risk Factors 1 Filed at: 03/24/2023 2206   Troponin 0 Filed at: 03/24/2023 2206   HEART Score 2 Filed at: 03/24/2023 2206           Stroke Assessment     Row Name 03/24/23 2059             NIH Stroke Scale    Interval Baseline      Level of Consciousness (1a ) 0      LOC Questions (1b ) 0      LOC Commands (1c ) 0      Best Gaze (2 ) 0      Visual (3 ) 0      Facial Palsy (4 ) 0      Motor Arm, Left (5a ) 0      Motor Arm, Right (5b ) 0      Motor Leg, Left (6a ) 0      Motor Leg, Right (6b ) 0      Limb Ataxia (7 ) 0      Sensory (8 ) 0      Best Language (9 ) 0      Dysarthria (10 ) 0      Extinction and Inattention (11 ) (Formerly Neglect) 0      Total 0              Flowsheet Row Most Recent Value   Thrombolytic Decision Options    Thrombolytic Decision Patient not a candidate  Patient is not a candidate options Symptoms resolved/clearly non disabling , Uncontrolled refactory hypertension  , Unclear time of onset outside appropriate time window  PERC Rule for PE    Flowsheet Row Most Recent Value   PERC Rule for PE    Age >=50 0 Filed at: 03/24/2023 2059   HR >=100 0 Filed at: 03/24/2023 2059   O2 Sat on room air < 95% 0 Filed at: 03/24/2023 2059   History of PE or DVT 0 Filed at: 03/24/2023 2059   Recent trauma or surgery 0 Filed at: 03/24/2023 2059   Hemoptysis 0 Filed at: 03/24/2023 2059   Exogenous estrogen 0 Filed at: 03/24/2023 2059   Unilateral leg swelling 0 Filed at: 03/24/2023 2059   PERC Rule for PE Results 0 Filed at: 03/24/2023 2059              SBIRT 20yo+    Flowsheet Row Most Recent Value   SBIRT (25 yo +)    In order to provide better care to our patients, we are screening all of our patients for alcohol and drug use  Would it be okay to ask you these screening questions?  No Filed at: 03/24/2023 2238          Wells' Criteria for PE    Flowsheet Row Most Recent Value   Wells' Criteria for PE    Clinical signs and symptoms of DVT 0 Filed at: 03/24/2023 2100   PE is primary diagnosis or equally likely 0 Filed at: 03/24/2023 2100   HR >100 0 Filed at: 03/24/2023 2100   Immobilization at least 3 days or Surgery in the previous 4 weeks 0 Filed at: 03/24/2023 2100   Previous, objectively diagnosed PE or DVT 0 Filed at: 03/24/2023 2100   Hemoptysis 0 Filed at: 03/24/2023 2100   Malignancy with treatment within 6 months or palliative 0 Filed at: 03/24/2023 2100   Scar Burrows Criteria Total 0 Filed at: 03/24/2023 2100                Medical Decision Making  EKG INTERPRETATION  @ 2110  RHYTHM:NSR @ 76 bpm  AXIS: normal axis   INTERVALS: NC @ 154 ms  QRS COMPLEX: QRS @ 72 ms  ST SEGMENT: non specific st segment changes  Diffuse artifact  QT INTERVAL: QTC @ 427 ms  COMPARED WITH PRIOR no change 7/2019  Interpretation by Lio Maradiaga DO    Differential diagnosis includes but not limited to: BPPV, Menière's, labyrinthitis, CVA, TIA, arrhthymias, MENA, electrolyte dysfunction, orthostatic hypotension, dehydration, medication reaction, OM, vestibular neuritis  Differential diagnosis includes but not limited to:  Cardiac arrhythmia is, toxicologic, hypothyroidism, hyperthyroidism, anemia, hyponatremia, hypernatremia, hypomagnesemia, hypermagnesemia, , hypokalemia, hypokalemia, acute kidney injury anxiety, depression, cardiomyopathy, alcohol abuse, tobacco abuse PVCs, prescription medications, pericarditis, myocarditis, septal defect, QTC prolongation, pregnancy, infectious, CO, pheochromocytoma      Amount and/or Complexity of Data Reviewed  Labs: ordered  Decision-making details documented in ED Course  Details: Heart score less than 3 with a troponin less than 5  D-dimer negative  No MENA, electrolyte dysfunction or anemia  Radiology: ordered  Decision-making details documented in ED Course  ECG/medicine tests: ordered and independent interpretation performed  Decision-making details documented in ED Course  Details: No arrhythmia  No ischemia      Risk  Prescription drug management            Disposition  Final diagnoses:   Dizziness   BV (bacterial vaginosis)   Palpitations   Elevated blood pressure reading   Headache     Time reflects when diagnosis was documented in both MDM as applicable and the Disposition within this note     Time User Action Codes Description Comment    3/24/2023 10:21 PM Soumya Latham Add [R42] Dizziness     3/24/2023 10:21 PM Soumya Latham Add [N76 0,  B96 89] BV (bacterial vaginosis)     3/24/2023 10:22 PM Sidra Ritter Add [R00 2] Palpitations     3/24/2023 10:22 PM Huber Ritter L Add [R03 0] Elevated blood pressure reading     3/24/2023 11:19 PM Leonor Hearn Add [R51 9] Headache       ED Disposition     ED Disposition   Discharge    Condition   Stable    Date/Time   Fri Mar 24, 2023 11:19 PM    Comment   Amador Cardosoel Paz discharge to home/self care  Follow-up Information     Follow up With Specialties Details Why ZAIRA Presley Nurse Practitioner, Family Medicine Schedule an appointment as soon as possible for a visit in 1 week  [address]  [phone]            Patient's Medications   Discharge Prescriptions    LIDOCAINE (LIDODERM) 5 %    Apply 1 patch topically over 12 hours daily for 4 days Remove & Discard patch within 12 hours or as directed by MD       Start Date: 3/24/2023 End Date: 3/28/2023       Order Dose: 1 patch       Quantity: 4 patch    Refills: 0    METRONIDAZOLE (FLAGYL) 500 MG TABLET    Take 1 tablet (500 mg total) by mouth every 12 (twelve) hours for 7 days       Start Date: 3/24/2023 End Date: 3/31/2023       Order Dose: 500 mg       Quantity: 14 tablet    Refills: 0    NAPROXEN (NAPROSYN) 500 MG TABLET    Take 1 tablet (500 mg total) by mouth 2 (two) times a day with meals for 3 days       Start Date: 3/24/2023 End Date: 3/27/2023       Order Dose: 500 mg       Quantity: 6 tablet    Refills: 0       No discharge procedures on file      PDMP Review     None          ED Provider  Electronically Signed by           Radha Vázquez DO  03/25/23 0000

## 2023-03-25 NOTE — DISCHARGE INSTRUCTIONS
Make sure you take full course of antibiotics    Avoid alcohol while taking Flagyl as this can make you very sick with nausea and vomiting

## 2023-03-27 DIAGNOSIS — K21.9 GASTROESOPHAGEAL REFLUX DISEASE, UNSPECIFIED WHETHER ESOPHAGITIS PRESENT: ICD-10-CM

## 2023-03-27 RX ORDER — FAMOTIDINE 20 MG/1
TABLET, FILM COATED ORAL
Qty: 180 TABLET | Refills: 1 | OUTPATIENT
Start: 2023-03-27

## 2023-03-30 ENCOUNTER — TELEPHONE (OUTPATIENT)
Dept: GASTROENTEROLOGY | Facility: CLINIC | Age: 38
End: 2023-03-30

## 2023-03-30 ENCOUNTER — OFFICE VISIT (OUTPATIENT)
Dept: FAMILY MEDICINE CLINIC | Facility: CLINIC | Age: 38
End: 2023-03-30

## 2023-03-30 VITALS
TEMPERATURE: 97.6 F | DIASTOLIC BLOOD PRESSURE: 80 MMHG | HEART RATE: 86 BPM | HEIGHT: 62 IN | BODY MASS INDEX: 34.28 KG/M2 | WEIGHT: 186.3 LBS | SYSTOLIC BLOOD PRESSURE: 118 MMHG | RESPIRATION RATE: 18 BRPM | OXYGEN SATURATION: 98 %

## 2023-03-30 DIAGNOSIS — R51.9 LEFT TEMPORAL HEADACHE: ICD-10-CM

## 2023-03-30 DIAGNOSIS — F41.9 ANXIETY: ICD-10-CM

## 2023-03-30 DIAGNOSIS — R42 VERTIGO: Primary | ICD-10-CM

## 2023-03-30 DIAGNOSIS — Z59.811 HOUSING INSTABILITY, HOUSED, WITH RISK OF HOMELESSNESS: ICD-10-CM

## 2023-03-30 RX ORDER — TOPIRAMATE 25 MG/1
25 TABLET ORAL 2 TIMES DAILY
Qty: 60 TABLET | Refills: 0 | Status: SHIPPED | OUTPATIENT
Start: 2023-03-30 | End: 2023-04-29

## 2023-03-30 RX ORDER — MECLIZINE HCL 12.5 MG/1
12.5 TABLET ORAL 3 TIMES DAILY PRN
Qty: 30 TABLET | Refills: 2 | Status: SHIPPED | OUTPATIENT
Start: 2023-03-30 | End: 2023-06-28

## 2023-03-30 RX ORDER — SUMATRIPTAN 25 MG/1
25 TABLET, FILM COATED ORAL ONCE AS NEEDED
Qty: 10 TABLET | Refills: 0 | Status: SHIPPED | OUTPATIENT
Start: 2023-03-30

## 2023-03-30 SDOH — ECONOMIC STABILITY - HOUSING INSECURITY: HOUSING INSTABILITY WITH RISK OF HOMELESSNESS: Z59.811

## 2023-03-30 NOTE — ASSESSMENT & PLAN NOTE
Chronic left temporal headache due to stress at home   Naproxen not helping to relief symptoms    Discussed avoiding triggers, headache diary and importance of staying well hydrated and regular exercise  No current red flags such as sudden onset headache, focal exam findings, stiff neck, temperature, or altered mental status    - Educated patient about foods or beverages that can trigger a migraine attack  - Advised not to use abortive treatment >3 times per week to prevent medication overuse headache  - Advised to make a headache diary and bring it with them on next visit  Also informed patient on smart phone apps such as migraine ruthie and headache tracker  - Advised patient to stay well hydrated (drink 64 ounces of water/day), get adequate sleep, and eat regularly       Start Topamax 25 mg bid   Imitrex 25 mg  for abortive treatment

## 2023-03-30 NOTE — ASSESSMENT & PLAN NOTE
Continue with episodes of anxiety do to stressors at home, does not wish to start hydroxyzine or SSRI/SNRI's due to side effect  Willing to establish with psychiatry - referral placed and provided Jaret De Jesus resource sheet     Continue with Trazodone

## 2023-03-30 NOTE — TELEPHONE ENCOUNTER
LVM for patient that we had to cancel apt on 5/19/23 with Dr Oseguera Forward  However we can move her to 5/18/23 at the same time  Asked patient to call and confirm 5/18/23 was ok
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
## 2023-04-04 ENCOUNTER — TELEPHONE (OUTPATIENT)
Dept: PSYCHIATRY | Facility: CLINIC | Age: 38
End: 2023-04-04

## 2023-04-11 NOTE — TELEPHONE ENCOUNTER
Pt called in regards to a vm she received  She stared that she wants to schedule an appt  Writer informed her that there is no opening available at this time   She refused to be added to wait list

## 2023-05-16 ENCOUNTER — HOSPITAL ENCOUNTER (OUTPATIENT)
Dept: MAMMOGRAPHY | Facility: CLINIC | Age: 38
Discharge: HOME | End: 2023-05-16

## 2023-05-16 ENCOUNTER — TELEPHONE (OUTPATIENT)
Dept: PSYCHIATRY | Facility: CLINIC | Age: 38
End: 2023-05-16

## 2023-05-16 VITALS — HEIGHT: 62 IN | BODY MASS INDEX: 33.13 KG/M2 | WEIGHT: 180 LBS

## 2023-05-16 DIAGNOSIS — N64.4 PAIN OF RIGHT BREAST: ICD-10-CM

## 2023-05-16 DIAGNOSIS — N64.4 PAIN IN THE BREAST: ICD-10-CM

## 2023-05-16 NOTE — TELEPHONE ENCOUNTER
Referral letter sent via [employer] and no response received within 15 days  Referral has been closed at this time

## 2023-05-18 ENCOUNTER — OFFICE VISIT (OUTPATIENT)
Dept: GASTROENTEROLOGY | Facility: CLINIC | Age: 38
End: 2023-05-18

## 2023-05-18 VITALS
HEIGHT: 62 IN | TEMPERATURE: 97.8 F | BODY MASS INDEX: 34.78 KG/M2 | SYSTOLIC BLOOD PRESSURE: 118 MMHG | DIASTOLIC BLOOD PRESSURE: 66 MMHG | WEIGHT: 189 LBS

## 2023-05-18 DIAGNOSIS — R10.84 GENERALIZED ABDOMINAL PAIN: ICD-10-CM

## 2023-05-18 DIAGNOSIS — R14.0 BLOATING: Primary | ICD-10-CM

## 2023-05-18 DIAGNOSIS — R11.0 NAUSEA: ICD-10-CM

## 2023-05-18 NOTE — PROGRESS NOTES
[facility] - Outpatient Consultation  Kathryn Padilla 42 y o  female MRN: [id number]  Encounter: [id number]          ASSESSMENT AND PLAN:      1  Generalized abdominal pain  2  Bloating  3  Nausea    Differentials include peptic ulcer disease, H  pylori, gastritis, gastroparesis, dyspepsia, IBS  Patient had constipation and diarrhea in the past on a prior office visit it was documented  We will get EGD to investigate further  Get stomach and small bowel biopsies  Continue current medications  Further management plan based on investigation results  RTC 3 months  Kari Rojas MD  Gastroenterology  [place] [address]  Date: May 18, 2023    - Ambulatory Referral to Gastroenterology  - EGD; Future      ______________________________________________________________________    HPI: 25-year-old with GERD, obesity presents for evaluation  Patient reports intermittent abdominal pain for the last 5 months  It starts in the right side of the belly and then goes across to the left side  No relation to meal intake  She also reports nausea and bloating  Bowel movements are normal   No relation of symptoms to dairy intake  She started taking famotidine and Carafate which helped symptoms somewhat  No blood in stools  Weight is stable  No family history of colon cancer  She used to take ibuprofen in the past but currently taking Tylenol for arthritis  Her labs were reviewed by me today in 2023 her CBC and lipase levels were normal   She had an ultrasound and CT scan done earlier in 2023 and reports of the imaging were reviewed by me today  Both CT scan and ultrasound showed simple hepatic cyst     REVIEW OF SYSTEMS:    CONSTITUTIONAL: Denies any fever, chills, rigors, and weight loss  HEENT: No earache or tinnitus  Denies hearing loss or visual disturbances  CARDIOVASCULAR: No chest pain or palpitations     RESPIRATORY: Denies any cough, "hemoptysis, shortness of breath or dyspnea on exertion  GASTROINTESTINAL: As noted in the History of Present Illness  GENITOURINARY: No problems with urination  Denies any hematuria or dysuria  NEUROLOGIC: No dizziness or vertigo, denies headaches  MUSCULOSKELETAL: Denies any muscle or joint pain  SKIN: Denies skin rashes or itching  ENDOCRINE: Denies excessive thirst  Denies intolerance to heat or cold  PSYCHOSOCIAL: Denies depression or anxiety  Denies any recent memory loss  Historical Information   Past Medical History:   Diagnosis Date   • Anxiety    • Chest pain     pt denies today//saw cardiology and everything was \"normal\"/saw cardiology 7/2019 Dr CHARLES Bustos/BENITO MENESES   • Dental crown present    • GERD (gastroesophageal reflux disease)    • Hypertension    • PONV (postoperative nausea and vomiting)    • Precancerous changes of the cervix      Past Surgical History:   Procedure Laterality Date   • CARPAL TUNNEL RELEASE Right    • EXAMINATION UNDER ANESTHESIA N/A 10/4/2020    Procedure: EXAM UNDER ANESTHESIA (EUA), CAUTERIZATION OF CERVIX;  Surgeon: Donovan Whitaker MD;  [facility];  Service: Gynecology   • [place] COLPOSCOPY CERVIX VAG LOOP ELTRD BX CERVIX N/A 10/2/2020    Procedure: BIOPSY LEEP CERVIX;  Surgeon: Zay Hunt MD;  Location: [facility];  Service: Gynecology   • TUBAL LIGATION       Social History   Social History     Substance and Sexual Activity   Alcohol Use Not Currently     Social History     Substance and Sexual Activity   Drug Use Never     Social History     Tobacco Use   Smoking Status Never   Smokeless Tobacco Never     Family History   Problem Relation Age of Onset   • Asthma Mother    • Hypertension Mother    • No Known Problems Father    • No Known Problems Sister    • No Known Problems Daughter    • No Known Problems Maternal Grandmother    • No Known Problems Maternal Grandfather    • No Known Problems Paternal Grandmother    • No Known " "Problems Paternal Grandfather    • No Known Problems Maternal Aunt    • No Known Problems Maternal Aunt    • No Known Problems Paternal Aunt    • No Known Problems Paternal Aunt    • No Known Problems Paternal Aunt    • Diabetes Paternal Aunt    • Breast cancer Neg Hx    • Cancer Neg Hx        Meds/Allergies       Current Outpatient Medications:   •  ergocalciferol (VITAMIN D2) 50,000 units  •  famotidine (PEPCID) 20 mg tablet  •  multivitamin (THERAGRAN) TABS  •  nystatin (MYCOSTATIN) cream  •  sucralfate (CARAFATE) 1 g/10 mL suspension  •  traZODone (DESYREL) 50 mg tablet  •  acetaminophen (TYLENOL) 650 mg CR tablet  •  baclofen 10 mg tablet  •  hydrocortisone (ANUSOL-HC) 2 5 % rectal cream  •  ibuprofen (MOTRIN) 400 mg tablet  •  lidocaine (LIDODERM) 5 %  •  meclizine (ANTIVERT) 12 5 MG tablet  •  naproxen (NAPROSYN) 500 mg tablet  •  naproxen (Naprosyn) 500 mg tablet  •  polyethylene glycol (GoodSense ClearLax) 17 GM/SCOOP powder  •  SUMAtriptan (Imitrex) 25 mg tablet  •  topiramate (Topamax) 25 mg tablet    Allergies   Allergen Reactions   • No Known Allergies            Objective     Blood pressure 118/66, temperature 97 8 °F (36 6 °C), temperature source Tympanic, height 5' 2\" (1 575 m), weight 85 7 kg (189 lb), not currently breastfeeding  Body mass index is 34 57 kg/m²  PHYSICAL EXAM:      General Appearance:   Alert, cooperative, no distress   HEENT:   Normocephalic, atraumatic, anicteric      Neck:  Supple, symmetrical, trachea midline   Lungs:   Clear to auscultation bilaterally; no rales, rhonchi or wheezing; respirations unlabored    Heart[de-identified]   Regular rate and rhythm; no murmur, rub, or gallop     Abdomen:   Soft, non-tender, non-distended; normal bowel sounds; no masses, no organomegaly    Genitalia:   Deferred    Rectal:   Deferred    Extremities:  No cyanosis, clubbing or edema    Pulses:  2+ and symmetric    Skin:  No jaundice, rashes, or lesions    Lymph nodes:  No palpable cervical " lymphadenopathy        Lab Results:   No visits with results within 1 Day(s) from this visit     Latest known visit with results is:   Admission on 03/24/2023, Discharged on 03/25/2023   Component Date Value   • Color, UA 03/24/2023 Yellow    • Clarity, UA 03/24/2023 Clear    • Specific Gravity, UA 03/24/2023 1 010    • pH, UA 03/24/2023 6 0    • Leukocytes, UA 03/24/2023 Trace (A)    • Nitrite, UA 03/24/2023 Negative    • Protein, UA 03/24/2023 Negative    • Glucose, UA 03/24/2023 Negative    • Ketones, UA 03/24/2023 Negative    • Urobilinogen, UA 03/24/2023 0 2    • Bilirubin, UA 03/24/2023 Negative    • Occult Blood, UA 03/24/2023 Large (A)    • EXT Preg Test, Ur 03/24/2023 Negative    • WBC 03/24/2023 7 91    • RBC 03/24/2023 4 63    • Hemoglobin 03/24/2023 14 6    • Hematocrit 03/24/2023 41 7    • MCV 03/24/2023 90    • MCH 03/24/2023 31 5    • MCHC 03/24/2023 35 0    • RDW 03/24/2023 11 9    • MPV 03/24/2023 9 9    • Platelets 54/68/2599 247    • nRBC 03/24/2023 0    • Neutrophils Relative 03/24/2023 49    • Immat GRANS % 03/24/2023 1    • Lymphocytes Relative 03/24/2023 38    • Monocytes Relative 03/24/2023 8    • Eosinophils Relative 03/24/2023 3    • Basophils Relative 03/24/2023 1    • Neutrophils Absolute 03/24/2023 4 01    • Immature Grans Absolute 03/24/2023 0 04    • Lymphocytes Absolute 03/24/2023 2 99    • Monocytes Absolute 03/24/2023 0 59    • Eosinophils Absolute 03/24/2023 0 20    • Basophils Absolute 03/24/2023 0 08    • Sodium 03/24/2023 137    • Potassium 03/24/2023 3 6    • Chloride 03/24/2023 103    • CO2 03/24/2023 27    • ANION GAP 03/24/2023 7    • BUN 03/24/2023 14    • Creatinine 03/24/2023 0 72    • Glucose 03/24/2023 88    • Calcium 03/24/2023 9 4    • AST 03/24/2023 13    • ALT 03/24/2023 17    • Alkaline Phosphatase 03/24/2023 47    • Total Protein 03/24/2023 7 5    • Albumin 03/24/2023 4 7    • Total Bilirubin 03/24/2023 0 35    • eGFR 03/24/2023 105    • Magnesium 03/24/2023 2 3 • D-Dimer, Quant 03/24/2023 <0 27    • hs TnI 0hr 03/24/2023 3    • BNP 03/24/2023 10    • RBC, UA 03/24/2023 2-4    • WBC, UA 03/24/2023 4-10 (A)    • Epithelial Cells 03/24/2023 Moderate (A)    • Bacteria, UA 03/24/2023 Occasional    • Ventricular Rate 03/24/2023 76    • Atrial Rate 03/24/2023 76    • NJ Interval 03/24/2023 154    • QRSD Interval 03/24/2023 72    • QT Interval 03/24/2023 380    • QTC Interval 03/24/2023 427    • P Axis 03/24/2023 37    • QRS Axis 03/24/2023 79    • T Wave Axis 03/24/2023 3          Radiology Results:   Mammo diagnostic bilateral w 3d & cad, US breast left limited (diagnostic)    Result Date: 5/16/2023  Narrative: DIAGNOSIS: Diffuse pain of left breast TECHNIQUE: Ultrasound of the bilateral breast(s) was performed  Digital diagnostic mammography was performed  Computer Aided Detection (CAD) analyzed all applicable images  COMPARISONS: Prior breast imaging dated: 11/01/2019, 11/01/2019, 08/12/2019, and 08/12/2019 RELEVANT HISTORY: Family Breast Cancer History: History of breast cancer in Neg Hx  Family Medical History: No known relevant family medical history  Personal History: No known relevant hormone history  No known relevant surgical history  No known relevant medical history  RISK ASSESSMENT: 5 Year Tyrer-Cuzick: 0 34 % 10 Year Tyrer-Cuzick: 0 88 % Lifetime Tyrer-Cuzick: 8 01 % TISSUE DENSITY: The breasts are heterogeneously dense, which may obscure small masses  INDICATION: Radha Mclaughlin is a 42 y o  female presenting for PAIN IN RIGHT BREAST  FINDINGS: LEFT A) FOCAL ASYMMETRY Mammo diagnostic bilateral w 3d & cad: There is an 8 mm focal asymmetry seen in the outer central region of the left breast at 3 o'clock  Targeted sonographic evaluation of the left breast 2 to 5 o'clock position was performed    At the 3 o'clock position 7 cm from the nipple there is an 8 x 6 x 3 mm near anechoic lesion with low-level internal echoes which is a probable sonographic correlate for the mammographic asymmetry  Appearance is suggestive of a complicated cyst versus well-circumscribed mass  6-month follow-up is recommended to confirm stability  Right Mammo diagnostic bilateral w 3d & cad   There are no suspicious masses, grouped microcalcifications or areas of unexplained architectural distortion  The skin and nipple areolar complex are unremarkable  Impression:  Probably benign findings left breast   6-month follow-up was recommended   ASSESSMENT/BI-RADS CATEGORY: Left: 3 - Probably Benign Overall: 3 - Probably Benign RECOMMENDATION:      - Diagnostic mammogram in 6 months for the left breast       - Ultrasound in 6 months for the left breast       - Clinical management for the left breast  Workstation ID: [id number]

## 2023-05-18 NOTE — PATIENT INSTRUCTIONS
Scheduled date of EGD(as of today):05 26 23  Physician performing EGD:DR BENSON  Location of EGD:  Instructions reviewed with patient by:AMARIS  Clearances:  N/A

## 2023-05-26 ENCOUNTER — ANESTHESIA (OUTPATIENT)
Dept: GASTROENTEROLOGY | Facility: HOSPITAL | Age: 38
End: 2023-05-26

## 2023-05-26 ENCOUNTER — HOSPITAL ENCOUNTER (OUTPATIENT)
Dept: GASTROENTEROLOGY | Facility: HOSPITAL | Age: 38
Setting detail: OUTPATIENT SURGERY
End: 2023-05-26
Attending: INTERNAL MEDICINE

## 2023-05-26 ENCOUNTER — ANESTHESIA EVENT (OUTPATIENT)
Dept: GASTROENTEROLOGY | Facility: HOSPITAL | Age: 38
End: 2023-05-26

## 2023-05-26 VITALS
HEART RATE: 66 BPM | OXYGEN SATURATION: 97 % | RESPIRATION RATE: 18 BRPM | DIASTOLIC BLOOD PRESSURE: 91 MMHG | TEMPERATURE: 98.8 F | SYSTOLIC BLOOD PRESSURE: 131 MMHG

## 2023-05-26 DIAGNOSIS — R14.0 BLOATING: ICD-10-CM

## 2023-05-26 DIAGNOSIS — K29.70 GASTRITIS WITHOUT BLEEDING, UNSPECIFIED CHRONICITY, UNSPECIFIED GASTRITIS TYPE: Primary | ICD-10-CM

## 2023-05-26 DIAGNOSIS — R10.84 GENERALIZED ABDOMINAL PAIN: ICD-10-CM

## 2023-05-26 LAB
EXT PREGNANCY TEST URINE: NEGATIVE
EXT. CONTROL: NORMAL

## 2023-05-26 RX ORDER — OMEPRAZOLE 40 MG/1
40 CAPSULE, DELAYED RELEASE ORAL DAILY
Qty: 30 CAPSULE | Refills: 5 | Status: SHIPPED | OUTPATIENT
Start: 2023-05-26 | End: 2023-11-22

## 2023-05-26 RX ORDER — SODIUM CHLORIDE, SODIUM LACTATE, POTASSIUM CHLORIDE, CALCIUM CHLORIDE 600; 310; 30; 20 MG/100ML; MG/100ML; MG/100ML; MG/100ML
INJECTION, SOLUTION INTRAVENOUS CONTINUOUS PRN
Status: DISCONTINUED | OUTPATIENT
Start: 2023-05-26 | End: 2023-05-26

## 2023-05-26 RX ORDER — LIDOCAINE HYDROCHLORIDE 10 MG/ML
INJECTION, SOLUTION EPIDURAL; INFILTRATION; INTRACAUDAL; PERINEURAL AS NEEDED
Status: DISCONTINUED | OUTPATIENT
Start: 2023-05-26 | End: 2023-05-26

## 2023-05-26 RX ORDER — PROPOFOL 10 MG/ML
INJECTION, EMULSION INTRAVENOUS AS NEEDED
Status: DISCONTINUED | OUTPATIENT
Start: 2023-05-26 | End: 2023-05-26

## 2023-05-26 RX ADMIN — PROPOFOL 50 MG: 10 INJECTION, EMULSION INTRAVENOUS at 10:57

## 2023-05-26 RX ADMIN — PROPOFOL 150 MG: 10 INJECTION, EMULSION INTRAVENOUS at 10:52

## 2023-05-26 RX ADMIN — SODIUM CHLORIDE, SODIUM LACTATE, POTASSIUM CHLORIDE, AND CALCIUM CHLORIDE: .6; .31; .03; .02 INJECTION, SOLUTION INTRAVENOUS at 10:34

## 2023-05-26 RX ADMIN — LIDOCAINE HYDROCHLORIDE 50 MG: 10 INJECTION, SOLUTION EPIDURAL; INFILTRATION; INTRACAUDAL at 10:53

## 2023-05-26 NOTE — ANESTHESIA POSTPROCEDURE EVALUATION
Post-Op Assessment Note    CV Status:  Stable  Pain Score: 0    Pain management: adequate     Mental Status:  Awake   Hydration Status:  Euvolemic   PONV Controlled:  Controlled   Airway Patency:  Patent      Post Op Vitals Reviewed: Yes      Staff: CRNA         No notable events documented      /75 (05/26/23 1108)    Temp 98 8 °F (37 1 °C) (05/26/23 1108)    Pulse 71 (05/26/23 1108)   Resp 18 (05/26/23 1108)    SpO2 95 % (05/26/23 1108)

## 2023-05-26 NOTE — ANESTHESIA PREPROCEDURE EVALUATION
Procedure:  EGD    Relevant Problems   ANESTHESIA   (+) PONV (postoperative nausea and vomiting)      CARDIO   (+) Hyperlipidemia   (+) Hypertension      GI/HEPATIC   (+) GERD (gastroesophageal reflux disease)      MUSCULOSKELETAL   (+) Acute bilateral low back pain with right-sided sciatica   (+) Blepharospasm      NEURO/PSYCH   (+) Anxiety   (+) Left temporal headache      Endocrine   (+) Thyroid nodule      Genitourinary   (+) RONALDO III (cervical intraepithelial neoplasia III)      Other   (+) Fibrocystic breast disease   (+) Insomnia   (+) Intermittent palpitations   (+) Obesity   (+) S/P LEEP (loop electrosurgical excision procedure)      Lab Results   Component Value Date    AGAP 7 03/24/2023    ALKPHOS 47 03/24/2023    ALT 17 03/24/2023    AST 13 03/24/2023    BUN 14 03/24/2023    CALCIUM 9 4 03/24/2023     03/24/2023    CO2 27 03/24/2023    CREATININE 0 72 03/24/2023    EGFR 105 03/24/2023    GLUC 88 03/24/2023    GLUF 90 04/26/2019    K 3 6 03/24/2023    SODIUM 137 03/24/2023    TBILI 0 35 03/24/2023    TP 7 5 03/24/2023     Lab Results   Component Value Date    HCT 41 7 03/24/2023    HGB 14 6 03/24/2023    MCV 90 03/24/2023     03/24/2023    WBC 7 91 03/24/2023       Physical Exam    Airway    Mallampati score: II  TM Distance: >3 FB  Neck ROM: full     Dental   No notable dental hx     Cardiovascular      Pulmonary      Other Findings        Anesthesia Plan  ASA Score- 2     Anesthesia Type- IV sedation with anesthesia with ASA Monitors  Additional Monitors:   Airway Plan:           Plan Factors-Exercise tolerance (METS): >4 METS  Chart reviewed  EKG reviewed  Imaging results reviewed  Existing labs reviewed  Patient summary reviewed  Induction- intravenous  Postoperative Plan- Plan for postoperative opioid use  Planned trial extubation    Informed Consent- Anesthetic plan and risks discussed with patient  I personally reviewed this patient with the CRNA   Discussed and agreed on the Anesthesia Plan with the OTONIEL Cuevas

## 2023-05-26 NOTE — H&P
"History and Physical -  [facility]  Maggy Tse 42 y o  female MRN: [id number]                  HPI: Maggy Tse is a 42y o  year old female who presents for EGD to investigate nausea, abdominal pain and bloating  REVIEW OF SYSTEMS: Per the HPI, and otherwise unremarkable  Historical Information   Past Medical History:   Diagnosis Date   • Anxiety    • Chest pain     pt denies today//saw cardiology and everything was \"normal\"/saw cardiology 7/2019 Dr CHARLES Bustos/BENITO MENESES   • Dental crown present    • GERD (gastroesophageal reflux disease)    • Hypertension    • PONV (postoperative nausea and vomiting)    • Precancerous changes of the cervix      Past Surgical History:   Procedure Laterality Date   • CARPAL TUNNEL RELEASE Right    • EXAMINATION UNDER ANESTHESIA N/A 10/4/2020    Procedure: EXAM UNDER ANESTHESIA (EUA), CAUTERIZATION OF CERVIX;  Surgeon: Lily Coleman MD;  [facility];  Service: Gynecology   • [place] COLPOSCOPY CERVIX VAG LOOP QUOC [place] [address] N/A 10/2/2020    Procedure: BIOPSY LEEP CERVIX;  Surgeon: Chino Driver MD;  [facility];  Service: Gynecology   • TUBAL LIGATION       Social History   Social History     Substance and Sexual Activity   Alcohol Use Not Currently     Social History     Substance and Sexual Activity   Drug Use Never     Social History     Tobacco Use   Smoking Status Never   Smokeless Tobacco Never     Family History   Problem Relation Age of Onset   • Asthma Mother    • Hypertension Mother    • No Known Problems Father    • No Known Problems Sister    • No Known Problems Daughter    • No Known Problems Maternal Grandmother    • No Known Problems Maternal Grandfather    • No Known Problems Paternal Grandmother    • No Known Problems Paternal Grandfather    • No Known Problems Maternal Aunt    • No Known Problems Maternal Aunt    • No Known Problems Paternal Aunt    • No Known Problems " Paternal Aunt    • No Known Problems Paternal Aunt    • Diabetes Paternal Aunt    • Breast cancer Neg Hx    • Cancer Neg Hx        Meds/Allergies     (Not in a hospital admission)      Allergies   Allergen Reactions   • No Known Allergies        Objective     Blood pressure 134/96, pulse 71, temperature 97 5 °F (36 4 °C), temperature source Temporal, resp  rate 16, last menstrual period 04/11/2023, SpO2 96 %, not currently breastfeeding  PHYSICAL EXAM    Gen: NAD  CV: RRR  CHEST: Clear  ABD: soft, NT/ND  EXT: no edema      ASSESSMENT/PLAN:  Neno Guerrero is a 42y o  year old female who presents for EGD to investigate nausea, abdominal pain and bloating  The patient is stable and optimized for the procedure, we reviewed risk and benefits  Risk include but not limited to infection, bleeding, perforation and missing a lesion

## 2023-06-01 DIAGNOSIS — K31.9 MUCOSAL ABNORMALITY OF DUODENUM: Primary | ICD-10-CM

## 2023-06-08 ENCOUNTER — TELEPHONE (OUTPATIENT)
Dept: OBGYN CLINIC | Facility: CLINIC | Age: 38
End: 2023-06-08

## 2023-06-17 DIAGNOSIS — R14.0 BLOATING: ICD-10-CM

## 2023-06-17 DIAGNOSIS — R10.84 GENERALIZED ABDOMINAL PAIN: ICD-10-CM

## 2023-06-17 DIAGNOSIS — K29.70 GASTRITIS WITHOUT BLEEDING, UNSPECIFIED CHRONICITY, UNSPECIFIED GASTRITIS TYPE: ICD-10-CM

## 2023-06-17 RX ORDER — OMEPRAZOLE 40 MG/1
40 CAPSULE, DELAYED RELEASE ORAL DAILY
Qty: 90 CAPSULE | Refills: 2 | Status: SHIPPED | OUTPATIENT
Start: 2023-06-17 | End: 2023-12-14

## 2023-07-10 ENCOUNTER — OFFICE VISIT (OUTPATIENT)
Dept: FAMILY MEDICINE CLINIC | Facility: CLINIC | Age: 38
End: 2023-07-10

## 2023-07-10 VITALS
WEIGHT: 188 LBS | OXYGEN SATURATION: 99 % | HEART RATE: 100 BPM | BODY MASS INDEX: 34.6 KG/M2 | SYSTOLIC BLOOD PRESSURE: 122 MMHG | HEIGHT: 62 IN | DIASTOLIC BLOOD PRESSURE: 80 MMHG | RESPIRATION RATE: 16 BRPM | TEMPERATURE: 98.7 F

## 2023-07-10 DIAGNOSIS — K21.9 GASTROESOPHAGEAL REFLUX DISEASE, UNSPECIFIED WHETHER ESOPHAGITIS PRESENT: ICD-10-CM

## 2023-07-10 DIAGNOSIS — F41.9 ANXIETY: Primary | ICD-10-CM

## 2023-07-10 DIAGNOSIS — M54.2 NECK DISCOMFORT: ICD-10-CM

## 2023-07-10 PROCEDURE — 99214 OFFICE O/P EST MOD 30 MIN: CPT

## 2023-07-10 RX ORDER — TRAZODONE HYDROCHLORIDE 50 MG/1
50 TABLET ORAL
Qty: 90 TABLET | Refills: 1 | Status: SHIPPED | OUTPATIENT
Start: 2023-07-10 | End: 2023-07-10

## 2023-07-10 RX ORDER — SUCRALFATE ORAL 1 G/10ML
1 SUSPENSION ORAL 4 TIMES DAILY
Qty: 414 ML | Refills: 0 | Status: SHIPPED | OUTPATIENT
Start: 2023-07-10

## 2023-07-10 RX ORDER — TRAZODONE HYDROCHLORIDE 50 MG/1
50 TABLET ORAL
Qty: 90 TABLET | Refills: 1 | Status: SHIPPED | OUTPATIENT
Start: 2023-07-10

## 2023-07-10 NOTE — ASSESSMENT & PLAN NOTE
TIA-7 Flowsheet Screening    Flowsheet Row Most Recent Value   Over the last 2 weeks, how often have you been bothered by any of the following problems?     Feeling nervous, anxious, or on edge 2   Not being able to stop or control worrying 2   Worrying too much about different things 2   Trouble relaxing 1   Being so restless that it is hard to sit still 1   Becoming easily annoyed or irritable 1   Feeling afraid as if something awful might happen 1   TIA-7 Total Score 10        Patient reports being contacted by psych - they don't have availability until next year, she also attempted to contact other therapist but was unsuccessfully   Pt reports mild improvement of anxiety with trazodone 50 mg - continue with current treatment   Follow up in 6 months or prn

## 2023-07-10 NOTE — ASSESSMENT & PLAN NOTE
Reports vague neck discomfort with palpation   Discussed symptoms may be related to anxiety or GERD   Xray - soft tissue neck -future   Continue to monitor

## 2023-07-10 NOTE — ASSESSMENT & PLAN NOTE
-Discussed diet and lifestyle interventions to improve sx including: avoidance of common triggers (chocolate, caffeine, tomatoes, citrus), eat small meals frequently, remain upright after meals   Vague abdominal discomfort, denies pain during exam   Reports significant improvement of symptoms  Continue to follow with GI   Continue with omeprazole and sucralfate tid
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
## 2023-08-03 ENCOUNTER — PATIENT OUTREACH (OUTPATIENT)
Dept: FAMILY MEDICINE CLINIC | Facility: CLINIC | Age: 38
End: 2023-08-03
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
## 2023-08-19 ENCOUNTER — APPOINTMENT (EMERGENCY)
Dept: ULTRASOUND IMAGING | Facility: HOSPITAL | Age: 38
End: 2023-08-19
Payer: COMMERCIAL

## 2023-08-19 ENCOUNTER — HOSPITAL ENCOUNTER (EMERGENCY)
Facility: HOSPITAL | Age: 38
Discharge: HOME | End: 2023-08-19
Attending: EMERGENCY MEDICINE
Payer: COMMERCIAL

## 2023-08-19 VITALS
OXYGEN SATURATION: 100 % | HEART RATE: 62 BPM | BODY MASS INDEX: 34.35 KG/M2 | SYSTOLIC BLOOD PRESSURE: 167 MMHG | DIASTOLIC BLOOD PRESSURE: 95 MMHG | TEMPERATURE: 98.2 F | WEIGHT: 187.83 LBS | RESPIRATION RATE: 16 BRPM

## 2023-08-19 DIAGNOSIS — R10.11 COLICKY RUQ ABDOMINAL PAIN: Primary | ICD-10-CM

## 2023-08-19 LAB
ALBUMIN SERPL BCP-MCNC: 4.6 G/DL (ref 3.5–5)
ALP SERPL-CCNC: 47 U/L (ref 34–104)
ALT SERPL W P-5'-P-CCNC: 17 U/L (ref 7–52)
ANION GAP SERPL CALCULATED.3IONS-SCNC: 8 MMOL/L
AST SERPL W P-5'-P-CCNC: 14 U/L (ref 13–39)
BASOPHILS # BLD AUTO: 0.08 THOUSANDS/ÂΜL (ref 0–0.1)
BASOPHILS NFR BLD AUTO: 1 % (ref 0–1)
BILIRUB SERPL-MCNC: 0.39 MG/DL (ref 0.2–1)
BUN SERPL-MCNC: 11 MG/DL (ref 5–25)
CALCIUM SERPL-MCNC: 9.6 MG/DL (ref 8.4–10.2)
CHLORIDE SERPL-SCNC: 102 MMOL/L (ref 96–108)
CO2 SERPL-SCNC: 27 MMOL/L (ref 21–32)
CREAT SERPL-MCNC: 0.69 MG/DL (ref 0.6–1.3)
EOSINOPHIL # BLD AUTO: 0.08 THOUSAND/ÂΜL (ref 0–0.61)
EOSINOPHIL NFR BLD AUTO: 1 % (ref 0–6)
ERYTHROCYTE [DISTWIDTH] IN BLOOD BY AUTOMATED COUNT: 11.9 % (ref 11.6–15.1)
GFR SERPL CREATININE-BSD FRML MDRD: 110 ML/MIN/1.73SQ M
GLUCOSE SERPL-MCNC: 92 MG/DL (ref 65–140)
HCT VFR BLD AUTO: 42.4 % (ref 34.8–46.1)
HGB BLD-MCNC: 14.3 G/DL (ref 11.5–15.4)
IMM GRANULOCYTES # BLD AUTO: 0.02 THOUSAND/UL (ref 0–0.2)
IMM GRANULOCYTES NFR BLD AUTO: 0 % (ref 0–2)
LYMPHOCYTES # BLD AUTO: 2.14 THOUSANDS/ÂΜL (ref 0.6–4.47)
LYMPHOCYTES NFR BLD AUTO: 37 % (ref 14–44)
MCH RBC QN AUTO: 30.2 PG (ref 26.8–34.3)
MCHC RBC AUTO-ENTMCNC: 33.7 G/DL (ref 31.4–37.4)
MCV RBC AUTO: 90 FL (ref 82–98)
MONOCYTES # BLD AUTO: 0.4 THOUSAND/ÂΜL (ref 0.17–1.22)
MONOCYTES NFR BLD AUTO: 7 % (ref 4–12)
NEUTROPHILS # BLD AUTO: 3.01 THOUSANDS/ÂΜL (ref 1.85–7.62)
NEUTS SEG NFR BLD AUTO: 54 % (ref 43–75)
NRBC BLD AUTO-RTO: 0 /100 WBCS
PLATELET # BLD AUTO: 283 THOUSANDS/UL (ref 149–390)
PMV BLD AUTO: 9.7 FL (ref 8.9–12.7)
POTASSIUM SERPL-SCNC: 3.9 MMOL/L (ref 3.5–5.3)
PROT SERPL-MCNC: 7.6 G/DL (ref 6.4–8.4)
RBC # BLD AUTO: 4.74 MILLION/UL (ref 3.81–5.12)
SODIUM SERPL-SCNC: 137 MMOL/L (ref 135–147)
WBC # BLD AUTO: 5.73 THOUSAND/UL (ref 4.31–10.16)

## 2023-08-19 PROCEDURE — 80053 COMPREHEN METABOLIC PANEL: CPT | Performed by: EMERGENCY MEDICINE

## 2023-08-19 PROCEDURE — 85025 COMPLETE CBC W/AUTO DIFF WBC: CPT | Performed by: EMERGENCY MEDICINE

## 2023-08-19 PROCEDURE — 76705 ECHO EXAM OF ABDOMEN: CPT

## 2023-08-19 PROCEDURE — 96361 HYDRATE IV INFUSION ADD-ON: CPT

## 2023-08-19 PROCEDURE — 96375 TX/PRO/DX INJ NEW DRUG ADDON: CPT

## 2023-08-19 PROCEDURE — 99284 EMERGENCY DEPT VISIT MOD MDM: CPT | Performed by: EMERGENCY MEDICINE

## 2023-08-19 PROCEDURE — 99284 EMERGENCY DEPT VISIT MOD MDM: CPT

## 2023-08-19 PROCEDURE — 96374 THER/PROPH/DIAG INJ IV PUSH: CPT

## 2023-08-19 PROCEDURE — 36415 COLL VENOUS BLD VENIPUNCTURE: CPT | Performed by: EMERGENCY MEDICINE

## 2023-08-19 RX ORDER — KETOROLAC TROMETHAMINE 30 MG/ML
30 INJECTION, SOLUTION INTRAMUSCULAR; INTRAVENOUS ONCE
Status: COMPLETED | OUTPATIENT
Start: 2023-08-19 | End: 2023-08-19

## 2023-08-19 RX ORDER — SUCRALFATE 1 G/1
1 TABLET ORAL ONCE
Status: COMPLETED | OUTPATIENT
Start: 2023-08-19 | End: 2023-08-19

## 2023-08-19 RX ORDER — FAMOTIDINE 10 MG/ML
20 INJECTION, SOLUTION INTRAVENOUS ONCE
Status: COMPLETED | OUTPATIENT
Start: 2023-08-19 | End: 2023-08-19

## 2023-08-19 RX ADMIN — SUCRALFATE 1 G: 1 TABLET ORAL at 17:49

## 2023-08-19 RX ADMIN — SODIUM CHLORIDE 1000 ML: 0.9 INJECTION, SOLUTION INTRAVENOUS at 17:48

## 2023-08-19 RX ADMIN — FAMOTIDINE 20 MG: 10 INJECTION INTRAVENOUS at 17:49

## 2023-08-19 RX ADMIN — KETOROLAC TROMETHAMINE 30 MG: 30 INJECTION, SOLUTION INTRAMUSCULAR; INTRAVENOUS at 17:49

## 2023-08-19 NOTE — Clinical Note
Albino Coppola was seen and treated in our emergency department on 8/19/2023. No restrictions            Diagnosis: RUQ abd pain    Tashia Dooley  may return to work on return date. She may return on this date: 08/20/2023         If you have any questions or concerns, please don't hesitate to call.       Kwabena Martinez, DO    ______________________________           _______________          _______________  Hospital Representative                              Date                                Time

## 2023-08-19 NOTE — ED PROVIDER NOTES
History  Chief Complaint   Patient presents with   • Abdominal Pain     Patient c/o right abd pain with nausea and dizziness that began 8 months ago. Patient has been followed for this but no diagnosis     43 yo female with onset today of RUQ abd pain. Pt reports intermittent abdominal pain for the last 8 months that starts in the right upper quadrant and at times goes across to the left side. No relation to po intake. Some associated nausea and bloating. Reports nml bowel movements. Had EGD that showed ·Mild, patchy edematous and erythematous mucosa in the antrum - known to Dr Armida Connor. Today at work recurred. History provided by:  Patient   used: No    Abdominal Pain  Pain location:  RUQ  Pain quality: aching and bloating    Pain radiates to:  Does not radiate  Pain severity:  Moderate  Onset quality:  Gradual  Timing:  Constant  Progression:  Unchanged  Chronicity:  Recurrent  Relieved by:  Nothing  Worsened by:  Nothing  Ineffective treatments:  None tried  Associated symptoms: anorexia and nausea    Associated symptoms: no chest pain, no chills, no cough, no dysuria, no fever, no hematuria, no shortness of breath, no sore throat and no vomiting    Risk factors: has not had multiple surgeries        Prior to Admission Medications   Prescriptions Last Dose Informant Patient Reported? Taking?    SUMAtriptan (Imitrex) 25 mg tablet Not Taking  No No   Sig: Take 1 tablet (25 mg total) by mouth once as needed for migraine for up to 10 doses   Patient not taking: Reported on 5/18/2023   acetaminophen (TYLENOL) 650 mg CR tablet   No Yes   Sig: Take 1 tablet (650 mg total) by mouth every 8 (eight) hours as needed for mild pain   ergocalciferol (VITAMIN D2) 50,000 units   No Yes   Sig: Take 1 capsule (50,000 Units total) by mouth once a week   famotidine (PEPCID) 20 mg tablet   No Yes   Sig: TAKE 1 TABLET BY MOUTH TWICE A DAY   multivitamin (THERAGRAN) TABS   Yes Yes   Sig: Take 1 tablet by mouth daily   nystatin (MYCOSTATIN) cream Not Taking  No No   Sig: Apply topically 2 (two) times a day   Patient not taking: Reported on 8/19/2023   omeprazole (PriLOSEC) 40 MG capsule   No Yes   Sig: TAKE 1 CAPSULE (40 MG TOTAL) BY MOUTH DAILY. polyethylene glycol (GoodSense ClearLax) 17 GM/SCOOP powder   No No   Sig: Take 17 g by mouth daily for 14 days   sucralfate (CARAFATE) 1 g/10 mL suspension   No Yes   Sig: Take 10 mL (1 g total) by mouth 4 (four) times a day   traZODone (DESYREL) 50 mg tablet   No Yes   Sig: Take 1 tablet (50 mg total) by mouth daily at bedtime      Facility-Administered Medications: None       Past Medical History:   Diagnosis Date   • Anxiety    • Chest pain     pt denies today//saw cardiology and everything was "normal"/saw cardiology 7/2019 Dr CHARLES Bustos/JENNIFER MENESES   • Dental crown present    • GERD (gastroesophageal reflux disease)    • Hypertension    • PONV (postoperative nausea and vomiting)    • Precancerous changes of the cervix        Past Surgical History:   Procedure Laterality Date   • CARPAL TUNNEL RELEASE Right    • EXAMINATION UNDER ANESTHESIA N/A 10/4/2020    Procedure: EXAM UNDER ANESTHESIA (EUA), CAUTERIZATION OF CERVIX;  Surgeon: Albina Pratt MD;  Location[facility];  Service: Gynecology   • MO COLPOSCOPY CERVIX VAG LOOP ELTRD BX CERVIX N/A 10/2/2020    Procedure: BIOPSY LEEP CERVIX;  Surgeon: Marianna rGace MD;  Location: [facility];  Service: Gynecology   • TUBAL LIGATION         Family History   Problem Relation Age of Onset   • Asthma Mother    • Hypertension Mother    • No Known Problems Father    • No Known Problems Sister    • No Known Problems Daughter    • No Known Problems Maternal Grandmother    • No Known Problems Maternal Grandfather    • No Known Problems Paternal Grandmother    • No Known Problems Paternal Grandfather    • No Known Problems Maternal Aunt    • No Known Problems Maternal Aunt    • No Known Problems Paternal Aunt • No Known Problems Paternal Aunt    • No Known Problems Paternal Aunt    • Diabetes Paternal Aunt    • Breast cancer Neg Hx    • Cancer Neg Hx      I have reviewed and agree with the history as documented. E-Cigarette/Vaping   • E-Cigarette Use Never User      E-Cigarette/Vaping Substances   • Nicotine No    • THC No    • CBD No    • Flavoring No    • Other No    • Unknown No      Social History     Tobacco Use   • Smoking status: Never   • Smokeless tobacco: Never   Vaping Use   • Vaping Use: Never used   Substance Use Topics   • Alcohol use: Not Currently   • Drug use: Never       Review of Systems   Constitutional: Negative for appetite change, chills and fever. HENT: Negative for ear pain and sore throat. Eyes: Negative for pain and visual disturbance. Respiratory: Negative for cough and shortness of breath. Cardiovascular: Negative for chest pain and palpitations. Gastrointestinal: Positive for abdominal pain (RUQ), anorexia and nausea. Negative for vomiting. Abdominal distention: bloating at times - not currently. Genitourinary: Negative for dysuria and hematuria. Musculoskeletal: Negative for arthralgias and back pain. Skin: Negative for color change and rash. Neurological: Negative for seizures and syncope. All other systems reviewed and are negative. Physical Exam  Physical Exam  Vitals and nursing note reviewed. Constitutional:       General: She is not in acute distress. Appearance: She is well-developed. HENT:      Head: Normocephalic and atraumatic. Eyes:      Conjunctiva/sclera: Conjunctivae normal.   Cardiovascular:      Rate and Rhythm: Normal rate and regular rhythm. Heart sounds: No murmur heard. Pulmonary:      Effort: Pulmonary effort is normal. No respiratory distress. Breath sounds: Normal breath sounds. Abdominal:      Palpations: Abdomen is soft. Tenderness: There is abdominal tenderness (mild) in the right upper quadrant.  Positive signs include Apodaca's sign. Musculoskeletal:         General: No swelling. Cervical back: Neck supple. Skin:     General: Skin is warm and dry. Capillary Refill: Capillary refill takes less than 2 seconds. Neurological:      Mental Status: She is alert.    Psychiatric:         Mood and Affect: Mood normal.         Vital Signs  ED Triage Vitals [08/19/23 1712]   Temperature Pulse Respirations Blood Pressure SpO2   98.2 °F (36.8 °C) 75 16 165/99 100 %      Temp Source Heart Rate Source Patient Position - Orthostatic VS BP Location FiO2 (%)   Oral Monitor Sitting Right arm --      Pain Score       10 - Worst Possible Pain           Vitals:    08/19/23 1712 08/19/23 1914 08/19/23 1915   BP: 165/99 167/95 167/95   Pulse: 75 64 62   Patient Position - Orthostatic VS: Sitting Lying Lying         Visual Acuity      ED Medications  Medications   sodium chloride 0.9 % bolus 1,000 mL (0 mL Intravenous Stopped 8/19/23 1900)   ketorolac (TORADOL) injection 30 mg (30 mg Intravenous Given 8/19/23 1749)   sucralfate (CARAFATE) tablet 1 g (1 g Oral Given 8/19/23 1749)   Famotidine (PF) (PEPCID) injection 20 mg (20 mg Intravenous Given 8/19/23 1749)       Diagnostic Studies  Results Reviewed     Procedure Component Value Units Date/Time    Comprehensive metabolic panel [[id number]] Collected: 08/19/23 1755    Lab Status: Final result Specimen: Blood from Arm, Right Updated: 08/19/23 1821     Sodium 137 mmol/L      Potassium 3.9 mmol/L      Chloride 102 mmol/L      CO2 27 mmol/L      ANION GAP 8 mmol/L      BUN 11 mg/dL      Creatinine 0.69 mg/dL      Glucose 92 mg/dL      Calcium 9.6 mg/dL      AST 14 U/L      ALT 17 U/L      Alkaline Phosphatase 47 U/L      Total Protein 7.6 g/dL      Albumin 4.6 g/dL      Total Bilirubin 0.39 mg/dL      eGFR 110 ml/min/1.73sq m     Narrative:      [place] guidelines for Chronic Kidney Disease (CKD):   •  Stage 1 with normal or high GFR (GFR > 90 mL/min/1.73 square meters)  •  Stage 2 Mild CKD (GFR = 60-89 mL/min/1.73 square meters)  •  Stage 3A Moderate CKD (GFR = 45-59 mL/min/1.73 square meters)  •  Stage 3B Moderate CKD (GFR = 30-44 mL/min/1.73 square meters)  •  Stage 4 Severe CKD (GFR = 15-29 mL/min/1.73 square meters)  •  Stage 5 End Stage CKD (GFR <15 mL/min/1.73 square meters)  Note: GFR calculation is accurate only with a steady state creatinine    CBC and differential [[id number]] Collected: 08/19/23 1755    Lab Status: Final result Specimen: Blood from Arm, Right Updated: 08/19/23 1800     WBC 5.73 Thousand/uL      RBC 4.74 Million/uL      Hemoglobin 14.3 g/dL      Hematocrit 42.4 %      MCV 90 fL      MCH 30.2 pg      MCHC 33.7 g/dL      RDW 11.9 %      MPV 9.7 fL      Platelets 013 Thousands/uL      nRBC 0 /100 WBCs      Neutrophils Relative 54 %      Immat GRANS % 0 %      Lymphocytes Relative 37 %      Monocytes Relative 7 %      Eosinophils Relative 1 %      Basophils Relative 1 %      Neutrophils Absolute 3.01 Thousands/µL      Immature Grans Absolute 0.02 Thousand/uL      Lymphocytes Absolute 2.14 Thousands/µL      Monocytes Absolute 0.40 Thousand/µL      Eosinophils Absolute 0.08 Thousand/µL      Basophils Absolute 0.08 Thousands/µL                  US right upper quadrant   Final Result by Tee Canas MD (08/19 2008)      No gallstones. The common duct is normal in caliber. Small hepatic cyst      Workstation performed: [id number]                    Procedures  Procedures         ED Course  ED Course as of 08/19/23 2128   Sat Aug 19, 2023   1723 Pt seen and examined. 43 yo female with onset today of RUQ abd pain. Pt reports intermittent abdominal pain for the last 8 months that starts in the right upper quadrant and at times goes across to the left side. No relation to po intake. Some associated nausea and bloating. Reports nml bowel movements. Had EGD that showed ·Mild, patchy edematous and erythematous mucosa in the antrum - known to  [facility]. Today at work recurred. Will give IVF, toradol, pepcid and carafate and check labs, US GB as pt has + Apodaca's. [address] including LFT's all WNL, pt at [address] [address] US shows No gallstones. The common duct is normal in caliber. Small hepatic cyst.  Reviewed results with pt who is frustrated but understands need to f/u with PCP and GI specialist as planned. Feels much better, requests work note for today and to return tomorrow. SBIRT 20yo+    Flowsheet Row Most Recent Value   Initial Alcohol Screen: US AUDIT-C     1. How often do you have a drink containing alcohol? 0 Filed at: 08/19/2023 1912   2. How many drinks containing alcohol do you have on a typical day you are drinking? 0 Filed at: 08/19/2023 1912   3a. Male UNDER 65: How often do you have five or more drinks on one occasion? 0 Filed at: 08/19/2023 1912   3b. FEMALE Any Age, or MALE 65+: How often do you have 4 or more drinks on one occassion? 0 Filed at: 08/19/2023 1912   Audit-C Score 0 Filed at: 08/19/2023 1912   SERGIO: How many times in the past year have you. .. Used an illegal drug or used a prescription medication for non-medical reasons? Never Filed at: 08/19/2023 1912                    MDM    Disposition  Final diagnoses:   Colicky RUQ abdominal pain     Time reflects when diagnosis was documented in both MDM as applicable and the Disposition within this note     Time User Action Codes Description Comment    8/19/2023  8:35 PM Regine Velazquez Add [A75.54] Colicky RUQ abdominal pain       ED Disposition     ED Disposition   Discharge    Condition   Stable    Date/Time   Sat Aug 19, 2023  8:35 PM    Comment   [id number] Marjorie Romano discharge to home/self care.                Follow-up Information     Follow up With Specialties Details Why ZAIRA Beth Nurse Practitioner, Family Medicine Schedule an appointment as soon as possible for a visit   [address] [id number] [facility] MD Michael Gastroenterology, Internal Medicine Schedule an appointment as soon as possible for a visit   [address] [place] [address] [address]  [phone]            Discharge Medication List as of 8/19/2023  8:36 PM      CONTINUE these medications which have NOT CHANGED    Details   acetaminophen (TYLENOL) 650 mg CR tablet Take 1 tablet (650 mg total) by mouth every 8 (eight) hours as needed for mild pain, Starting Fri 10/2/2020, Normal      ergocalciferol (VITAMIN D2) 50,000 units Take 1 capsule (50,000 Units total) by mouth once a week, Starting Tue 2/7/2023, Normal      famotidine (PEPCID) 20 mg tablet TAKE 1 TABLET BY MOUTH TWICE A DAY, Normal      multivitamin (THERAGRAN) TABS Take 1 tablet by mouth daily, Historical Med      omeprazole (PriLOSEC) 40 MG capsule TAKE 1 CAPSULE (40 MG TOTAL) BY MOUTH DAILY. , Starting Sat 6/17/2023, Until Thu 12/14/2023, Normal      sucralfate (CARAFATE) 1 g/10 mL suspension Take 10 mL (1 g total) by mouth 4 (four) times a day, Starting Mon 7/10/2023, Normal      traZODone (DESYREL) 50 mg tablet Take 1 tablet (50 mg total) by mouth daily at bedtime, Starting Mon 7/10/2023, Normal      nystatin (MYCOSTATIN) cream Apply topically 2 (two) times a day, Starting Mon 2/6/2023, Normal      polyethylene glycol (GoodSense ClearLax) 17 GM/SCOOP powder Take 17 g by mouth daily for 14 days, Starting Tue 9/20/2022, Until Tue 10/11/2022, Normal      SUMAtriptan (Imitrex) 25 mg tablet Take 1 tablet (25 mg total) by mouth once as needed for migraine for up to 10 doses, Starting Thu 3/30/2023, Normal             No discharge procedures on file.     PDMP Review     None          ED Provider  Electronically Signed by           Sherry Jonas DO  08/19/23 5336

## 2023-08-21 ENCOUNTER — TELEPHONE (OUTPATIENT)
Dept: GASTROENTEROLOGY | Facility: CLINIC | Age: 38
End: 2023-08-21

## 2023-08-22 ENCOUNTER — APPOINTMENT (OUTPATIENT)
Dept: LAB | Facility: HOSPITAL | Age: 38
End: 2023-08-22
Payer: COMMERCIAL

## 2023-08-22 ENCOUNTER — HOSPITAL ENCOUNTER (OUTPATIENT)
Dept: RADIOLOGY | Facility: HOSPITAL | Age: 38
Discharge: HOME | End: 2023-08-22
Payer: COMMERCIAL

## 2023-08-22 ENCOUNTER — OFFICE VISIT (OUTPATIENT)
Dept: OBGYN CLINIC | Facility: CLINIC | Age: 38
End: 2023-08-22

## 2023-08-22 VITALS
HEART RATE: 71 BPM | WEIGHT: 188.6 LBS | BODY MASS INDEX: 34.71 KG/M2 | DIASTOLIC BLOOD PRESSURE: 97 MMHG | HEIGHT: 62 IN | SYSTOLIC BLOOD PRESSURE: 157 MMHG

## 2023-08-22 DIAGNOSIS — K21.9 GASTROESOPHAGEAL REFLUX DISEASE, UNSPECIFIED WHETHER ESOPHAGITIS PRESENT: ICD-10-CM

## 2023-08-22 DIAGNOSIS — N63.20 MASS OF LEFT BREAST, UNSPECIFIED QUADRANT: Primary | ICD-10-CM

## 2023-08-22 DIAGNOSIS — K31.9 MUCOSAL ABNORMALITY OF DUODENUM: ICD-10-CM

## 2023-08-22 DIAGNOSIS — N92.0 MENORRHAGIA WITH REGULAR CYCLE: ICD-10-CM

## 2023-08-22 DIAGNOSIS — M54.2 NECK DISCOMFORT: Primary | ICD-10-CM

## 2023-08-22 LAB — IGA SERPL-MCNC: 170 MG/DL (ref 66–433)

## 2023-08-22 PROCEDURE — 86364 TISS TRNSGLTMNASE EA IG CLAS: CPT

## 2023-08-22 PROCEDURE — 99213 OFFICE O/P EST LOW 20 MIN: CPT | Performed by: OBSTETRICS & GYNECOLOGY

## 2023-08-22 PROCEDURE — 70360 X-RAY EXAM OF NECK: CPT

## 2023-08-22 PROCEDURE — 82784 ASSAY IGA/IGD/IGG/IGM EACH: CPT

## 2023-08-22 PROCEDURE — 36415 COLL VENOUS BLD VENIPUNCTURE: CPT

## 2023-08-22 NOTE — PROGRESS NOTES
PROBLEM GYNECOLOGICAL VISIT    Jaxon Dietz is a 42 y.o. female who presents today with complaint of left breast pain. Her general medical history has been reviewed and she reports it as follows:    Past Medical History:   Diagnosis Date   • Anxiety    • Chest pain     pt denies today//saw cardiology and everything was "normal"/saw cardiology 2019 Dr CHARLES Bustos/JENNIFER MENESES   • Dental crown present    • GERD (gastroesophageal reflux disease)    • Hypertension    • PONV (postoperative nausea and vomiting)    • Precancerous changes of the cervix      Past Surgical History:   Procedure Laterality Date   • CARPAL TUNNEL RELEASE Right    • EXAMINATION UNDER ANESTHESIA N/A 10/4/2020    Procedure: EXAM UNDER ANESTHESIA (EUA), CAUTERIZATION OF CERVIX;  Surgeon: Linda Mitchell MD;  [facility];  Service: Gynecology   • [place] COLPOSCOPY CERVIX VAG LOOP ELTRD BX CERVIX N/A 10/2/2020    Procedure: BIOPSY LEEP CERVIX;  Surgeon: Jet Giles MD;  [facility];  Service: Gynecology   • TUBAL LIGATION       OB History        3    Para   3    Term   3       0    AB        Living   3       SAB   0    IAB   0    Ectopic   0    Multiple   0    Live Births   3               Social History     Tobacco Use   • Smoking status: Never   • Smokeless tobacco: Never   Vaping Use   • Vaping Use: Never used   Substance Use Topics   • Alcohol use: Not Currently   • Drug use: Never     Social History     Substance and Sexual Activity   Sexual Activity Yes   • Partners: Male   • Birth control/protection: Female Sterilization    Comment: Tubal       Current Outpatient Medications   Medication Instructions   • acetaminophen (TYLENOL) 650 mg, Oral, Every 8 hours PRN   • Cyanocobalamin (VITAMIN B-12 PO) Oral   • ergocalciferol (VITAMIN D2) 50,000 Units, Oral, Weekly   • famotidine (PEPCID) 20 mg tablet TAKE 1 TABLET BY MOUTH TWICE A DAY   • GoodSense ClearLax 17 g, Oral, Daily   • multivitamin (THERAGRAN) TABS 1 tablet, Daily   • nystatin (MYCOSTATIN) cream Topical, 2 times daily   • omeprazole (PRILOSEC) 40 mg, Oral, Daily   • sucralfate (CARAFATE) 1 g, Oral, 4 times daily   • SUMAtriptan (IMITREX) 25 mg, Oral, Once as needed   • traZODone (DESYREL) 50 mg, Oral, Daily at bedtime       History of Present Illness:   Patient presents with c/o left breast burning pain sensation and her menses are heavier and painful. Patient states has newly been diagnosed with Celiac Ds. Review of Systems:  Review of Systems   Constitutional:        Left breast pain   Genitourinary: Positive for menstrual problem. Menorrhagia   All other systems reviewed and are negative. Physical Exam:  /97   Pulse 71   Ht 5' 2" (1.575 m)   Wt 85.5 kg (188 lb 9.6 oz)   LMP 08/18/2023   BMI 34.50 kg/m²   Physical Exam  Constitutional:       Appearance: Normal appearance. Neurological:      Mental Status: She is alert. Vitals reviewed. Assessment:   1. Left breast mass   2. Menorrhagia  Plan:   1. Refer to Breast surgeon   2. Imaging ordered: pelvic sonogram   3. Return to office 3wks. Reviewed with patient that test results are available in MyChart immediately, but that they will not necessarily be reviewed by me immediately. Explained that I will review results at my earliest opportunity and contact patient appropriately.

## 2023-08-22 NOTE — PATIENT INSTRUCTIONS
Thank you for your confidence in our team.   We appreciate you and welcome your feedback. If you receive a survey from us, please take a few moments to let us know how we are doing.    Sincerely,  Ga Watts, DO

## 2023-08-23 LAB — TTG IGA SER-ACNC: <2 U/ML (ref 0–3)

## 2023-08-25 ENCOUNTER — OFFICE VISIT (OUTPATIENT)
Dept: GASTROENTEROLOGY | Facility: CLINIC | Age: 38
End: 2023-08-25
Payer: COMMERCIAL

## 2023-08-25 ENCOUNTER — HOSPITAL ENCOUNTER (OUTPATIENT)
Dept: ULTRASOUND IMAGING | Facility: HOSPITAL | Age: 38
End: 2023-08-25
Payer: COMMERCIAL

## 2023-08-25 VITALS
DIASTOLIC BLOOD PRESSURE: 82 MMHG | HEIGHT: 62 IN | BODY MASS INDEX: 34.37 KG/M2 | SYSTOLIC BLOOD PRESSURE: 130 MMHG | TEMPERATURE: 98.2 F | WEIGHT: 186.8 LBS

## 2023-08-25 DIAGNOSIS — R14.0 BLOATING: ICD-10-CM

## 2023-08-25 DIAGNOSIS — K21.9 CHRONIC GERD: ICD-10-CM

## 2023-08-25 DIAGNOSIS — K29.70 GASTRITIS WITHOUT BLEEDING, UNSPECIFIED CHRONICITY, UNSPECIFIED GASTRITIS TYPE: ICD-10-CM

## 2023-08-25 DIAGNOSIS — R10.84 GENERALIZED ABDOMINAL PAIN: Primary | ICD-10-CM

## 2023-08-25 DIAGNOSIS — N92.0 MENORRHAGIA WITH REGULAR CYCLE: ICD-10-CM

## 2023-08-25 DIAGNOSIS — R10.11 RIGHT UPPER QUADRANT ABDOMINAL PAIN: ICD-10-CM

## 2023-08-25 PROCEDURE — 76856 US EXAM PELVIC COMPLETE: CPT

## 2023-08-25 PROCEDURE — 76830 TRANSVAGINAL US NON-OB: CPT

## 2023-08-25 PROCEDURE — 99214 OFFICE O/P EST MOD 30 MIN: CPT | Performed by: INTERNAL MEDICINE

## 2023-08-25 RX ORDER — DICYCLOMINE HYDROCHLORIDE 10 MG/1
10 CAPSULE ORAL 4 TIMES DAILY PRN
Qty: 60 CAPSULE | Refills: 5 | Status: SHIPPED | OUTPATIENT
Start: 2023-08-25 | End: 2023-09-01

## 2023-08-25 NOTE — PROGRESS NOTES
[facility] - Outpatient Follow-up Note  Manjula Pulse 42 y.o. female MRN: [id number]  Encounter: [id number]          ASSESSMENT AND PLAN:      1. Right upper quadrant abdominal pain  2. Bloating  3. GERD  4. Nausea  5. Gastritis    Patient continues to have abdominal pain. Imaging has not shown any disease. Discussed with patient about checking gallbladder function with HIDA scan. Patient agreeable. In the meanwhile, I will start patient on bowel muscle relaxant such as dicyclomine. Told patient to start twice daily and can increase the dose to control the symptoms. Which ever does control symptoms she can take that on a consistent basis. Continue omeprazole. Patient is taking anxiety/depression medication. Discussed with patient about Elavil which she can discuss further with primary care which can improve sleep and mood as well as control abdominal pain as well. RTC 4 months. Dannielle Calhoun MD  Gastroenterology  [facility]  Date: August 25, 2023    - dicyclomine (BENTYL) 10 mg capsule; Take 1 capsule (10 mg total) by mouth 4 (four) times a day as needed (Abdominal pain)  Dispense: 60 capsule; Refill: 5  - NM hepatobiliary w rx; Future    ______________________________________________________________________    SUBJECTIVE: 64-year-old with GERD, elevated BMI, osteoarthritis presents for follow-up. Last visit patient reported abdominal pain, nausea and bloating. Normal bowel movements. No problems with dairy. Ultrasound and CT scan were ordered earlier in 2023 the report of which was reviewed by me today and this showed simple hepatic cyst.  Labs were ordered including CBC and lipase which were reviewed by me today and were normal.  EGD was performed which showed gastritis. Gastric and duodenal biopsy were negative for any significant disease. Patient was started on omeprazole 40 mg daily.   Celiac antibody was ordered and the report was reviewed by me today and the lab was normal.    Patient reports that acid reflux and bloating have improved a lot and controlled with omeprazole. She is taking omeprazole daily. She still continues to have right upper quadrant pain intermittently. Symptoms occur/increase with stress/anxiety. She has been having more headaches lately as well. Bowel movements are normal consistency. No blood in stools. REVIEW OF SYSTEMS IS OTHERWISE NEGATIVE. Historical Information   Past Medical History:   Diagnosis Date   • Anxiety    • Chest pain     pt denies today//saw cardiology and everything was "normal"/saw cardiology 7/2019 Dr CHARLES Bustos/JENNIFER MENESES   • Dental crown present    • GERD (gastroesophageal reflux disease)    • Hypertension    • PONV (postoperative nausea and vomiting)    • Precancerous changes of the cervix      Past Surgical History:   Procedure Laterality Date   • CARPAL TUNNEL RELEASE Right    • EXAMINATION UNDER ANESTHESIA N/A 10/4/2020    Procedure: EXAM UNDER ANESTHESIA (EUA), CAUTERIZATION OF CERVIX;  Surgeon: Ajay Shelton MD;  Location: [facility];  Service: Gynecology   • NH COLPOSCOPY CERVIX VAG LOOP ELTRD [address] N/A 10/2/2020    Procedure: BIOPSY LEEP CERVIX;  Surgeon: Yoanna Michael MD;  Location: [facility];  Service: Gynecology   • TUBAL LIGATION       Social History   Social History     Substance and Sexual Activity   Alcohol Use Not Currently     Social History     Substance and Sexual Activity   Drug Use Never     Social History     Tobacco Use   Smoking Status Never   Smokeless Tobacco Never     Family History   Problem Relation Age of Onset   • Asthma Mother    • Hypertension Mother    • No Known Problems Father    • No Known Problems Sister    • No Known Problems Daughter    • No Known Problems Maternal Grandmother    • No Known Problems Maternal Grandfather    • No Known Problems Paternal Grandmother    • No Known Problems Paternal Grandfather    • No Known Problems Maternal Aunt    • No Known Problems Maternal Aunt    • No Known Problems Paternal Aunt    • No Known Problems Paternal Aunt    • No Known Problems Paternal Aunt    • Diabetes Paternal Aunt    • Breast cancer Neg Hx    • Cancer Neg Hx        Meds/Allergies       Current Outpatient Medications:   •  Cyanocobalamin (VITAMIN B-12 PO)  •  dicyclomine (BENTYL) 10 mg capsule  •  ergocalciferol (VITAMIN D2) 50,000 units  •  famotidine (PEPCID) 20 mg tablet  •  multivitamin (THERAGRAN) TABS  •  omeprazole (PriLOSEC) 40 MG capsule  •  sucralfate (CARAFATE) 1 g/10 mL suspension  •  traZODone (DESYREL) 50 mg tablet  •  acetaminophen (TYLENOL) 650 mg CR tablet  •  nystatin (MYCOSTATIN) cream  •  polyethylene glycol (GoodSense ClearLax) 17 GM/SCOOP powder  •  SUMAtriptan (Imitrex) 25 mg tablet    Allergies   Allergen Reactions   • No Known Allergies            Objective     Blood pressure 130/82, temperature 98.2 °F (36.8 °C), temperature source Tympanic, height 5' 2" (1.575 m), weight 84.7 kg (186 lb 12.8 oz), last menstrual period 08/18/2023, not currently breastfeeding. Body mass index is 34.17 kg/m². PHYSICAL EXAM:      General Appearance:   Alert, cooperative, no distress   HEENT:   Normocephalic, atraumatic, anicteric.     Neck:  Supple, symmetrical, trachea midline   Lungs:   Clear to auscultation bilaterally; no rales, rhonchi or wheezing; respirations unlabored    Heart[de-identified]   Regular rate and rhythm; no murmur, rub, or gallop. Abdomen:   Soft, non-tender, non-distended; normal bowel sounds; no masses, no organomegaly    Genitalia:   Deferred    Rectal:   Deferred    Extremities:  No cyanosis, clubbing or edema    Pulses:  2+ and symmetric    Skin:  No jaundice, rashes, or lesions    Lymph nodes:  No palpable cervical lymphadenopathy        Lab Results:   No visits with results within 1 Day(s) from this visit.    Latest known visit with results is:   Appointment on 08/22/2023 Component Date Value   • IGA 08/22/2023 170    • TISSUE TRANSGLUTAMINASE * 08/22/2023 <2          Radiology Results:   XR neck soft tissue    Result Date: 8/22/2023  Narrative: NECK SOFT TISSUES INDICATION:   K21.9: Gastro-esophageal reflux disease without esophagitis. COMPARISON:  None VIEWS:  XR NECK SOFT TISSUE Images: 2 FINDINGS: The epiglottis and aryepiglottic folds are normal. The adenoids and palatine tonsils are not enlarged. No radiopaque foreign body. There are mild degenerative changes noted in the cervical spine. Impression: Normal neck soft tissues. Mild degenerative changes in the spine. Workstation performed: [id number]     US right upper quadrant    Result Date: 8/19/2023  Narrative: RIGHT UPPER QUADRANT ULTRASOUND INDICATION:     RUQ abd pain. History of chronic right upper quadrant pain; pain again recurred today. COMPARISON: 2/10/2023 ultrasound; 1/31/2023 CT abdomen TECHNIQUE:   Real-time ultrasound of the right upper quadrant was performed with a curvilinear transducer with both volumetric sweeps and still imaging techniques. FINDINGS: PANCREAS:  Visualized portions of the pancreas are within normal limits. AORTA AND IVC:  Visualized portions are normal for patient age. LIVER: Size:  Within normal range. The liver measures 16.9 cm in the midclavicular line. Contour:  Surface contour is smooth. Parenchyma:  Echogenicity and echotexture are within normal limits. 1.2 cm cyst in the left lobe of the liver unchanged. There appears to be a thin, incomplete septation. No liver mass identified. Limited imaging of the main portal vein shows it to be patent and hepatopetal. BILIARY: No gallbladder findings. No intrahepatic biliary dilatation. CBD measures 3.0 mm. No choledocholithiasis. KIDNEY: Right kidney measures 10.6 x 4.6 x 5.7 cm. Volume 145.5 mL Kidney within normal limits. ASCITES:   None. Impression: No gallstones. The common duct is normal in caliber.  Small hepatic cyst Workstation performed: [id number]

## 2023-08-30 ENCOUNTER — OFFICE VISIT (OUTPATIENT)
Dept: FAMILY MEDICINE CLINIC | Facility: CLINIC | Age: 38
End: 2023-08-30

## 2023-08-30 ENCOUNTER — DOCUMENTATION (OUTPATIENT)
Dept: HEMATOLOGY ONCOLOGY | Facility: CLINIC | Age: 38
End: 2023-08-30

## 2023-08-30 VITALS
HEIGHT: 62 IN | DIASTOLIC BLOOD PRESSURE: 94 MMHG | WEIGHT: 189 LBS | SYSTOLIC BLOOD PRESSURE: 142 MMHG | HEART RATE: 72 BPM | TEMPERATURE: 97.8 F | RESPIRATION RATE: 18 BRPM | OXYGEN SATURATION: 99 % | BODY MASS INDEX: 34.78 KG/M2

## 2023-08-30 DIAGNOSIS — E55.9 VITAMIN D DEFICIENCY: ICD-10-CM

## 2023-08-30 DIAGNOSIS — R10.84 GENERALIZED ABDOMINAL PAIN: ICD-10-CM

## 2023-08-30 DIAGNOSIS — K64.4 EXTERNAL HEMORRHOID: ICD-10-CM

## 2023-08-30 DIAGNOSIS — K21.9 GASTROESOPHAGEAL REFLUX DISEASE, UNSPECIFIED WHETHER ESOPHAGITIS PRESENT: Primary | ICD-10-CM

## 2023-08-30 DIAGNOSIS — I10 PRIMARY HYPERTENSION: ICD-10-CM

## 2023-08-30 DIAGNOSIS — M47.9 SPINE DEGENERATION: ICD-10-CM

## 2023-08-30 DIAGNOSIS — F41.9 ANXIETY: ICD-10-CM

## 2023-08-30 PROCEDURE — 99213 OFFICE O/P EST LOW 20 MIN: CPT | Performed by: FAMILY MEDICINE

## 2023-08-30 RX ORDER — ERGOCALCIFEROL 1.25 MG/1
50000 CAPSULE ORAL WEEKLY
Qty: 12 CAPSULE | Refills: 3 | Status: SHIPPED | OUTPATIENT
Start: 2023-08-30

## 2023-08-30 RX ORDER — FAMOTIDINE 20 MG/1
20 TABLET, FILM COATED ORAL 2 TIMES DAILY
Qty: 30 TABLET | Refills: 1 | Status: SHIPPED | OUTPATIENT
Start: 2023-08-30

## 2023-08-30 RX ORDER — HYDROXYZINE HYDROCHLORIDE 10 MG/1
10 TABLET, FILM COATED ORAL EVERY 6 HOURS PRN
Qty: 30 TABLET | Refills: 0 | Status: SHIPPED | OUTPATIENT
Start: 2023-08-30

## 2023-08-30 RX ORDER — HYDROCORTISONE 25 MG/G
CREAM TOPICAL 2 TIMES DAILY
Qty: 28 G | Refills: 0 | Status: SHIPPED | OUTPATIENT
Start: 2023-08-30

## 2023-08-30 RX ORDER — OMEPRAZOLE 40 MG/1
40 CAPSULE, DELAYED RELEASE ORAL DAILY
Qty: 90 CAPSULE | Refills: 1 | Status: SHIPPED | OUTPATIENT
Start: 2023-08-30 | End: 2024-02-26

## 2023-08-30 NOTE — PROGRESS NOTES
Name: Bobette Snellen      : 1985      MRN: [id number]  Encounter Provider: Ev Ernst MD  Encounter Date: 2023   Encounter department: [facility]     1. Gastroesophageal reflux disease, unspecified whether esophagitis present  Assessment & Plan:  Home medication Prilosec 40 mg daily and Pepcid 20 mg daily     - Advised the patient decreasing acidic foods such as tomato sauce, spicy food, no NSAIDs, no alcohol or tobacco smoking  - Advised the patient to eat at least 3-4 hours prior to sleeping  - Provided patient with brief information about GERD    Orders:  -     famotidine (PEPCID) 20 mg tablet; Take 1 tablet (20 mg total) by mouth 2 (two) times a day    2. Spine degeneration  Assessment & Plan:  Per last imaging mild spinal degeneration of the spine.     - Comprehensive Spine program referral    Orders:  -     Ambulatory Referral to Comprehensive Spine Program; Future    3. Primary hypertension  Assessment & Plan:  BP Readings from Last 3 Encounters:   23 142/94   23 130/82   23 157/97     Stable   No home medication, continue to monitor  Low salt diet and regular exercise encouraged       4. Generalized abdominal pain  -     omeprazole (PriLOSEC) 40 MG capsule; Take 1 capsule (40 mg total) by mouth daily    5. Vitamin D deficiency  -     ergocalciferol (VITAMIN D2) 50,000 units; Take 1 capsule (50,000 Units total) by mouth once a week    6. Anxiety  -     hydrOXYzine HCL (ATARAX) 10 mg tablet; Take 1 tablet (10 mg total) by mouth every 6 (six) hours as needed for itching    7. External hemorrhoid  -     hydrocortisone (ANUSOL-HC) 2.5 % rectal cream; Apply topically 2 (two) times a day         Subjective      43 yo female patient with a PMH of GERD, abdominal pain, mild spinal degeneration and hypertension comes to the office for a follow up appt of her chronic conditions.  Patient was recently seen by GI and reports she has been taking her medication Omeprazole, Pepcid as well as addition of Bentyl regularly. Of note, patient reports she has anxiety and wishes to start a medication for it. She reports she takes Trazodone in the evening to help her sleep. Denies fever, chills, headache, blurry vision, soar throat, chest pain, SOB, wheezing, n/v/c/d, abdominal pain or urinary symptoms. Review of Systems   Constitutional: Negative for chills, fatigue and fever. HENT: Negative for congestion, ear pain, rhinorrhea and sore throat. Eyes: Negative for visual disturbance. Respiratory: Negative for cough, chest tightness and shortness of breath. Cardiovascular: Negative for chest pain and palpitations. Gastrointestinal: Negative for abdominal pain, constipation, diarrhea, nausea and vomiting. Genitourinary: Negative for difficulty urinating, dysuria and hematuria. Musculoskeletal: Positive for back pain and neck pain. Negative for arthralgias, gait problem, joint swelling, myalgias and neck stiffness. Skin: Negative for rash. Neurological: Negative for seizures, syncope, light-headedness and headaches. All other systems reviewed and are negative. Current Outpatient Medications on File Prior to Visit   Medication Sig   • [DISCONTINUED] omeprazole (PriLOSEC) 40 MG capsule TAKE 1 CAPSULE (40 MG TOTAL) BY MOUTH DAILY.    • acetaminophen (TYLENOL) 650 mg CR tablet Take 1 tablet (650 mg total) by mouth every 8 (eight) hours as needed for mild pain   • Cyanocobalamin (VITAMIN B-12 PO) Take by mouth   • dicyclomine (BENTYL) 10 mg capsule Take 1 capsule (10 mg total) by mouth 4 (four) times a day as needed (Abdominal pain)   • multivitamin (THERAGRAN) TABS Take 1 tablet by mouth daily   • nystatin (MYCOSTATIN) cream Apply topically 2 (two) times a day (Patient not taking: Reported on 8/19/2023)   • polyethylene glycol (GoodSense ClearLax) 17 GM/SCOOP powder Take 17 g by mouth daily for 14 days   • SUMAtriptan (Imitrex) 25 mg tablet Take 1 tablet (25 mg total) by mouth once as needed for migraine for up to 10 doses (Patient not taking: Reported on 5/18/2023)   • traZODone (DESYREL) 50 mg tablet Take 1 tablet (50 mg total) by mouth daily at bedtime   • [DISCONTINUED] ergocalciferol (VITAMIN D2) 50,000 units Take 1 capsule (50,000 Units total) by mouth once a week   • [DISCONTINUED] famotidine (PEPCID) 20 mg tablet TAKE 1 TABLET BY MOUTH TWICE A DAY   • [DISCONTINUED] sucralfate (CARAFATE) 1 g/10 mL suspension Take 10 mL (1 g total) by mouth 4 (four) times a day       Objective     /94 (BP Location: Right arm, Patient Position: Sitting, Cuff Size: Standard)   Pulse 72   Temp 97.8 °F (36.6 °C) (Temporal)   Resp 18   Ht 5' 2" (1.575 m)   Wt 85.7 kg (189 lb)   LMP 08/18/2023   SpO2 99%   BMI 34.57 kg/m²     Physical Exam  Vitals and nursing note reviewed. Constitutional:       General: She is not in acute distress. Appearance: Normal appearance. She is not toxic-appearing. HENT:      Head: Normocephalic and atraumatic. Right Ear: External ear normal.      Left Ear: External ear normal.      Nose: Nose normal. No congestion or rhinorrhea. Mouth/Throat:      Mouth: Mucous membranes are moist.      Pharynx: Oropharynx is clear. Eyes:      Extraocular Movements: Extraocular movements intact. Pupils: Pupils are equal, round, and reactive to light. Neck:      Vascular: No carotid bruit. Cardiovascular:      Rate and Rhythm: Normal rate and regular rhythm. Pulses: Normal pulses. Heart sounds: Normal heart sounds. No murmur heard. No gallop. Pulmonary:      Effort: Pulmonary effort is normal. No respiratory distress. Breath sounds: Normal breath sounds. No stridor. No wheezing. Abdominal:      General: Abdomen is flat. Bowel sounds are normal. There is no distension. Palpations: Abdomen is soft. Tenderness: There is no abdominal tenderness. Hernia: No hernia is present. Musculoskeletal:         General: Tenderness (lumbar back, left side, no radiation to the lower extremitties. skin intact) present. Normal range of motion. Cervical back: Normal range of motion and neck supple. Right lower leg: No edema. Left lower leg: No edema. Lymphadenopathy:      Cervical: No cervical adenopathy. Skin:     General: Skin is warm. Coloration: Skin is not jaundiced. Findings: No erythema or rash. Neurological:      General: No focal deficit present. Mental Status: She is alert and oriented to person, place, and time.        James Quinones MD

## 2023-08-30 NOTE — PATIENT INSTRUCTIONS
Please call Central Scheduling to schedule your appointment for Comprehensive Spine Program.   Number:       OWX GKMNF NNYSH a programacion central para programar owens isabel.     Ext [phone]

## 2023-08-30 NOTE — ASSESSMENT & PLAN NOTE
BP Readings from Last 3 Encounters:   08/30/23 142/94   08/25/23 130/82   08/22/23 157/97     Stable   No home medication, continue to monitor  Low salt diet and regular exercise encouraged

## 2023-08-30 NOTE — ASSESSMENT & PLAN NOTE
Home medication Prilosec 40 mg daily and Pepcid 20 mg daily     - Advised the patient decreasing acidic foods such as tomato sauce, spicy food, no NSAIDs, no alcohol or tobacco smoking  - Advised the patient to eat at least 3-4 hours prior to sleeping  - Provided patient with brief information about GERD

## 2023-08-30 NOTE — PROGRESS NOTES
Intake received- chart reviewed for external records retrieval.    Patient is scheduled on 9-28-23 @ 1:00 in [facility] office with Pedro Swartz    Due to Dx on patient's referral, no records retrieval needed by Oncology Care Coordination.

## 2023-09-01 DIAGNOSIS — R10.84 GENERALIZED ABDOMINAL PAIN: ICD-10-CM

## 2023-09-01 RX ORDER — DICYCLOMINE HYDROCHLORIDE 10 MG/1
CAPSULE ORAL
Qty: 360 CAPSULE | Refills: 1 | Status: SHIPPED | OUTPATIENT
Start: 2023-09-01

## 2023-09-06 ENCOUNTER — EVALUATION (OUTPATIENT)
Dept: PHYSICAL THERAPY | Facility: REHABILITATION | Age: 38
End: 2023-09-06
Payer: COMMERCIAL

## 2023-09-06 DIAGNOSIS — M54.2 NECK DISCOMFORT: Primary | ICD-10-CM

## 2023-09-06 PROCEDURE — 97110 THERAPEUTIC EXERCISES: CPT | Performed by: PHYSICAL THERAPIST

## 2023-09-06 PROCEDURE — 97162 PT EVAL MOD COMPLEX 30 MIN: CPT | Performed by: PHYSICAL THERAPIST

## 2023-09-06 NOTE — PROGRESS NOTES
PT Evaluation     Today's date: 2023  Patient name: Jon Partida  : 1985  MRN: [id number]  Referring provider: Prachi Alvarez, *  Dx:   Encounter Diagnosis     ICD-10-CM    1. Neck discomfort  M54.2 Ambulatory Referral to Physical Therapy                     Assessment  Assessment details: Patient presents with decreased cervical ROM, decreased UE/scapular strength, postural deficits and decreased function secondary to chronic cervical pain. Patient would benefit from skilled PT intervention to address these issues and to maximize function. Thank you for the referral.  Impairments: abnormal or restricted ROM, activity intolerance, impaired physical strength, lacks appropriate home exercise program, pain with function and poor posture   Understanding of Dx/Px/POC: good   Prognosis: good    Goals  Short Term:  Pt will report decreased levels of pain by at least 2 subjective ratings in 4 weeks  Pt will demonstrate improved ROM by at least 10 degrees in 4 weeks  Pt will demonstrate improved strength by 1/2 grade MMT in 4 weeks  Long Term:   Pt will be independent in their HEP in 8 weeks  Pt will demonstrate improved FOTO, > predicted level  Pt will be independent with all ADL's  Pt will be able to sleep through the night without waking due to cervical spine pain    Plan  Plan details: Patient was educated in [address] [place] [address] and Plan of Care. All questions were answered to pt's satisfaction.     Patient would benefit from: skilled physical therapy  Planned therapy interventions: joint mobilization, manual therapy, neuromuscular re-education, body mechanics training, patient education, postural training, stretching, strengthening, therapeutic activities, therapeutic exercise, flexibility, graded exercise and home exercise program  Frequency: 2x week  Duration in weeks: 8  Plan of Care beginning date: 2023  Plan of Care expiration date: 10/20/2023  Treatment plan discussed with: patient        Subjective Evaluation    History of Present Illness  Mechanism of injury: Pt is a 42 y.o female with a c/o ongoing neck discomfort for years. Pt also notes having headaches that can start in occiput region. Pt also notes she has a lot of stress and anxiety that contributes to this. Pt saw PCP recently and had radiographs, which showed mild degenerative changes. Pt is now referred for PT. Pt reports pain/difficulty with lifting things, OH reaching (mild), sleep (intermittent disturbance; more pain with sleeping on her left side). Patient Goals  Patient goals for therapy: decreased pain    Pain  At best pain ratin  At worst pain rating: 10  Location: b/l cervical spine, occiput, clavicles  Quality: tight and throbbing  Relieving factors: ice      Diagnostic Tests  X-ray: abnormal  Treatments  No previous or current treatments        Objective     Concurrent Complaints      Additional Special Questions  Patient denies loss of bowel/bladder control, saddle anesthesia, unexplained weight loss, history of cancer. Patient denies diplopia, dysarthria, dysphagia, drop attacks, nausea. Pt reports headaches 2-3x/week. Pt notes intermittent dizziness when she has increased anxiety or fatigue. Pt reports intermittent numbness in her hands due to carpal tunnel (has had R CTR). Postural Observations  Seated posture: poor    Additional Postural Observation Details  Slouched sitting posture, fwd head and rounded shoulders noted.       Tenderness     Additional Tenderness Details  None noted    Neurological Testing     Reflexes   Left   Biceps (C5/C6): normal (2+)  Brachioradialis (C6): normal (2+)  Triceps (C7): normal (2+)  Cruz's reflex: negative    Right   Biceps (C5/C6): normal (2+)  Brachioradialis (C6): normal (2+)  Triceps (C7): normal (2+)  Cruz's reflex: negative    Active Range of Motion   Cervical/Thoracic Spine       Cervical    Flexion:  WFL  Extension:  WFL  Left lateral flexion: 17 degrees      Right lateral flexion: 25 degrees     with pain  Left rotation: 52 degrees  Right rotation: 55 degrees    with pain    Passive Range of Motion     Additional Passive Range of Motion Details  Shoulder AROM wfl b/l with some tightness with functional IR BTB. Joint Play   Joints within functional limits: C1, C2, C3, C4, C5, C6 and C7     Strength/Myotome Testing   Cervical Spine     Left   Interossei strength (t1): 5    Right   Interossei strength (t1): 5    Left Shoulder     Planes of Motion   Flexion: 5   Abduction: 5   External rotation at 0°: 4+   Internal rotation at 0°: 5     Isolated Muscles   Lower trapezius: 3+   Middle trapezius: 4-     Right Shoulder     Planes of Motion   Flexion: 5   Abduction: 5   External rotation at 0°: 4+   Internal rotation at 0°: 5     Isolated Muscles   Lower trapezius: 4-   Middle trapezius: 4     Left Elbow   Flexion: 5  Extension: 5    Right Elbow   Flexion: 5  Extension: 5    Left Wrist/Hand   Thumb extension: 5    Right Wrist/Hand   Thumb extension: 5    Tests   Cervical   Positive neck flexor muscle endurance test.  Negative vertical compression, cervical distraction, alar ligament test and Sharp-Sarmad test.     Left   Negative Spurling's Test A and cervical flexion-rotation test.     Right   Negative Spurling's Test A and cervical flexion-rotation test.     Lumbar   Negative vertical compression.      Additional Tests Details  (-)scap assistance  DNF=17"             Precautions: anxiety, HTN, chronic pain      Manuals 9/6            SOR             B/l UT stretch                                       Neuro Re-Ed             TB/dilma shoulder ext             TB/dilma rows             TB b/l ER w/scap squeeze             Supine DNF                                                    Ther Ex             UBE             Serratus wall slides             Shrugs w/ c/s rot             C/S retractions seated             SNAGS b/l rot                          Pt education+ HEP instruction TP 8 mins                         Ther Activity             pball posture             pball shoulder flex/scap             Gait Training                                       Modalities

## 2023-09-07 ENCOUNTER — HOSPITAL ENCOUNTER (OUTPATIENT)
Dept: NUCLEAR MEDICINE | Facility: HOSPITAL | Age: 38
Discharge: HOME | End: 2023-09-07
Attending: INTERNAL MEDICINE
Payer: COMMERCIAL

## 2023-09-07 DIAGNOSIS — R10.11 RIGHT UPPER QUADRANT ABDOMINAL PAIN: ICD-10-CM

## 2023-09-07 PROCEDURE — A9537 TC99M MEBROFENIN: HCPCS

## 2023-09-07 PROCEDURE — 78227 HEPATOBIL SYST IMAGE W/DRUG: CPT

## 2023-09-07 PROCEDURE — G1004 CDSM NDSC: HCPCS

## 2023-09-07 RX ORDER — SINCALIDE 5 UG/5ML
0.02 INJECTION, POWDER, LYOPHILIZED, FOR SOLUTION INTRAVENOUS
Status: COMPLETED | OUTPATIENT
Start: 2023-09-07 | End: 2023-09-07

## 2023-09-07 RX ADMIN — SINCALIDE 1.7 MCG: 5 INJECTION, POWDER, LYOPHILIZED, FOR SOLUTION INTRAVENOUS at 11:30

## 2023-09-12 DIAGNOSIS — K21.9 GASTROESOPHAGEAL REFLUX DISEASE, UNSPECIFIED WHETHER ESOPHAGITIS PRESENT: ICD-10-CM

## 2023-09-13 ENCOUNTER — OFFICE VISIT (OUTPATIENT)
Dept: PHYSICAL THERAPY | Facility: REHABILITATION | Age: 38
End: 2023-09-13
Payer: COMMERCIAL

## 2023-09-13 ENCOUNTER — TELEPHONE (OUTPATIENT)
Age: 38
End: 2023-09-13

## 2023-09-13 DIAGNOSIS — M54.2 NECK DISCOMFORT: Primary | ICD-10-CM

## 2023-09-13 DIAGNOSIS — R10.11 RIGHT UPPER QUADRANT ABDOMINAL PAIN: Primary | ICD-10-CM

## 2023-09-13 PROCEDURE — 97112 NEUROMUSCULAR REEDUCATION: CPT | Performed by: PHYSICAL THERAPIST

## 2023-09-13 PROCEDURE — 97140 MANUAL THERAPY 1/> REGIONS: CPT | Performed by: PHYSICAL THERAPIST

## 2023-09-13 PROCEDURE — 97110 THERAPEUTIC EXERCISES: CPT | Performed by: PHYSICAL THERAPIST

## 2023-09-13 RX ORDER — FAMOTIDINE 20 MG/1
20 TABLET, FILM COATED ORAL 2 TIMES DAILY
Qty: 180 TABLET | Refills: 1 | OUTPATIENT
Start: 2023-09-13

## 2023-09-13 NOTE — TELEPHONE ENCOUNTER
Pt call transferred to nurses line     Pt called with right sided abdominal pain that has been chronic for 8 months but pt stated over the last 3 days, pain has worsened. Pain is intermittent and she rates it 9/10. Denies all other symptoms. Bentyl is not helping with pain. Workup thus far has been normal. Pt is looking for advice on next steps. I advised ED multiple times as there is not much I can offer for 9/10 pain. Pt declined.  I explained I would reach out to provider for further advice but if her symptoms exacerbate, she should go to ED

## 2023-09-13 NOTE — PROGRESS NOTES
Daily Note     Today's date: 2023  Patient name: Jon Partida  : 1985  MRN: [id number]  Referring provider: Prachi Alvarez, *  Dx:   Encounter Diagnosis     ICD-10-CM    1. Neck discomfort  M54.2                      Subjective: Pt reports her neck is not feeling bad today. Pt continues to report abdominal pain in which she has been evaluated for by Specialist.  Pt states she has not done much of her HEP due to being busy. Objective: See treatment diary below      Assessment: Tolerated treatment well. Pt notes intermittent muscle soreness with initiation of exercises. Pt denies any pain post session. Monitor response NV. Patient would benefit from continued PT      Plan: Continue per plan of care.       Precautions: anxiety, HTN, chronic pain      Manuals            SOR  TP 4'           B/l UT stretch  TP 4'                                     Neuro Re-Ed             TB/dilma shoulder ext  9# 3" 2x10           TB/dilma rows  9# 3" 2x10           TB b/l ER w/scap squeeze  otb 3" x10           Supine DNF                                                    Ther Ex             UBE  90rpm 3'/3'           Serratus wall slides  5"x10           Shrugs w/ c/s rot  3# x10 ea           C/S retractions seated  5"x10           SNAGS b/l rot  5"x10 ea                        Pt education+ HEP instruction TP 8 mins                         Ther Activity             pball posture  2'           pball shoulder flex/scap  x10 ea           Gait Training                                       Modalities

## 2023-09-14 RX ORDER — HYOSCYAMINE SULFATE 0.125 MG
0.12 TABLET ORAL EVERY 4 HOURS PRN
Qty: 30 TABLET | Refills: 2 | Status: SHIPPED | OUTPATIENT
Start: 2023-09-14

## 2023-09-14 NOTE — TELEPHONE ENCOUNTER
Agree unfortunately not much to do for severe pain outside of the ER, can send in levsin to try as an alternative to bentyl

## 2023-09-15 ENCOUNTER — OFFICE VISIT (OUTPATIENT)
Dept: PHYSICAL THERAPY | Facility: REHABILITATION | Age: 38
End: 2023-09-15
Payer: COMMERCIAL

## 2023-09-15 DIAGNOSIS — M54.2 NECK DISCOMFORT: Primary | ICD-10-CM

## 2023-09-15 PROCEDURE — 97140 MANUAL THERAPY 1/> REGIONS: CPT

## 2023-09-15 PROCEDURE — 97112 NEUROMUSCULAR REEDUCATION: CPT

## 2023-09-15 PROCEDURE — 97110 THERAPEUTIC EXERCISES: CPT

## 2023-09-15 NOTE — PROGRESS NOTES
Daily Note     Today's date: 9/15/2023  Patient name: Moose Hale  : 1985  MRN: [id number]  Referring provider: Jeremie Espinoza, *  Dx:   Encounter Diagnosis     ICD-10-CM    1. Neck discomfort  M54.2                      Subjective: Pt reported increased soreness today. She noted performing a different job at work with a lot of standing that causes some increased symptoms. Objective: See treatment diary below      Assessment: Tolerated treatment well. Patient demonstrated fatigue post treatment and exhibited good technique with therapeutic exercises. VC's needed for correct technique throughout session. Mild restrictions noted with manuals; L >R. Some relief noted post session. Plan: Continue per plan of care. Precautions: anxiety, HTN, chronic pain      Manuals 9/6 9/13 9/15          SOR  TP 4' TC 6' +TPR          B/l UT stretch  TP 4' TC 4'                                    Neuro Re-Ed             TB/dilma shoulder ext  9# 3" 2x10 9# 3" 2x10          TB/dilma rows  9# 3" 2x10 9# 3" 2x10          TB b/l ER w/scap squeeze  otb 3" x10 otb 3"x10          Supine DNF                                                    Ther Ex             UBE  90rpm 3'/3' 90 rpm 3'/3'          Serratus wall slides  5"x10 5"x10          Shrugs w/ c/s rot  3# x10 ea 3# x10 ea. C/S retractions seated  5"x10 5"x10          SNAGS b/l rot  5"x10 ea 5"x10 ea. Pt education+ HEP instruction TP 8 mins                         Ther Activity             pball posture  2' 2'          pball shoulder flex/scap  x10 ea x10 ea.           Gait Training                                       Modalities

## 2023-09-18 ENCOUNTER — OFFICE VISIT (OUTPATIENT)
Dept: PHYSICAL THERAPY | Facility: REHABILITATION | Age: 38
End: 2023-09-18
Payer: COMMERCIAL

## 2023-09-18 ENCOUNTER — APPOINTMENT (OUTPATIENT)
Dept: PHYSICAL THERAPY | Facility: REHABILITATION | Age: 38
End: 2023-09-18
Payer: COMMERCIAL

## 2023-09-18 DIAGNOSIS — M54.2 NECK DISCOMFORT: Primary | ICD-10-CM

## 2023-09-18 PROCEDURE — 97530 THERAPEUTIC ACTIVITIES: CPT

## 2023-09-18 PROCEDURE — 97110 THERAPEUTIC EXERCISES: CPT

## 2023-09-18 PROCEDURE — 97140 MANUAL THERAPY 1/> REGIONS: CPT

## 2023-09-18 PROCEDURE — 97112 NEUROMUSCULAR REEDUCATION: CPT

## 2023-09-18 NOTE — PROGRESS NOTES
Daily Note     Today's date: 2023  Patient name: Felicia Velasquez  : 1985  MRN: [id number]  Referring provider: Tena Smith, *  Dx:   Encounter Diagnosis     ICD-10-CM    1. Neck discomfort  M54.2           Start Time: 0750  Stop Time: 0830  Total time in clinic (min): 40 minutes    Subjective: Pt reports neck pain has been improving with therapy, "when I feel a lot of stress or overwork myself I start to get bad migraines". Objective: See treatment diary below      Assessment: Tolerated treatment well. Neck manual helped to decrease neck stiffness and pain with active movement to continue tx. Patient able to activated DNF after verbal and manual cueing, supine DNF causes slight discomfort in anterior neck during contraction, patient instructed to contract just enough so they could be tolerated. Upper back strengthening and motor control exercises tolerated well. Pt stated facepull + OH press with TB helped get down the movement of doing daily activities like house chores. Patient would benefit from continued PT      Plan: Progress treatment as tolerated. Precautions: anxiety, HTN, chronic pain    ith  Manuals 9/6 9/13 9/15 9/18         SOR  TP 4' TC 6' +TPR MC 6' +TPR         B/l UT stretch  TP 4' TC 4' [facility] 4'                                   Neuro Re-Ed             TB/dilma shoulder ext  9# 3" 2x10 9# 3" 2x10 gtb 3" 2x10         TB/dilma rows  9# 3" 2x10 9# 3" 2x10 gtb 3" 2x10         TB b/l ER w/scap squeeze  otb 3" x10 otb 3"x10 gtb 3"x10         Supine DNF    5"x10         TB Facepull + OH    10x otb                                   Ther Ex             UBE  90rpm 3'/3' 90 rpm 3'/3' 90 rpm 3'/3'         Serratus wall slides  5"x10 5"x10          Shrugs w/ c/s rot  3# x10 ea 3# x10 ea. 4# x10 ea. C/S retractions seated  5"x10 5"x10          SNAGS b/l rot  5"x10 ea 5"x10 ea.                        Pt education+ HEP instruction TP 8 mins Ther Activity             pball posture  2' 2'          pball shoulder flex/scap  x10 ea x10 ea. 10x ea.          Gait Training                                       Modalities

## 2023-09-20 ENCOUNTER — OFFICE VISIT (OUTPATIENT)
Dept: OBGYN CLINIC | Facility: CLINIC | Age: 38
End: 2023-09-20

## 2023-09-20 VITALS
HEIGHT: 62 IN | DIASTOLIC BLOOD PRESSURE: 91 MMHG | BODY MASS INDEX: 34.37 KG/M2 | WEIGHT: 186.8 LBS | HEART RATE: 66 BPM | SYSTOLIC BLOOD PRESSURE: 138 MMHG

## 2023-09-20 DIAGNOSIS — N93.9 ABNORMAL UTERINE BLEEDING (AUB): ICD-10-CM

## 2023-09-20 DIAGNOSIS — Z71.2 ENCOUNTER TO DISCUSS TEST RESULTS: Primary | ICD-10-CM

## 2023-09-20 PROCEDURE — 99213 OFFICE O/P EST LOW 20 MIN: CPT | Performed by: OBSTETRICS & GYNECOLOGY

## 2023-09-20 NOTE — PROGRESS NOTES
PROBLEM GYNECOLOGICAL VISIT    Linda Mckeon is a 42 y.o. female who presents today for follow up. Her general medical history has been reviewed and she reports it as follows:    Past Medical History:   Diagnosis Date   • Anxiety    • Chest pain     pt denies today//saw cardiology and everything was "normal"/saw cardiology 2019 Dr CHARLES Bustos/JENNIFER MENESES   • Dental crown present    • GERD (gastroesophageal reflux disease)    • Hypertension    • PONV (postoperative nausea and vomiting)    • Precancerous changes of the cervix      Past Surgical History:   Procedure Laterality Date   • CARPAL TUNNEL RELEASE Right    • EXAMINATION UNDER ANESTHESIA N/A 10/4/2020    Procedure: EXAM UNDER ANESTHESIA (EUA), CAUTERIZATION OF CERVIX;  Surgeon: Sherren Sins, MD;  [facility];  Service: Gynecology   • KS COLPOSCOPY CERVIX VAG LOOP ELTRD BX CERVIX N/A 10/2/2020    Procedure: BIOPSY LEEP CERVIX;  Surgeon: Zurdo Mendoza MD;  Location[facility];  Service: Gynecology   • TUBAL LIGATION       OB History        3    Para   3    Term   3       0    AB        Living   3       SAB   0    IAB   0    Ectopic   0    Multiple   0    Live Births   3               Social History     Tobacco Use   • Smoking status: Never   • Smokeless tobacco: Never   Vaping Use   • Vaping Use: Never used   Substance Use Topics   • Alcohol use: Not Currently   • Drug use: Never     Social History     Substance and Sexual Activity   Sexual Activity Yes   • Partners: Male   • Birth control/protection: Female Sterilization    Comment: Tubal       Current Outpatient Medications   Medication Instructions   • acetaminophen (TYLENOL) 650 mg, Oral, Every 8 hours PRN   • Cyanocobalamin (VITAMIN B-12 PO) Oral   • ergocalciferol (VITAMIN D2) 50,000 Units, Oral, Weekly   • famotidine (PEPCID) 20 mg, Oral, 2 times daily   • GoodSense ClearLax 17 g, Oral, Daily   • hydrocortisone (ANUSOL-HC) 2.5 % rectal cream Topical, 2 times daily   • hydrOXYzine HCL (ATARAX) 10 mg, Oral, Every 6 hours PRN   • hyoscyamine (LEVSIN) 0.125 mg, Oral, Every 4 hours PRN   • multivitamin (THERAGRAN) TABS 1 tablet, Oral, Daily   • nystatin (MYCOSTATIN) cream Topical, 2 times daily   • omeprazole (PRILOSEC) 40 mg, Oral, Daily   • SUMAtriptan (IMITREX) 25 mg, Oral, Once as needed   • traZODone (DESYREL) 50 mg, Oral, Daily at bedtime       History of Present Illness:   Patient presents for follow up s/p pelvic sonogram and GI consultation with c/o experienced vaginal bleeding 9/13-9/17 unsure if it was her period since it started earlier than the usually and started out with bright red blood when it is usually brownish. Patient states the RUQ did resolve with her bleeding however patient was on medication for irritable bowel. Review of Systems:  Review of Systems   Genitourinary: Positive for menstrual problem. AUB   All other systems reviewed and are negative. Physical Exam:  /91   Pulse 66   Ht 5' 2" (1.575 m)   Wt 84.7 kg (186 lb 12.8 oz)   LMP 09/13/2023   BMI 34.17 kg/m²   Physical Exam  Constitutional:       Appearance: Normal appearance. Neurological:      Mental Status: She is alert. Vitals reviewed. Discussion:  Reviewed GI 's note. Discuss with patient that the likely reason why her pain has resolved is due to the medications prescribed by the GI and she should continue with the medications. Recommend a pelvic sonogram for further evaluation of possible AUB vs her menses. Assessment:   1. AUB    Plan:   1. Imaging ordered: pelvic sonogram   2. Return to office 3wks. Reviewed with patient that [facility], but that they will not necessarily be reviewed by me immediately. Explained that I will review results at my earliest opportunity and contact patient appropriately.

## 2023-09-20 NOTE — PATIENT INSTRUCTIONS
Higinio por owens confianza en nuestro equipo. Florentino Francis y agradecemos kavitha comentarios. Si recibe abdias encuesta nuestra, tómese unos momentos para informarnos cómo estamos.    Sinceramente,  [facility], DO

## 2023-09-21 ENCOUNTER — OFFICE VISIT (OUTPATIENT)
Dept: PHYSICAL THERAPY | Facility: REHABILITATION | Age: 38
End: 2023-09-21
Payer: COMMERCIAL

## 2023-09-21 DIAGNOSIS — M54.2 NECK DISCOMFORT: Primary | ICD-10-CM

## 2023-09-21 PROCEDURE — 97140 MANUAL THERAPY 1/> REGIONS: CPT

## 2023-09-21 PROCEDURE — 97112 NEUROMUSCULAR REEDUCATION: CPT

## 2023-09-21 PROCEDURE — 97110 THERAPEUTIC EXERCISES: CPT

## 2023-09-21 NOTE — PROGRESS NOTES
Daily Note     Today's date: 2023  Patient name: Linda Mckeon  : 1985  MRN: [id number]  Referring provider: Maile Morin, *  Dx:   Encounter Diagnosis     ICD-10-CM    1. Neck discomfort  M54.2           Start Time: 3460  Stop Time:   Total time in clinic (min): 40 minutes    Subjective: Pt feel that she is feeling alright and that her neck continues to bother her. She mentioned that this may be due to stress. Objective: See treatment diary below      Assessment: Tolerated treatment well. Patient would benefit from continued PT. Pt was introduced to prone IYT to improve cervico-thoracic postural endurance and strength and responded well without increase in pain. She continues to respond well to MT focused on SOR and UT stretch as she notes decreased pain following. She was able to tolerate increased resistance with DB shrugs which displays improving postural strength. Continue to progress as tolerated. Supervised by Jen Marcelino DPT from [id number] 1:1 with Fariba Meza DPT entirety rest of tx. Plan: Continue per plan of care. Progress treatment as tolerated. Precautions: anxiety, HTN, chronic pain    ith  Manuals 9/6 9/13 9/15 9/18 9/21        SOR  TP 4' TC 6' +TPR MC 6' +TPR MC 5'        B/l UT stretch  TP 4' TC 4' [facility] 4' [facility] 3'                                  Neuro Re-Ed             TB/dilma shoulder ext  9# 3" 2x10 9# 3" 2x10 gtb 3" 2x10 gtb 3" 2x10        TB/dilma rows  9# 3" 2x10 9# 3" 2x10 gtb 3" 2x10 gtb 3" 2x10        TB b/l ER w/scap squeeze  otb 3" x10 otb 3"x10 gtb 3"x10 gtb 3"x10        Supine DNF    5"x10 5"x10        TB Facepull + OH    10x otb         Prone IT     10x, 3s hold ea                     Ther Ex             UBE  90rpm 3'/3' 90 rpm 3'/3' 90 rpm 3'/3' 90 rpm 3'/3'        Serratus wall slides  5"x10 5"x10          Shrugs w/ c/s rot  3# x10 ea 3# x10 ea. 4# x10 ea.  12#, shrugs 2x10        C/S retractions seated  5"x10 5"x10  5"x10 SNAGS b/l rot  5"x10 ea 5"x10 ea. Pt education+ HEP instruction TP 8 mins                         Ther Activity             pball posture  2' 2'          pball shoulder flex/scap  x10 ea x10 ea. 10x ea.          Gait Training                                       Modalities

## 2023-09-22 ENCOUNTER — APPOINTMENT (OUTPATIENT)
Dept: PHYSICAL THERAPY | Facility: REHABILITATION | Age: 38
End: 2023-09-22
Payer: COMMERCIAL

## 2023-09-26 ENCOUNTER — APPOINTMENT (OUTPATIENT)
Dept: PHYSICAL THERAPY | Facility: REHABILITATION | Age: 38
End: 2023-09-26
Payer: COMMERCIAL

## 2023-09-27 ENCOUNTER — HOSPITAL ENCOUNTER (OUTPATIENT)
Dept: ULTRASOUND IMAGING | Facility: HOSPITAL | Age: 38
Discharge: HOME | End: 2023-09-27
Payer: COMMERCIAL

## 2023-09-27 ENCOUNTER — APPOINTMENT (OUTPATIENT)
Dept: PHYSICAL THERAPY | Facility: REHABILITATION | Age: 38
End: 2023-09-27
Payer: COMMERCIAL

## 2023-09-27 DIAGNOSIS — N93.9 ABNORMAL UTERINE BLEEDING (AUB): ICD-10-CM

## 2023-09-27 PROCEDURE — 76856 US EXAM PELVIC COMPLETE: CPT

## 2023-09-27 PROCEDURE — 76830 TRANSVAGINAL US NON-OB: CPT

## 2023-09-28 ENCOUNTER — CONSULT (OUTPATIENT)
Dept: SURGICAL ONCOLOGY | Facility: CLINIC | Age: 38
End: 2023-09-28
Payer: COMMERCIAL

## 2023-09-28 ENCOUNTER — APPOINTMENT (OUTPATIENT)
Dept: PHYSICAL THERAPY | Facility: REHABILITATION | Age: 38
End: 2023-09-28
Payer: COMMERCIAL

## 2023-09-28 VITALS
HEIGHT: 62 IN | WEIGHT: 188.2 LBS | TEMPERATURE: 97.6 F | DIASTOLIC BLOOD PRESSURE: 94 MMHG | SYSTOLIC BLOOD PRESSURE: 132 MMHG | OXYGEN SATURATION: 99 % | BODY MASS INDEX: 34.63 KG/M2 | HEART RATE: 95 BPM

## 2023-09-28 DIAGNOSIS — N63.20 MASS OF LEFT BREAST, UNSPECIFIED QUADRANT: Primary | ICD-10-CM

## 2023-09-28 DIAGNOSIS — N64.4 BREAST PAIN, LEFT: ICD-10-CM

## 2023-09-28 PROBLEM — N63.0 BREAST MASS: Status: ACTIVE | Noted: 2023-09-28

## 2023-09-28 PROCEDURE — 99244 OFF/OP CNSLTJ NEW/EST MOD 40: CPT

## 2023-09-28 NOTE — PROGRESS NOTES
Surgical Oncology Follow Up       [address] [facility] [id number]    Lucrecia Kilgore  1985  [id number]  [facility] [place] [id number]    Chief Complaint   Patient presents with   • Consult       Assessment/Plan:  1. Mass of left breast, unspecified quadrant  - follow up imaging scheduled November     2. Breast pain, left  - EPO, warm compress, aleve, decrease salt and caffeine        Discussion/Summary: Patient is a 42year old female presenting for a consult for left breast "mass" and left breast pain. She was referred by her gyn. She has been having left breast pain with a hx of right breast pain as well. She had a dx b/l mammogram and u/s left breast on 5/16/23 which was BIRADS 3 category 3 density. Left breast mammogram showed an 8 mm focal asymmetry seen in the outer central region at the 3 o'clock position. U/S showed that at the 3 o'clock postition 7 cm from the nipple there is an 8 x 6 x 3 mm lesion suggestive of complicated cyst vs mass. 6 month f/u mammo and u/s is recommended to ensure stability. On exam it appears that patient was experiencing fibrocystic breast pains. She states that breast pain is intermittent and that it feels like a burning sensation at time. She also has arthritic pains in the left shoulder. There were no concerns on her exam. I am recommending EPO, warm compress, aleve as needed, and to decrease salt and caffeine intake. I will call her with the results of her imaging in November. I stated if this cyst grows in size she could have an aspiration. She was understanding. She was instructed to call with any questions or concerns. All questions were answered today.       History of Present Illness:     Oncology History    No history exists.        -Interval History: : Patient is a 42year old female presenting for a consult for left breast "mass" and left breast pain. She had a dx b/l mammogram and u/s left breast on 5/16/23 which was BIRADS 3 category 3 density. Left breast mammogram showed an 8 mm focal asymmetry seen in the outer central region at the 3 o'clock position. U/S showed that at the 3 o'clock postition 7 cm from the nipple there is an 8 x 6 x 3 mm lesion suggestive of complicated cyst vs mass. 6 month f/u mammo and u/s is recommended to ensure stability. She states that breast pain is intermittent and that it feels like a burning sensation at time. She also has arthritic pains in the left shoulder. She has never had hx of breast cancer. She has no family history of cancer. Her age of first period was 3. She's had 3 pregnancies and 3 live births. Age her first child was born was 23. She is pre-menopausal. She is not of Ashkenazi [employer] descent. Review of Systems:  Review of Systems   Constitutional: Positive for activity change. Negative for appetite change, fatigue and unexpected weight change. Eyes: Positive for photophobia. Respiratory: Negative for cough and shortness of breath. Cardiovascular: Positive for chest pain (at times). Gastrointestinal: Positive for abdominal pain and nausea. Negative for diarrhea and vomiting. Endocrine: Positive for heat intolerance. Genitourinary: Positive for pelvic pain. Musculoskeletal: Positive for arthralgias, back pain, joint swelling, myalgias and neck pain. Skin: Positive for rash. Neurological: Positive for dizziness. Negative for weakness and headaches. Hematological: Negative for adenopathy. Psychiatric/Behavioral: Positive for decreased concentration and sleep disturbance. The patient is nervous/anxious.         Patient Active Problem List   Diagnosis   • Obesity   • Hypertension   • Hyperlipidemia   • Fibrocystic breast disease   • Intermittent palpitations   • Left temporal headache   • Anxiety   • RONALDO III (cervical intraepithelial neoplasia III)   • GERD (gastroesophageal reflux disease)   • PONV (postoperative nausea and vomiting)   • S/P LEEP (loop electrosurgical excision procedure)   • Acute bilateral low back pain with right-sided sciatica   • External hemorrhoid   • Blepharospasm   • Insomnia   • Thyroid nodule   • Vertigo   • Spine degeneration   • Vitamin D deficiency   • Breast mass   • Breast pain, left     Past Medical History:   Diagnosis Date   • Anxiety    • Chest pain     pt denies today//saw cardiology and everything was "normal"/saw cardiology 7/2019 Dr CHARLES Bustos/JENNIFER MENESES   • Dental crown present    • GERD (gastroesophageal reflux disease)    • Hypertension    • PONV (postoperative nausea and vomiting)    • Precancerous changes of the cervix      Past Surgical History:   Procedure Laterality Date   • CARPAL TUNNEL RELEASE Right    • EXAMINATION UNDER ANESTHESIA N/A 10/4/2020    Procedure: EXAM UNDER ANESTHESIA (EUA), CAUTERIZATION OF CERVIX;  Surgeon: Suzanne Weiss MD;  [facility];  Service: Gynecology   • MT COLPOSCOPY CERVIX VAG LOOP ELTRD [address] N/A 10/2/2020    Procedure: BIOPSY LEEP CERVIX;  Surgeon: Cheryl Paulson MD;  [facility];  Service: Gynecology   • TUBAL LIGATION       Family History   Problem Relation Age of Onset   • Asthma Mother    • Hypertension Mother    • No Known Problems Father    • No Known Problems Sister    • No Known Problems Daughter    • No Known Problems Maternal Grandmother    • No Known Problems Maternal Grandfather    • No Known Problems Paternal Grandmother    • No Known Problems Paternal Grandfather    • No Known Problems Maternal Aunt    • No Known Problems Maternal Aunt    • No Known Problems Paternal Aunt    • No Known Problems Paternal Aunt    • No Known Problems Paternal Aunt    • Diabetes Paternal Aunt    • Breast cancer Neg Hx    • Cancer Neg Hx      Social History     Socioeconomic History   • Marital status: Single     Spouse name: Not on file   • Number of children: Not on file   • Years of education: Not on file   • Highest education level: Not on file   Occupational History   • Not on file   Tobacco Use   • Smoking status: Never   • Smokeless tobacco: Never   Vaping Use   • Vaping Use: Never used   Substance and Sexual Activity   • Alcohol use: Not Currently   • Drug use: Never   • Sexual activity: Yes     Partners: Male     Birth control/protection: Female Sterilization     Comment: Tubal   Other Topics Concern   • Not on file   Social History Narrative   • Not on file     Social Determinants of Health     Financial Resource Strain: Low Risk  (2/21/2023)    Overall Financial Resource Strain (CARDIA)    • Difficulty of Paying Living Expenses: Not hard at all   Recent Concern: Financial Resource Strain - Medium Risk (1/31/2023)    Overall Financial Resource Strain (CARDIA)    • Difficulty of Paying Living Expenses: Somewhat hard   Food Insecurity: No Food Insecurity (2/21/2023)    Hunger Vital Sign    • Worried About Running Out of Food in the Last Year: Never true    • Ran Out of Food in the Last Year: Never true   Recent Concern: [facility] Present (1/31/2023)    Hunger Vital Sign    • Worried About Running Out of Food in the Last Year: Often true    • Ran Out of Food in the Last Year: Often true   Transportation Needs: No Transportation Needs (2/21/2023)    PRAPARE - Transportation    • Lack of Transportation (Medical): No    • Lack of Transportation (Non-Medical): No   Physical Activity: Not on file   Stress: Stress Concern Present (10/25/2021)    [address] [facility]    • Feeling of Stress : To some extent   Social Connections: Not on file   Intimate Partner Violence: Not At Risk (1/25/2022)    Humiliation, Afraid, Rape, and Kick questionnaire    • Fear of Current or Ex-Partner: No    • Emotionally Abused: No    • Physically Abused: No    • Sexually Abused:  No Housing Stability: Low Risk  (1/25/2022)    Housing Stability Vital Sign    • Unable to Pay for Housing in the Last Year: No    • Number of Places Lived in the Last Year: 1    • Unstable Housing in the Last Year: No       Current Outpatient Medications:   •  Cyanocobalamin (VITAMIN B-12 PO), Take by mouth, Disp: , Rfl:   •  ergocalciferol (VITAMIN D2) 50,000 units, Take 1 capsule (50,000 Units total) by mouth once a week, Disp: 12 capsule, Rfl: 3  •  famotidine (PEPCID) 20 mg tablet, Take 1 tablet (20 mg total) by mouth 2 (two) times a day, Disp: 30 tablet, Rfl: 1  •  hydrOXYzine HCL (ATARAX) 10 mg tablet, Take 1 tablet (10 mg total) by mouth every 6 (six) hours as needed for itching, Disp: 30 tablet, Rfl: 0  •  hyoscyamine (Levsin) 0.125 MG tablet, Take 1 tablet (0.125 mg total) by mouth every 4 (four) hours as needed for cramping, Disp: 30 tablet, Rfl: 2  •  multivitamin (THERAGRAN) TABS, Take 1 tablet by mouth daily, Disp: , Rfl:   •  omeprazole (PriLOSEC) 40 MG capsule, Take 1 capsule (40 mg total) by mouth daily, Disp: 90 capsule, Rfl: 1  •  traZODone (DESYREL) 50 mg tablet, Take 1 tablet (50 mg total) by mouth daily at bedtime, Disp: 90 tablet, Rfl: 1  •  acetaminophen (TYLENOL) 650 mg CR tablet, Take 1 tablet (650 mg total) by mouth every 8 (eight) hours as needed for mild pain, Disp: 30 tablet, Rfl: 0  •  hydrocortisone (ANUSOL-HC) 2.5 % rectal cream, Apply topically 2 (two) times a day (Patient not taking: Reported on 9/20/2023), Disp: 28 g, Rfl: 0  •  nystatin (MYCOSTATIN) cream, Apply topically 2 (two) times a day (Patient not taking: Reported on 8/19/2023), Disp: 30 g, Rfl: 0  •  polyethylene glycol (GoodSense ClearLax) 17 GM/SCOOP powder, Take 17 g by mouth daily for 14 days, Disp: 17 g, Rfl: 2  •  SUMAtriptan (Imitrex) 25 mg tablet, Take 1 tablet (25 mg total) by mouth once as needed for migraine for up to 10 doses (Patient not taking: Reported on 5/18/2023), Disp: 10 tablet, Rfl: 0  Allergies Allergen Reactions   • No Known Allergies      There were no vitals filed for this visit. Physical Exam  Constitutional:       General: She is not in acute distress. Appearance: Normal appearance. Cardiovascular:      Rate and Rhythm: Normal rate and regular rhythm. Pulses: Normal pulses. Heart sounds: Normal heart sounds. Pulmonary:      Effort: Pulmonary effort is normal.      Breath sounds: Normal breath sounds. Chest:      Chest wall: No mass. Breasts:     Right: No swelling, bleeding, inverted nipple, mass, nipple discharge, skin change or tenderness. Left: No swelling, bleeding, inverted nipple, mass, nipple discharge, skin change or tenderness. Abdominal:      General: Abdomen is flat. Palpations: Abdomen is soft. Lymphadenopathy:      Upper Body:      Right upper body: No supraclavicular, axillary or pectoral adenopathy. Left upper body: No supraclavicular, axillary or pectoral adenopathy. Skin:     General: Skin is warm. Neurological:      General: No focal deficit present. Mental Status: She is alert and oriented to person, place, and time. Psychiatric:         Mood and Affect: Mood normal.         Behavior: Behavior normal.           Results:    Imaging  NM hepatobiliary w rx    Result Date: 9/7/2023  Narrative: HEPATOBILIARY SCAN WITH CHOLECYSTOKININ ADMINISTRATION INDICATION: Right upper quadrant abdominal pain for 8 months. COMPARISON: No prior nuclear studies available for comparison. [facility] TECHNIQUE:   Following the intravenous administration of 5.2 mCi Tc-99m labeled mebrofenin, dynamic abdominal images were obtained up to a 60 minute time period. Images were performed in AP projection. FINDINGS: There is prompt, uniform accumulation with normal clearance of the radiopharmaceutical by the liver.  There is normal filling of the intrahepatic ducts, common bile duct and gallbladder with normal excretion of the radiopharmaceutical into the duodenum. In order to evaluate the contractile response of the gallbladder to  cholecystokinin stimulation, 1.7 mcg sincalide (0.02 mcg/kg) was mixed with saline for a total of 60 cc and administered by slow intravenous infusion up to 60 minutes. These images demonstrate normal contraction of the gallbladder. The calculated gallbladder ejection fraction is 78% (N = >38%). The patient experienced mild right upper quadrant pain after CCK administration. Impression: 1. Normal hepatobiliary study. 2. Normal contractile response of the gallbladder to cholecystokinin infusion.  (78%) Resident: Raphael Sommers, the attending radiologist, have reviewed the images and agree with the final report above. Workstation performed: [id number]       I reviewed the above imaging data. Advance Care Planning/Advance Directives:  Discussed disease status, cancer treatment plans and/or cancer treatment goals with the patient.

## 2023-09-29 ENCOUNTER — APPOINTMENT (OUTPATIENT)
Dept: PHYSICAL THERAPY | Facility: REHABILITATION | Age: 38
End: 2023-09-29
Payer: COMMERCIAL

## 2023-10-02 ENCOUNTER — APPOINTMENT (OUTPATIENT)
Dept: PHYSICAL THERAPY | Facility: REHABILITATION | Age: 38
End: 2023-10-02
Payer: COMMERCIAL

## 2023-10-03 ENCOUNTER — OFFICE VISIT (OUTPATIENT)
Dept: PHYSICAL THERAPY | Facility: REHABILITATION | Age: 38
End: 2023-10-03
Payer: COMMERCIAL

## 2023-10-03 DIAGNOSIS — M54.2 NECK DISCOMFORT: Primary | ICD-10-CM

## 2023-10-03 PROCEDURE — 97140 MANUAL THERAPY 1/> REGIONS: CPT | Performed by: PHYSICAL THERAPIST

## 2023-10-03 PROCEDURE — 97112 NEUROMUSCULAR REEDUCATION: CPT | Performed by: PHYSICAL THERAPIST

## 2023-10-03 PROCEDURE — 97110 THERAPEUTIC EXERCISES: CPT | Performed by: PHYSICAL THERAPIST

## 2023-10-03 NOTE — PROGRESS NOTES
Daily Note     Today's date: 10/3/2023  Patient name: Jaxon Dietz  : 1985  MRN: [id number]  Referring provider: Amalia Jama, *  Dx:   Encounter Diagnosis     ICD-10-CM    1. Neck discomfort  M54.2               Pt arrived 20 mins late, but was accommodated. 1on1 805-830 and performed remaining as part of IEP. Subjective: Pt reports overall improvement in neck pain. Pt notes intermittent pain in her L shoulder and L rib cage. Objective: See treatment diary below      Assessment: Tolerated treatment and progressions well. No adverse response reported to today's session. Cervical spine flexibility continues to improve. Patient would benefit from continued PT      Plan: Continue per plan of care. Progress treatment as tolerated. Precautions: anxiety, HTN, chronic pain      Manuals 9/6 9/13 9/15 9/18 9/21 10/3       SOR  TP 4' TC 6' +TPR MC 6' +TPR MC 5' TP 4'       B/l UT stretch  TP 4' TC 4' [facility] 4' [facility] 3' TP 4'                                 Neuro Re-Ed             TB/dilma shoulder ext  9# 3" 2x10 9# 3" 2x10 gtb 3" 2x10 gtb 3" 2x10 9.5# 3" 2x10       TB/dilma rows  9# 3" 2x10 9# 3" 2x10 gtb 3" 2x10 gtb 3" 2x10 10# 3" 2x10       TB b/l ER w/scap squeeze  otb 3" x10 otb 3"x10 gtb 3"x10 gtb 3"x10 gtb 3" 2x10       Supine DNF    5"x10 5"x10 5"x10       TB Facepull + OH    10x otb  8.5# x10       Prone IT     10x, 3s hold ea 3"x10 ea                    Ther Ex             UBE  90rpm 3'/3' 90 rpm 3'/3' 90 rpm 3'/3' 90 rpm 3'/3' 85rpm 3'/3'       Serratus wall slides  5"x10 5"x10          Shrugs w/ c/s rot  3# x10 ea 3# x10 ea. 4# x10 ea. 12#, shrugs 2x10 10# x10 ea       C/S retractions seated  5"x10 5"x10  5"x10 5"x10       SNAGS b/l rot  5"x10 ea 5"x10 ea.           Doorway pec stretch      10"x10       Seated T/S ext over chair      10"x5       Pt education+ HEP instruction TP 8 mins                         Ther Activity             pball posture  2' 2' pball shoulder flex/scap  x10 ea x10 ea. 10x ea.   x15 ea       Gait Training                                       Modalities

## 2023-10-04 ENCOUNTER — APPOINTMENT (OUTPATIENT)
Dept: PHYSICAL THERAPY | Facility: REHABILITATION | Age: 38
End: 2023-10-04
Payer: COMMERCIAL

## 2023-10-05 ENCOUNTER — OFFICE VISIT (OUTPATIENT)
Dept: PHYSICAL THERAPY | Facility: REHABILITATION | Age: 38
End: 2023-10-05
Payer: COMMERCIAL

## 2023-10-05 DIAGNOSIS — M54.2 NECK DISCOMFORT: Primary | ICD-10-CM

## 2023-10-05 DIAGNOSIS — K21.9 GASTROESOPHAGEAL REFLUX DISEASE, UNSPECIFIED WHETHER ESOPHAGITIS PRESENT: ICD-10-CM

## 2023-10-05 PROCEDURE — 97110 THERAPEUTIC EXERCISES: CPT | Performed by: PHYSICAL THERAPIST

## 2023-10-05 PROCEDURE — 97140 MANUAL THERAPY 1/> REGIONS: CPT | Performed by: PHYSICAL THERAPIST

## 2023-10-05 PROCEDURE — 97112 NEUROMUSCULAR REEDUCATION: CPT | Performed by: PHYSICAL THERAPIST

## 2023-10-05 NOTE — PROGRESS NOTES
Daily Note     Today's date: 10/5/2023  Patient name: Jon Partida  : 1985  MRN: [id number]  Referring provider: Prachi Alvarez, *  Dx:   Encounter Diagnosis     ICD-10-CM    1. Neck discomfort  M54.2                      Subjective: Pt reports no c/o neck pain today, but states she has been having ongoing pain in her sternum and clavicle region. Objective: See treatment diary below      Assessment: Tolerated treatment well. Pt demonstrating improved cervical AROM, but some tightness persists with BSB and B rot. Improved c/s mobility post manuals. Patient would benefit from continued PT      Plan: Progress treatment as tolerated. Precautions: anxiety, HTN, chronic pain      Manuals 9/6 9/13 9/15 9/18 9/21 10/3 10/5      SOR  TP 4' TC 6' +TPR MC 6' +TPR MC 5' TP 4' TP 4'      B/l UT stretch  TP 4' TC 4' MC 4' MC 3' TP 4' TP 4'      B/l c/s rot PROM       TP 2'                   Neuro Re-Ed             TB/dilma shoulder ext  9# 3" 2x10 9# 3" 2x10 gtb 3" 2x10 gtb 3" 2x10 9.5# 3" 2x10 9.5# 3" 2x10      TB/dilma rows  9# 3" 2x10 9# 3" 2x10 gtb 3" 2x10 gtb 3" 2x10 10# 3" 2x10 10# 3"x20      TB b/l ER w/scap squeeze  otb 3" x10 otb 3"x10 gtb 3"x10 gtb 3"x10 gtb 3" 2x10 gtb 3" 2x10      Supine DNF    5"x10 5"x10 5"x10 5"x10      TB Facepull + OH    10x otb  8.5# x10 8.5# x10      Prone IT     10x, 3s hold ea 3"x10 ea 3"x10 ea                   Ther Ex             UBE  90rpm 3'/3' 90 rpm 3'/3' 90 rpm 3'/3' 90 rpm 3'/3' 85rpm 3'/3' 85rpm 3'/3'      Serratus wall slides  5"x10 5"x10          Shrugs w/ c/s rot  3# x10 ea 3# x10 ea. 4# x10 ea.  12#, shrugs 2x10 10# x10 ea 10# x10 ea      C/S retractions seated  5"x10 5"x10  5"x10 5"x10 5"x10      SNAGS b/l rot  5"x10 ea 5"x10 ea.     10"x5 ea      Doorway pec stretch      10"x10 10"x10      Seated T/S ext over chair      10"x5 10"x10      Pt education+ HEP instruction TP 8 mins                         Ther Activity             pball posture  2' 2'          pball shoulder flex/scap  x10 ea x10 ea. 10x ea.   x15 ea x15 ea      Gait Training                                       Modalities

## 2023-10-06 RX ORDER — FAMOTIDINE 20 MG/1
20 TABLET, FILM COATED ORAL 2 TIMES DAILY
Qty: 180 TABLET | Refills: 1 | Status: SHIPPED | OUTPATIENT
Start: 2023-10-06

## 2023-10-09 ENCOUNTER — OFFICE VISIT (OUTPATIENT)
Dept: PHYSICAL THERAPY | Facility: REHABILITATION | Age: 38
End: 2023-10-09
Payer: COMMERCIAL

## 2023-10-09 DIAGNOSIS — M54.2 NECK DISCOMFORT: Primary | ICD-10-CM

## 2023-10-09 PROCEDURE — 97140 MANUAL THERAPY 1/> REGIONS: CPT | Performed by: PHYSICAL THERAPIST

## 2023-10-09 PROCEDURE — 97110 THERAPEUTIC EXERCISES: CPT | Performed by: PHYSICAL THERAPIST

## 2023-10-09 PROCEDURE — 97112 NEUROMUSCULAR REEDUCATION: CPT | Performed by: PHYSICAL THERAPIST

## 2023-10-09 NOTE — PROGRESS NOTES
Daily Note     Today's date: 10/9/2023  Patient name: Adolph Martinez  : 1985  MRN: [id number]  Referring provider: Joy Islas, *  Dx:   Encounter Diagnosis     ICD-10-CM    1. Neck discomfort  M54.2                      Subjective: Pt reports improvement with her neck symptoms. Objective: See treatment diary below. FOTO recorded this session. Assessment: Tolerated treatment well. Patient demonstrating improved cervical mobility this session. Plan: Progress treatment as tolerated. Precautions: anxiety, HTN, chronic pain      Manuals 9/6 9/13 9/15 9/18 9/21 10/3 10/5 10/9     SOR  TP 4' TC 6' +TPR MC 6' +TPR MC 5' TP 4' TP 4' TP 4'     B/l UT stretch  TP 4' TC 4' MC 4' MC 3' TP 4' TP 4' TP 4'     B/l c/s rot PROM       TP 2' np                  Neuro Re-Ed             TB/dilma shoulder ext  9# 3" 2x10 9# 3" 2x10 gtb 3" 2x10 gtb 3" 2x10 9.5# 3" 2x10 9.5# 3" 2x10 10# 2x10     TB/dilma rows  9# 3" 2x10 9# 3" 2x10 gtb 3" 2x10 gtb 3" 2x10 10# 3" 2x10 10# 3"x20 12# 2x10     TB b/l ER w/scap squeeze  otb 3" x10 otb 3"x10 gtb 3"x10 gtb 3"x10 gtb 3" 2x10 gtb 3" 2x10 gtb 3" 2x10     Supine DNF    5"x10 5"x10 5"x10 5"x10 5"x10     TB Facepull + OH    10x otb  8.5# x10 8.5# x10 8.5# x10     Prone IT     10x, 3s hold ea 3"x10 ea 3"x10 ea 3" x10 ea                  Ther Ex             UBE  90rpm 3'/3' 90 rpm 3'/3' 90 rpm 3'/3' 90 rpm 3'/3' 85rpm 3'/3' 85rpm 3'/3' 85rpm 3'/3'     Serratus wall slides  5"x10 5"x10          Shrugs w/ c/s rot  3# x10 ea 3# x10 ea. 4# x10 ea.  12#, shrugs 2x10 10# x10 ea 10# x10 ea 10# x10 ea     C/S retractions seated  5"x10 5"x10  5"x10 5"x10 5"x10 5"x10     SNAGS b/l rot  5"x10 ea 5"x10 ea.     10"x5 ea np     Doorway pec stretch      10"x10 10"x10 10"x10     Seated T/S ext over chair      10"x5 10"x10 10"x10     Pt education+ HEP instruction TP 8 mins                         Ther Activity             pball posture  2' 2'          pball shoulder flex/scap  x10 ea x10 ea. 10x ea.   x15 ea x15 ea x15 ea     Gait Training                                       Modalities

## 2023-10-17 ENCOUNTER — ANNUAL EXAM (OUTPATIENT)
Dept: OBGYN CLINIC | Facility: CLINIC | Age: 38
End: 2023-10-17

## 2023-10-17 VITALS
BODY MASS INDEX: 34.23 KG/M2 | HEART RATE: 76 BPM | DIASTOLIC BLOOD PRESSURE: 88 MMHG | HEIGHT: 62 IN | WEIGHT: 186 LBS | SYSTOLIC BLOOD PRESSURE: 134 MMHG

## 2023-10-17 DIAGNOSIS — N93.9 ABNORMAL UTERINE BLEEDING (AUB): Primary | ICD-10-CM

## 2023-10-17 DIAGNOSIS — R92.8 ABNORMAL MAMMOGRAM: ICD-10-CM

## 2023-10-17 RX ORDER — DICYCLOMINE HYDROCHLORIDE 10 MG/1
10 CAPSULE ORAL
COMMUNITY

## 2023-10-17 RX ORDER — IBUPROFEN 600 MG/1
600 TABLET ORAL ONCE
Status: COMPLETED | OUTPATIENT
Start: 2023-10-17 | End: 2023-10-17

## 2023-10-17 RX ORDER — MEDROXYPROGESTERONE ACETATE 10 MG/1
10 TABLET ORAL DAILY
Qty: 90 TABLET | Refills: 0 | Status: SHIPPED | OUTPATIENT
Start: 2023-10-17

## 2023-10-17 RX ADMIN — IBUPROFEN 600 MG: 600 TABLET ORAL at 11:40

## 2023-10-17 NOTE — PATIENT INSTRUCTIONS
Thank you for your confidence in our team.   We appreciate you and welcome your feedback. If you receive a survey from us, please take a few moments to let us know how we are doing.    Sincerely,  Zoë Glover, DO

## 2023-10-17 NOTE — PROGRESS NOTES
Endometrial biopsy    Date/Time: 10/17/2023 9:53 AM    Performed by: Emiliano Prasad DO  Authorized by: Emiliano Prasad DO  Universal Protocol:  Consent: Verbal consent obtained. Written consent obtained. Risks and benefits: risks, benefits and alternatives were discussed  Consent given by: patient  Time out: Immediately prior to procedure a "time out" was called to verify the correct patient, procedure, equipment, support staff and site/side marked as required. Timeout called at: 10/17/2023 9:46 AM.  Patient understanding: patient states understanding of the procedure being performed  Patient consent: the patient's understanding of the procedure matches consent given  Procedure consent: procedure consent matches procedure scheduled  Relevant documents: relevant documents present and verified  Test results: test results available and properly labeled  Radiology Images displayed and confirmed. If images not available, report reviewed: imaging studies available  Patient identity confirmed: verbally with patient and provided demographic data    Indication:     Indications:  Other disorder of menstruation and other abnormal bleeding from female genital tract    Pre-procedure:     Premeds:  Ibuprofen  Procedure:     Procedure: endometrial biopsy with Pipelle      A bivalve speculum was placed in the vagina: yes      Cervix cleaned and prepped: yes      The cervix was dilated: no      Uterus sounded: yes      Uterus sound depth (cm):  7    Specimen collected: specimen collected and sent to pathology      Patient tolerated procedure well with no complications: yes

## 2023-10-17 NOTE — PROGRESS NOTES
PROBLEM GYNECOLOGICAL VISIT    Jon Partida is a 42 y.o. female who presents today with complaint of vaginal bleeding. Her general medical history has been reviewed and she reports it as follows:    Past Medical History:   Diagnosis Date    Anxiety     Chest pain     pt denies today//saw cardiology and everything was "normal"/saw cardiology 2019 Dr CHARLES Bustos/JENNIFER  [address] present     GERD (gastroesophageal reflux disease)     Hypertension     PONV (postoperative nausea and vomiting)     Precancerous changes of the cervix      Past Surgical History:   Procedure Laterality Date    CARPAL TUNNEL RELEASE Right     EXAMINATION UNDER ANESTHESIA N/A 10/4/2020    Procedure: EXAM UNDER ANESTHESIA (EUA), CAUTERIZATION OF CERVIX;  Surgeon: Scottie Shore MD;  Location: [facility];  Service: Gynecology    TN COLPOSCOPY CERVIX VAG LOOP ELTRD  [address]  N/A 10/2/2020    Procedure: BIOPSY LEEP CERVIX;  Surgeon: Yony Rodriguez MD;  Location: [facility];  Service: Gynecology    TUBAL LIGATION       OB History          3    Para   3    Term   3       0    AB        Living   3         SAB   0    IAB   0    Ectopic   0    Multiple   0    Live Births   3               Social History     Tobacco Use    Smoking status: Never    Smokeless tobacco: Never   Vaping Use    Vaping Use: Never used   Substance Use Topics    Alcohol use: Not Currently    Drug use: Never     Social History     Substance and Sexual Activity   Sexual Activity Yes    Partners: Male    Birth control/protection: Female Sterilization    Comment: Tubal       Current Outpatient Medications   Medication Instructions    acetaminophen (TYLENOL) 650 mg, Oral, Every 8 hours PRN    Cyanocobalamin (VITAMIN B-12 PO) Oral    dicyclomine (BENTYL) 10 mg, Oral, 4 times daily (before meals and at bedtime)    ergocalciferol (VITAMIN D2) 50,000 Units, Oral, Weekly    famotidine (PEPCID) 20 mg, Oral, 2 times daily GoodSense ClearLax 17 g, Oral, Daily    hydrocortisone (ANUSOL-HC) 2.5 % rectal cream Topical, 2 times daily    hydrOXYzine HCL (ATARAX) 10 mg, Oral, Every 6 hours PRN    hyoscyamine (LEVSIN) 0.125 mg, Oral, Every 4 hours PRN    multivitamin (THERAGRAN) TABS 1 tablet, Oral, Daily    nystatin (MYCOSTATIN) cream Topical, 2 times daily    omeprazole (PRILOSEC) 40 mg, Oral, Daily    SUMAtriptan (IMITREX) 25 mg, Oral, Once as needed    traZODone (DESYREL) 50 mg, Oral, Daily at bedtime       History of Present Illness:   Patient presents with c/o heavy vaginal bleeding that started last week and is still on going. Patient requesting definitive treatment. Review of Systems:  Review of Systems   All other systems reviewed and are negative. Physical Exam:  /88   Pulse 76   Ht 5' 2" (1.575 m)   Wt 84.4 kg (186 lb)   LMP 10/10/2023   BMI 34.02 kg/m²   Physical Exam  Constitutional:       Appearance: Normal appearance. Genitourinary:      Bladder and urethral meatus normal.      No lesions in the vagina. Right Labia: No rash, tenderness or lesions. Left Labia: No tenderness, lesions or rash. Vaginal bleeding present. No cervical motion tenderness or lesion. Cervical exam comments: Blood at os. No urethral tenderness or mass present. Neurological:      Mental Status: She is alert. Vitals reviewed. Discussion:  Discuss with patient recommend an EMB since she is still having the heavy bleeding. Consent signed for procedure. Risk and benefits discussed. Consent signed for [facility] BS. Patient will f/u in 3wks for annual examination. Assessment:   1. AUB    Plan:   1. Consent for EMB and [facility] BS   2. Provera escribed   3. Return to office 3wks. Reviewed with patient that [facility] results are [facility], but that they will not necessarily be reviewed by me immediately.   Explained that I will review results at my earliest opportunity and contact patient appropriately.

## 2023-10-23 PROCEDURE — 88305 TISSUE EXAM BY PATHOLOGIST: CPT | Performed by: PATHOLOGY

## 2023-10-25 ENCOUNTER — TELEPHONE (OUTPATIENT)
Dept: OBGYN CLINIC | Facility: CLINIC | Age: 38
End: 2023-10-25

## 2023-10-26 ENCOUNTER — DOCUMENTATION (OUTPATIENT)
Dept: GYNECOLOGY | Facility: CLINIC | Age: 38
End: 2023-10-26

## 2023-11-01 DIAGNOSIS — B37.89 CANDIDIASIS OF BREAST: ICD-10-CM

## 2023-11-01 DIAGNOSIS — F41.9 ANXIETY: ICD-10-CM

## 2023-11-01 DIAGNOSIS — R51.9 LEFT TEMPORAL HEADACHE: ICD-10-CM

## 2023-11-01 RX ORDER — TRAZODONE HYDROCHLORIDE 50 MG/1
50 TABLET ORAL
Qty: 90 TABLET | Refills: 1 | Status: SHIPPED | OUTPATIENT
Start: 2023-11-01 | End: 2023-11-03 | Stop reason: SDUPTHER

## 2023-11-01 RX ORDER — SUMATRIPTAN 25 MG/1
25 TABLET, FILM COATED ORAL ONCE AS NEEDED
Qty: 10 TABLET | Refills: 0 | Status: SHIPPED | OUTPATIENT
Start: 2023-11-01 | End: 2023-11-03

## 2023-11-01 RX ORDER — NYSTATIN 100000 U/G
CREAM TOPICAL 2 TIMES DAILY
Qty: 30 G | Refills: 0 | Status: SHIPPED | OUTPATIENT
Start: 2023-11-01

## 2023-11-03 ENCOUNTER — OFFICE VISIT (OUTPATIENT)
Dept: FAMILY MEDICINE CLINIC | Facility: CLINIC | Age: 38
End: 2023-11-03

## 2023-11-03 VITALS
BODY MASS INDEX: 34.78 KG/M2 | HEIGHT: 62 IN | HEART RATE: 77 BPM | TEMPERATURE: 98.7 F | DIASTOLIC BLOOD PRESSURE: 80 MMHG | SYSTOLIC BLOOD PRESSURE: 126 MMHG | RESPIRATION RATE: 16 BRPM | WEIGHT: 189 LBS | OXYGEN SATURATION: 99 %

## 2023-11-03 DIAGNOSIS — Z98.890 S/P LEEP (LOOP ELECTROSURGICAL EXCISION PROCEDURE): ICD-10-CM

## 2023-11-03 DIAGNOSIS — K59.00 CONSTIPATION, UNSPECIFIED CONSTIPATION TYPE: ICD-10-CM

## 2023-11-03 DIAGNOSIS — R51.9 LEFT TEMPORAL HEADACHE: Primary | ICD-10-CM

## 2023-11-03 DIAGNOSIS — I10 PRIMARY HYPERTENSION: ICD-10-CM

## 2023-11-03 DIAGNOSIS — M54.50 CHRONIC BILATERAL LOW BACK PAIN, UNSPECIFIED WHETHER SCIATICA PRESENT: ICD-10-CM

## 2023-11-03 DIAGNOSIS — K64.4 EXTERNAL HEMORRHOID: ICD-10-CM

## 2023-11-03 DIAGNOSIS — K21.9 GASTROESOPHAGEAL REFLUX DISEASE, UNSPECIFIED WHETHER ESOPHAGITIS PRESENT: ICD-10-CM

## 2023-11-03 DIAGNOSIS — M54.41 ACUTE BILATERAL LOW BACK PAIN WITH RIGHT-SIDED SCIATICA: ICD-10-CM

## 2023-11-03 DIAGNOSIS — R07.81 RIB PAIN ON LEFT SIDE: ICD-10-CM

## 2023-11-03 DIAGNOSIS — F41.9 ANXIETY: ICD-10-CM

## 2023-11-03 DIAGNOSIS — G89.29 CHRONIC BILATERAL LOW BACK PAIN, UNSPECIFIED WHETHER SCIATICA PRESENT: ICD-10-CM

## 2023-11-03 DIAGNOSIS — E55.9 VITAMIN D DEFICIENCY: ICD-10-CM

## 2023-11-03 PROCEDURE — 99213 OFFICE O/P EST LOW 20 MIN: CPT | Performed by: FAMILY MEDICINE

## 2023-11-03 RX ORDER — HYDROCORTISONE 25 MG/G
1 CREAM TOPICAL 2 TIMES DAILY
Qty: 30 G | Refills: 3 | Status: SHIPPED | OUTPATIENT
Start: 2023-11-03

## 2023-11-03 RX ORDER — PROPRANOLOL HYDROCHLORIDE 20 MG/1
20 TABLET ORAL EVERY 12 HOURS SCHEDULED
Qty: 30 TABLET | Refills: 0 | Status: SHIPPED | OUTPATIENT
Start: 2023-11-03

## 2023-11-03 RX ORDER — LIDOCAINE 50 MG/G
OINTMENT TOPICAL AS NEEDED
Qty: 240 G | Refills: 2 | Status: SHIPPED | OUTPATIENT
Start: 2023-11-03

## 2023-11-03 RX ORDER — SUMATRIPTAN 50 MG/1
50 TABLET, FILM COATED ORAL EVERY 12 HOURS PRN
Qty: 90 TABLET | Refills: 2 | Status: SHIPPED | OUTPATIENT
Start: 2023-11-03 | End: 2023-11-03

## 2023-11-03 RX ORDER — TRAZODONE HYDROCHLORIDE 50 MG/1
50 TABLET ORAL
Qty: 90 TABLET | Refills: 1 | Status: SHIPPED | OUTPATIENT
Start: 2023-11-03

## 2023-11-03 RX ORDER — FAMOTIDINE 20 MG/1
20 TABLET, FILM COATED ORAL 2 TIMES DAILY
Qty: 180 TABLET | Refills: 1 | Status: SHIPPED | OUTPATIENT
Start: 2023-11-03

## 2023-11-03 RX ORDER — DIPHENOXYLATE HYDROCHLORIDE AND ATROPINE SULFATE 2.5; .025 MG/1; MG/1
1 TABLET ORAL DAILY
Qty: 90 TABLET | Refills: 5 | Status: SHIPPED | OUTPATIENT
Start: 2023-11-03

## 2023-11-03 RX ORDER — SUMATRIPTAN 50 MG/1
50 TABLET, FILM COATED ORAL ONCE AS NEEDED
Qty: 10 TABLET | Refills: 5 | Status: SHIPPED | OUTPATIENT
Start: 2023-11-03

## 2023-11-03 RX ORDER — SUMATRIPTAN 50 MG/1
50 TABLET, FILM COATED ORAL ONCE AS NEEDED
Qty: 10 TABLET | Refills: 5 | Status: SHIPPED | OUTPATIENT
Start: 2023-11-03 | End: 2023-11-03

## 2023-11-03 RX ORDER — SENNOSIDES 8.6 MG
650 CAPSULE ORAL EVERY 8 HOURS PRN
Qty: 90 TABLET | Refills: 5 | Status: SHIPPED | OUTPATIENT
Start: 2023-11-03

## 2023-11-03 RX ORDER — ERGOCALCIFEROL 1.25 MG/1
50000 CAPSULE ORAL WEEKLY
Qty: 12 CAPSULE | Refills: 3 | Status: SHIPPED | OUTPATIENT
Start: 2023-11-03

## 2023-11-03 NOTE — PROGRESS NOTES
Name: Sana Mason      : 1985      MRN: [id number]  Encounter Provider: Christian Pollard MD  Encounter Date: 11/3/2023   Encounter department: [address]     1. Left temporal headache  Assessment & Plan:  Uncontrolled. Moderate improvement with Sumatriptan and Tylenol. 6/10 during the day, worsens at night. Associated with lack of sleep (due to work and sleep schedule). Migraine as unilateral, throbbing with phono and photo phobia vs muscle spasm (in the setting of uncomfortable sleep and neck pain) vs spinal degeneration (confirmed on imaging)     - Continue drinking plenty of water   - Sumatriptan 50 mg once as needed, no more than 2-3 times a week   - Will start Propranolol 20 mg BID and follow up in 3 months to review medication and check for improvement   - Tylenol 650 mg q6h PRN, no more than 2-3 times a week for headaches  - Referral to Neurology   - Discussed conservative measures as well and ED precations    Orders:  -     Ambulatory Referral to Neurology; Future  -     propranolol (INDERAL) 20 mg tablet; Take 1 tablet (20 mg total) by mouth every 12 (twelve) hours  -     SUMAtriptan (IMITREX) 50 mg tablet; Take 1 tablet (50 mg total) by mouth once as needed for migraine for up to 60 doses may repeat in 2 hours if necessary    2. Gastroesophageal reflux disease, unspecified whether esophagitis present  Assessment & Plan:  Well controlled on Pepcid 20 mg daily and Omeprazole 40 mg daily     Orders:  -     famotidine (PEPCID) 20 mg tablet; Take 1 tablet (20 mg total) by mouth 2 (two) times a day  -     multivitamin (THERAGRAN) TABS; Take 1 tablet by mouth daily    3. Anxiety  -     traZODone (DESYREL) 50 mg tablet; Take 1 tablet (50 mg total) by mouth daily at bedtime    4. Rib pain on left side  Assessment & Plan:  Associated with taking a deep breath and movements.    Musculoskeletal vs pleuritic   Last CXR in 2019 - No acute cardiopulmonary disease. Anterior right upper lung nodular density. Recommend CT.     - Will order CXR at this time and consider CT if no improvement      Orders:  -     XR chest pa & lateral; Future; Expected date: 11/03/2023      5. Acute bilateral low back pain with right-sided sciatica  Assessment & Plan:  No alarming symptoms present. Currently no sciatica. Spinal degeneration. Bilateral muscle spasm on PE. Negative straight leg test    - Alarming sx discussed, as well as making appt if any worsening in back pain   - Continue Tylenol 650 mg q6h PRN, no more that 6 tablets a day, pt understood and in agreement with pain   - Lidocaine ointment PRN   - lower back pain stretching exercise provided in AVS    -     lidocaine (XYLOCAINE) 5 % ointment; Apply topically as needed for mild pain       6. Primary hypertension  Assessment & Plan:  BP Readings from Last 3 Encounters:   11/03/23 126/80   10/17/23 134/88   09/28/23 132/94     Well controlled with lifestyle modifications. - Will start Propranolol for migraines, see assessment and plan above         Subjective      41 yo female patient with a PMH of migraines, Htn, GERD and spinal degeneration comes to the office for a follow up of her chronic conditions. She reports her headaches have remained uncontrolled, despite taking as needed Sumatriptan which seems to help in the moment and Tylenol as needed. Her headaches have been daily for the past 3 weeks, 6/10 during the day, worsen to 10/10 at night, and worsen which moving in bed. She reports she drinks 4-5 16 oz bottles of water daily to maintain hydration, however has been sleeping poorly die to work and waking up early. She denies any current stressors in her life and good social support. Due to her spinal degeneration, she [facility], however due to early appointments and difficulty with schedule, she has stopped.  In the setting of her back pain, she reports occasional RLE pain (intermittent), on the latera side. No alarming symptoms noted. At this time, patient denies dizziness, lightheadedness, chest pain, SOB, n/v/c/d or urinary symptoms, or rashes on the skin. Review of Systems   Constitutional:  Negative for chills, fatigue and fever. HENT:  Negative for congestion, ear pain, rhinorrhea and sore throat. Eyes:  Negative for visual disturbance. Respiratory:  Negative for cough, chest tightness and shortness of breath. Cardiovascular:  Negative for chest pain and palpitations. Gastrointestinal:  Negative for abdominal pain, constipation, diarrhea, nausea and vomiting. Genitourinary:  Negative for difficulty urinating, dysuria and hematuria. Musculoskeletal:  Negative for arthralgias and back pain. Skin:  Negative for rash. Neurological:  Positive for headaches. Negative for seizures, syncope and light-headedness. All other systems reviewed and are negative.       Current Outpatient Medications on File Prior to Visit   Medication Sig    Cyanocobalamin (VITAMIN B-12 PO) Take by mouth    dicyclomine (BENTYL) 10 mg capsule Take 10 mg by mouth 4 (four) times a day (before meals and at bedtime)    omeprazole (PriLOSEC) 40 MG capsule Take 1 capsule (40 mg total) by mouth daily    polyethylene glycol (GoodSense ClearLax) 17 GM/SCOOP powder Take 17 g by mouth daily for 14 days    [DISCONTINUED] acetaminophen (TYLENOL) 650 mg CR tablet Take 1 tablet (650 mg total) by mouth every 8 (eight) hours as needed for mild pain    [DISCONTINUED] multivitamin (THERAGRAN) TABS Take 1 tablet by mouth daily    [DISCONTINUED] traZODone (DESYREL) 50 mg tablet Take 1 tablet (50 mg total) by mouth daily at bedtime    hydrocortisone (ANUSOL-HC) 2.5 % rectal cream Apply topically 2 (two) times a day (Patient not taking: Reported on 9/20/2023)    medroxyPROGESTERone (PROVERA) 10 mg tablet Take 1 tablet (10 mg total) by mouth daily    nystatin (MYCOSTATIN) cream Apply topically 2 (two) times a day    [DISCONTINUED] ergocalciferol (VITAMIN D2) 50,000 units Take 1 capsule (50,000 Units total) by mouth once a week    [DISCONTINUED] famotidine (PEPCID) 20 mg tablet TAKE 1 TABLET BY MOUTH TWICE A DAY    [DISCONTINUED] hydrOXYzine HCL (ATARAX) 10 mg tablet Take 1 tablet (10 mg total) by mouth every 6 (six) hours as needed for itching (Patient not taking: Reported on 9/28/2023)    [DISCONTINUED] hyoscyamine (Levsin) 0.125 MG tablet Take 1 tablet (0.125 mg total) by mouth every 4 (four) hours as needed for cramping (Patient not taking: Reported on 9/28/2023)    [DISCONTINUED] SUMAtriptan (Imitrex) 25 mg tablet Take 1 tablet (25 mg total) by mouth once as needed for migraine for up to 10 doses       Objective     /80 (BP Location: Right arm, Patient Position: Sitting, Cuff Size: Standard)   Pulse 77   Temp 98.7 °F (37.1 °C) (Temporal)   Resp 16   Ht 5' 2" (1.575 m)   Wt 85.7 kg (189 lb)   LMP 10/18/2023   SpO2 99%   Breastfeeding No   BMI 34.57 kg/m²     Physical Exam  Vitals and nursing note reviewed. Constitutional:       General: She is not in acute distress. Appearance: Normal appearance. She is not toxic-appearing. HENT:      Head: Normocephalic and atraumatic. Right Ear: External ear normal.      Left Ear: External ear normal.      Nose: Nose normal. No congestion or rhinorrhea. Mouth/Throat:      Mouth: Mucous membranes are moist.      Pharynx: Oropharynx is clear. Eyes:      Extraocular Movements: Extraocular movements intact. Pupils: Pupils are equal, round, and reactive to light. Neck:      Vascular: No carotid bruit. Cardiovascular:      Rate and Rhythm: Normal rate and regular rhythm. Pulses: Normal pulses. Heart sounds: Normal heart sounds. No murmur heard. No gallop. Pulmonary:      Effort: Pulmonary effort is normal. No respiratory distress. Breath sounds: Normal breath sounds. No stridor. No wheezing.    Abdominal: General: Abdomen is flat. Bowel sounds are normal. There is no distension. Palpations: Abdomen is soft. Tenderness: There is no abdominal tenderness. Hernia: No hernia is present. Musculoskeletal:         General: Normal range of motion. Cervical back: Normal range of motion and neck supple. Right lower leg: No edema. Left lower leg: No edema. Comments: Lower back pain, with bilateral muscle spasm. Left lateral sided rib pain, worsens with breathing and tender to deep palpation. Lymphadenopathy:      Cervical: No cervical adenopathy. Skin:     General: Skin is warm. Coloration: Skin is not jaundiced. Findings: No erythema or rash. Neurological:      General: No focal deficit present. Mental Status: She is alert and oriented to person, place, and time. Mental status is at baseline. Cranial Nerves: No cranial nerve deficit. Sensory: No sensory deficit. Motor: No weakness.       Coordination: Coordination normal.      Gait: Gait normal.       Farhad Garcia MD

## 2023-11-03 NOTE — ASSESSMENT & PLAN NOTE
Associated with taking a deep breath and movements. Musculoskeletal vs pleuritic   Last CXR in 2019 - No acute cardiopulmonary disease. Anterior right upper lung nodular density.   Recommend CT.     - Will order CXR at this time and consider CT if no improvement

## 2023-11-03 NOTE — ASSESSMENT & PLAN NOTE
BP Readings from Last 3 Encounters:   11/03/23 126/80   10/17/23 134/88   09/28/23 132/94     Well controlled with lifestyle modifications.    - Will start Propranolol for migraines

## 2023-11-03 NOTE — ASSESSMENT & PLAN NOTE
Uncontrolled. Moderate improvement with Sumatriptan and Tylenol. 6/10 during the day, worsens at night. Associated with lack of sleep (due to work and sleep schedule).    Migraine as unilateral, throbbing with phono and photo phobia vs muscle spasm (in the setting of uncomfortable sleep and neck pain) vs spinal degeneration (confirmed on imaging)     - Continue drinking plenty of water   - Sumatriptan 50 mg once as needed, no more than 2-3 times a week   - Will start Propranolol 20 mg BID and follow up in 3 months to review medication and check for improvement   - Tylenol 650 mg q6h PRN, no more than 2-3 times a week for headaches  - Referral to Neurology   - Discussed conservative measures as well and ED precations

## 2023-11-03 NOTE — ASSESSMENT & PLAN NOTE
No alarming symptoms present. Currently no sciatica. Spinal degeneration. Bilateral muscle spasm on PE.    Negative straight leg test    - Alarming sx discussed, as well as making appt if any worsening in back pain   - Continue Tylenol 650 mg q6h PRN, no more that 6 tablets a day, pt understood and in agreement with pain   - Lidocaine ointment PRN   - lower back pain stretching exercise provided in AVS

## 2023-11-03 NOTE — PATIENT INSTRUCTIONS
I would like for the patient to try a triptan medication (serotonin receptor agonist), take 1 tablet by mouth once at the onset of a headache. Patient may repeat this dose in 2 hours time. Patient should take no more than 2 tablets in 24 hours. No more than 9 tablets in 1 month. Patient can combine this medication with either 1000 mg of Tylenol, 600 mg of ibuprofen or naproxen 440 at the same time to see if this helps relieve the patient's headaches. Pt does not have a history of migraines with aura or CVA, CAD or uncontrolled HTN. Please call Central Scheduling to schedule your appointment for Neurology  Number:       [facility] VSXOA [id number] a program[facility] para programar owens isabel.     Ext 8

## 2023-11-09 ENCOUNTER — ANNUAL EXAM (OUTPATIENT)
Dept: OBGYN CLINIC | Facility: CLINIC | Age: 38
End: 2023-11-09

## 2023-11-09 VITALS
BODY MASS INDEX: 35.11 KG/M2 | SYSTOLIC BLOOD PRESSURE: 149 MMHG | WEIGHT: 190.8 LBS | HEIGHT: 62 IN | HEART RATE: 69 BPM | DIASTOLIC BLOOD PRESSURE: 95 MMHG

## 2023-11-09 DIAGNOSIS — Z01.419 ROUTINE GYNECOLOGICAL EXAMINATION: Primary | ICD-10-CM

## 2023-11-09 DIAGNOSIS — R30.9 PAIN WITH URINATION: ICD-10-CM

## 2023-11-09 LAB
SL AMB  POCT GLUCOSE, UA: ABNORMAL
SL AMB LEUKOCYTE ESTERASE,UA: ABNORMAL
SL AMB POCT BILIRUBIN,UA: ABNORMAL
SL AMB POCT BLOOD,UA: ABNORMAL
SL AMB POCT CLARITY,UA: CLEAR
SL AMB POCT COLOR,UA: ABNORMAL
SL AMB POCT KETONES,UA: ABNORMAL
SL AMB POCT NITRITE,UA: ABNORMAL
SL AMB POCT PH,UA: 7.5
SL AMB POCT SPECIFIC GRAVITY,UA: 1
SL AMB POCT URINE PROTEIN: ABNORMAL
SL AMB POCT UROBILINOGEN: 0.2

## 2023-11-09 PROCEDURE — 81002 URINALYSIS NONAUTO W/O SCOPE: CPT | Performed by: OBSTETRICS & GYNECOLOGY

## 2023-11-09 PROCEDURE — S0612 ANNUAL GYNECOLOGICAL EXAMINA: HCPCS | Performed by: OBSTETRICS & GYNECOLOGY

## 2023-11-09 PROCEDURE — 87086 URINE CULTURE/COLONY COUNT: CPT | Performed by: OBSTETRICS & GYNECOLOGY

## 2023-11-09 RX ORDER — NITROFURANTOIN 25; 75 MG/1; MG/1
100 CAPSULE ORAL 2 TIMES DAILY
Qty: 14 CAPSULE | Refills: 0 | Status: SHIPPED | OUTPATIENT
Start: 2023-11-09 | End: 2023-11-16

## 2023-11-09 NOTE — PATIENT INSTRUCTIONS
Thank you for your confidence in our team.   We appreciate you and welcome your feedback. If you receive a survey from us, please take a few moments to let us know how we are doing.    Sincerely,  Kristan King, DO

## 2023-11-09 NOTE — PROGRESS NOTES
Sadi Busby is a 42 y.o. female who presents today for annual GYN exam.  Her last pap smear was performed 10/25/21 and result was negative. She reports history of abnormal pap smears in her past.  She reports menses as irregular. Patient's last menstrual period was 10/18/2023 (exact date). Her general medical history has been reviewed and she reports it as follows:    Past Medical History:   Diagnosis Date    Anxiety     Chest pain     pt denies today//saw cardiology and everything was "normal"/saw cardiology 2019 Dr CHARLES Bustos/JENNIFER [address] present     GERD (gastroesophageal reflux disease)     Hypertension     PONV (postoperative nausea and vomiting)     Precancerous changes of the cervix      Past Surgical History:   Procedure Laterality Date    CARPAL TUNNEL RELEASE Right     EXAMINATION UNDER ANESTHESIA N/A 10/4/2020    Procedure: EXAM UNDER ANESTHESIA (EUA), CAUTERIZATION OF CERVIX;  Surgeon: Albina Pratt MD;  Location: [facility];  Service: Gynecology    [place] COLPOSCOPY CERVIX VAG LOOP ELTRD  [address] N/A 10/2/2020    Procedure: BIOPSY LEEP CERVIX;  Surgeon: Marianna Grace MD;  Location: [facility];  Service: Gynecology    TUBAL LIGATION       OB History          3    Para   3    Term   3       0    AB        Living   3         SAB   0    IAB   0    Ectopic   0    Multiple   0    Live Births   3               Social History     Tobacco Use    Smoking status: Never    Smokeless tobacco: Never   Vaping Use    Vaping Use: Never used   Substance Use Topics    Alcohol use: Not Currently    Drug use: Never     Social History     Substance and Sexual Activity   Sexual Activity Yes    Partners: Male    Birth control/protection: Female Sterilization    Comment: Tubal     Cancer-related family history is negative for Breast cancer and Cancer.     Current Outpatient Medications   Medication Instructions    acetaminophen (TYLENOL) 650 mg, Oral, Every 8 hours PRN    Cyanocobalamin (VITAMIN B-12 PO) Oral    dicyclomine (BENTYL) 10 mg, Oral, 4 times daily (before meals and at bedtime)    ergocalciferol (VITAMIN D2) 50,000 Units, Oral, Weekly    famotidine (PEPCID) 20 mg, Oral, 2 times daily    GoodSense ClearLax 17 g, Oral, Daily    hydrocortisone (ANUSOL-HC) 2.5 % rectal cream 1 Application, Topical, 2 times daily, To affected area    lidocaine (XYLOCAINE) 5 % ointment Topical, As needed    medroxyPROGESTERone (PROVERA) 10 mg, Oral, Daily    multivitamin (THERAGRAN) TABS 1 tablet, Oral, Daily    nystatin (MYCOSTATIN) cream Topical, 2 times daily    omeprazole (PRILOSEC) 40 mg, Oral, Daily    propranolol (INDERAL) 20 mg, Oral, Every 12 hours scheduled    SUMAtriptan (IMITREX) 50 mg, Oral, Once as needed, may repeat in 2 hours if necessary    traZODone (DESYREL) 50 mg, Oral, Daily at bedtime       Review of Systems:  Review of Systems   All other systems reviewed and are negative. Physical Exam:  /95   Pulse 69   Ht 5' 2" (1.575 m)   Wt 86.5 kg (190 lb 12.8 oz)   LMP 10/18/2023 (Exact Date)   BMI 34.90 kg/m²   Physical Exam  Constitutional:       Appearance: Normal appearance. Genitourinary:      Bladder and urethral meatus normal.      No lesions in the vagina. Right Labia: No rash, tenderness or lesions. Left Labia: No tenderness, lesions or rash. No inguinal adenopathy present in the right or left side. No vaginal discharge or bleeding. Right Adnexa: not tender, not full and no mass present. Left Adnexa: not tender, not full and no mass present. No cervical motion tenderness, discharge or lesion. Uterus is not enlarged or tender. No uterine mass detected. No urethral tenderness or mass present. Breasts:     Breasts are soft. Right: No swelling, inverted nipple, mass, nipple discharge, skin change or tenderness.       Left: No swelling, inverted nipple, mass, nipple discharge, skin change or tenderness. HENT:      Head: Normocephalic and atraumatic. Cardiovascular:      Rate and Rhythm: Normal rate and regular rhythm. Pulmonary:      Effort: Pulmonary effort is normal.      Breath sounds: Normal breath sounds. Abdominal:      General: Bowel sounds are normal.      Palpations: Abdomen is soft. Hernia: There is no hernia in the left inguinal area or right inguinal area. Musculoskeletal:         General: Normal range of motion. Cervical back: Normal range of motion and neck supple. Lymphadenopathy:      Upper Body:      Right upper body: No supraclavicular or axillary adenopathy. Left upper body: No supraclavicular or axillary adenopathy. Lower Body: No right inguinal adenopathy. No left inguinal adenopathy. Neurological:      Mental Status: She is alert and oriented to person, place, and time. Skin:     General: Skin is warm and dry. Psychiatric:         Mood and Affect: Mood normal.   Vitals reviewed. Assessment/Plan:   1. Normal well-woman GYN exam.  2. Cervical cancer screening:  Normal cervical exam.  Has  received HPV vaccine in the past.   3. STD screening:  Patient declines. 4. Breast cancer screening:  Normal breast exam.    Reviewed breast self-awareness. 5. Depression Screening: Patient's depression screening was assessed with a PHQ-2 score of 4. Their PHQ-9 score was 12. Continue regular follow-up with their psychologist/therapist/psychiatrist who is managing their mental health condition(s). Referral placed for Social    6. BMI Counseling: Body mass index is 34.9 kg/m². Discussed the patient's BMI with her. The BMI is above normal. Nutrition recommendations include decreasing overall calorie intake. 7. Contraception:  tubal ligation   8. Return to office 1yr/prn. Review[facility] with patient that [facility], but that they will not necessarily be reviewed by me immediately. Explained that I will review results at my earliest opportunity and contact patient appropriately.

## 2023-11-10 DIAGNOSIS — R51.9 LEFT TEMPORAL HEADACHE: ICD-10-CM

## 2023-11-10 RX ORDER — PROPRANOLOL HYDROCHLORIDE 20 MG/1
20 TABLET ORAL EVERY 12 HOURS
Qty: 180 TABLET | Refills: 1 | Status: SHIPPED | OUTPATIENT
Start: 2023-11-10

## 2023-11-11 LAB — BACTERIA UR CULT: NORMAL

## 2023-11-22 ENCOUNTER — TELEPHONE (OUTPATIENT)
Dept: OBGYN CLINIC | Facility: CLINIC | Age: 38
End: 2023-11-22

## 2023-11-28 NOTE — TELEPHONE ENCOUNTER
Patient called again  asked if she can get a letter for work stating she can work until 12/20/23. 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

## 2023-11-30 ENCOUNTER — TELEPHONE (OUTPATIENT)
Dept: NEUROLOGY | Facility: CLINIC | Age: 38
End: 2023-11-30

## 2023-11-30 NOTE — TELEPHONE ENCOUNTER
Called spoke to patient confirmed appointment with Kecia Good on 12/4/23 at [facility].

## 2023-12-04 ENCOUNTER — CONSULT (OUTPATIENT)
Dept: NEUROLOGY | Facility: CLINIC | Age: 38
End: 2023-12-04
Payer: COMMERCIAL

## 2023-12-04 VITALS
BODY MASS INDEX: 34.3 KG/M2 | TEMPERATURE: 97.2 F | DIASTOLIC BLOOD PRESSURE: 82 MMHG | HEIGHT: 62 IN | HEART RATE: 78 BPM | SYSTOLIC BLOOD PRESSURE: 130 MMHG | WEIGHT: 186.4 LBS

## 2023-12-04 DIAGNOSIS — G44.209 TENSION HEADACHE: Primary | ICD-10-CM

## 2023-12-04 DIAGNOSIS — R51.9 LEFT TEMPORAL HEADACHE: ICD-10-CM

## 2023-12-04 DIAGNOSIS — R20.0 LEFT FACIAL NUMBNESS: ICD-10-CM

## 2023-12-04 PROCEDURE — 99245 OFF/OP CONSLTJ NEW/EST HI 55: CPT | Performed by: PSYCHIATRY & NEUROLOGY

## 2023-12-04 RX ORDER — RIZATRIPTAN BENZOATE 10 MG/1
10 TABLET ORAL AS NEEDED
Qty: 18 TABLET | Refills: 3 | Status: SHIPPED | OUTPATIENT
Start: 2023-12-04

## 2023-12-04 RX ORDER — PREDNISONE 10 MG/1
TABLET ORAL
Qty: 42 TABLET | Refills: 0 | Status: SHIPPED | OUTPATIENT
Start: 2023-12-04

## 2023-12-04 RX ORDER — NORTRIPTYLINE HYDROCHLORIDE 10 MG/1
CAPSULE ORAL
Qty: 90 CAPSULE | Refills: 3 | Status: SHIPPED | OUTPATIENT
Start: 2023-12-04

## 2023-12-04 NOTE — PROGRESS NOTES
NEUROLOGY OUTPATIENT - NEW PATIENT VISIT NOTE        NAME: Adolph Martinez  MRN: [id number]  : 1985     TODAY'S DATE: 23         HISTORY OF PRESENT ILLNESS (HPI):    Ms. Samy Sabillon is a 42 y.o. female presenting with Headache       HEADACHE DESCRIPTION:    Onset of headaches: gradual several years back--The left temple headaches started about 2 months back  Location of headaches: left-sided unilateral, temporal, and retro-orbital  Quality of the pain: throbbing  Intensity of the headache: At present: 3/10; At its worst:  10/10  Duration of the headaches:hour(s)  Frequency of the headaches:daily  Presence of aura:  No  Associated symptoms with headaches: no vomiting , +nausea, +light sensitivity , +sound sensitivity, and dizziness --She is complaining of numbness on the left V 2 distribution which is associated with the headache and vertigo which is associated with headache   Triggers:No   Positional component: Yes   Alleviating factors: No   Any specific time of the day when get the headaches: no particular time of day    Family history of migraine headaches: No  History of neck and head trauma: No  Smoking status: No  Oral contraceptive use: Yes    Life style factors:  -Hydration: adequate  -Sleep: inadequate--She is using the Trazodone to help sleep at night (50 mg) for 2 years  -Caffeine intake: 2 cups of caffeinated coffee per day(s)  -Alcohol intake: none     Impact of headches:  -Number of headaches in past 30 days: 30/30 days.   -Number of days missed per month because of headache: none days missed in the last 30 days.   -Number of ER visits for her headaches: No  in the last 30 days.        Treatment:  -Over the counter medications tried for headaches:   Tylenol been ineffective     -Prescription medications:  Abortive or Rescue Medications used:   Sumatriptan been helpful     Preventative or daily medications used for headaches:   Propranolol been ineffective         Red Flags for headache:  Age <5 or >50: No  First worst headaches: No  Maximal at intensity: No  Change in pattern: Yes  New headache in pregnancy, cancer or immunocompromised patient: No  Loss of consciousness or convulsions: No  Headache triggered by exertion, Valsalva maneuver or sexual activity: No  Abnormal exam: please see below in Neurological examination. CURRENT OUTPATIENT MEDICATIONS:    Gordo Franks has a current medication list which includes the following prescription(s): acetaminophen, cyanocobalamin, dicyclomine, ergocalciferol, famotidine, hydrocortisone, lidocaine, medroxyprogesterone, multivitamin, nortriptyline, nystatin, omeprazole, prednisone, rizatriptan, and goodsense clearlax. PHYSICAL EXAMINATION:   VITAL SIGNS:  height is 5' 2" (1.575 m) and weight is 84.6 kg (186 lb 6.4 oz). Her temporal temperature is 97.2 °F (36.2 °C) (abnormal). Her blood pressure is 130/82 and her pulse is 78. NEUROLOGICAL EXAMINATION:    MENTAL STATUS EXAM: Alert, oriented to time place and person     CRANIAL NERVE EXAMINATION:  I Not tested   II Normal visual fields to finger counting. II, Ill,  IV, VI Pupils were symmetric, briskly reactive. No afferent pupillary defect. Eye movements are full without nystagmus. No ptosis. V Facial sensation were decreased in the left V 2    VII Facial movements were asymmetrical on the left side (UMN?)   VIII Hearing was intact   IX, X Intact   XI Shoulder shrug and head turn was normal   XII Tongue protrusion is in the mid line.           MOTOR EXAM:        Arm Right  Left Leg Right  Left   Deltoid 5/5 5/5 lliopsoas 5/5 5/5   Biceps Unable to assess secondary to the pain  5/5 Quads 5/5 5/5   Triceps 5/5 5/5 Hamstrings 5/5 5/5   Wrist Extension 5/5 5/5 Ankle Dorsi Flexion 5/5 5/5   Wrist Flexion 5/5 5/5 Ankle Plantar Flexion 5/5 5/5               DEEP TENDON REFLEXES:     Brachioradialis Biceps Triceps Patellar Achilles   Right 2+ 2+ 2+ 2+ 2+   Left 2+ 2+ 2+ 2+ 2+            SENSORY EXAMINATION:   Sensation to dull touch Intact     COORDINATION:   normal finger to nose testing     GAIT/STATION:   normal        DIAGNOSTIC STUDIES:   PERTINENT LABS:     Lab Results   Component Value Date    WBC 5.73 08/19/2023     Lab Results   Component Value Date    HGB 14.3 08/19/2023     Lab Results   Component Value Date     08/19/2023     Lab Results   Component Value Date    LYMPHSABS 2.14 08/19/2023     No results found for: "LABLYMP"  Lab Results   Component Value Date    EOSABS 0.08 08/19/2023     No components found for: "NEUTROPHILS"    No results found for: "LABMONO"         No results found for: "Mayra"  Lab Results   Component Value Date    CREATININE 0.69 08/19/2023     Lab Results   Component Value Date    AST 14 08/19/2023     Lab Results   Component Value Date    ALT 17 08/19/2023     Lab Results   Component Value Date    ALKPHOS 47 08/19/2023     Lab Results   Component Value Date    AST 14 08/19/2023           Lab Results   Component Value Date    HEPBSAG Non-reactive 01/31/2023    HEPCAB Non-reactive 01/31/2023            NEUROIMAGING:          Narrative & Impression   CT BRAIN - WITHOUT CONTRAST 3/24/2023     INDICATION:   Dizziness, non-specific  dizzy. COMPARISON:  None. TECHNIQUE:  CT examination of the brain was performed. Multiplanar 2D reformatted images were created from the source data. Radiation dose length product (DLP) for this visit:  841 mGy-cm . This examination, like all CT scans performed in the [facility], was performed utilizing techniques to minimize radiation dose exposure, including the use of iterative   reconstruction and automated exposure control. IMAGE QUALITY:  Diagnostic. FINDINGS:     PARENCHYMA:  No intracranial mass, mass effect or midline shift. No CT signs of acute infarction. No acute parenchymal hemorrhage.      VENTRICLES AND EXTRA-AXIAL SPACES:  Normal for the patient's age.     Alirio Julieta ORBITS: Normal visualized orbits. PARANASAL SINUSES: Normal visualized paranasal sinuses. CALVARIUM AND EXTRACRANIAL SOFT TISSUES:  Normal.     IMPRESSION:     No acute intracranial abnormality. ASSESSMENT AND PLAN:    Ms. Alexsander Mccann is a 42 y. o.female  presenting with left-sided headache that seems to be getting worse in the last 2 months or so. Patient  has a past medical history of Anxiety, Chest pain, Dental crown present, GERD (gastroesophageal reflux disease), Hypertension, PONV (postoperative nausea and vomiting), and Precancerous changes of the cervix. [facility] Neurological exam is concerning for left-sided upper motor neuron facial weakness, decreased sensation in the left V2 region and significant pain in the right biceps area. Neuroimaging including prior CT head was negative in March of this year. Unsure what the exact etiology of her symptoms but would like to get an MRI of the brain to rule out any intracranial pathology that might be causing her left facial asymmetry and left V2 region numbness. She is also reporting vertigo but these headaches. Tension headache versus hemicrania continua can be in the differential as well. Tension headache/Left temporal headache/Left facial numbness    -Start nortriptyline (PAMELOR) 10 mg capsule; Start with 10 mg at bed time, if no relief please increase the dose to 20 mg in a week's time. - Stop using the propranolol secondary to the side effectsincluding vertigo and low blood pressure? - Stop using Trazodone for sleep as it does not seems to be helping, we can consider Remeron if needed.   -Start predniSONE 10 mg tablet; Prednisone taper: Take 6 tabs for 2 days, 5 tabs for 2 days, 4 tabs for 2 days, 3 tabs for 2 days, 2 tabs for 2 days, 1 tabs for 2 days and then stop.    - Stop Imitrex and start using Maxalt Take 1 tablet (10 mg total) by mouth as needed for migraine Take at the onset of migraine; if symptoms continue or return, may take another dose at least 2 hours after first dose. Take no more than 2 doses in a day  - MRI brain with and without contrast; Future        The management plan was discussed in detail with the patient and all questions were answered. Ms. Dianne Chen was encouraged to contact our office with any questions or concerns and to contact the clinic or go to the nearest emergency room if symptoms change or worsen. I have spent a total of 61 min in reviewing and/or ordering tests, medications, or procedures, performing an examination or evaluation, reviewing pertinent history, counseling and educating the patient, referring and/or communicating with other health care professionals, documenting in the EMR and general coordination of care of Ms. Dianne Chen  today. Bina Thao MD.   Staff Neurologist,   Neuroimmunology and Neuroinfectious disease  12/04/23     This report has been created through the use of voice recognition/text compilation software. Typographical and content errors may occur with this process. While efforts are made to detect and correct such errors, in some cases errors will persist.  For this reason, wording in this document should be considered in the proper context and not strictly verbatim.   If, when reviewing the document, an error is discovered, please let the office know at [phone]

## 2023-12-04 NOTE — PATIENT INSTRUCTIONS
Lifestyle adjustments. Irrespective of treatment modalities applied, trigger control and lifestyle modification are indispensable to the successful management of migraine. This is especially important in children, adolescents, or pregnant women where drug treatments must be especially limited. Although there is no robust evidence for most of the recommendations, most are general health measures that, given the lack of adverse effects and the benefit for general well-being, we consider should be recommended in all patients. Avoid triggers. Many patients attribute the onset or worsening of pain to specific triggers such as stress, sleep changes, food, or atmospheric changes, among others. It is even possible that the relationship occurs inversely, so that premonitory symptoms such as sleep disturbances and appetite 48 to 72 hours before the onset of pain can be misinterpreted by the patient as the trigger of migraine attacks. The therapeutic implications of this relationship are also unclear. There are possible triggers such as sleep deprivation, fasting, or certain foods that can be easily avoidable. But avoiding other triggers can lead to very restrictive lifestyles with a reduction in quality of life that does not outweigh the potential beneficial.    Sleep. Another complex relationship is headache and sleep. An excess or lack of sleep can trigger migraine attacks and at the same time rest is one of the most used treatments to improve the symptoms of the migraine attack. Additionally, migraine and other headaches occur comorbidly with sleep disorders.  Patients with chronic migraine have a higher prevalence of sleep disorders, specifically poor sleep habits and nonrestorative rest.    Regarding general sleep measures for patients with headache, it is recommended to define regular sleep schedules that allow 8 hours of rest per day, insisting that they remain constant also during the weekend; have dinner 4 hours before bedtime and avoid liquids in the last two hours; and eliminate naps and avoid using screens, television, reading, or listening to music in bed. A nonpharmacological intervention to improve sleep habits can improve headache frequency and even reverse chronic to episodic migraine. Diet. In the scientific literature, but especially in the informative websites and magazines, multiple and varied diets are proposed that aim to reduce the frequency of headaches. There are two main approaches: elimination diets, which consist of suppressing potentially triggering foods such as chocolate, alcohol, cheese, nuts, or citrus fruits and diets that provide high or low amounts of certain components, ie, rich in vitamin B12, B6, or D or low in histamine, lactose, or fatty acids. The studies are not very rigorous and most do not have a control group (). In addition, it must be considered that food triggers were only associated with onset of headache in less than 10% of the participants. Dietary recommendations for patients with migraine should be the same as for the general population with special emphasis on the prevention of obesity, which is a factor related to headache chronification. It is recommendable to have a varied diet, eating five meals a day to avoid periods of prolonged fasting and incorporating water intake to reach around 2.5 liters per day, which should be increased in case of physical activity or increase in temperature or humidity. Specific diets should be recommended solely based on whether there are other comorbidities in the patient. Caffeine at moderate doses (< 400mg/day: equivalent to two cups of coffee) does not seem to have a negative effect on headache frequency although it should be taken regularly to avoid withdrawal headaches.     Although patients with migraine headaches and cluster headaches may be more susceptible to alcohol as a precipitant, there is no evidence to recommend abstinence from alcohol in all patients. Individual predisposition and cultural factors must be considered. Exercise. Aerobic exercise can prevent or reduce symptoms of multiple chronic diseases, including headache. With some methodological limitations, there are studies that demonstrate benefits of aerobic exercise as a therapeutic intervention to reduce the frequency and intensity of headaches, as well as the quality of life measured by questionnaires. Exercise can have a beneficial effect on headaches directly but also indirectly, improving sleep quality, mood, cardiovascular function, and preventing weight gain. In addition, it can improve the control of other diseases frequently comorbid with headache such as obesity, hypertension, anxiety, depression, or sleep disorders (). The clinical benefit of yoga as an add-on therapy in patients with episodic migraine has been demonstrated. Vitamins for headache  Mg 400 mg daily for headache  RiboFlavin 400 mg daily for headache      Amitripyline/Nortriptyline:    Start with a dose of 10 mg at night (6pm if morning hangover effects occur) or on alternate nights if you are particularly susceptible to side effects. If tolerated and no relief to your symptoms please increase the dose to 20 mg every night. Common side effects can include dry mouth, constipation, or other symptoms. Prednisone taper:    Take 6 tabs for 2 days, 5 tabs for 2 days, 4 tabs for 2 days, 3 tabs for 2 days, 2 tabs for 2 days, 1 tabs for 2 days and then stop

## 2023-12-08 ENCOUNTER — OFFICE VISIT (OUTPATIENT)
Dept: FAMILY MEDICINE CLINIC | Facility: CLINIC | Age: 38
End: 2023-12-08

## 2023-12-08 VITALS
TEMPERATURE: 97.9 F | WEIGHT: 189.3 LBS | BODY MASS INDEX: 34.62 KG/M2 | SYSTOLIC BLOOD PRESSURE: 120 MMHG | OXYGEN SATURATION: 99 % | HEART RATE: 74 BPM | DIASTOLIC BLOOD PRESSURE: 80 MMHG

## 2023-12-08 DIAGNOSIS — M54.9 MID BACK PAIN ON LEFT SIDE: Primary | ICD-10-CM

## 2023-12-08 DIAGNOSIS — M25.522 PAIN OF BOTH ELBOWS: ICD-10-CM

## 2023-12-08 DIAGNOSIS — M25.521 PAIN OF BOTH ELBOWS: ICD-10-CM

## 2023-12-08 PROCEDURE — 99213 OFFICE O/P EST LOW 20 MIN: CPT | Performed by: FAMILY MEDICINE

## 2023-12-08 NOTE — PROGRESS NOTES
Assessment/Plan:    No problem-specific Assessment & Plan notes found for this encounter. Diagnoses and all orders for this visit:    Mid back pain on left side  Comments:  Discussed stretchin and strengthening exercises. Moist heat for 15 minutes 2x day  Orders:  -     Diclofenac Sodium (VOLTAREN) 1 %; Apply 2 g topically 4 (four) times a day    Pain of both elbows  -     Diclofenac Sodium (VOLTAREN) 1 %; Apply 2 g topically 4 (four) times a day          Subjective:      Patient ID: Milton Jones is a 34 y.o. female. Elbow Pain  This is a recurrent problem. The current episode started 1 to 4 weeks ago. The problem occurs intermittently. Associated symptoms include arthralgias. The symptoms are aggravated by twisting (worse at the end of the work day). She has tried NSAIDs for the symptoms. The treatment provided mild relief. Back Pain  This is a chronic problem. The current episode started more than 1 month ago. The problem occurs intermittently. The problem is unchanged. The pain is present in the thoracic spine. The quality of the pain is described as aching. The pain does not radiate. The pain is at a severity of 5/10. The symptoms are aggravated by twisting. Risk factors include obesity, poor posture and sedentary lifestyle. She has tried NSAIDs for the symptoms. The treatment provided mild relief. The following portions of the patient's history were reviewed and updated as appropriate: She  has a past medical history of Anxiety, Chest pain, Dental crown present, GERD (gastroesophageal reflux disease), Hypertension, PONV (postoperative nausea and vomiting), and Precancerous changes of the cervix.   She   Patient Active Problem List    Diagnosis Date Noted   • Rib pain on left side 11/03/2023   • Breast mass 09/28/2023   • Breast pain, left 09/28/2023   • Vitamin D deficiency 08/30/2023   • Spine degeneration 07/10/2023   • Vertigo 03/30/2023   • Thyroid nodule 02/06/2023   • Insomnia 10/06/2022   • External hemorrhoid 06/15/2022   • Blepharospasm 06/15/2022   • Acute bilateral low back pain with right-sided sciatica 02/10/2021   • S/P LEEP (loop electrosurgical excision procedure) 10/02/2020   • GERD (gastroesophageal reflux disease)    • PONV (postoperative nausea and vomiting)    • RONALDO III (cervical intraepithelial neoplasia III) 09/10/2020   • Anxiety 08/03/2020   • Left temporal headache 07/20/2020   • Intermittent palpitations 04/21/2020   • Fibrocystic breast disease 11/07/2019   • Hypertension 05/09/2019   • Hyperlipidemia 05/09/2019   • Obesity 04/25/2019     She  has a past surgical history that includes Tubal ligation; Carpal tunnel release (Right); pr colposcopy cervix vag loop eltrd bx cervix (N/A, 10/2/2020); and Examination under anesthesia (N/A, 10/4/2020). Her family history includes Asthma in her mother; Diabetes in her paternal aunt; Hypertension in her mother; No Known Problems in her daughter, father, maternal aunt, maternal aunt, maternal grandfather, maternal grandmother, paternal aunt, paternal aunt, paternal aunt, paternal grandfather, paternal grandmother, and sister. She  reports that she has never smoked. She has never used smokeless tobacco. She reports that she does not currently use alcohol. She reports that she does not use drugs.   Current Outpatient Medications   Medication Sig Dispense Refill   • acetaminophen (TYLENOL) 650 mg CR tablet Take 1 tablet (650 mg total) by mouth every 8 (eight) hours as needed for mild pain 90 tablet 5   • Cyanocobalamin (VITAMIN B-12 PO) Take by mouth     • Diclofenac Sodium (VOLTAREN) 1 % Apply 2 g topically 4 (four) times a day 150 g 1   • dicyclomine (BENTYL) 10 mg capsule Take 10 mg by mouth 4 (four) times a day (before meals and at bedtime)     • ergocalciferol (VITAMIN D2) 50,000 units Take 1 capsule (50,000 Units total) by mouth once a week 12 capsule 3   • famotidine (PEPCID) 20 mg tablet Take 1 tablet (20 mg total) by mouth 2 (two) times a day 180 tablet 1   • hydrocortisone (ANUSOL-HC) 2.5 % rectal cream APPLY TO AFFECTED AREA TWICE A DAY 30 g 3   • lidocaine (XYLOCAINE) 5 % ointment Apply topically as needed for mild pain 240 g 2   • medroxyPROGESTERone (PROVERA) 10 mg tablet Take 1 tablet (10 mg total) by mouth daily 90 tablet 0   • multivitamin (THERAGRAN) TABS Take 1 tablet by mouth daily 90 tablet 5   • nortriptyline (PAMELOR) 10 mg capsule Start with 10 mg at bed time, if no relief please increase the dose to 20 mg in a week's time. 90 capsule 3   • nystatin (MYCOSTATIN) cream Apply topically 2 (two) times a day 30 g 0   • omeprazole (PriLOSEC) 40 MG capsule Take 1 capsule (40 mg total) by mouth daily 90 capsule 1   • predniSONE 10 mg tablet Prednisone taper: Take 6 tabs for 2 days, 5 tabs for 2 days, 4 tabs for 2 days, 3 tabs for 2 days, 2 tabs for 2 days, 1 tabs for 2 days and then stop 42 tablet 0   • rizatriptan (Maxalt) 10 mg tablet Take 1 tablet (10 mg total) by mouth as needed for migraine Take at the onset of migraine; if symptoms continue or return, may take another dose at least 2 hours after first dose. Take no more than 2 doses in a day. 18 tablet 3   • polyethylene glycol (GoodSense ClearLax) 17 GM/SCOOP powder Take 17 g by mouth daily for 14 days (Patient not taking: Reported on 12/4/2023) 17 g 2     No current facility-administered medications for this visit.      Current Outpatient Medications on File Prior to Visit   Medication Sig   • acetaminophen (TYLENOL) 650 mg CR tablet Take 1 tablet (650 mg total) by mouth every 8 (eight) hours as needed for mild pain   • Cyanocobalamin (VITAMIN B-12 PO) Take by mouth   • dicyclomine (BENTYL) 10 mg capsule Take 10 mg by mouth 4 (four) times a day (before meals and at bedtime)   • ergocalciferol (VITAMIN D2) 50,000 units Take 1 capsule (50,000 Units total) by mouth once a week   • famotidine (PEPCID) 20 mg tablet Take 1 tablet (20 mg total) by mouth 2 (two) times a day   • hydrocortisone (ANUSOL-HC) 2.5 % rectal cream APPLY TO AFFECTED AREA TWICE A DAY   • lidocaine (XYLOCAINE) 5 % ointment Apply topically as needed for mild pain   • medroxyPROGESTERone (PROVERA) 10 mg tablet Take 1 tablet (10 mg total) by mouth daily   • multivitamin (THERAGRAN) TABS Take 1 tablet by mouth daily   • nortriptyline (PAMELOR) 10 mg capsule Start with 10 mg at bed time, if no relief please increase the dose to 20 mg in a week's time. • nystatin (MYCOSTATIN) cream Apply topically 2 (two) times a day   • omeprazole (PriLOSEC) 40 MG capsule Take 1 capsule (40 mg total) by mouth daily   • predniSONE 10 mg tablet Prednisone taper: Take 6 tabs for 2 days, 5 tabs for 2 days, 4 tabs for 2 days, 3 tabs for 2 days, 2 tabs for 2 days, 1 tabs for 2 days and then stop   • rizatriptan (Maxalt) 10 mg tablet Take 1 tablet (10 mg total) by mouth as needed for migraine Take at the onset of migraine; if symptoms continue or return, may take another dose at least 2 hours after first dose. Take no more than 2 doses in a day. • polyethylene glycol (GoodSense ClearLax) 17 GM/SCOOP powder Take 17 g by mouth daily for 14 days (Patient not taking: Reported on 12/4/2023)     No current facility-administered medications on file prior to visit. She is allergic to no known allergies. .    Review of Systems   Musculoskeletal:  Positive for arthralgias and back pain. All other systems reviewed and are negative. Objective:      /80 (BP Location: Right arm, Patient Position: Sitting, Cuff Size: Standard)   Pulse 74   Temp 97.9 °F (36.6 °C) (Temporal)   Wt 85.9 kg (189 lb 4.8 oz)   LMP 10/18/2023 (Exact Date)   SpO2 99%   BMI 34.62 kg/m²          Physical Exam  Vitals and nursing note reviewed. Constitutional:       Appearance: She is well-developed. HENT:      Head: Normocephalic.       Right Ear: External ear normal.      Left Ear: External ear normal.      Nose: Nose normal.   Eyes: Conjunctiva/sclera: Conjunctivae normal.      Pupils: Pupils are equal, round, and reactive to light. Neck:      Thyroid: No thyromegaly. Cardiovascular:      Rate and Rhythm: Normal rate and regular rhythm. Heart sounds: Normal heart sounds. Pulmonary:      Effort: Pulmonary effort is normal.      Breath sounds: Normal breath sounds. Abdominal:      Palpations: Abdomen is soft. Tenderness: There is no abdominal tenderness. There is no guarding or rebound. Musculoskeletal:         General: Tenderness present. Normal range of motion. Cervical back: Normal range of motion and neck supple. Skin:     General: Skin is dry. Neurological:      Mental Status: She is alert and oriented to person, place, and time. Deep Tendon Reflexes: Reflexes are normal and symmetric.

## 2023-12-09 ENCOUNTER — HOSPITAL ENCOUNTER (EMERGENCY)
Facility: HOSPITAL | Age: 38
Discharge: HOME | End: 2023-12-09
Attending: EMERGENCY MEDICINE | Admitting: EMERGENCY MEDICINE
Payer: COMMERCIAL

## 2023-12-09 VITALS
DIASTOLIC BLOOD PRESSURE: 73 MMHG | HEART RATE: 75 BPM | OXYGEN SATURATION: 100 % | TEMPERATURE: 98.2 F | BODY MASS INDEX: 34.27 KG/M2 | SYSTOLIC BLOOD PRESSURE: 122 MMHG | WEIGHT: 187.39 LBS | RESPIRATION RATE: 14 BRPM

## 2023-12-09 DIAGNOSIS — G43.909 MIGRAINE HEADACHE: Primary | ICD-10-CM

## 2023-12-09 LAB — GLUCOSE SERPL-MCNC: 108 MG/DL (ref 65–140)

## 2023-12-09 PROCEDURE — 82948 REAGENT STRIP/BLOOD GLUCOSE: CPT

## 2023-12-09 PROCEDURE — 99284 EMERGENCY DEPT VISIT MOD MDM: CPT | Performed by: EMERGENCY MEDICINE

## 2023-12-09 PROCEDURE — 96365 THER/PROPH/DIAG IV INF INIT: CPT

## 2023-12-09 PROCEDURE — 99283 EMERGENCY DEPT VISIT LOW MDM: CPT

## 2023-12-09 PROCEDURE — 96375 TX/PRO/DX INJ NEW DRUG ADDON: CPT

## 2023-12-09 RX ORDER — MAGNESIUM SULFATE HEPTAHYDRATE 40 MG/ML
2 INJECTION, SOLUTION INTRAVENOUS ONCE
Status: COMPLETED | OUTPATIENT
Start: 2023-12-09 | End: 2023-12-09

## 2023-12-09 RX ORDER — KETOROLAC TROMETHAMINE 30 MG/ML
30 INJECTION, SOLUTION INTRAMUSCULAR; INTRAVENOUS ONCE
Status: COMPLETED | OUTPATIENT
Start: 2023-12-09 | End: 2023-12-09

## 2023-12-09 RX ORDER — DIPHENHYDRAMINE HYDROCHLORIDE 50 MG/ML
25 INJECTION INTRAMUSCULAR; INTRAVENOUS ONCE
Status: COMPLETED | OUTPATIENT
Start: 2023-12-09 | End: 2023-12-09

## 2023-12-09 RX ORDER — METOCLOPRAMIDE HYDROCHLORIDE 5 MG/ML
10 INJECTION INTRAMUSCULAR; INTRAVENOUS ONCE
Status: COMPLETED | OUTPATIENT
Start: 2023-12-09 | End: 2023-12-09

## 2023-12-09 RX ADMIN — KETOROLAC TROMETHAMINE 30 MG: 30 INJECTION, SOLUTION INTRAMUSCULAR; INTRAVENOUS at 19:20

## 2023-12-09 RX ADMIN — DIPHENHYDRAMINE HYDROCHLORIDE 25 MG: 50 INJECTION, SOLUTION INTRAMUSCULAR; INTRAVENOUS at 19:18

## 2023-12-09 RX ADMIN — METOCLOPRAMIDE 10 MG: 5 INJECTION, SOLUTION INTRAMUSCULAR; INTRAVENOUS at 19:21

## 2023-12-09 RX ADMIN — SODIUM CHLORIDE 1000 ML: 0.9 INJECTION, SOLUTION INTRAVENOUS at 19:23

## 2023-12-09 RX ADMIN — MAGNESIUM SULFATE HEPTAHYDRATE 2 G: 40 INJECTION, SOLUTION INTRAVENOUS at 19:23

## 2023-12-09 NOTE — Clinical Note
Naun Wilde was seen and treated in our emergency department on 12/9/2023. No restrictions            Diagnosis:     Joey Kc  may return to work on return date. She may return on this date: 12/11/2023         If you have any questions or concerns, please don't hesitate to call.       Karen Menendez MD    ______________________________           _______________          _______________  Hospital Representative                              Date                                Time

## 2023-12-10 NOTE — ED PROVIDER NOTES
History  Chief Complaint   Patient presents with    Migraine     Pt reports left sided migraine x1 year. Pt reports some n/v and dizziness. States "high BS" of 124 at urgent care     57-year-old female presents for evaluation of a throbbing left-sided headache. She states the headache has been intermittent for the past year. Patient was gradual with a throbbing left-sided headache which started earlier today, is constant, nonradiating, without modifying factors. She states this feels of her typical migraine headaches which she was recently diagnosed with by neurology. Patient is scheduled to have a MRI on Sunday. Patient denies focal numbness or weakness, recent falls or head trauma, nausea, vomit, fevers, chills, neck pain/neck stiffness      History provided by:  Patient  Migraine  Associated symptoms: headaches and nausea    Associated symptoms: no abdominal pain, no chest pain, no congestion, no diarrhea, no ear pain, no fatigue, no fever, no myalgias, no rash, no rhinorrhea, no shortness of breath, no sore throat, no vomiting and no wheezing        Prior to Admission Medications   Prescriptions Last Dose Informant Patient Reported? Taking?    Cyanocobalamin (VITAMIN B-12 PO)  Self Yes No   Sig: Take by mouth   Diclofenac Sodium (VOLTAREN) 1 %   No No   Sig: Apply 2 g topically 4 (four) times a day   acetaminophen (TYLENOL) 650 mg CR tablet  Self No No   Sig: Take 1 tablet (650 mg total) by mouth every 8 (eight) hours as needed for mild pain   dicyclomine (BENTYL) 10 mg capsule  Self Yes No   Sig: Take 10 mg by mouth 4 (four) times a day (before meals and at bedtime)   ergocalciferol (VITAMIN D2) 50,000 units  Self No No   Sig: Take 1 capsule (50,000 Units total) by mouth once a week   famotidine (PEPCID) 20 mg tablet  Self No No   Sig: Take 1 tablet (20 mg total) by mouth 2 (two) times a day   hydrocortisone (ANUSOL-HC) 2.5 % rectal cream  Self No No   Sig: APPLY TO AFFECTED AREA TWICE A DAY   lidocaine (XYLOCAINE) 5 % ointment  Self No No   Sig: Apply topically as needed for mild pain   medroxyPROGESTERone (PROVERA) 10 mg tablet  Self No No   Sig: Take 1 tablet (10 mg total) by mouth daily   multivitamin (THERAGRAN) TABS  Self No No   Sig: Take 1 tablet by mouth daily   nortriptyline (PAMELOR) 10 mg capsule   No No   Sig: Start with 10 mg at bed time, if no relief please increase the dose to 20 mg in a week's time. nystatin (MYCOSTATIN) cream  Self No No   Sig: Apply topically 2 (two) times a day   omeprazole (PriLOSEC) 40 MG capsule  Self No No   Sig: Take 1 capsule (40 mg total) by mouth daily   polyethylene glycol (GoodSense ClearLax) 17 GM/SCOOP powder  Self No No   Sig: Take 17 g by mouth daily for 14 days   Patient not taking: Reported on 12/4/2023   predniSONE 10 mg tablet   No No   Sig: Prednisone taper: Take 6 tabs for 2 days, 5 tabs for 2 days, 4 tabs for 2 days, 3 tabs for 2 days, 2 tabs for 2 days, 1 tabs for 2 days and then stop   rizatriptan (Maxalt) 10 mg tablet   No No   Sig: Take 1 tablet (10 mg total) by mouth as needed for migraine Take at the onset of migraine; if symptoms continue or return, may take another dose at least 2 hours after first dose. Take no more than 2 doses in a day. Facility-Administered Medications: None       Past Medical History:   Diagnosis Date    Anxiety     Chest pain     pt denies today//saw cardiology and everything was "normal"/saw cardiology 7/2019 Dr CHARLES Bustos/C.  [address] [facility] present     GERD (gastroesophageal reflux disease)     Hypertension     PONV (postoperative nausea and vomiting)     Precancerous changes of the cervix        Past Surgical History:   Procedure Laterality Date    CARPAL TUNNEL RELEASE Right     EXAMINATION UNDER ANESTHESIA N/A 10/4/2020    Procedure: EXAM UNDER ANESTHESIA (EUA), CAUTERIZATION OF CERVIX;  Surgeon: Stew Magaña MD;  Location: [facility];  Service: Gynecology    NY COLPOSCOPY CERVIX VAG LOOP ELTRD BX CERVIX N/A 10/2/2020    Procedure: BIOPSY LEEP CERVIX;  Surgeon: Preston Queen MD;  Location: [facility];  Service: Gynecology    TUBAL LIGATION         Family History   Problem Relation Age of Onset    Asthma Mother     Hypertension Mother     No Known Problems Father     No Known Problems Sister     No Known Problems Daughter     No Known Problems Maternal Grandmother     No Known Problems Maternal Grandfather     No Known Problems Paternal Grandmother     No Known Problems Paternal Grandfather     No Known Problems Maternal Aunt     No Known Problems Maternal Aunt     No Known Problems Paternal Aunt     No Known Problems Paternal Aunt     No Known Problems Paternal Aunt     Diabetes Paternal Aunt     Breast cancer Neg Hx     Cancer Neg Hx      I have reviewed and agree with the history as documented. E-Cigarette/Vaping    E-Cigarette Use Never User      E-Cigarette/Vaping Substances    Nicotine No     THC No     CBD No     Flavoring No     Other No     Unknown No      Social History     Tobacco Use    Smoking status: Never    Smokeless tobacco: Never   Vaping Use    Vaping Use: Never used   Substance Use Topics    Alcohol use: Not Currently    Drug use: Never       Review of Systems   Constitutional:  Negative for activity change, appetite change, fatigue and fever. HENT:  Negative for congestion, dental problem, ear pain, rhinorrhea and sore throat. Eyes:  Negative for pain and redness. Respiratory:  Negative for chest tightness, shortness of breath and wheezing. Cardiovascular:  Negative for chest pain and palpitations. Gastrointestinal:  Positive for nausea. Negative for abdominal pain, blood in stool, constipation, diarrhea and vomiting. Endocrine: Negative for cold intolerance and heat intolerance. Genitourinary:  Negative for dysuria, frequency and hematuria. Musculoskeletal:  Negative for arthralgias and myalgias. Skin:  Negative for color change, pallor and rash. Neurological:  Positive for headaches. Negative for weakness and numbness. Hematological:  Does not bruise/bleed easily. Psychiatric/Behavioral:  Negative for agitation, hallucinations and suicidal ideas. Physical Exam  Physical Exam  Vitals and nursing note reviewed. Constitutional:       Appearance: She is well-developed. HENT:      Head: Normocephalic and atraumatic. Mouth/Throat:      Pharynx: No oropharyngeal exudate. Eyes:      Extraocular Movements: Extraocular movements intact. Pupils: Pupils are equal, round, and reactive to light. Comments: Normal funduscopic exam   Neck:      Comments: No meningeal signs  Cardiovascular:      Rate and Rhythm: Normal rate and regular rhythm. Heart sounds: Normal heart sounds. Pulmonary:      Effort: Pulmonary effort is normal. No respiratory distress. Breath sounds: Normal breath sounds. No wheezing or rales. Chest:      Chest wall: No tenderness. Abdominal:      General: Bowel sounds are normal. There is no distension. Palpations: Abdomen is soft. There is no mass. Tenderness: There is no abdominal tenderness. Hernia: No hernia is present. Comments: No cvat   Musculoskeletal:         General: Normal range of motion. Cervical back: Normal range of motion and neck supple. No rigidity or tenderness. Lymphadenopathy:      Cervical: No cervical adenopathy. Skin:     Findings: No erythema or rash. Comments: No edema   Neurological:      General: No focal deficit present. Mental Status: She is alert and oriented to person, place, and time. Cranial Nerves: No cranial nerve deficit. Sensory: No sensory deficit. Motor: No weakness.       Coordination: Coordination normal.      Gait: Gait normal.   Psychiatric:         Behavior: Behavior normal.         Vital Signs  ED Triage Vitals   Temperature Pulse Respirations Blood Pressure SpO2   12/09/23 1831 12/09/23 1831 12/09/23 1831 12/09/23 1831 12/09/23 1831   98.2 °F (36.8 °C) 79 16 158/94 100 %      Temp Source Heart Rate Source Patient Position - Orthostatic VS BP Location FiO2 (%)   12/09/23 1831 12/09/23 1831 12/09/23 1831 12/09/23 1831 --   Oral Monitor Sitting Right arm       Pain Score       12/09/23 1920       10 - Worst Possible Pain           Vitals:    12/09/23 1831   BP: 158/94   Pulse: 79   Patient Position - Orthostatic VS: Sitting         Visual Acuity      ED Medications  Medications   ketorolac (TORADOL) injection 30 mg (30 mg Intravenous Given 12/9/23 1920)   metoclopramide (REGLAN) injection 10 mg (10 mg Intravenous Given 12/9/23 1921)   diphenhydrAMINE (BENADRYL) injection 25 mg (25 mg Intravenous Given 12/9/23 1918)   magnesium sulfate 2 g/50 mL IVPB (premix) 2 g (0 g Intravenous Stopped 12/9/23 2038)   sodium chloride 0.9 % bolus 1,000 mL (0 mL Intravenous Stopped 12/9/23 2038)       Diagnostic Studies  Results Reviewed       Procedure Component Value Units Date/Time    Fingerstick Glucose (POCT) [[id number]]  (Normal) Collected: 12/09/23 1831    Lab Status: Final result Updated: 12/09/23 1834     POC Glucose 108 mg/dl                    No orders to display              Procedures  Procedures         ED Course  ED Course as of 12/09/23 2042   Sat Dec 09, 2023   2039 Headache resolved, will reassure, , neuro follow up. Medical Decision Making  Recurrent headache consistent with patient's headache pattern which is not worse headache of her life with a benign neuroexam likely secondary to migraine. Imaging is not indicated this time is acute CNS pathology is extremely unlikely. Will treat for migraine headache, reassess. Risk  Prescription drug management.              Disposition  Final diagnoses:   Migraine headache     Time reflects when diagnosis was documented in both MDM as applicable and the Disposition within this note       Time User Action Codes Description Comment    12/9/2023  8:40 PM Yumiko Thibodeaux Add [W35.732] Migraine headache           ED Disposition       ED Disposition   Discharge    Condition   Stable    Date/Time   Sat Dec 9, 2023  8:40 PM    Comment   Jacy Paz discharge to home/self care.                    Follow-up Information    None         Current Discharge Medication List        CONTINUE these medications which have NOT CHANGED    Details   acetaminophen (TYLENOL) 650 mg CR tablet Take 1 tablet (650 mg total) by mouth every 8 (eight) hours as needed for mild pain  Qty: 90 tablet, Refills: 5    Associated Diagnoses: S/P LEEP (loop electrosurgical excision procedure)      Cyanocobalamin (VITAMIN B-12 PO) Take by mouth      Diclofenac Sodium (VOLTAREN) 1 % Apply 2 g topically 4 (four) times a day  Qty: 150 g, Refills: 1    Associated Diagnoses: Mid back pain on left side; Pain of both elbows      dicyclomine (BENTYL) 10 mg capsule Take 10 mg by mouth 4 (four) times a day (before meals and at bedtime)      ergocalciferol (VITAMIN D2) 50,000 units Take 1 capsule (50,000 Units total) by mouth once a week  Qty: 12 capsule, Refills: 3    Associated Diagnoses: Vitamin D deficiency      famotidine (PEPCID) 20 mg tablet Take 1 tablet (20 mg total) by mouth 2 (two) times a day  Qty: 180 tablet, Refills: 1    Associated Diagnoses: Gastroesophageal reflux disease, unspecified whether esophagitis present      hydrocortisone (ANUSOL-HC) 2.5 % rectal cream APPLY TO AFFECTED AREA TWICE A DAY  Qty: 30 g, Refills: 3    Associated Diagnoses: External hemorrhoid      lidocaine (XYLOCAINE) 5 % ointment Apply topically as needed for mild pain  Qty: 240 g, Refills: 2    Associated Diagnoses: Chronic bilateral low back pain, unspecified whether sciatica present      medroxyPROGESTERone (PROVERA) 10 mg tablet Take 1 tablet (10 mg total) by mouth daily  Qty: 90 tablet, Refills: 0    Associated Diagnoses: Abnormal uterine bleeding (AUB)      multivitamin [facility]) TABS Take 1 tablet by mouth daily  Qty: 90 tablet, Refills: 5    Associated Diagnoses: Gastroesophageal reflux disease, unspecified whether esophagitis present; Constipation, unspecified constipation type      nortriptyline (PAMELOR) 10 mg capsule Start with 10 mg at bed time, if no relief please increase the dose to 20 mg in a week's time. Qty: 90 capsule, Refills: 3    Associated Diagnoses: Tension headache      nystatin (MYCOSTATIN) cream Apply topically 2 (two) times a day  Qty: 30 g, Refills: 0    Associated Diagnoses: Candidiasis of breast      omeprazole (PriLOSEC) 40 MG capsule Take 1 capsule (40 mg total) by mouth daily  Qty: 90 capsule, Refills: 1    Associated Diagnoses: Generalized abdominal pain      polyethylene glycol (GoodSense ClearLax) 17 GM/SCOOP powder Take 17 g by mouth daily for 14 days  Qty: 17 g, Refills: 2    Associated Diagnoses: Constipation, unspecified constipation type      predniSONE 10 mg tablet Prednisone taper: Take 6 tabs for 2 days, 5 tabs for 2 days, 4 tabs for 2 days, 3 tabs for 2 days, 2 tabs for 2 days, 1 tabs for 2 days and then stop  Qty: 42 tablet, Refills: 0    Associated Diagnoses: Tension headache      rizatriptan (Maxalt) 10 mg tablet Take 1 tablet (10 mg total) by mouth as needed for migraine Take at the onset of migraine; if symptoms continue or return, may take another dose at least 2 hours after first dose. Take no more than 2 doses in a day. Qty: 18 tablet, Refills: 3    Associated Diagnoses: Tension headache             No discharge procedures on file.     PDMP Review       None            ED Provider  Electronically Signed by             J Luis Weir MD  12/09/23 2042

## 2023-12-11 ENCOUNTER — TELEPHONE (OUTPATIENT)
Dept: NEUROLOGY | Facility: CLINIC | Age: 38
End: 2023-12-11

## 2023-12-11 DIAGNOSIS — R51.9 LEFT TEMPORAL HEADACHE: Primary | ICD-10-CM

## 2023-12-11 NOTE — TELEPHONE ENCOUNTER
Radiology Dept.  calling to requests lab orders for the patient to have  BUN And Creatine labs ordered for the patient to have done before their MRI on 12/17/23    Dr. Yasmin Connelly is informed         When Labs  are placed please reach out to the patient to make them aware and the time frame for getting this completed      Thank you!

## 2023-12-12 ENCOUNTER — OFFICE VISIT (OUTPATIENT)
Dept: OBGYN CLINIC | Facility: CLINIC | Age: 38
End: 2023-12-12

## 2023-12-12 VITALS
HEIGHT: 62 IN | SYSTOLIC BLOOD PRESSURE: 146 MMHG | HEART RATE: 94 BPM | DIASTOLIC BLOOD PRESSURE: 101 MMHG | BODY MASS INDEX: 34.45 KG/M2 | WEIGHT: 187.2 LBS

## 2023-12-12 DIAGNOSIS — N93.9 ABNORMAL UTERINE BLEEDING (AUB): ICD-10-CM

## 2023-12-12 DIAGNOSIS — N92.0 MENORRHAGIA WITH REGULAR CYCLE: ICD-10-CM

## 2023-12-12 DIAGNOSIS — D25.1 INTRAMURAL UTERINE FIBROID: ICD-10-CM

## 2023-12-12 DIAGNOSIS — N80.03 ADENOMYOSIS: ICD-10-CM

## 2023-12-12 DIAGNOSIS — Z01.818 PREOP EXAMINATION: Primary | ICD-10-CM

## 2023-12-12 PROCEDURE — 99214 OFFICE O/P EST MOD 30 MIN: CPT | Performed by: OBSTETRICS & GYNECOLOGY

## 2023-12-12 NOTE — PROGRESS NOTES
1. Left temporal headache    - BUN; Future  - Creatinine, serum;  Future        Doc Graf MD.   Staff Neurologist  12/11/23

## 2023-12-12 NOTE — PROGRESS NOTES
Lurdes Mejía was seen today for pre-op exam.    Diagnoses and all orders for this visit:    Preop examination    Menorrhagia with regular cycle    Abnormal uterine bleeding (AUB)    Adenomyosis    Intramural uterine fibroid         Plan    Tyra Chen is scheduled for  [facility]  on 12/22/23. Pre-op instructions, including showering with Hibiclens, discussed with patient and patient received paper copy. The risks, benefits and alternatives to the procedure were discussed. We discussed the risks of pain, bleeding, blood clot, infection, allergic reaction, neurovascular injury, injury to uterus, injury to surrounding structures such as bowel, bladder and/or ureters, and possibility of inability to complete the procedure. We discussed code status - full code. Blood transfusion is acceptable. We discussed resident physician participation in the procedure, including pelvic exam.  All questions answered, consent obtained. Marva Castleman is a 34 y.o. female here for a pre-op consultation. She is without complaint today. Patient Active Problem List   Diagnosis    Obesity    Hypertension    Hyperlipidemia    Fibrocystic breast disease    Intermittent palpitations    Left temporal headache    Anxiety    RONALDO III (cervical intraepithelial neoplasia III)    GERD (gastroesophageal reflux disease)    PONV (postoperative nausea and vomiting)    S/P LEEP (loop electrosurgical excision procedure)    Acute bilateral low back pain with right-sided sciatica    External hemorrhoid    Blepharospasm    Insomnia    Thyroid nodule    Vertigo    Spine degeneration    Vitamin D deficiency    Breast mass    Breast pain, left    Rib pain on left side         Gynecologic History  Patient's last menstrual period was 12/06/2023 (exact date). Contraception: tubal ligation  Last Pap: 10/25/21. Results were: normal  Last mammogram: 5/16/23.  Results were: 6mos follow up    Obstetric History  OB History    Para Term  AB Living   3 3 3 0   3   SAB IAB Ectopic Multiple Live Births   0 0 0 0 3      # Outcome Date GA Lbr Lucio/2nd Weight Sex Delivery Anes PTL Lv   3 Term            2 Term            1 Term                Past Medical/Surgical/Family/Social History    Past Medical History:   Diagnosis Date    Anxiety     Chest pain     pt denies today//saw cardiology and everything was "normal"/saw cardiology 2019 Dr CHARLES Bustos/JENNIFER  [address] [facility] present     GERD (gastroesophageal reflux disease)     Hypertension     PONV (postoperative nausea and vomiting)     Precancerous changes of the cervix      Past Surgical History:   Procedure Laterality Date    CARPAL TUNNEL RELEASE Right     EXAMINATION UNDER ANESTHESIA N/A 10/4/2020    Procedure: EXAM UNDER ANESTHESIA (EUA), CAUTERIZATION OF CERVIX;  Surgeon: Rita Gray MD;  Location: [facility];  Service: Gynecology    DC COLPOSCOPY CERVIX VAG LOOP ELD  [address] N/A 10/2/2020    Procedure: BIOPSY LEEP CERVIX;  Surgeon: Sofia Mcdonnell MD;  Location: [facility];  Service: Gynecology    TUBAL LIGATION       Family History   Problem Relation Age of Onset    Asthma Mother     Hypertension Mother     No Known Problems Father     No Known Problems Sister     No Known Problems Daughter     No Known Problems Maternal Grandmother     No Known Problems Maternal Grandfather     No Known Problems Paternal Grandmother     No Known Problems Paternal Grandfather     No Known Problems Maternal Aunt     No Known Problems Maternal Aunt     No Known Problems Paternal Aunt     No Known Problems Paternal Aunt     No Known Problems Paternal Aunt     Diabetes Paternal Aunt     Breast cancer Neg Hx     Cancer Neg Hx      Social History     Socioeconomic History    Marital status: Single     Spouse name: Not on file    Number of children: Not on file    Years of education: Not on file    Highest education level: Not on file Occupational History    Not on file   Tobacco Use    Smoking status: Never    Smokeless tobacco: Never   Vaping Use    Vaping Use: Never used   Substance and Sexual Activity    Alcohol use: Not Currently    Drug use: Never    Sexual activity: Yes     Partners: Male     Birth control/protection: Female Sterilization     Comment: Tubal   Other Topics Concern    Not on file   Social History Narrative    Not on file     Social Determinants of Health     Financial Resource Strain: Low Risk  (11/9/2023)    Overall Financial Resource Strain (CARDIA)     Difficulty of Paying Living Expenses: Not hard at all   Food Insecurity: [address] Present (11/9/2023)    Hunger Vital Sign     Worried About [place] in the Last Year: Sometimes true     Ran Out of Food in the Last Year: Sometimes true   Transportation Needs: No Transportation Needs (11/9/2023)    PRAPARE - Transportation     Lack of Transportation (Medical): No     Lack of Transportation (Non-Medical): No   Physical Activity: Not on file   Stress: Stress Concern Present (10/25/2021)    [facility]     Feeling of Stress :  To some extent   Social Connections: Not on file   Intimate Partner Violence: Not At Risk (1/25/2022)    Humiliation, Afraid, Rape, and Kick questionnaire     Fear of Current or Ex-Partner: No     Emotionally Abused: No     Physically Abused: No     Sexually Abused: No   Housing Stability: Low Risk  (1/25/2022)    Housing Stability Vital Sign     Unable to Pay for Housing in the Last Year: No     Number of Places Lived in the Last Year: 1     Unstable Housing in the Last Year: No         No known allergies    Current Outpatient Medications:     acetaminophen (TYLENOL) 650 mg CR tablet, Take 1 tablet (650 mg total) by mouth every 8 (eight) hours as needed for mild pain, Disp: 90 tablet, Rfl: 5    Cyanocobalamin (VITAMIN B-12 PO), Take by mouth, Disp: , Rfl:     Diclofenac Sodium (VOLTAREN) 1 %, Apply 2 g topically 4 (four) times a day, Disp: 150 g, Rfl: 1    dicyclomine (BENTYL) 10 mg capsule, Take 10 mg by mouth 4 (four) times a day (before meals and at bedtime), Disp: , Rfl:     ergocalciferol (VITAMIN D2) 50,000 units, Take 1 capsule (50,000 Units total) by mouth once a week, Disp: 12 capsule, Rfl: 3    famotidine (PEPCID) 20 mg tablet, Take 1 tablet (20 mg total) by mouth 2 (two) times a day, Disp: 180 tablet, Rfl: 1    medroxyPROGESTERone (PROVERA) 10 mg tablet, Take 1 tablet (10 mg total) by mouth daily, Disp: 90 tablet, Rfl: 0    multivitamin (THERAGRAN) TABS, Take 1 tablet by mouth daily, Disp: 90 tablet, Rfl: 5    nortriptyline (PAMELOR) 10 mg capsule, Start with 10 mg at bed time, if no relief please increase the dose to 20 mg in a week's time. , Disp: 90 capsule, Rfl: 3    nystatin (MYCOSTATIN) cream, Apply topically 2 (two) times a day, Disp: 30 g, Rfl: 0    omeprazole (PriLOSEC) 40 MG capsule, Take 1 capsule (40 mg total) by mouth daily, Disp: 90 capsule, Rfl: 1    predniSONE 10 mg tablet, Prednisone taper: Take 6 tabs for 2 days, 5 tabs for 2 days, 4 tabs for 2 days, 3 tabs for 2 days, 2 tabs for 2 days, 1 tabs for 2 days and then stop, Disp: 42 tablet, Rfl: 0    rizatriptan (Maxalt) 10 mg tablet, Take 1 tablet (10 mg total) by mouth as needed for migraine Take at the onset of migraine; if symptoms continue or return, may take another dose at least 2 hours after first dose.  Take no more than 2 doses in a day., Disp: 18 tablet, Rfl: 3    hydrocortisone (ANUSOL-HC) 2.5 % rectal cream, APPLY TO AFFECTED AREA TWICE A DAY (Patient not taking: Reported on 12/12/2023), Disp: 30 g, Rfl: 3    lidocaine (XYLOCAINE) 5 % ointment, Apply topically as needed for mild pain (Patient not taking: Reported on 12/12/2023), Disp: 240 g, Rfl: 2    polyethylene glycol (GoodSense ClearLax) 17 GM/SCOOP powder, Take 17 g by mouth daily for 14 days (Patient not taking: Reported on 12/4/2023), Disp: 17 g, Rfl: 2      Review of Systems  Constitutional: no fever, feels well  CV: No complaints of chest pain, palpitations  Pulm: No shortness of breath or dyspnea on exertion  Gastrointestinal: no complaints of abdominal pain, nausea  Genitourinary : no complaints of dysuria, vaginal discharge       Objective     BP (!) 146/101   Pulse 94   Ht 5' 2" (1.575 m)   Wt 84.9 kg (187 lb 3.2 oz)   LMP 12/06/2023 (Exact Date)   BMI 34.24 kg/m²     GEN: The patient was alert and oriented x3, pleasant well-appearing female in no acute distress.    CV: Regular rate and rhythm  PULM: nonlabored respirations, CTAB  ABD: BS positive, soft, nontender  : deferred  EXT: No calf tenderness  MSK: Normal gait  Skin: warm, dry  Neuro: no focal deficits  Psych: normal affect and judgement, cooperative

## 2023-12-12 NOTE — H&P (VIEW-ONLY)
Assessment Tyra was seen today for pre-op exam.    Diagnoses and all orders for this visit:    Preop examination    Menorrhagia with regular cycle    Abnormal uterine bleeding (AUB)    Adenomyosis    Intramural uterine fibroid         Plan    Tyra Paz is scheduled for  [facility]  on 12/22/23. Pre-op instructions, including showering with Hibiclens, discussed with patient and patient received paper copy.     The risks, benefits and alternatives to the procedure were discussed.  We discussed the risks of pain, bleeding, blood clot, infection, allergic reaction, neurovascular injury, injury to uterus, injury to surrounding structures such as bowel, bladder and/or ureters, and possibility of inability to complete the procedure.  We discussed code status - full code.  Blood transfusion is acceptable.  We discussed resident physician participation in the procedure, including pelvic exam.  All questions answered, consent obtained.        Subjective     Tyra Paz is a 38 y.o. female here for a pre-op consultation. She is without complaint today.       Patient Active Problem List   Diagnosis    Obesity    Hypertension    Hyperlipidemia    Fibrocystic breast disease    Intermittent palpitations    Left temporal headache    Anxiety    RONALDO III (cervical intraepithelial neoplasia III)    GERD (gastroesophageal reflux disease)    PONV (postoperative nausea and vomiting)    S/P LEEP (loop electrosurgical excision procedure)    Acute bilateral low back pain with right-sided sciatica    External hemorrhoid    Blepharospasm    Insomnia    Thyroid nodule    Vertigo    Spine degeneration    Vitamin D deficiency    Breast mass    Breast pain, left    Rib pain on left side         Gynecologic History  Patient's last menstrual period was 12/06/2023 (exact date).  Contraception: tubal ligation  Last Pap: 10/25/21. Results were: normal  Last mammogram: 5/16/23. Results were: 6mos follow up    Obstetric  "History  OB History    Para Term  AB Living   3 3 3 0   3   SAB IAB Ectopic Multiple Live Births   0 0 0 0 3      # Outcome Date GA Lbr Lucio/2nd Weight Sex Delivery Anes PTL Lv   3 Term            2 Term            1 Term                Past Medical/Surgical/Family/Social History    Past Medical History:   Diagnosis Date    Anxiety     Chest pain     pt denies today//saw cardiology and everything was \"normal\"/saw cardiology 2019 Dr CHARLES Bustos/JENNIFER MENESES    Dental crown present     GERD (gastroesophageal reflux disease)     Hypertension     PONV (postoperative nausea and vomiting)     Precancerous changes of the cervix      Past Surgical History:   Procedure Laterality Date    CARPAL TUNNEL RELEASE Right     EXAMINATION UNDER ANESTHESIA N/A 10/4/2020    Procedure: EXAM UNDER ANESTHESIA (EUA), CAUTERIZATION OF CERVIX;  Surgeon: Vani Hayden MD;  [facility]: [facility];  Service: Gynecology    WI COLPOSCOPY CERVIX VAG LOOP ELTRD BX CERVIX N/A 10/2/2020    Procedure: BIOPSY LEEP CERVIX;  Surgeon: Cachorro Goddard MD;  Location[facility];  Service: Gynecology    TUBAL LIGATION       Family History   Problem Relation Age of Onset    Asthma Mother     Hypertension Mother     No Known Problems Father     No Known Problems Sister     No Known Problems Daughter     No Known Problems Maternal Grandmother     No Known Problems Maternal Grandfather     No Known Problems Paternal Grandmother     No Known Problems Paternal Grandfather     No Known Problems Maternal Aunt     No Known Problems Maternal Aunt     No Known Problems Paternal Aunt     No Known Problems Paternal Aunt     No Known Problems Paternal Aunt     Diabetes Paternal Aunt     Breast cancer Neg Hx     Cancer Neg Hx      Social History     Socioeconomic History    Marital status: Single     Spouse name: Not on file    Number of children: Not on file    Years of education: Not on file    Highest education level: Not on file "   Occupational History    Not on file   Tobacco Use    Smoking status: Never    Smokeless tobacco: Never   Vaping Use    Vaping Use: Never used   Substance and Sexual Activity    Alcohol use: Not Currently    Drug use: Never    Sexual activity: Yes     Partners: Male     Birth control/protection: Female Sterilization     Comment: Tubal   Other Topics Concern    Not on file   Social History Narrative    Not on file     Social Determinants of Health     Financial Resource Strain: Low Risk  (11/9/2023)    Overall Financial Resource Strain (CARDIA)     Difficulty of Paying Living Expenses: Not hard at all   Food Insecurity: Food Insecurity Present (11/9/2023)    Hunger Vital Sign     Worried About Running Out of Food in the Last Year: Sometimes true     Ran Out of Food in the Last Year: Sometimes true   Transportation Needs: No Transportation Needs (11/9/2023)    PRAPARE - Transportation     Lack of Transportation (Medical): No     Lack of Transportation (Non-Medical): No   Physical Activity: Not on file   Stress: Stress Concern Present (10/25/2021)    [employer] - Occupational Stress Questionnaire     Feeling of Stress : To some extent   Social Connections: Not on file   Intimate Partner Violence: Not At Risk (1/25/2022)    Humiliation, Afraid, Rape, and Kick questionnaire     Fear of Current or Ex-Partner: No     Emotionally Abused: No     Physically Abused: No     Sexually Abused: No   Housing Stability: Low Risk  (1/25/2022)    Housing Stability Vital Sign     Unable to Pay for Housing in the Last Year: No     Number of Places Lived in the Last Year: 1     Unstable Housing in the Last Year: No         No known allergies    Current Outpatient Medications:     acetaminophen (TYLENOL) 650 mg CR tablet, Take 1 tablet (650 mg total) by mouth every 8 (eight) hours as needed for mild pain, Disp: 90 tablet, Rfl: 5    Cyanocobalamin (VITAMIN B-12 PO), Take by mouth, Disp: , Rfl:     Diclofenac  Sodium (VOLTAREN) 1 %, Apply 2 g topically 4 (four) times a day, Disp: 150 g, Rfl: 1    dicyclomine (BENTYL) 10 mg capsule, Take 10 mg by mouth 4 (four) times a day (before meals and at bedtime), Disp: , Rfl:     ergocalciferol (VITAMIN D2) 50,000 units, Take 1 capsule (50,000 Units total) by mouth once a week, Disp: 12 capsule, Rfl: 3    famotidine (PEPCID) 20 mg tablet, Take 1 tablet (20 mg total) by mouth 2 (two) times a day, Disp: 180 tablet, Rfl: 1    medroxyPROGESTERone (PROVERA) 10 mg tablet, Take 1 tablet (10 mg total) by mouth daily, Disp: 90 tablet, Rfl: 0    multivitamin (THERAGRAN) TABS, Take 1 tablet by mouth daily, Disp: 90 tablet, Rfl: 5    nortriptyline (PAMELOR) 10 mg capsule, Start with 10 mg at bed time, if no relief please increase the dose to 20 mg in a week's time., Disp: 90 capsule, Rfl: 3    nystatin (MYCOSTATIN) cream, Apply topically 2 (two) times a day, Disp: 30 g, Rfl: 0    omeprazole (PriLOSEC) 40 MG capsule, Take 1 capsule (40 mg total) by mouth daily, Disp: 90 capsule, Rfl: 1    predniSONE 10 mg tablet, Prednisone taper: Take 6 tabs for 2 days, 5 tabs for 2 days, 4 tabs for 2 days, 3 tabs for 2 days, 2 tabs for 2 days, 1 tabs for 2 days and then stop, Disp: 42 tablet, Rfl: 0    rizatriptan (Maxalt) 10 mg tablet, Take 1 tablet (10 mg total) by mouth as needed for migraine Take at the onset of migraine; if symptoms continue or return, may take another dose at least 2 hours after first dose. Take no more than 2 doses in a day., Disp: 18 tablet, Rfl: 3    hydrocortisone (ANUSOL-HC) 2.5 % rectal cream, APPLY TO AFFECTED AREA TWICE A DAY (Patient not taking: Reported on 12/12/2023), Disp: 30 g, Rfl: 3    lidocaine (XYLOCAINE) 5 % ointment, Apply topically as needed for mild pain (Patient not taking: Reported on 12/12/2023), Disp: 240 g, Rfl: 2    polyethylene glycol (GoodSense ClearLax) 17 GM/SCOOP powder, Take 17 g by mouth daily for 14 days (Patient not taking: Reported on 12/4/2023),  "Disp: 17 g, Rfl: 2      Review of Systems  Constitutional: no fever, feels well  CV: No complaints of chest pain, palpitations  Pulm: No shortness of breath or dyspnea on exertion  Gastrointestinal: no complaints of abdominal pain, nausea  Genitourinary : no complaints of dysuria, vaginal discharge       Objective     BP (!) 146/101   Pulse 94   Ht 5' 2\" (1.575 m)   Wt 84.9 kg (187 lb 3.2 oz)   LMP 12/06/2023 (Exact Date)   BMI 34.24 kg/m²     GEN: The patient was alert and oriented x3, pleasant well-appearing female in no acute distress.   CV: Regular rate and rhythm  PULM: nonlabored respirations, CTAB  ABD: BS positive, soft, nontender  : deferred  EXT: No calf tenderness  MSK: Normal gait  Skin: warm, dry  Neuro: no focal deficits  Psych: normal affect and judgement, cooperative   "

## 2023-12-12 NOTE — TELEPHONE ENCOUNTER
Orders were placed     Request that the patient is called and made aware that they need to get this done before MRI on 12/17/23      Thank you!

## 2023-12-14 ENCOUNTER — TELEPHONE (OUTPATIENT)
Dept: OBGYN CLINIC | Facility: CLINIC | Age: 38
End: 2023-12-14

## 2023-12-14 NOTE — PRE-PROCEDURE INSTRUCTIONS
Pre-Surgery Instructions:   Medication Instructions    acetaminophen (TYLENOL) 650 mg CR tablet Uses PRN- OK to take day of surgery    Cyanocobalamin (VITAMIN B-12 PO) Stop taking 7 days prior to surgery.    dicyclomine (BENTYL) 10 mg capsule Hold day of surgery.    ergocalciferol (VITAMIN D2) 50,000 units Stop taking 7 days prior to surgery.    famotidine (PEPCID) 20 mg tablet Uses PRN- OK to take day of surgery    lidocaine (XYLOCAINE) 5 % ointment Hold day of surgery.    medroxyPROGESTERone (PROVERA) 10 mg tablet Hold day of surgery.    Menaquinone-7 (VITAMIN K2 PO) Stop taking 7 days prior to surgery.    multivitamin (THERAGRAN) TABS Stop taking 7 days prior to surgery.    nortriptyline (PAMELOR) 10 mg capsule Take night before surgery    nystatin (MYCOSTATIN) cream Hold day of surgery.    omeprazole (PriLOSEC) 40 MG capsule Take day of surgery.    predniSONE 10 mg tablet Uses PRN- OK to take day of surgery   Medication instructions for day surgery reviewed. Please use only a sip of water to take your instructed medications. Avoid all over the counter vitamins, supplements and NSAIDS for one week prior to surgery per anesthesia guidelines. Tylenol is ok to take as needed.     You will receive a call one business day prior to surgery with an arrival time and hospital directions. If your surgery is scheduled on a Monday, the hospital will be calling you on the Friday prior to your surgery. If you have not heard from anyone by 8pm, please call the hospital supervisor through the hospital  at [phone]. (Mark [phone]).    Do not eat or drink anything after midnight the night before your surgery, including candy, mints, lifesavers, or chewing gum. Do not drink alcohol 24hrs before your surgery. Try not to smoke at least 24hrs before your surgery.       Follow the pre surgery showering instructions as listed in the “My Surgical Experience Booklet” or otherwise provided by your surgeon's office. Do  not use a blade to shave the surgical area 1 week before surgery. It is okay to use a clean electric clippers up to 24 hours before surgery. Do not apply any lotions, creams, including makeup, cologne, deodorant, or perfumes after showering on the day of your surgery. Do not use dry shampoo, hair spray, hair gel, or any type of hair products.     No contact lenses, eye make-up, or artificial eyelashes. Remove nail polish, including gel polish, and any artificial, gel, or acrylic nails if possible. Remove all jewelry including rings and body piercing jewelry.     Wear causal clothing that is easy to take on and off. Consider your type of surgery.    Keep any valuables, jewelry, piercings at home. Please bring any specially ordered equipment (sling, braces) if indicated.    Arrange for a responsible person to drive you to and from the hospital on the day of your surgery. Visitor Guidelines discussed.     Call the surgeon's office with any new illnesses, exposures, or additional questions prior to surgery.    Please reference your “My Surgical Experience Booklet” for additional information to prepare for your upcoming surgery.

## 2023-12-15 ENCOUNTER — APPOINTMENT (OUTPATIENT)
Dept: LAB | Facility: HOSPITAL | Age: 38
End: 2023-12-15
Payer: COMMERCIAL

## 2023-12-15 DIAGNOSIS — R51.9 LEFT TEMPORAL HEADACHE: ICD-10-CM

## 2023-12-15 LAB
BUN SERPL-MCNC: 17 MG/DL (ref 5–25)
CREAT SERPL-MCNC: 0.75 MG/DL (ref 0.6–1.3)
GFR SERPL CREATININE-BSD FRML MDRD: 100 ML/MIN/1.73SQ M

## 2023-12-15 PROCEDURE — 82565 ASSAY OF CREATININE: CPT

## 2023-12-15 PROCEDURE — 84520 ASSAY OF UREA NITROGEN: CPT

## 2023-12-15 PROCEDURE — 36415 COLL VENOUS BLD VENIPUNCTURE: CPT

## 2023-12-15 NOTE — TELEPHONE ENCOUNTER
BUN/Creatinine obtained today and resulted. Nothing further at this time until MRI results are available. Currently scheduled for 12/17/23.

## 2023-12-17 ENCOUNTER — HOSPITAL ENCOUNTER (OUTPATIENT)
Dept: MRI IMAGING | Facility: HOSPITAL | Age: 38
Discharge: HOME | End: 2023-12-17
Attending: PSYCHIATRY & NEUROLOGY
Payer: COMMERCIAL

## 2023-12-17 DIAGNOSIS — R51.9 LEFT TEMPORAL HEADACHE: ICD-10-CM

## 2023-12-17 DIAGNOSIS — G44.209 TENSION HEADACHE: ICD-10-CM

## 2023-12-17 DIAGNOSIS — R20.0 LEFT FACIAL NUMBNESS: ICD-10-CM

## 2023-12-17 PROCEDURE — A9585 GADOBUTROL INJECTION: HCPCS | Performed by: PSYCHIATRY & NEUROLOGY

## 2023-12-17 PROCEDURE — G1004 CDSM NDSC: HCPCS

## 2023-12-17 PROCEDURE — 70553 MRI BRAIN STEM W/O & W/DYE: CPT

## 2023-12-17 RX ORDER — GADOBUTROL 604.72 MG/ML
8 INJECTION INTRAVENOUS
Status: COMPLETED | OUTPATIENT
Start: 2023-12-17 | End: 2023-12-17

## 2023-12-17 RX ADMIN — GADOBUTROL 8 ML: 604.72 INJECTION INTRAVENOUS at 10:50

## 2023-12-21 ENCOUNTER — ANESTHESIA EVENT (OUTPATIENT)
Dept: PERIOP | Facility: HOSPITAL | Age: 38
End: 2023-12-21
Payer: COMMERCIAL

## 2023-12-22 ENCOUNTER — ANESTHESIA (OUTPATIENT)
Dept: PERIOP | Facility: HOSPITAL | Age: 38
End: 2023-12-22
Payer: COMMERCIAL

## 2023-12-22 ENCOUNTER — HOSPITAL ENCOUNTER (OUTPATIENT)
Facility: HOSPITAL | Age: 38
Setting detail: OUTPATIENT SURGERY
Discharge: HOME | End: 2023-12-22
Attending: OBSTETRICS & GYNECOLOGY | Admitting: OBSTETRICS & GYNECOLOGY
Payer: COMMERCIAL

## 2023-12-22 VITALS
WEIGHT: 184.08 LBS | SYSTOLIC BLOOD PRESSURE: 112 MMHG | HEART RATE: 75 BPM | RESPIRATION RATE: 16 BRPM | DIASTOLIC BLOOD PRESSURE: 61 MMHG | TEMPERATURE: 97.2 F | HEIGHT: 62 IN | OXYGEN SATURATION: 99 % | BODY MASS INDEX: 33.88 KG/M2

## 2023-12-22 DIAGNOSIS — D25.9 UTERINE LEIOMYOMA, UNSPECIFIED LOCATION: ICD-10-CM

## 2023-12-22 DIAGNOSIS — Z90.710 S/P LAPAROSCOPIC HYSTERECTOMY: Primary | ICD-10-CM

## 2023-12-22 DIAGNOSIS — N93.9 ABNORMAL UTERINE BLEEDING: ICD-10-CM

## 2023-12-22 LAB
ABO GROUP BLD: NORMAL
ANION GAP SERPL CALCULATED.3IONS-SCNC: 9 MMOL/L
BASOPHILS # BLD AUTO: 0.07 THOUSANDS/ÂΜL (ref 0–0.1)
BASOPHILS NFR BLD AUTO: 1 % (ref 0–1)
BLD GP AB SCN SERPL QL: NEGATIVE
BUN SERPL-MCNC: 16 MG/DL (ref 5–25)
CALCIUM SERPL-MCNC: 8.9 MG/DL (ref 8.4–10.2)
CHLORIDE SERPL-SCNC: 104 MMOL/L (ref 96–108)
CO2 SERPL-SCNC: 24 MMOL/L (ref 21–32)
CREAT SERPL-MCNC: 0.66 MG/DL (ref 0.6–1.3)
EOSINOPHIL # BLD AUTO: 0.1 THOUSAND/ÂΜL (ref 0–0.61)
EOSINOPHIL NFR BLD AUTO: 1 % (ref 0–6)
ERYTHROCYTE [DISTWIDTH] IN BLOOD BY AUTOMATED COUNT: 12.4 % (ref 11.6–15.1)
EXT PREGNANCY TEST URINE: NEGATIVE
EXT. CONTROL: NORMAL
GFR SERPL CREATININE-BSD FRML MDRD: 110 ML/MIN/1.73SQ M
GLUCOSE P FAST SERPL-MCNC: 106 MG/DL (ref 65–99)
GLUCOSE SERPL-MCNC: 106 MG/DL (ref 65–140)
HCT VFR BLD AUTO: 37.2 % (ref 34.8–46.1)
HGB BLD-MCNC: 12.6 G/DL (ref 11.5–15.4)
IMM GRANULOCYTES # BLD AUTO: 0.05 THOUSAND/UL (ref 0–0.2)
IMM GRANULOCYTES NFR BLD AUTO: 1 % (ref 0–2)
LYMPHOCYTES # BLD AUTO: 2.62 THOUSANDS/ÂΜL (ref 0.6–4.47)
LYMPHOCYTES NFR BLD AUTO: 38 % (ref 14–44)
MCH RBC QN AUTO: 29.5 PG (ref 26.8–34.3)
MCHC RBC AUTO-ENTMCNC: 33.9 G/DL (ref 31.4–37.4)
MCV RBC AUTO: 87 FL (ref 82–98)
MONOCYTES # BLD AUTO: 0.49 THOUSAND/ÂΜL (ref 0.17–1.22)
MONOCYTES NFR BLD AUTO: 7 % (ref 4–12)
NEUTROPHILS # BLD AUTO: 3.65 THOUSANDS/ÂΜL (ref 1.85–7.62)
NEUTS SEG NFR BLD AUTO: 52 % (ref 43–75)
NRBC BLD AUTO-RTO: 0 /100 WBCS
PLATELET # BLD AUTO: 270 THOUSANDS/UL (ref 149–390)
PMV BLD AUTO: 10.2 FL (ref 8.9–12.7)
POTASSIUM SERPL-SCNC: 3.5 MMOL/L (ref 3.5–5.3)
RBC # BLD AUTO: 4.27 MILLION/UL (ref 3.81–5.12)
RH BLD: POSITIVE
SODIUM SERPL-SCNC: 137 MMOL/L (ref 135–147)
SPECIMEN EXPIRATION DATE: NORMAL
WBC # BLD AUTO: 6.98 THOUSAND/UL (ref 4.31–10.16)

## 2023-12-22 PROCEDURE — 86901 BLOOD TYPING SEROLOGIC RH(D): CPT | Performed by: STUDENT IN AN ORGANIZED HEALTH CARE EDUCATION/TRAINING PROGRAM

## 2023-12-22 PROCEDURE — 85025 COMPLETE CBC W/AUTO DIFF WBC: CPT | Performed by: OBSTETRICS & GYNECOLOGY

## 2023-12-22 PROCEDURE — 80048 BASIC METABOLIC PNL TOTAL CA: CPT | Performed by: OBSTETRICS & GYNECOLOGY

## 2023-12-22 PROCEDURE — 88307 TISSUE EXAM BY PATHOLOGIST: CPT | Performed by: PATHOLOGY

## 2023-12-22 PROCEDURE — 81025 URINE PREGNANCY TEST: CPT | Performed by: OBSTETRICS & GYNECOLOGY

## 2023-12-22 PROCEDURE — 86850 RBC ANTIBODY SCREEN: CPT | Performed by: STUDENT IN AN ORGANIZED HEALTH CARE EDUCATION/TRAINING PROGRAM

## 2023-12-22 PROCEDURE — 58571 TLH W/T/O 250 G OR LESS: CPT | Performed by: OBSTETRICS & GYNECOLOGY

## 2023-12-22 PROCEDURE — 86900 BLOOD TYPING SEROLOGIC ABO: CPT | Performed by: STUDENT IN AN ORGANIZED HEALTH CARE EDUCATION/TRAINING PROGRAM

## 2023-12-22 RX ORDER — DEXAMETHASONE SODIUM PHOSPHATE 4 MG/ML
6 INJECTION, SOLUTION INTRA-ARTICULAR; INTRALESIONAL; INTRAMUSCULAR; INTRAVENOUS; SOFT TISSUE ONCE
Status: COMPLETED | OUTPATIENT
Start: 2023-12-22 | End: 2023-12-22

## 2023-12-22 RX ORDER — IBUPROFEN 600 MG/1
600 TABLET ORAL EVERY 6 HOURS PRN
Status: DISCONTINUED | OUTPATIENT
Start: 2023-12-22 | End: 2023-12-22 | Stop reason: HOSPADM

## 2023-12-22 RX ORDER — VECURONIUM BROMIDE 1 MG/ML
INJECTION, POWDER, LYOPHILIZED, FOR SOLUTION INTRAVENOUS AS NEEDED
Status: DISCONTINUED | OUTPATIENT
Start: 2023-12-22 | End: 2023-12-22

## 2023-12-22 RX ORDER — FUROSEMIDE 10 MG/ML
INJECTION INTRAMUSCULAR; INTRAVENOUS AS NEEDED
Status: DISCONTINUED | OUTPATIENT
Start: 2023-12-22 | End: 2023-12-22

## 2023-12-22 RX ORDER — CEFAZOLIN SODIUM 2 G/50ML
2000 SOLUTION INTRAVENOUS ONCE
Status: COMPLETED | OUTPATIENT
Start: 2023-12-22 | End: 2023-12-22

## 2023-12-22 RX ORDER — SODIUM CHLORIDE 9 MG/ML
125 INJECTION, SOLUTION INTRAVENOUS CONTINUOUS
Status: DISCONTINUED | OUTPATIENT
Start: 2023-12-22 | End: 2023-12-22

## 2023-12-22 RX ORDER — PROMETHAZINE HYDROCHLORIDE 25 MG/ML
6.25 INJECTION, SOLUTION INTRAMUSCULAR; INTRAVENOUS ONCE AS NEEDED
Status: DISCONTINUED | OUTPATIENT
Start: 2023-12-22 | End: 2023-12-22 | Stop reason: HOSPADM

## 2023-12-22 RX ORDER — ONDANSETRON 2 MG/ML
4 INJECTION INTRAMUSCULAR; INTRAVENOUS ONCE AS NEEDED
Status: COMPLETED | OUTPATIENT
Start: 2023-12-22 | End: 2023-12-22

## 2023-12-22 RX ORDER — FENTANYL CITRATE/PF 50 MCG/ML
50 SYRINGE (ML) INJECTION
Status: DISCONTINUED | OUTPATIENT
Start: 2023-12-22 | End: 2023-12-22 | Stop reason: HOSPADM

## 2023-12-22 RX ORDER — BUPIVACAINE HYDROCHLORIDE 2.5 MG/ML
INJECTION, SOLUTION EPIDURAL; INFILTRATION; INTRACAUDAL AS NEEDED
Status: DISCONTINUED | OUTPATIENT
Start: 2023-12-22 | End: 2023-12-22 | Stop reason: HOSPADM

## 2023-12-22 RX ORDER — METRONIDAZOLE 500 MG/100ML
500 INJECTION, SOLUTION INTRAVENOUS ONCE
Status: COMPLETED | OUTPATIENT
Start: 2023-12-22 | End: 2023-12-22

## 2023-12-22 RX ORDER — OXYCODONE HYDROCHLORIDE 5 MG/1
5 TABLET ORAL EVERY 4 HOURS PRN
Start: 2023-12-22 | End: 2024-01-11

## 2023-12-22 RX ORDER — SODIUM CHLORIDE 9 MG/ML
INJECTION, SOLUTION INTRAVENOUS CONTINUOUS PRN
Status: DISCONTINUED | OUTPATIENT
Start: 2023-12-22 | End: 2023-12-22

## 2023-12-22 RX ORDER — HYDROMORPHONE HCL/PF 1 MG/ML
0.5 SYRINGE (ML) INJECTION
Status: DISCONTINUED | OUTPATIENT
Start: 2023-12-22 | End: 2023-12-22 | Stop reason: HOSPADM

## 2023-12-22 RX ORDER — ONDANSETRON 2 MG/ML
INJECTION INTRAMUSCULAR; INTRAVENOUS AS NEEDED
Status: DISCONTINUED | OUTPATIENT
Start: 2023-12-22 | End: 2023-12-22

## 2023-12-22 RX ORDER — KETOROLAC TROMETHAMINE 30 MG/ML
INJECTION, SOLUTION INTRAMUSCULAR; INTRAVENOUS AS NEEDED
Status: DISCONTINUED | OUTPATIENT
Start: 2023-12-22 | End: 2023-12-22

## 2023-12-22 RX ORDER — DEXAMETHASONE SODIUM PHOSPHATE 10 MG/ML
INJECTION, SOLUTION INTRAMUSCULAR; INTRAVENOUS AS NEEDED
Status: DISCONTINUED | OUTPATIENT
Start: 2023-12-22 | End: 2023-12-22

## 2023-12-22 RX ORDER — LIDOCAINE HYDROCHLORIDE 20 MG/ML
INJECTION, SOLUTION EPIDURAL; INFILTRATION; INTRACAUDAL; PERINEURAL AS NEEDED
Status: DISCONTINUED | OUTPATIENT
Start: 2023-12-22 | End: 2023-12-22

## 2023-12-22 RX ORDER — PROPOFOL 10 MG/ML
INJECTION, EMULSION INTRAVENOUS AS NEEDED
Status: DISCONTINUED | OUTPATIENT
Start: 2023-12-22 | End: 2023-12-22

## 2023-12-22 RX ORDER — FENTANYL CITRATE 50 UG/ML
INJECTION, SOLUTION INTRAMUSCULAR; INTRAVENOUS AS NEEDED
Status: DISCONTINUED | OUTPATIENT
Start: 2023-12-22 | End: 2023-12-22

## 2023-12-22 RX ORDER — CEFAZOLIN SODIUM 1 G/3ML
INJECTION, POWDER, FOR SOLUTION INTRAMUSCULAR; INTRAVENOUS AS NEEDED
Status: DISCONTINUED | OUTPATIENT
Start: 2023-12-22 | End: 2023-12-22

## 2023-12-22 RX ORDER — MIDAZOLAM HYDROCHLORIDE 2 MG/2ML
INJECTION, SOLUTION INTRAMUSCULAR; INTRAVENOUS AS NEEDED
Status: DISCONTINUED | OUTPATIENT
Start: 2023-12-22 | End: 2023-12-22

## 2023-12-22 RX ORDER — ACETAMINOPHEN 325 MG/1
975 TABLET ORAL EVERY 6 HOURS PRN
Status: DISCONTINUED | OUTPATIENT
Start: 2023-12-22 | End: 2023-12-22 | Stop reason: HOSPADM

## 2023-12-22 RX ORDER — ONDANSETRON 2 MG/ML
4 INJECTION INTRAMUSCULAR; INTRAVENOUS EVERY 6 HOURS PRN
Status: DISCONTINUED | OUTPATIENT
Start: 2023-12-22 | End: 2023-12-22 | Stop reason: HOSPADM

## 2023-12-22 RX ORDER — HYDROMORPHONE HCL/PF 1 MG/ML
SYRINGE (ML) INJECTION AS NEEDED
Status: DISCONTINUED | OUTPATIENT
Start: 2023-12-22 | End: 2023-12-22

## 2023-12-22 RX ORDER — MAGNESIUM HYDROXIDE 1200 MG/15ML
LIQUID ORAL AS NEEDED
Status: DISCONTINUED | OUTPATIENT
Start: 2023-12-22 | End: 2023-12-22 | Stop reason: HOSPADM

## 2023-12-22 RX ORDER — OXYCODONE HYDROCHLORIDE 5 MG/1
5 TABLET ORAL EVERY 4 HOURS PRN
Qty: 20 TABLET | Refills: 0 | Status: SHIPPED | OUTPATIENT
Start: 2023-12-22

## 2023-12-22 RX ADMIN — MIDAZOLAM 2 MG: 1 INJECTION INTRAMUSCULAR; INTRAVENOUS at 07:57

## 2023-12-22 RX ADMIN — METRONIDAZOLE: 500 INJECTION, SOLUTION INTRAVENOUS at 08:14

## 2023-12-22 RX ADMIN — ONDANSETRON 4 MG: 2 INJECTION INTRAMUSCULAR; INTRAVENOUS at 12:10

## 2023-12-22 RX ADMIN — FENTANYL CITRATE 50 MCG: 50 INJECTION INTRAMUSCULAR; INTRAVENOUS at 08:41

## 2023-12-22 RX ADMIN — SODIUM CHLORIDE: 0.9 INJECTION, SOLUTION INTRAVENOUS at 12:05

## 2023-12-22 RX ADMIN — PROPOFOL 200 MG: 10 INJECTION, EMULSION INTRAVENOUS at 08:05

## 2023-12-22 RX ADMIN — IBUPROFEN 600 MG: 600 TABLET, FILM COATED ORAL at 14:41

## 2023-12-22 RX ADMIN — CEFAZOLIN SODIUM 2000 MG: 2 SOLUTION INTRAVENOUS at 07:57

## 2023-12-22 RX ADMIN — SODIUM CHLORIDE: 0.9 INJECTION, SOLUTION INTRAVENOUS at 08:11

## 2023-12-22 RX ADMIN — FENTANYL CITRATE 50 MCG: 50 INJECTION INTRAMUSCULAR; INTRAVENOUS at 09:26

## 2023-12-22 RX ADMIN — TRIMETHOBENZAMIDE HYDROCHLORIDE 200 MG: 100 INJECTION INTRAMUSCULAR at 15:19

## 2023-12-22 RX ADMIN — KETOROLAC TROMETHAMINE 30 MG: 30 INJECTION, SOLUTION INTRAMUSCULAR; INTRAVENOUS at 12:08

## 2023-12-22 RX ADMIN — CEFAZOLIN 1000 MG: 1 INJECTION, POWDER, FOR SOLUTION INTRAMUSCULAR; INTRAVENOUS at 12:03

## 2023-12-22 RX ADMIN — HYDROMORPHONE HYDROCHLORIDE 0.5 MG: 1 INJECTION, SOLUTION INTRAMUSCULAR; INTRAVENOUS; SUBCUTANEOUS at 12:15

## 2023-12-22 RX ADMIN — VECURONIUM BROMIDE 2 MG: 1 INJECTION, POWDER, LYOPHILIZED, FOR SOLUTION INTRAVENOUS at 10:14

## 2023-12-22 RX ADMIN — DEXAMETHASONE SODIUM PHOSPHATE 6 MG: 4 INJECTION INTRA-ARTICULAR; INTRALESIONAL; INTRAMUSCULAR; INTRAVENOUS; SOFT TISSUE at 13:05

## 2023-12-22 RX ADMIN — VECURONIUM BROMIDE 8 MG: 1 INJECTION, POWDER, LYOPHILIZED, FOR SOLUTION INTRAVENOUS at 08:05

## 2023-12-22 RX ADMIN — FENTANYL CITRATE 50 MCG: 50 INJECTION INTRAMUSCULAR; INTRAVENOUS at 10:25

## 2023-12-22 RX ADMIN — DEXAMETHASONE SODIUM PHOSPHATE 4 MG: 10 INJECTION INTRAMUSCULAR; INTRAVENOUS at 08:05

## 2023-12-22 RX ADMIN — VECURONIUM BROMIDE 2 MG: 1 INJECTION, POWDER, LYOPHILIZED, FOR SOLUTION INTRAVENOUS at 09:25

## 2023-12-22 RX ADMIN — SUGAMMADEX 200 MG: 100 INJECTION, SOLUTION INTRAVENOUS at 12:15

## 2023-12-22 RX ADMIN — FENTANYL CITRATE 50 MCG: 50 INJECTION INTRAMUSCULAR; INTRAVENOUS at 11:12

## 2023-12-22 RX ADMIN — VECURONIUM BROMIDE 2 MG: 1 INJECTION, POWDER, LYOPHILIZED, FOR SOLUTION INTRAVENOUS at 09:33

## 2023-12-22 RX ADMIN — LIDOCAINE HYDROCHLORIDE 100 MG: 20 INJECTION, SOLUTION EPIDURAL; INFILTRATION; INTRACAUDAL at 08:05

## 2023-12-22 RX ADMIN — ONDANSETRON 4 MG: 2 INJECTION INTRAMUSCULAR; INTRAVENOUS at 12:36

## 2023-12-22 RX ADMIN — METHYLENE BLUE 25 MG: 5 INJECTION INTRAVENOUS at 11:59

## 2023-12-22 RX ADMIN — VECURONIUM BROMIDE 2 MG: 1 INJECTION, POWDER, LYOPHILIZED, FOR SOLUTION INTRAVENOUS at 11:02

## 2023-12-22 RX ADMIN — SODIUM CHLORIDE 125 ML/HR: 0.9 INJECTION, SOLUTION INTRAVENOUS at 06:33

## 2023-12-22 RX ADMIN — FENTANYL CITRATE 100 MCG: 50 INJECTION INTRAMUSCULAR; INTRAVENOUS at 08:05

## 2023-12-22 RX ADMIN — FUROSEMIDE 20 MG: 10 INJECTION, SOLUTION INTRAVENOUS at 11:50

## 2023-12-22 NOTE — ANESTHESIA PREPROCEDURE EVALUATION
Procedure:  LTH W/ BILATERAL SALPINGECTOMY (Bilateral: Abdomen)    Relevant Problems             CARDIO       (+) Hyperlipidemia   (+) Hypertension          GI/HEPATIC   (+) GERD (gastroesophageal reflux disease)      MUSCULOSKELETAL              NEURO/PSYCH       (+) Left temporal headache        Physical Exam    Airway    Mallampati score: III  TM Distance: >3 FB  Neck ROM: full     Dental       Cardiovascular  Rhythm: regular    Pulmonary   Breath sounds clear to auscultation    Other Findings  post-pubertal.      Anesthesia Plan  ASA Score- 2     Anesthesia Type- general with ASA Monitors.         Additional Monitors:     Airway Plan: ETT.           Plan Factors-Exercise tolerance (METS): >4 METS.    Chart reviewed.   Existing labs reviewed.     Patient is not a current smoker.              Induction- intravenous.    Postoperative Plan- Plan for postoperative opioid use. Planned trial extubation    Informed Consent- Anesthetic plan and risks discussed with patient.

## 2023-12-22 NOTE — ANESTHESIA POSTPROCEDURE EVALUATION
Post-Op Assessment Note    CV Status:  Stable    Pain management: adequate       Mental Status:  Alert and awake   Hydration Status:  Euvolemic   PONV Controlled:  Controlled   Airway Patency:  Patent     Post Op Vitals Reviewed: Yes      Staff: Anesthesiologist               /55 (12/22/23 1310)    Temp      Pulse 92 (12/22/23 1310)   Resp 16 (12/22/23 1310)    SpO2 96 % (12/22/23 1310)

## 2023-12-22 NOTE — INTERVAL H&P NOTE
H&P reviewed. After examining the patient I find no changes in the patients condition since the H&P had been written.    Vitals:    12/22/23 0612   BP: 158/96   Pulse: 90   Resp: 16   Temp: 97.7 °F (36.5 °C)   SpO2: 99%

## 2023-12-22 NOTE — OP NOTE
OPERATIVE REPORT  PATIENT NAME: Tyra Paz    :  1985  MRN: [id number]  Pt Location: [facility] OR ROOM 05    SURGERY DATE: 2023    Surgeons and Role:     * Yardlie Toussaint-Foster, DO - Primary     * Melody Pope MD - Assisting     * Allie Chinchilla MD - Assisting     * Cristina Chiu MD - Assisting    Preop Diagnosis:  Uterine leiomyoma, unspecified location [D25.9]  Abnormal uterine bleeding [N93.9]    Post-Op Diagnosis Codes:     * Uterine leiomyoma, unspecified location [D25.9]     * Abnormal uterine bleeding [N93.9]    Procedure(s):  Bilateral - LTH W/ BILATERAL SALPINGECTOMY  CYSTOSCOPY    Specimen(s):  ID Type Source Tests Collected by Time Destination   1 : uterus, cervix and bilateral tubes Tissue Uterus TISSUE EXAM Yardlie Toussaint-Foster, DO 2023 0852        Estimated Blood Loss:   400 mL    Drains:  Urethral Catheter Latex 16 Fr. (Active)   Number of days: 0       Anesthesia Type:   General    Operative Indications:  Uterine leiomyoma, unspecified location [D25.9]  Abnormal uterine bleeding [N93.9]    Operative Findings:  Normal external female genitalia   Anteverted uterus, normal size and contour, freely mobile. No appreciable adnexal masses.     On laparoscopy:   No evidence of injury to the bowel directly beneath first trocar placement.   Upper abdomen inspected, normal appearing liver and gallbladder, bowel and vasculature.   Inspection of the pelvis- grossly normal appearing uterus, bilateral fallopian tubes remnants, and ovaries.   Bilateral ureteral vermiculation noted to be outside of the planned surgical field prior to surgical manipulation  Hemostatic surgical sites under decreased intra-abdominal pressure at the end of the case     On Cystoscopy:   Normal appearing bladder without evidence of disruption of the bladder mucosa  Bilateral ureteral jets noted       Complications:   None    Procedure and Technique:  The patient was correctly  identified in preop holding. She was taken to the operating room. SCDs were applied to the lower extremities. General anesthesia was administered without difficulty. Ancef 2g was administered for surgical prophylaxis. She was then positioned in dorsal lithotomy using yellofin Celestine stirrups with the arms tucked. All pressure points were padded. Exam under anesthesia was performed. The vagina was prepped with chlorhexidine. The abdomen was prepped with Chloraprep and appropriate drying time allowed before sterile drapes were applied. Time-out was performed with all parties in agreement.    The anterior lip of the cervix was visualized and grasped with a single-tooth tenaculum. The uterus sounded to 9cm. The cervix was serially dilated to accommodate the TAE uterine manipulator tip. A stay suture of 0-Vicryl was applied to the anterior lip of the cervix.  The single-tooth tenaculum was removed and the TAE uterine manipulator placed without incident. A strong catheter was inserted into the bladder and the vaginal occluder balloon inflated.    Sterile gloves were exchanged and attention turned to the abdomen.  Local anesthesia was infiltrated the umbilicus. A 5mm incision was made. The laparoscope was inserted under direct visualization at this site. Pneumoperitoneum was established and maintained at 15mmHg. There was no evidence of injury directly below the trocar insertion site. Pneumoperitoneum was maintained at 15mmHg.    Subsequently, after infiltrating the areas with local, two additional small incisions were made in the right and left lower quadrants. Two 5mm ports were introduced under direct visualization at these sites. The patient was placed in trendelenburg. Findings of pelvic survey are as noted above. The right fallopian tube was grasped and elevated by its fimbriated end. The transperitoneal course of the right ureter was identified to be well out of the planned surgical field. The Enseal was selected.  The mesosalpinx of the right fallopian tube was coagulated and cut and the fallopian tube amputated 5cm from the cornua.     The fallopian tube was withdrawn from the abdomen. The right round and utero-ovarian ligaments were sequentially coagulated and cut. The broad ligament was then coagulated, cut and its anterior and posterior leaves  to initiate the bladder flap. The bladder was dissected approximately MCFP across the lower uterine segment. The uterine artery was coagulated.     Attention was turned to the contralateral side. The transperitoneal course of the left ureter was identified to be well out of the planned surgical field. The same sequence of procedures was then performed on this side. The bladder flap was completed and the bladder pushed down to below the level of the Koh cervical cup. The bilateral uterine arteries were then coagulated and cut via Yost . The bilateral uterosacral-cardinal ligament complexes were completely detached from the cervix. Blanching of the uterus was confirmed. The colpotomy was performed with the monopolar J-hook with the Enseal used laterally near the vascular pedicles to ensure hemostasis. Once the colpotomy was completed, the attention was then turned to the perineum where the vaginal cuff occluder was deflated and the specimens removed vaginally. All specimens (uterus, cervix, and bilateral fallopian tubes) were sent for pathological analysis. A sterile glove with a lap sponge was placed in the vagina to reestablish pneumoperitoneum.     Attention was then turned to the abdomen.  The vaginal cuff was closed laparoscopically with running 2-0 PDS Stratafix suture after placing an additional port. The suture was cut, and the needle and excess suture removed from the abdomen. Good hemostasis was achieved.    The pelvis was irrigated and suctioned. Surgiflo was placed on the vaginal cuff. Hemostasis was confirmed at all surgical sites under reduced  intraabdominal pressure. Pneumoperitoneum was then evacuated and the trocars and laparoscope were removed. The trocar incisions were closed using 4-0 Monocryl. The strong was removed from the bladder and cystoscopy was performed. The bladder was confirmed to be intact with no evidence of intravesical suture material. Bilateral ureteral jets were visualized. The cystoscope was withdrawn and the bladder was drained via the strong. There were no lacerations to the vagina. Patient was  extubated and transferred to the recovery room in stable condition. She tolerated the procedure well. All needle, sponge, and instrument counts were noted to be correct x 2 at the end of the procedure.     Dr. Toussaint-Foster was present for the entire procedure.    Patient Disposition:  PACU     SIGNATURE: Allie Chinchilla MD  DATE: December 22, 2023  TIME: 12:36 PM

## 2023-12-22 NOTE — DISCHARGE INSTRUCTIONS
Hysterectomy Discharge Instructions    WHAT YOU NEED TO KNOW:   A hysterectomy is surgery to remove your uterus. Your ovaries, fallopian tubes, cervix, or part of your vagina may also need to be removed. The organs and tissue that will be removed depends on your medical condition.  After a hysterectomy, you will not be able to become pregnant.  If your ovaries are removed, you will go through menopause if you have not already.    DISCHARGE INSTRUCTIONS:   Contact your doctor at the number above if:   You have a fever over 101o.  You have nausea or are vomiting that does not improve after a light meal.   Your pain is getting worse, even after you take medicine.   You feel pain or burning when you urinate, or you have trouble urinating.   You have pus or a foul-smelling odor coming from your vagina.    Your wound is red, swollen, or draining pus.  You see new or an increased amount of bright red blood coming from your vagina or your incisions.   You have questions or concerns about your condition or care.    Seek care immediately:   Your arm or leg feels warm, tender, and painful. It may look swollen and red.  You have increasing abdominal or pelvic pain.   You have heavy vaginal bleeding that fills 1 or more sanitary pads in 1 hour.    Call 911 for any of the following:   You feel lightheaded, short of breath, and have chest pain.   You cough up blood.    Medicines: You may need any of the following:  Prescription medicine may be given. You may receive a prescription for pain medication or be advised to use over the counter (OTC) pain medication such as acetaminophen (Tylenol) or ibuprofen (Advil, Motrin). Ask your healthcare provider how to take this medicine safely.  NSAIDs , such as ibuprofen, help decrease swelling, pain, and fever. NSAIDs can cause stomach bleeding or kidney problems in certain people. If you take blood thinner medicine, always ask your healthcare provider if NSAIDs are safe for you. Always read  the medicine label and follow directions.   Stool softeners help treat or prevent constipation.    Take your medicine as directed. Contact your healthcare provider if you think your medicine is not helping or if you have side effects. Tell him or her if you are allergic to any medicine. Keep a list of the medicines, vitamins, and herbs you take. Include the amounts, and when and why you take them. Bring the list or the pill bottles to follow-up visits. Carry your medicine list with you in case of an emergency.    Activity:   Rest as needed. Get up and move around as directed to help prevent blood clots. Start with short walks and slowly increase the distance every day. Limit the number of times you climb stairs to 2 times each day for the first week. Plan most of your daily activities on one level of your home.      Do not lift objects heavier than 10 pounds for 6 weeks. Avoid strenuous activity for 2 weeks.      Do not strain during bowel movements. High-fiber foods and extra liquids can help you prevent constipation. Examples of high-fiber foods are fruit and bran. Prune juice and water are good liquids to drink.      Do not have sex, use tampons, or douche for up to 8 weeks.     You may shower as soon as the day after surgery.  Tub baths can be taken starting 2 weeks after surgery.Do not go into pools or hot tubs until cleared by your doctor.      Ask when it is safe for you to drive. It is generally safe to drive after 2 weeks and when no longer taking prescription pain medication.    Ask when you may return to work and to other regular activities.    Wound care: Care for your abdominal incisions as directed. Carefully wash around the wound with soap and water. If you have Hibiclens or medicated soap that you were instructed to use before surgery, you may use that to wash with for up to 2 days after surgery.  If not, any mild non-scented, non-abrasive soap is safe.  It is okay to let the soap and water run over  your incision. Do not scrub your incision. Dry the area and put on new, clean bandages as directed. Change your bandages when they get wet or dirty. If you have strips of medical tape, let them fall off on their own. It may take 7 to 14 days for them to fall off. Check your incision every day for redness, swelling, or pus.   Deep breathing: Take deep breaths and cough 10 times each hour. This will decrease your risk for a lung infection. Take a deep breath and hold it for as long as you can. Let the air out and then cough strongly. Deep breaths help open your airway. You may be given an incentive spirometer to help you take deep breaths. Put the plastic piece in your mouth and take a slow, deep breath, then let the air out and cough. Repeat these steps 10 times every hour.   Get support: This surgery may be life-changing for you and your family. You will no longer be able to get pregnant. Sudden changes in the levels of your hormones may occur and cause mood swings and depression. You may feel angry, sad, or frightened, or cry frequently and unexpectedly. These feelings are normal. Talk to your healthcare provider about where you can get support. You can also ask if hormone replacement medicine is right for you.   Follow up with your healthcare provider or gynecologist as directed: You may need to return to have stitches removed, and for other tests. Write down your questions so you remember to ask them during your visits.      © 2017 [employer] Information is for End User's use only and may not be sold, redistributed or otherwise used for commercial purposes. All illustrations and images included in CareNotes® are the copyrighted property of [employer] or [employer].  The above information is an  only. It is not intended as medical advice for individual conditions or treatments. Talk to your doctor, nurse or pharmacist before following any medical regimen to see if  it is safe and effective for you.

## 2023-12-28 PROCEDURE — 88307 TISSUE EXAM BY PATHOLOGIST: CPT | Performed by: PATHOLOGY

## 2023-12-29 ENCOUNTER — TELEPHONE (OUTPATIENT)
Dept: OBGYN CLINIC | Facility: CLINIC | Age: 38
End: 2023-12-29

## 2023-12-29 DIAGNOSIS — Z90.710 S/P LAPAROSCOPIC HYSTERECTOMY: Primary | ICD-10-CM

## 2023-12-29 RX ORDER — AMOXICILLIN AND CLAVULANATE POTASSIUM 250; 125 MG/1; MG/1
1 TABLET, FILM COATED ORAL EVERY 12 HOURS SCHEDULED
Qty: 20 TABLET | Refills: 0 | Status: SHIPPED | OUTPATIENT
Start: 2023-12-29 | End: 2024-01-08

## 2023-12-29 RX ORDER — DIPHENHYDRAMINE HCL 25 MG
25 TABLET ORAL EVERY 6 HOURS PRN
Qty: 30 TABLET | Refills: 0 | Status: SHIPPED | OUTPATIENT
Start: 2023-12-29 | End: 2024-01-03

## 2023-12-29 NOTE — TELEPHONE ENCOUNTER
Pt is concerned and I explained to PT that she should go to the ER if she feels she is in a very uncomfortable situation. But there is no one to see her on 12/29

## 2024-01-02 ENCOUNTER — TELEPHONE (OUTPATIENT)
Dept: NEUROLOGY | Facility: CLINIC | Age: 39
End: 2024-01-02

## 2024-01-02 ENCOUNTER — OFFICE VISIT (OUTPATIENT)
Dept: OBGYN CLINIC | Facility: CLINIC | Age: 39
End: 2024-01-02

## 2024-01-02 VITALS — HEIGHT: 62 IN | BODY MASS INDEX: 33.67 KG/M2

## 2024-01-02 DIAGNOSIS — L50.9 HIVES: ICD-10-CM

## 2024-01-02 DIAGNOSIS — Z09 POSTOPERATIVE EXAMINATION: Primary | ICD-10-CM

## 2024-01-02 PROCEDURE — 99024 POSTOP FOLLOW-UP VISIT: CPT | Performed by: OBSTETRICS & GYNECOLOGY

## 2024-01-02 RX ORDER — METHYLPREDNISOLONE 4 MG/1
TABLET ORAL
Qty: 21 TABLET | Refills: 0 | Status: SHIPPED | OUTPATIENT
Start: 2024-01-02

## 2024-01-02 NOTE — PROGRESS NOTES
"David Paz is a 38 y.o. female who presents to the clinic 2 weeks status post  [facility] BS and Cystoscopy  for abnormal uterine bleeding and fibroids for incision check with c/o hives on her abdomen and discharge from the port sites. Augmentin and benadryl was escribed last Friday. Eating a regular diet without difficulty. Bowel movements are normal. The patient is not having any pain.    The following portions of the patient's history were reviewed and updated as appropriate: allergies, current medications, past family history, past medical history, past social history, past surgical history, and problem list.    Review of Systems  Pertinent items are noted in HPI.      Objective     Ht 5' 2\" (1.575 m)   LMP 12/06/2023 (Exact Date)   BMI 33.67 kg/m²   General:  alert and oriented, in no acute distress   Abdomen: soft, bowel sounds active, non-tender   Incision/Port sites:   Positive small vesicles noted, erythema, and discharge from port sites         Assessment      Doing well postoperatively.  Postoperative course complicated by hives from adhesives and skin closure(skin glue).  Operative findings again reviewed. Pathology report discussed.      Plan     1. Continue any current medications.  2. Wound care discussed.  3. Activity restrictions:  as stated on postop instructions  4. Follow up: 2 weeks for incision follow up   5. Allergies update on patient's chart    "

## 2024-01-08 ENCOUNTER — TELEPHONE (OUTPATIENT)
Dept: OBGYN CLINIC | Facility: CLINIC | Age: 39
End: 2024-01-08

## 2024-01-17 ENCOUNTER — OFFICE VISIT (OUTPATIENT)
Dept: OBGYN CLINIC | Facility: CLINIC | Age: 39
End: 2024-01-17

## 2024-01-17 VITALS
BODY MASS INDEX: 33.79 KG/M2 | WEIGHT: 183.6 LBS | HEIGHT: 62 IN | DIASTOLIC BLOOD PRESSURE: 90 MMHG | SYSTOLIC BLOOD PRESSURE: 132 MMHG | HEART RATE: 71 BPM

## 2024-01-17 DIAGNOSIS — Z09 POSTOPERATIVE EXAMINATION: Primary | ICD-10-CM

## 2024-01-17 PROCEDURE — 99024 POSTOP FOLLOW-UP VISIT: CPT | Performed by: OBSTETRICS & GYNECOLOGY

## 2024-01-17 RX ORDER — MEDROXYPROGESTERONE ACETATE 10 MG/1
10 TABLET ORAL DAILY
COMMUNITY
End: 2024-01-17 | Stop reason: ALTCHOICE

## 2024-01-17 NOTE — PROGRESS NOTES
"David Paz is a 38 y.o. female who presents to the clinic 4 weeks status post  [facility] BS Cystoscopy  for abnormal uterine bleeding and fibroids. Eating a regular diet without difficulty. Bowel movements are normal. The patient is not having any pain. Patient returns for reexamination of her trocar sites secondary to allergic dermatitis from skin closure(skin glue).    The following portions of the patient's history were reviewed and updated as appropriate: allergies, current medications, past family history, past medical history, past social history, past surgical history, and problem list.    Review of Systems  Pertinent items are noted in HPI.      Objective     /90   Pulse 71   Ht 5' 2\" (1.575 m)   Wt 83.3 kg (183 lb 9.6 oz)   LMP 12/06/2023 (Exact Date)   BMI 33.58 kg/m²   General:  alert and oriented, in no acute distress   Abdomen: soft, bowel sounds active, non-tender   Incision:   healing well, no drainage, no erythema, no hernia, no seroma, no swelling, no dehiscence, incision well approximated         Assessment      Doing well postoperatively.  Operative findings again reviewed. Pathology report discussed.      Plan     1. Continue any current medications.  2. Wound care discussed.  3. Activity restrictions:  as stated on postoperative instructions  4. Anticipated return to work:  2/2/24 .  5. Follow up: 2 weeks for postoperative visit.   "

## 2024-01-17 NOTE — LETTER
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  She may return to work on 2/2/24 unrestricted.  Please contact our office with any questions or concerns.        Sincerely,     Yardlie Toussaint-Foster, DO  1/17/2024

## 2024-02-02 ENCOUNTER — OFFICE VISIT (OUTPATIENT)
Dept: GASTROENTEROLOGY | Facility: CLINIC | Age: 39
End: 2024-02-02
Payer: COMMERCIAL

## 2024-02-02 VITALS
SYSTOLIC BLOOD PRESSURE: 132 MMHG | HEIGHT: 62 IN | WEIGHT: 183.3 LBS | HEART RATE: 70 BPM | DIASTOLIC BLOOD PRESSURE: 90 MMHG | TEMPERATURE: 98.7 F | BODY MASS INDEX: 33.73 KG/M2

## 2024-02-02 DIAGNOSIS — R10.9 RIGHT SIDED ABDOMINAL PAIN: Primary | ICD-10-CM

## 2024-02-02 DIAGNOSIS — K62.89 RECTAL PAIN: ICD-10-CM

## 2024-02-02 DIAGNOSIS — K21.9 GASTROESOPHAGEAL REFLUX DISEASE WITHOUT ESOPHAGITIS: ICD-10-CM

## 2024-02-02 PROCEDURE — 99214 OFFICE O/P EST MOD 30 MIN: CPT | Performed by: PHYSICIAN ASSISTANT

## 2024-02-02 NOTE — PROGRESS NOTES
[facility] - Outpatient Follow-up Note  Tyra Beverly Paz 38 y.o. female MRN: [id number]  Encounter: [id number]  ASSESSMENT AND PLAN:    1. Right sided abdominal pain  2. Rectal pain  Patient with ongoing right-sided abdominal pain for over a year with associated rectal pain/feeling the need to have a bowel movement but cannot.  Pain can get better with a bowel movement.  She is under gone CT scan, right upper quadrant ultrasound, HIDA scan, EGD with no explanation of pain found.    At this time I recommended patient start a high-fiber diet, discussed importance of adequate water intake  I recommended she continue with dicyclomine twice daily at this time  We will obtain a colonoscopy for further evaluation of ongoing symptoms    - Colonoscopy; Future  - polyethylene glycol (GOLYTELY) 4000 mL solution; Take 4,000 mL by mouth once for 1 dose Take as directed by office instructions prior to colonoscopy.  Dispense: 4000 mL; Refill: 0    3. Gastroesophageal reflux disease without esophagitis  Stable and well-controlled on omeprazole 40 mg once daily in the morning.  I recommended she continue omeprazole 40 mg once daily in the morning at this time.  No plans for repeat EGD.    Patient was instructed to call the office with any questions, concerns, new/ worsening/ persisting GI symptoms. Advised patient go to the ER with any severe or worsening abdominal pain, fevers/ chills, intractable N/V, chest pain, SOB, dizziness, lightheadedness, feeling something stuck in esophagus that will not go down. Patient expressed understanding and is in agreement with treatment plan.     Will plan to follow up after diagnostic tests   __________________________________________________________    SUBJECTIVE:      Patient is new to me.  Patient with a past medical history of GERD, hypertension, anxiety, hyperlipidemia, obesity, vitamin D deficiency presents to the office today for follow-up.  Patient  was last seen in the office by , previous office note was reviewed.  At last office visit patient was started on dicyclomine 10 mg and told to take this 4 times a day as needed.  A nuclear medicine HIDA scan was also ordered.  Nuclear medicine HIDA scan was completed 9/7/2023 this was reviewed, this was completely normal    Patient continues to complain of abdominal pain x over 1 year.   Abdominal pain located on right side. Moreso on right lower side. Can radiate to back. Pain comes and goes. Pain can be gone for several weeks then return. Pain is throbbing. Pain is worse with beans. Pain can improve after a BM.   She is taking dicyclomine 10 mg BID with minimal relief of pain   She tells me she has ~ 2 Bms/ day at baseline. When she has the pain she can have a rectal pain as well/feels like she has to have a bowel movement but cannot.  Stool is formed and brown. She does have occasional hard stools needing to strain.   She reports taking omeprazole 40 mg once daily with complete and adequate control of heartburn, reflux, bloating   Her weight has remained stable.  Patient denies any current or recent fevers/ chills, nausea, vomiting, change in bowel habits, diarrhea,  black or bloody stools, heartburn, dysphagia, odynophagia.   Denies chest pain, SOB    No prior colonoscopy     Review of Systems   Constitutional:  Negative for chills and fever.   HENT:  Negative for ear pain and sore throat.    Eyes:  Negative for pain and visual disturbance.   Respiratory:  Negative for cough and shortness of breath.    Cardiovascular:  Negative for chest pain and palpitations.   Gastrointestinal:  Positive for abdominal pain. Negative for vomiting.   Genitourinary:  Negative for dysuria and hematuria.   Musculoskeletal:  Negative for arthralgias and back pain.   Skin:  Negative for color change and rash.   Neurological:  Negative for seizures and syncope.   All other systems reviewed and are negative.    "      Historical Information   Past Medical History:   Diagnosis Date    Anxiety     Arthritis     spine    Chest pain     pt denies today//saw cardiology and everything was \"normal\"/saw cardiology 7/2019 Dr CHARLES Bustos/JENNIFER MENESES    COVID     2021    Dental crown present     Dizziness     GERD (gastroesophageal reflux disease)     Hypertension     Migraines     Pelvic pain     PONV (postoperative nausea and vomiting)     Precancerous changes of the cervix      Past Surgical History:   Procedure Laterality Date    CARPAL TUNNEL RELEASE Right     CYSTOSCOPY N/A 12/22/2023    Procedure: CYSTOSCOPY;  Surgeon: Yardlie Toussaint-Foster, DO;  Location: [facility];  Service: Gynecology    EXAMINATION UNDER ANESTHESIA N/A 10/4/2020    Procedure: EXAM UNDER ANESTHESIA (EUA), CAUTERIZATION OF CERVIX;  Surgeon: Vani Hayden MD;  Location[facility];  Service: Gynecology    HYSTERECTOMY W/ SALPINGO-OOPHERECTOMY Bilateral 12/22/2023    Procedure: LTH W/ BILATERAL SALPINGECTOMY;  Surgeon: Yardlie Toussaint-Foster, DO;  Location: [facility];  Service: Gynecology    OR COLPOSCOPY CERVIX VAG LOOP ELTRD BX CERVIX N/A 10/2/2020    Procedure: BIOPSY LEEP CERVIX;  Surgeon: Cachorro Goddard MD;  Location: [facility];  Service: Gynecology    TUBAL LIGATION       Social History   Social History     Substance and Sexual Activity   Alcohol Use Not Currently     Social History     Substance and Sexual Activity   Drug Use Never     Social History     Tobacco Use   Smoking Status Never   Smokeless Tobacco Never     Family History   Problem Relation Age of Onset    Asthma Mother     Hypertension Mother     No Known Problems Father     No Known Problems Sister     No Known Problems Daughter     No Known Problems Maternal Grandmother     No Known Problems Maternal Grandfather     No Known Problems Paternal Grandmother     No Known Problems Paternal Grandfather     No Known Problems Maternal Aunt     No Known Problems Maternal " Aunt     No Known Problems Paternal Aunt     No Known Problems Paternal Aunt     No Known Problems Paternal Aunt     Diabetes Paternal Aunt     Breast cancer Neg Hx     Cancer Neg Hx        Meds/Allergies       Current Outpatient Medications:     Cyanocobalamin (VITAMIN B-12 PO)    dicyclomine (BENTYL) 10 mg capsule    ergocalciferol (VITAMIN D2) 50,000 units    famotidine (PEPCID) 20 mg tablet    MAGNESIUM PO    Menaquinone-7 (VITAMIN K2 PO)    multivitamin (THERAGRAN) TABS    omeprazole (PriLOSEC) 40 MG capsule    acetaminophen (TYLENOL) 650 mg CR tablet    Diclofenac Sodium (VOLTAREN) 1 %    diphenhydrAMINE (BENADRYL) 25 mg tablet    hydrocortisone (ANUSOL-HC) 2.5 % rectal cream    lidocaine (XYLOCAINE) 5 % ointment    methylPREDNISolone 4 MG tablet therapy pack    oxyCODONE (Roxicodone) 5 immediate release tablet    rizatriptan (Maxalt) 10 mg tablet    Allergies   Allergen Reactions    Drs Choice Skin Closure [Epimide] Hives    Wound Dressing Adhesive Hives           Objective     Wt Readings from Last 3 Encounters:   02/02/24 83.1 kg (183 lb 4.8 oz)   01/17/24 83.3 kg (183 lb 9.6 oz)   12/22/23 83.5 kg (184 lb 1.4 oz)     Temp Readings from Last 3 Encounters:   02/02/24 98.7 °F (37.1 °C) (Tympanic)   12/22/23 (!) 97.2 °F (36.2 °C) (Temporal)   12/09/23 98.2 °F (36.8 °C) (Oral)     BP Readings from Last 3 Encounters:   02/02/24 132/90   01/17/24 132/90   12/22/23 112/61     Pulse Readings from Last 3 Encounters:   02/02/24 70   01/17/24 71   12/22/23 75          PHYSICAL EXAM:      Physical Exam  Vitals reviewed.   Constitutional:       General: She is not in acute distress.     Appearance: She is obese. She is not toxic-appearing.   HENT:      Head: Normocephalic and atraumatic.   Eyes:      Extraocular Movements: Extraocular movements intact.      Conjunctiva/sclera: Conjunctivae normal.   Cardiovascular:      Rate and Rhythm: Normal rate and regular rhythm.   Pulmonary:      Effort: Pulmonary effort is  normal. No respiratory distress.      Breath sounds: Normal breath sounds.   Abdominal:      General: Bowel sounds are normal.      Palpations: Abdomen is soft.      Tenderness: There is abdominal tenderness in the right lower quadrant and periumbilical area.   Musculoskeletal:         General: No swelling or tenderness.      Cervical back: Normal range of motion and neck supple.   Skin:     General: Skin is warm and dry.      Coloration: Skin is not jaundiced.   Neurological:      General: No focal deficit present.      Mental Status: She is alert and oriented to person, place, and time. Mental status is at baseline.   Psychiatric:         Mood and Affect: Mood normal.         Behavior: Behavior normal.         Thought Content: Thought content normal.        Lab Results:   Lab Results   Component Value Date    WBC 6.98 12/22/2023    HGB 12.6 12/22/2023    HCT 37.2 12/22/2023    MCV 87 12/22/2023     12/22/2023       Lab Results   Component Value Date    SODIUM 137 12/22/2023    K 3.5 12/22/2023     12/22/2023    CO2 24 12/22/2023    AGAP 9 12/22/2023    BUN 16 12/22/2023    CREATININE 0.66 12/22/2023    GLUC 106 12/22/2023    GLUF 106 (H) 12/22/2023    CALCIUM 8.9 12/22/2023    AST 14 08/19/2023    ALT 17 08/19/2023    ALKPHOS 47 08/19/2023    TP 7.6 08/19/2023    TBILI 0.39 08/19/2023    EGFR 110 12/22/2023     Lab Results   Component Value Date    FCH6QSVSSCAP 1.200 02/06/2023       Celiac panel from 8/22/2023 reviewed, this was normal/negative for celiac disease    Radiology Results:   Patient underwent a right upper quadrant ultrasound on 8/19/2023, this was reviewed, this was done for right upper quadrant abdominal pain and showed no gallstones, normal common bile duct.  Small hepatic cyst.    Patient underwent ultrasound of the pelvis complete with transvaginal on 9/27/2023, this showed 2 subcentimeter uterine fibroids, adenomyosis, otherwise normal pelvic sonogram.    Patient underwent a CT of  the abdomen and pelvis with IV contrast for left lower quadrant abdominal pain on 1/31/2023, this was reviewed, this showed a left hepatic simple cyst, normal spleen, normal pancreas, normal stomach and bowel, normal pelvis.  No ascites or lymphadenopathy.  There is no acute abnormality in the abdomen or pelvis to explain the patient's pain.    Prior Procedure Results/Reports   Last EGD reviewed completed 5/26/2023 and showed mild, patchy edematous and erythematous mucosa in the gastric antrum.  Esophagus and duodenum were normal.  Duodenal biopsy showed focal increased intraepithelial lymphocytes, no villous blunting.  Gastric biopsy was negative for H. Pylori    Melody Britton PA-C   Available on [employer]

## 2024-02-02 NOTE — PATIENT INSTRUCTIONS
Scheduled date of colonoscopy (as of today):03.26.24  Physician performing colonoscopy:DR BENSON  Location of colonoscopy:  Bowel prep reviewed with patient:BETSY  Instructions reviewed with patient by:AMARIS  Clearances: N/A

## 2024-02-05 ENCOUNTER — TELEPHONE (OUTPATIENT)
Dept: NEUROLOGY | Facility: CLINIC | Age: 39
End: 2024-02-05

## 2024-02-05 ENCOUNTER — OFFICE VISIT (OUTPATIENT)
Dept: OBGYN CLINIC | Facility: CLINIC | Age: 39
End: 2024-02-05

## 2024-02-05 VITALS
DIASTOLIC BLOOD PRESSURE: 97 MMHG | WEIGHT: 184.6 LBS | SYSTOLIC BLOOD PRESSURE: 151 MMHG | HEIGHT: 62 IN | HEART RATE: 86 BPM | BODY MASS INDEX: 33.97 KG/M2

## 2024-02-05 DIAGNOSIS — R82.90 MALODOROUS URINE: ICD-10-CM

## 2024-02-05 DIAGNOSIS — R82.90 MALODOROUS URINE: Primary | ICD-10-CM

## 2024-02-05 DIAGNOSIS — Z09 POSTOPERATIVE EXAMINATION: Primary | ICD-10-CM

## 2024-02-05 LAB
SL AMB  POCT GLUCOSE, UA: ABNORMAL
SL AMB LEUKOCYTE ESTERASE,UA: ABNORMAL
SL AMB POCT BILIRUBIN,UA: ABNORMAL
SL AMB POCT BLOOD,UA: ABNORMAL
SL AMB POCT CLARITY,UA: ABNORMAL
SL AMB POCT COLOR,UA: YELLOW
SL AMB POCT KETONES,UA: ABNORMAL
SL AMB POCT NITRITE,UA: ABNORMAL
SL AMB POCT PH,UA: 6
SL AMB POCT SPECIFIC GRAVITY,UA: 1
SL AMB POCT URINE PROTEIN: ABNORMAL
SL AMB POCT UROBILINOGEN: 0.2

## 2024-02-05 PROCEDURE — 87086 URINE CULTURE/COLONY COUNT: CPT | Performed by: OBSTETRICS & GYNECOLOGY

## 2024-02-05 PROCEDURE — 81002 URINALYSIS NONAUTO W/O SCOPE: CPT | Performed by: OBSTETRICS & GYNECOLOGY

## 2024-02-05 PROCEDURE — 99024 POSTOP FOLLOW-UP VISIT: CPT | Performed by: OBSTETRICS & GYNECOLOGY

## 2024-02-05 RX ORDER — SULFAMETHOXAZOLE AND TRIMETHOPRIM 800; 160 MG/1; MG/1
1 TABLET ORAL EVERY 12 HOURS SCHEDULED
Qty: 2 TABLET | Refills: 0 | Status: SHIPPED | OUTPATIENT
Start: 2024-02-05 | End: 2024-02-07 | Stop reason: SDUPTHER

## 2024-02-05 NOTE — PROGRESS NOTES
"Subjective     Tyra A Siria Paz is a 38 y.o. female who presents to the clinic 6 weeks status post  TLH BS and Cystoscopy  for abnormal uterine bleeding and fibroids. Eating a regular diet without difficulty. Bowel movements are normal. The patient is not having any pain.  Patient with c/o malodorous urine.    The following portions of the patient's history were reviewed and updated as appropriate: allergies, current medications, past family history, past medical history, past social history, past surgical history, and problem list.    Review of Systems  Pertinent items are noted in HPI.      Objective     /97   Pulse 86   Ht 5' 2\" (1.575 m)   Wt 83.7 kg (184 lb 9.6 oz)   LMP 12/06/2023 (Exact Date)   BMI 33.76 kg/m²   General:  alert and oriented, in no acute distress   Abdomen: soft, bowel sounds active, non-tender   Vaginal cuff:   Cuff intact, palpable sutures, no discharge         Assessment      Doing well postoperatively.  Operative findings again reviewed. Pathology report discussed.      Plan     1. Continue any current medications.  2. Wound care discussed.  3. Activity restrictions: none  4. Follow up: 3  mos  for annual examination   "

## 2024-02-06 LAB — BACTERIA UR CULT: NORMAL

## 2024-02-07 ENCOUNTER — OFFICE VISIT (OUTPATIENT)
Dept: NEUROLOGY | Facility: CLINIC | Age: 39
End: 2024-02-07
Payer: COMMERCIAL

## 2024-02-07 VITALS
TEMPERATURE: 98.2 F | WEIGHT: 184 LBS | BODY MASS INDEX: 33.86 KG/M2 | DIASTOLIC BLOOD PRESSURE: 89 MMHG | OXYGEN SATURATION: 94 % | HEART RATE: 82 BPM | SYSTOLIC BLOOD PRESSURE: 169 MMHG | HEIGHT: 62 IN

## 2024-02-07 DIAGNOSIS — R82.90 MALODOROUS URINE: ICD-10-CM

## 2024-02-07 DIAGNOSIS — G43.109 MIGRAINE WITH AURA AND WITHOUT STATUS MIGRAINOSUS, NOT INTRACTABLE: ICD-10-CM

## 2024-02-07 DIAGNOSIS — R51.9 LEFT TEMPORAL HEADACHE: Primary | ICD-10-CM

## 2024-02-07 PROCEDURE — 99215 OFFICE O/P EST HI 40 MIN: CPT | Performed by: PSYCHIATRY & NEUROLOGY

## 2024-02-07 RX ORDER — PREDNISONE 10 MG/1
TABLET ORAL
Qty: 42 TABLET | Refills: 0 | Status: SHIPPED | OUTPATIENT
Start: 2024-02-07

## 2024-02-07 RX ORDER — SULFAMETHOXAZOLE AND TRIMETHOPRIM 800; 160 MG/1; MG/1
1 TABLET ORAL EVERY 12 HOURS SCHEDULED
Qty: 2 TABLET | Refills: 0 | Status: SHIPPED | OUTPATIENT
Start: 2024-02-07 | End: 2024-02-08

## 2024-02-07 RX ORDER — VENLAFAXINE HYDROCHLORIDE 75 MG/1
75 CAPSULE, EXTENDED RELEASE ORAL DAILY
Qty: 90 CAPSULE | Refills: 3 | Status: SHIPPED | OUTPATIENT
Start: 2024-02-07

## 2024-02-07 RX ORDER — MEDROXYPROGESTERONE ACETATE 10 MG/1
10 TABLET ORAL
COMMUNITY
Start: 2023-10-17

## 2024-02-07 NOTE — PROGRESS NOTES
NEUROLOGY OUTPATIENT - FOLLOW UP PATIENT VISIT NOTE        NAME: Tyra Paz  MRN: [id number]  : 1985     TODAY'S DATE: 24         HISTORY OF PRESENT ILLNESS (HPI):    Ms. Siria Paz is a 38 y.o. female presenting with Headache      INTERIM HISTORY:  During her last clinic visit she was seen for headaches and recommended to get an MRI of the brain came back with in normal limits. She was started on Elavil for her headaches and Prednisone taper for headaches.  She never completed the prednisone taper and has been taking it on as-needed basis.  She also is using Pamelor 10 mg at bedtime but mentions that it is not helping with her symptoms.  Reports having 6 or 7 headache events over the last month over the left temporal area, which she describes as throbbing. She took rizatriptan with relief. Describes improvement in headache, but continues to endorse anxiety. .This usually works from 2 to 10 PM and then probably sleep for 4 to 5 hours on average.  She also has a lot of stress at home in regards to her boyfriend's family which might also be contributing towards the headaches.  She mentions that the pressure and the tension on the left side of her temple seem to be easing up as well.        PRIOR NOTES:    HEADACHE DESCRIPTION:    Onset of headaches: gradual several years back--The left temple headaches started about 2 months back  Location of headaches: left-sided unilateral, temporal, and retro-orbital  Quality of the pain: throbbing  Intensity of the headache: At present: 3/10;  At its worst:  10/10  Duration of the headaches:hour(s)  Frequency of the headaches:daily  Presence of aura:  No  Associated symptoms with headaches: no vomiting , +nausea, +light sensitivity , +sound sensitivity, and dizziness --She is complaining of numbness on the left V 2 distribution which is associated with the headache and vertigo which is associated with headache   Triggers:No   Positional  "component: Yes   Alleviating factors: No   Any specific time of the day when get the headaches: no particular time of day    Family history of migraine headaches: No  History of neck and head trauma: No  Smoking status: No  Oral contraceptive use: Yes    Life style factors:  -Hydration: adequate  -Sleep: inadequate--She is using the Trazodone to help sleep at night (50 mg) for 2 years  -Caffeine intake: 2 cups of caffeinated coffee per day(s)  -Alcohol intake: none     Impact of headches:  -Number of headaches in past 30 days: 30/30 days.   -Number of days missed per month because of headache: none days missed in the last 30 days.   -Number of ER visits for her headaches: No  in the last 30 days.       Treatment:  -Over the counter medications tried for headaches:   Tylenol been ineffective     -Prescription medications:  Abortive or Rescue Medications used:   Sumatriptan been helpful     Preventative or daily medications used for headaches:   Propranolol been ineffective         Red Flags for headache:  Age <5 or >50: No  First worst headaches: No  Maximal at intensity: No  Change in pattern: Yes  New headache in pregnancy, cancer or immunocompromised patient: No  Loss of consciousness or convulsions: No  Headache triggered by exertion, Valsalva maneuver or sexual activity: No  Abnormal exam: please see below in Neurological examination.       CURRENT OUTPATIENT MEDICATIONS:    Tyra Paz has a current medication list which includes the following prescription(s): acetaminophen, cyanocobalamin, diclofenac sodium, dicyclomine, diphenhydramine, ergocalciferol, famotidine, hydrocortisone, lidocaine, magnesium, menaquinone-7, methylprednisolone, multivitamin, omeprazole, oxycodone, polyethylene glycol, and rizatriptan.       PHYSICAL EXAMINATION:   VITAL SIGNS:  height is 5' 2\" (1.575 m) and weight is 83.5 kg (184 lb). Her temporal temperature is 98.2 °F (36.8 °C). Her blood pressure is 169/89 and her " "pulse is 82. Her oxygen saturation is 94%.        NEUROLOGICAL EXAMINATION: some portions copied from prior notes and updated accordingly      MENTAL STATUS EXAM: Alert, oriented to time place and person     CRANIAL NERVE EXAMINATION:  I Not tested   II Normal visual fields to finger counting.   II, Ill,  IV, VI Pupils were symmetric, briskly reactive. No afferent pupillary defect.  Eye movements are full without nystagmus. No ptosis.   V Facial sensation intact    VII Facial movements were symmetrical   VIII Hearing was intact   IX, X Intact   XI Shoulder shrug and head turn was normal   XII Tongue protrusion is in the mid line.          MOTOR EXAM:        Arm Right  Left Leg Right  Left   Deltoid 5/5 5/5 lliopsoas 5/5 5/5   Biceps 5/5 5/5 Quads 5/5 5/5   Triceps 5/5 5/5 Hamstrings 5/5 5/5   Wrist Extension 5/5 5/5 Ankle Dorsi Flexion 5/5 5/5   Wrist Flexion 5/5 5/5 Ankle Plantar Flexion 5/5 5/5               DEEP TENDON REFLEXES:     Brachioradialis Biceps Triceps Patellar Achilles   Right 2+ 2+ 2+ 2+ 2+   Left 2+ 2+ 2+ 2+ 2+            SENSORY EXAMINATION:   Sensation to dull touch Intact     COORDINATION:   normal finger to nose testing     GAIT/STATION:   normal        DIAGNOSTIC STUDIES:   PERTINENT LABS:     Lab Results   Component Value Date    WBC 6.98 12/22/2023     Lab Results   Component Value Date    HGB 12.6 12/22/2023     Lab Results   Component Value Date     12/22/2023     Lab Results   Component Value Date    LYMPHSABS 2.62 12/22/2023     No results found for: \"LABLYMP\"  Lab Results   Component Value Date    EOSABS 0.10 12/22/2023     No components found for: \"NEUTROPHILS\"    No results found for: \"LABMONO\"         No results found for: \"LABGLUC\"  Lab Results   Component Value Date    CREATININE 0.66 12/22/2023     Lab Results   Component Value Date    AST 14 08/19/2023     Lab Results   Component Value Date    ALT 17 08/19/2023     Lab Results   Component Value Date    ALKPHOS 47 08/19/2023 "     Lab Results   Component Value Date    AST 14 08/19/2023           Lab Results   Component Value Date    HEPBSAG Non-reactive 01/31/2023    HEPCAB Non-reactive 01/31/2023            NEUROIMAGING:  Results for orders placed during the hospital encounter of 12/17/23    MRI brain with and without contrast    Impression  No mass effect, acute intracranial hemorrhage or evidence of recent infarction. No abnormal parenchymal or leptomeningeal enhancement identified.    Workstation performed: [id number]          Narrative & Impression   CT BRAIN - WITHOUT CONTRAST 3/24/2023     INDICATION:   Dizziness, non-specific  dizzy.     COMPARISON:  None.     TECHNIQUE:  CT examination of the brain was performed.  Multiplanar 2D reformatted images were created from the source data.     Radiation dose length product (DLP) for this visit:  841 mGy-cm .  This examination, like all CT scans performed in the [facility], was performed utilizing techniques to minimize radiation dose exposure, including the use of iterative   reconstruction and automated exposure control.       IMAGE QUALITY:  Diagnostic.     FINDINGS:     PARENCHYMA:  No intracranial mass, mass effect or midline shift. No CT signs of acute infarction.  No acute parenchymal hemorrhage.     VENTRICLES AND EXTRA-AXIAL SPACES:  Normal for the patient's age.     VISUALIZED ORBITS: Normal visualized orbits.     PARANASAL SINUSES: Normal visualized paranasal sinuses.     CALVARIUM AND EXTRACRANIAL SOFT TISSUES:  Normal.     IMPRESSION:     No acute intracranial abnormality.             ASSESSMENT AND PLAN:    Ms. Siria Paz is a 38 y.o.female  presenting with follow up for left-sided temporal headache. Patient  has a past medical history of Anxiety, Arthritis, Chest pain, COVID, Dental crown present, Dizziness, GERD (gastroesophageal reflux disease), Hypertension, Migraines, Pelvic pain, PONV (postoperative nausea and vomiting), and Precancerous changes  of the cervix.. Neurological exam is concerning for left-sided upper motor neuron facial weakness (from prior exam), decreased sensation in the left V2 region (resolved). Neuroimaging including prior CT head was negative in March of this year.  MRI of the brain came back with in normal limits. I feel that the stress/tension and lack of sleep are contributing towards her headaches.        Tension headache/Left temporal headache/Left facial numbness    Due to her underlying anxiety and worsening headaches I would recommend starting her on Effexor 75 mg daily    I have also recommended to the patient that she should finish the course of the prednisone taper.  Prednisone taper: Take 6 tabs for 2 days, 5 tabs for 2 days, 4 tabs for 2 days, 3 tabs for 2 days, 2 tabs for 2 days, 1 tabs for 2 days and then stop     I am not sure if she is taking the Maxalt but I have recommended that she should start taking it on a regular basis    If the headache do not improve, we can consider Botox vs anti CRGP inhibitors.        Failed medications so far:     Pamelor (30 mg ) did not help much with her headaches.   Propranolol secondary to the side effectsincluding vertigo and low blood pressure?  Imitrex did not helped with her headaches.   Trazodone did not helped with her headaches.         The management plan was discussed in detail with the patient and all questions were answered.     Ms. Siria Paz was encouraged to contact our office with any questions or concerns and to contact the clinic or go to the nearest emergency room if symptoms change or worsen.       I have spent a total of 42 min in reviewing and/or ordering tests, medications, or procedures, performing an examination or evaluation, reviewing pertinent history, counseling and educating the patient, referring and/or communicating with other health care professionals, documenting in the EMR and general coordination of care of Ms. Siria Paz  today.            Carmen Wheeler MD.   Staff Neurologist,   Neuroimmunology and Neuroinfectious disease  02/07/24     This report has been created through the use of voice recognition/text compilation software.  Typographical and content errors may occur with this process.  While efforts are made to detect and correct such errors, in some cases errors will persist.  For this reason, wording in this document should be considered in the proper context and not strictly verbatim.  If, when reviewing the document, an error is discovered, please let the office know at [phone]

## 2024-02-21 ENCOUNTER — TELEPHONE (OUTPATIENT)
Age: 39
End: 2024-02-21

## 2024-02-21 NOTE — TELEPHONE ENCOUNTER
Patients GI provider:  Dr. Alvarado    Number to return call: [phone]    Reason for call: Pt called in and requested to be place on a wait list. Patient is requesting to be scheduled on 3/29/2024. If there are any cancellations please give patient a call, thank you.    Scheduled procedure/appointment date if applicable: Apt/procedure 3/26/2024     No

## 2024-02-25 ENCOUNTER — APPOINTMENT (EMERGENCY)
Dept: ULTRASOUND IMAGING | Facility: HOSPITAL | Age: 39
End: 2024-02-25
Payer: COMMERCIAL

## 2024-02-25 ENCOUNTER — HOSPITAL ENCOUNTER (EMERGENCY)
Facility: HOSPITAL | Age: 39
Discharge: HOME | End: 2024-02-26
Attending: EMERGENCY MEDICINE
Payer: COMMERCIAL

## 2024-02-25 DIAGNOSIS — R10.2 PELVIC PAIN: Primary | ICD-10-CM

## 2024-02-25 LAB
ALBUMIN SERPL BCP-MCNC: 4.2 G/DL (ref 3.5–5)
ALP SERPL-CCNC: 49 U/L (ref 34–104)
ALT SERPL W P-5'-P-CCNC: 17 U/L (ref 7–52)
ANION GAP SERPL CALCULATED.3IONS-SCNC: 7 MMOL/L
AST SERPL W P-5'-P-CCNC: 16 U/L (ref 13–39)
BASOPHILS # BLD AUTO: 0.09 THOUSANDS/ÂΜL (ref 0–0.1)
BASOPHILS NFR BLD AUTO: 1 % (ref 0–1)
BILIRUB SERPL-MCNC: 0.35 MG/DL (ref 0.2–1)
BILIRUB UR QL STRIP: NEGATIVE
BUN SERPL-MCNC: 11 MG/DL (ref 5–25)
CALCIUM SERPL-MCNC: 8.9 MG/DL (ref 8.4–10.2)
CHLORIDE SERPL-SCNC: 103 MMOL/L (ref 96–108)
CLARITY UR: CLEAR
CO2 SERPL-SCNC: 25 MMOL/L (ref 21–32)
COLOR UR: COLORLESS
CREAT SERPL-MCNC: 0.64 MG/DL (ref 0.6–1.3)
EOSINOPHIL # BLD AUTO: 0.15 THOUSAND/ÂΜL (ref 0–0.61)
EOSINOPHIL NFR BLD AUTO: 2 % (ref 0–6)
ERYTHROCYTE [DISTWIDTH] IN BLOOD BY AUTOMATED COUNT: 12.5 % (ref 11.6–15.1)
GFR SERPL CREATININE-BSD FRML MDRD: 111 ML/MIN/1.73SQ M
GLUCOSE SERPL-MCNC: 92 MG/DL (ref 65–140)
GLUCOSE UR STRIP-MCNC: NEGATIVE MG/DL
HCT VFR BLD AUTO: 38.5 % (ref 34.8–46.1)
HGB BLD-MCNC: 12.9 G/DL (ref 11.5–15.4)
HGB UR QL STRIP.AUTO: NEGATIVE
IMM GRANULOCYTES # BLD AUTO: 0.02 THOUSAND/UL (ref 0–0.2)
IMM GRANULOCYTES NFR BLD AUTO: 0 % (ref 0–2)
KETONES UR STRIP-MCNC: NEGATIVE MG/DL
LEUKOCYTE ESTERASE UR QL STRIP: NEGATIVE
LYMPHOCYTES # BLD AUTO: 2.94 THOUSANDS/ÂΜL (ref 0.6–4.47)
LYMPHOCYTES NFR BLD AUTO: 37 % (ref 14–44)
MCH RBC QN AUTO: 29 PG (ref 26.8–34.3)
MCHC RBC AUTO-ENTMCNC: 33.5 G/DL (ref 31.4–37.4)
MCV RBC AUTO: 87 FL (ref 82–98)
MONOCYTES # BLD AUTO: 0.63 THOUSAND/ÂΜL (ref 0.17–1.22)
MONOCYTES NFR BLD AUTO: 8 % (ref 4–12)
NEUTROPHILS # BLD AUTO: 4.04 THOUSANDS/ÂΜL (ref 1.85–7.62)
NEUTS SEG NFR BLD AUTO: 52 % (ref 43–75)
NITRITE UR QL STRIP: NEGATIVE
NRBC BLD AUTO-RTO: 0 /100 WBCS
PH UR STRIP.AUTO: 6 [PH]
PLATELET # BLD AUTO: 254 THOUSANDS/UL (ref 149–390)
PMV BLD AUTO: 9.8 FL (ref 8.9–12.7)
POTASSIUM SERPL-SCNC: 3.8 MMOL/L (ref 3.5–5.3)
PROT SERPL-MCNC: 7.1 G/DL (ref 6.4–8.4)
PROT UR STRIP-MCNC: NEGATIVE MG/DL
RBC # BLD AUTO: 4.45 MILLION/UL (ref 3.81–5.12)
SODIUM SERPL-SCNC: 135 MMOL/L (ref 135–147)
SP GR UR STRIP.AUTO: 1 (ref 1–1.03)
UROBILINOGEN UR STRIP-ACNC: <2 MG/DL
WBC # BLD AUTO: 7.87 THOUSAND/UL (ref 4.31–10.16)

## 2024-02-25 PROCEDURE — 76856 US EXAM PELVIC COMPLETE: CPT

## 2024-02-25 PROCEDURE — 36415 COLL VENOUS BLD VENIPUNCTURE: CPT

## 2024-02-25 PROCEDURE — 99283 EMERGENCY DEPT VISIT LOW MDM: CPT

## 2024-02-25 PROCEDURE — 87591 N.GONORRHOEAE DNA AMP PROB: CPT

## 2024-02-25 PROCEDURE — 85025 COMPLETE CBC W/AUTO DIFF WBC: CPT

## 2024-02-25 PROCEDURE — 81003 URINALYSIS AUTO W/O SCOPE: CPT

## 2024-02-25 PROCEDURE — 87491 CHLMYD TRACH DNA AMP PROBE: CPT

## 2024-02-25 PROCEDURE — 80053 COMPREHEN METABOLIC PANEL: CPT

## 2024-02-25 PROCEDURE — 81514 NFCT DS BV&VAGINITIS DNA ALG: CPT

## 2024-02-25 PROCEDURE — 99285 EMERGENCY DEPT VISIT HI MDM: CPT | Performed by: EMERGENCY MEDICINE

## 2024-02-25 PROCEDURE — 76830 TRANSVAGINAL US NON-OB: CPT

## 2024-02-26 VITALS
RESPIRATION RATE: 18 BRPM | BODY MASS INDEX: 34.15 KG/M2 | TEMPERATURE: 98.6 F | HEART RATE: 78 BPM | WEIGHT: 186.73 LBS | SYSTOLIC BLOOD PRESSURE: 140 MMHG | OXYGEN SATURATION: 98 % | DIASTOLIC BLOOD PRESSURE: 98 MMHG

## 2024-02-26 PROBLEM — R10.2 PELVIC PAIN: Status: ACTIVE | Noted: 2024-02-26

## 2024-02-26 LAB
C GLABRATA DNA VAG QL NAA+PROBE: NEGATIVE
C KRUSEI DNA VAG QL NAA+PROBE: NEGATIVE
C TRACH DNA SPEC QL NAA+PROBE: NEGATIVE
CANDIDA SP 6 PNL VAG NAA+PROBE: POSITIVE
N GONORRHOEA DNA SPEC QL NAA+PROBE: NEGATIVE
T VAGINALIS DNA VAG QL NAA+PROBE: NEGATIVE
VAGINOSIS/ITIS DNA PNL VAG PROBE+SIG AMP: NEGATIVE

## 2024-02-26 PROCEDURE — NC001 PR NO CHARGE: Performed by: OBSTETRICS & GYNECOLOGY

## 2024-02-26 NOTE — ASSESSMENT & PLAN NOTE
"Pt is 8 weeks S/P TLH, BS & Cystoscopy and presents with pelvic pain    Imaging:  US pelvis complete w/ TVUS 02/25:\"Status post hysterectomy. No loculated collection or hematoma. Small amount of simple free fluid.    No evidence of ovarian torsion.\"      Labs:    WBC   Date Value Ref Range Status   02/25/2024 7.87 4.31 - 10.16 Thousand/uL Final     Hemoglobin   Date Value Ref Range Status   02/25/2024 12.9 11.5 - 15.4 g/dL Final     Platelets   Date Value Ref Range Status   02/25/2024 254 149 - 390 Thousands/uL Final       Plan:  Pelvic pain in the setting of recent TLH:   Speculum Exam: yellow/gray discharge , no bleeding, vaginal cuff sutures intact & visible( confirmed with digital exam), grossly normal vaginal mucosa. No concern for dehiscence .  Afebrile & all vital signs stable  G/C collected by ED doc & pending   CBC, CMP & UA collected by ED and within normal limits  Follow up  patient advised to F/U outpatient re: pelvic pain & return to ED if severe pain not controlled by OTC PO pain meds , vaginal bleeding that soaks milagros-pad in 1 hour                "

## 2024-02-26 NOTE — CONSULTS
"Consultation - Gynecology  Tyra Paz 38 y.o. female MRN: [id number]  Unit/Bed#: ED-12 Encounter: [id number]      Consults      Chief Complaint   Patient presents with    Post-op Problem     Pt states she had a hysterectomy on  and her incision area is still open and painful.  Pt returned to work on 24.  Pt has not called surgeon        Assessment/Plan      * Pelvic pain  Assessment & Plan  Pt is 8 weeks S/P TLH, BS & Cystoscopy and presents with pelvic pain    Imaging:  US pelvis complete w/ TVUS :\"Status post hysterectomy. No loculated collection or hematoma. Small amount of simple free fluid.    No evidence of ovarian torsion.\"      Labs:    WBC   Date Value Ref Range Status   2024 7.87 4.31 - 10.16 Thousand/uL Final     Hemoglobin   Date Value Ref Range Status   2024 12.9 11.5 - 15.4 g/dL Final     Platelets   Date Value Ref Range Status   2024 254 149 - 390 Thousands/uL Final       Plan:  Pelvic pain in the setting of recent TLH:   Speculum Exam: yellow/gray discharge , no bleeding, vaginal cuff sutures intact & visible( confirmed with digital exam), grossly normal vaginal mucosa. No concern for dehiscence .  Afebrile & all vital signs stable  G/C collected by ED doc & pending   CBC, CMP & UA collected by ED and within normal limits  Follow up  patient advised to F/U outpatient re: pelvic pain & return to ED if severe pain not controlled by OTC PO pain meds , vaginal bleeding that soaks milagros-pad in 1 hour                         History of Present Illness:  Physician Requesting Consult: No att. providers found  Reason for Consult / Principal Problem: Pelvic pain  HPI: Tyra Paz is a 38 y.o.  female who is 8 weeks s/p TLH, BS & cystoscopy presented to ED complaining of pelvic pain. She report sthat the pain started when she returned to work 2 weeks ago. She denies heavy lifting at work. She also denies recent intercourse, abdominal trauma and " "vaginal bleeding. She denies fever, nausea and vomiting. All other review of symptoms are negative.      Historical Information   Past Medical History:   Diagnosis Date    Anxiety     Arthritis     spine    Chest pain     pt denies today//saw cardiology and everything was \"normal\"/saw cardiology 2019 Dr CHARLES Bustos/JENNIFER MENESES    COVID         Dental crown present     Dizziness     GERD (gastroesophageal reflux disease)     Hypertension     Migraines     Pelvic pain     PONV (postoperative nausea and vomiting)     Precancerous changes of the cervix      Past Surgical History:   Procedure Laterality Date    CARPAL TUNNEL RELEASE Right     CYSTOSCOPY N/A 2023    Procedure: CYSTOSCOPY;  Surgeon: Yardlie Toussaint-Foster, DO;  Location[facility];  Service: Gynecology    EXAMINATION UNDER ANESTHESIA N/A 10/4/2020    Procedure: EXAM UNDER ANESTHESIA (EUA), CAUTERIZATION OF CERVIX;  Surgeon: Vani Hayden MD;  Location[facility];  Service: Gynecology    HYSTERECTOMY W/ SALPINGO-OOPHERECTOMY Bilateral 2023    Procedure: LTH W/ BILATERAL SALPINGECTOMY;  Surgeon: Yardlie Toussaint-Foster, DO;  [facility];  Service: Gynecology    TX COLPOSCOPY CERVIX VAG LOOP ELTRD BX CERVIX N/A 10/2/2020    Procedure: BIOPSY LEEP CERVIX;  Surgeon: Cachorro Goddard MD;  Location: [facility];  Service: Gynecology    TUBAL LIGATION       OB History    Para Term  AB Living   3 3 3 0   3   SAB IAB Ectopic Multiple Live Births   0 0 0 0 3      # Outcome Date GA Lbr Lucio/2nd Weight Sex Delivery Anes PTL Lv   3 Term            2 Term            1 Term              Family History   Problem Relation Age of Onset    Asthma Mother     Hypertension Mother     No Known Problems Father     No Known Problems Sister     No Known Problems Daughter     No Known Problems Maternal Grandmother     No Known Problems Maternal Grandfather     No Known Problems Paternal Grandmother     No Known Problems " Paternal Grandfather     No Known Problems Maternal Aunt     No Known Problems Maternal Aunt     No Known Problems Paternal Aunt     No Known Problems Paternal Aunt     No Known Problems Paternal Aunt     Diabetes Paternal Aunt     Breast cancer Neg Hx     Cancer Neg Hx      Social History   Social History     Substance and Sexual Activity   Alcohol Use Not Currently     Social History     Substance and Sexual Activity   Drug Use Never     Social History     Tobacco Use   Smoking Status Never   Smokeless Tobacco Never     Allergies   Allergen Reactions    Drs Choice Skin Closure [Epimide] Hives    Wound Dressing Adhesive Hives       Objective   Vitals: Blood pressure 140/98, pulse 78, temperature 98.6 °F (37 °C), temperature source Oral, resp. rate 18, weight 84.7 kg (186 lb 11.7 oz), last menstrual period 12/06/2023, SpO2 98%, not currently breastfeeding. Body mass index is 34.15 kg/m².    Invasive Devices       None                   Physical Exam  Constitutional:       Appearance: Normal appearance.   HENT:      Head: Normocephalic and atraumatic.   Eyes:      Extraocular Movements: Extraocular movements intact.   Cardiovascular:      Rate and Rhythm: Normal rate.      Pulses: Normal pulses.   Pulmonary:      Effort: Pulmonary effort is normal.      Breath sounds: Normal breath sounds.   Abdominal:      General: There is no distension.      Palpations: Abdomen is soft.      Tenderness: There is no abdominal tenderness. There is no guarding or rebound.   Genitourinary:     General: Normal vulva.      Vagina: No signs of injury. Vaginal discharge (yellow gray discharge) present.      Uterus: Absent.       Comments: Cervix is surgically absent, Vaginal cuff is intact , sutures visible , non-erythematous  Musculoskeletal:         General: Normal range of motion.      Cervical back: Normal range of motion.   Skin:     General: Skin is warm and dry.   Neurological:      General: No focal deficit present.      Mental  Status: She is alert.   Psychiatric:         Mood and Affect: Mood normal.         Lab Results:   Recent Results (from the past 24 hour(s))   UA w Reflex to Microscopic w Reflex to Culture    Collection Time: 02/25/24 10:07 PM    Specimen: Urine, Clean Catch   Result Value Ref Range    Color, UA Colorless     Clarity, UA Clear     Specific Gravity, UA 1.004 1.003 - 1.030    pH, UA 6.0 4.5, 5.0, 5.5, 6.0, 6.5, 7.0, 7.5, 8.0    Leukocytes, UA Negative Negative    Nitrite, UA Negative Negative    Protein, UA Negative Negative mg/dl    Glucose, UA Negative Negative mg/dl    Ketones, UA Negative Negative mg/dl    Urobilinogen, UA <2.0 <2.0 mg/dl mg/dl    Bilirubin, UA Negative Negative    Occult Blood, UA Negative Negative   CBC and differential    Collection Time: 02/25/24 10:07 PM   Result Value Ref Range    WBC 7.87 4.31 - 10.16 Thousand/uL    RBC 4.45 3.81 - 5.12 Million/uL    Hemoglobin 12.9 11.5 - 15.4 g/dL    Hematocrit 38.5 34.8 - 46.1 %    MCV 87 82 - 98 fL    MCH 29.0 26.8 - 34.3 pg    MCHC 33.5 31.4 - 37.4 g/dL    RDW 12.5 11.6 - 15.1 %    MPV 9.8 8.9 - 12.7 fL    Platelets 254 149 - 390 Thousands/uL    nRBC 0 /100 WBCs    Neutrophils Relative 52 43 - 75 %    Immat GRANS % 0 0 - 2 %    Lymphocytes Relative 37 14 - 44 %    Monocytes Relative 8 4 - 12 %    Eosinophils Relative 2 0 - 6 %    Basophils Relative 1 0 - 1 %    Neutrophils Absolute 4.04 1.85 - 7.62 Thousands/µL    Immature Grans Absolute 0.02 0.00 - 0.20 Thousand/uL    Lymphocytes Absolute 2.94 0.60 - 4.47 Thousands/µL    Monocytes Absolute 0.63 0.17 - 1.22 Thousand/µL    Eosinophils Absolute 0.15 0.00 - 0.61 Thousand/µL    Basophils Absolute 0.09 0.00 - 0.10 Thousands/µL   Comprehensive metabolic panel    Collection Time: 02/25/24 10:07 PM   Result Value Ref Range    Sodium 135 135 - 147 mmol/L    Potassium 3.8 3.5 - 5.3 mmol/L    Chloride 103 96 - 108 mmol/L    CO2 25 21 - 32 mmol/L    ANION GAP 7 mmol/L    BUN 11 5 - 25 mg/dL    Creatinine 0.64 0.60  - 1.30 mg/dL    Glucose 92 65 - 140 mg/dL    Calcium 8.9 8.4 - 10.2 mg/dL    AST 16 13 - 39 U/L    ALT 17 7 - 52 U/L    Alkaline Phosphatase 49 34 - 104 U/L    Total Protein 7.1 6.4 - 8.4 g/dL    Albumin 4.2 3.5 - 5.0 g/dL    Total Bilirubin 0.35 0.20 - 1.00 mg/dL    eGFR 111 ml/min/1.73sq shivani Mace MD  2/26/2024  5:19 AM

## 2024-02-26 NOTE — ED NOTES
Pt took leftover antibiotic tablet at 1820 that was prescribed awhile ago.  Pt states she never finished the prescription because she felt better.      Ariela Bernal RN  02/25/24 2050

## 2024-02-26 NOTE — ED ATTENDING ATTESTATION
2/25/2024  I, Reinier Willis Jr, DO, saw and evaluated the patient. I have discussed the patient with the resident/non-physician practitioner and agree with the resident's/non-physician practitioner's findings, Plan of Care, and MDM as documented in the resident's/non-physician practitioner's note, except where noted. All available labs and Radiology studies were reviewed.  I was present for key portions of any procedure(s) performed by the resident/non-physician practitioner and I was immediately available to provide assistance.       At this point I agree with the current assessment done in the Emergency Department.  I have conducted an independent evaluation of this patient a history and physical is as follows:    Final Diagnosis:  1. Pelvic pain            MDM    ED Course as of 02/26/24 0250   Sun Feb 25, 2024 2337 CBC and differential  Normal   2337 Comprehensive metabolic panel  Normal   2337 UA w Reflex to Microscopic w Reflex to Culture  Normal   Patient is 2-month status post total laparoscopic hysterectomy with bilateral salpingectomy and cystoscopy.  Patient states that she did well in the postoperative period.  Did follow-up with OB/GYN and gastroenterology and was not noted to have any complications.  Patient returned to work on February 2.  She states that ever since that time she has been developing lower abdominal tenderness along the skin in a bandlike distribution where her pants go over her incisions.  She states that the incisions have been opening and closing but not having any active drainage in the area.    Patient denies any recent fevers, chills, nausea, vomiting or diarrhea.    Overall physical exam appears benign.  Abdomen is soft with tenderness over the incisions.  Incisions are closed without active drainage at this time.  No signs of incisional hernia.    Pelvic exam done by the resident showed green-yellow discharge from the cervix.  Patient denied any knowledge of discharge,  vaginal swelling or irritation.    Given her exam findings and history we will obtain labs and imaging to further evaluate for possible underlying infection causing this persistent pain.    Workup here is overall negative.  Case discussed with OB/GYN who came and saw the patient.  Agreed with workup and discharge home with outpatient follow-up.  No further Needed at this time.  Did advise the patient to use a barrier such as gauze, cloth or looser clothing over the area until fully healed.      Lab Results:   Abnormal Labs Reviewed - No abnormal labs to display  Lab Results: I have personally reviewed pertinent lab results.    Imaging:   US pelvis complete w transvaginal   Final Result      1.  Status post hysterectomy. No loculated collection or hematoma. Small amount of simple free fluid.   2.  No evidence of ovarian torsion.      Workstation performed: [id number]           I have personally reviewed pertinent reports.    EKG, Pathology, and Other Studies: I have personally reviewed pertinent films in PACS    Clinical Impression:    Final diagnoses:   Pelvic pain         Disposition    discharged           New Prescriptions:    Discharge Medication List as of 2/26/2024  2:06 AM               Follow-up Instructions:    [facility] [address] [address] [phone]              History of Present Illness   Tyra Paz is a 38 y.o. female who presents with Post-op Problem (Pt states she had a hysterectomy on 12/22 and her incision area is still open and painful.  Pt returned to work on 2/2/24.  Pt has not called surgeon )    has a past medical history of Anxiety, Arthritis, Chest pain, COVID, Dental crown present, Dizziness, GERD (gastroesophageal reflux disease), Hypertension, Migraines, Pelvic pain, PONV (postoperative nausea and vomiting), and Precancerous changes of the cervix..         Objective     Vitals:    02/25/24 2045  02/25/24 2306 02/26/24 0214   BP: 165/81 (!) 158/101 140/98   BP Location: Right arm Right arm Left arm   Pulse: 82 75 78   Resp: 16 18 18   Temp: 98.6 °F (37 °C)     TempSrc: Oral     SpO2: 97% 98% 98%   Weight: 84.7 kg (186 lb 11.7 oz)       Body mass index is 34.15 kg/m².  No intake or output data in the 24 hours ending 02/26/24 0250  Invasive Devices       None                   ED Course  ED Course as of 02/26/24 0250   Sun Feb 25, 2024 2337 CBC and differential  Normal   2337 Comprehensive metabolic panel  Normal   2337 UA w Reflex to Microscopic w Reflex to Culture  Normal         Critical Care Time  Procedures

## 2024-02-26 NOTE — ED PROVIDER NOTES
Chief Complaint   Patient presents with    Post-op Problem     Pt states she had a hysterectomy on 12/22 and her incision area is still open and painful.  Pt returned to work on 2/2/24.  Pt has not called surgeon      History of Present Illness and Review of Systems   This is a 38 y.o. female with PMH significant for Hysterectomy in [facility] coming in today with complaint of 1 month of suprapubic pain, and pelvic pain that is intermittent throughout the day. She denies any dyspareunia. She has not have fever chill nausea vomiting or diarrhea. She states that she has had intermittent constipation. She has a surgical incision site on the left that she states is open. This is closed currently.    - No language barrier.     No other complaints for this encounter.    Remainder of ROS Reviewed and Non-Pertinent    Past Medical, Past Surgical History:    has a past medical history of Anxiety, Arthritis, Chest pain, COVID, Dental crown present, Dizziness, GERD (gastroesophageal reflux disease), Hypertension, Migraines, Pelvic pain, PONV (postoperative nausea and vomiting), and Precancerous changes of the cervix.   has a past surgical history that includes Tubal ligation; Carpal tunnel release (Right); pr colposcopy cervix vag loop eltrd bx cervix (N/A, 10/2/2020); Examination under anesthesia (N/A, 10/4/2020); HYESTERECTOMY W SALPINGO-OOPHERECTOMY (Bilateral, 12/22/2023); and CYSTOSCOPY (N/A, 12/22/2023).     Allergies:     Allergies   Allergen Reactions    Drs Choice Skin Closure [Epimide] Hives    Wound Dressing Adhesive Hives       Social and Family History:     Social History     Substance and Sexual Activity   Alcohol Use Not Currently     Social History     Tobacco Use   Smoking Status Never   Smokeless Tobacco Never     Social History     Substance and Sexual Activity   Drug Use Never       Physical Examination     Vitals:    02/25/24 2045 02/25/24 2306 02/26/24 0214   BP: 165/81 (!) 158/101 140/98   BP Location:  Right arm Right arm Left arm   Pulse: 82 75 78   Resp: 16 18 18   Temp: 98.6 °F (37 °C)     TempSrc: Oral     SpO2: 97% 98% 98%   Weight: 84.7 kg (186 lb 11.7 oz)         Physical Exam  Vitals and nursing note reviewed. Exam conducted with a chaperone present.   Constitutional:       General: She is not in acute distress.     Appearance: She is well-developed.   HENT:      Head: Normocephalic and atraumatic.   Eyes:      Conjunctiva/sclera: Conjunctivae normal.   Cardiovascular:      Rate and Rhythm: Normal rate and regular rhythm.      Heart sounds: No murmur heard.  Pulmonary:      Effort: Pulmonary effort is normal. No respiratory distress.      Breath sounds: Normal breath sounds.   Abdominal:      Palpations: Abdomen is soft.      Tenderness: There is abdominal tenderness.      Comments: Suprapubic tenderness and  bilateral pelvic tenderness   Genitourinary:     Exam position: Lithotomy position.      Pubic Area: No rash.       Labia:         Right: No rash, tenderness or lesion.         Left: No rash, tenderness or lesion.       Vagina: Normal.      Cervix: No friability, lesion, erythema or cervical bleeding.      Adnexa:         Right: Tenderness present.         Left: Tenderness present.       Rectum: Normal.      Comments: Patient does not have CMT, She does have a yellow green discharge around the cervix. No malodor present.   Cervical os closed.  Musculoskeletal:         General: No swelling.      Cervical back: Neck supple.   Lymphadenopathy:      Lower Body: No right inguinal adenopathy. No left inguinal adenopathy.   Skin:     General: Skin is warm and dry.      Capillary Refill: Capillary refill takes less than 2 seconds.   Neurological:      Mental Status: She is alert and oriented to person, place, and time.      Cranial Nerves: No cranial nerve deficit.      Sensory: No sensory deficit.      Motor: No weakness.      Coordination: Coordination normal.      Gait: Gait normal.      Deep Tendon  Reflexes: Reflexes normal.   Psychiatric:         Mood and Affect: Mood normal.           Procedures   Procedures      MDM:   Medical Decision Making  Amount and/or Complexity of Data Reviewed  Labs: ordered.  Radiology: ordered.    38-year-old woman past medical history of hysterectomy comes in after having increased pelvic pain since starting her job.  The patient states that is worse when she sits.  On examination the patient had no cervical motion tenderness or inflammation to the vagina/cervix.  The patient does have yellow-green discharge around her cervix that is now malodorous.  The patient abdomen is significant for tender right and left lower quadrant/pelvis.  Patient does not have any McBurney's point or Apodaca sign present.  Patient has normal CBC CMP urinalysis.  Due to the patient's pain and recent procedure a transvaginal ultrasound was done to look for signs of torsion.  The patient's torsion was negative and a CT abdomen was done to follow to look for other signs of pathology.  CT came back as small amount of simple free fluid.  OB/GYN was consulted and evaluated the patient.  They gave recommendation to follow-up in the outpatient setting and take Motrin for the pain.    - Reviewed relevant past office visits/hospitalizations/procedures  -Obtained pertinent history that influenced decision making from the     ED Course as of 02/26/24 2052 Mon Feb 26, 2024   0012   1.  Status post hysterectomy. No loculated collection or hematoma. Small amount of simple free fluid.  2.  No evidence of ovarian torsion.        Final Dispo   Final Diagnosis:  1. Pelvic pain      Time reflects when diagnosis was documented in both MDM as applicable and the Disposition within this note       Time User Action Codes Description Comment    2/26/2024  1:53 AM Jak Azul Add [R10.2] Pelvic pain           ED Disposition       ED Disposition   Discharge    Condition   Stable    Date/Time   Mon Feb 26, 2024  2:06 AM     Comment   Tyra Beverly Paz discharge to home/self care.                   Follow-up Information       Follow up With Specialties Details Why Contact Info Additional Information    [facility]   [address]  [address]  [facility] [address] [phone] [facility], [address], [place], [place], [address] [phone]          Medications - No data to display    Risk Stratification Tools                Orders Placed This Encounter   Procedures    Chlamydia/GC amplified DNA by PCR    Molecular Vaginal Panel    US pelvis complete w transvaginal    UA w Reflex to Microscopic w Reflex to Culture    CBC and differential    Comprehensive metabolic panel       Labs:     Labs Reviewed   UA W REFLEX TO MICROSCOPIC WITH REFLEX TO CULTURE       Result Value Ref Range Status    Color, UA Colorless   Final    Clarity, UA Clear   Final    Specific Gravity, UA 1.004  1.003 - 1.030 Final    pH, UA 6.0  4.5, 5.0, 5.5, 6.0, 6.5, 7.0, 7.5, 8.0 Final    Leukocytes, UA Negative  Negative Final    Nitrite, UA Negative  Negative Final    Protein, UA Negative  Negative mg/dl Final    Glucose, UA Negative  Negative mg/dl Final    Ketones, UA Negative  Negative mg/dl Final    Urobilinogen, UA <2.0  <2.0 mg/dl mg/dl Final    Bilirubin, UA Negative  Negative Final    Occult Blood, UA Negative  Negative Final   CBC AND DIFFERENTIAL    WBC 7.87  4.31 - 10.16 Thousand/uL Final    RBC 4.45  3.81 - 5.12 Million/uL Final    Hemoglobin 12.9  11.5 - 15.4 g/dL Final    Hematocrit 38.5  34.8 - 46.1 % Final    MCV 87  82 - 98 fL Final    MCH 29.0  26.8 - 34.3 pg Final    MCHC 33.5  31.4 - 37.4 g/dL Final    RDW 12.5  11.6 - 15.1 % Final    MPV 9.8  8.9 - 12.7 fL Final    Platelets 254  149 - 390 Thousands/uL Final    nRBC 0  /100 WBCs Final    Neutrophils Relative 52  43 - 75 % Final    Immat GRANS % 0  0 - 2 % Final     Lymphocytes Relative 37  14 - 44 % Final    Monocytes Relative 8  4 - 12 % Final    Eosinophils Relative 2  0 - 6 % Final    Basophils Relative 1  0 - 1 % Final    Neutrophils Absolute 4.04  1.85 - 7.62 Thousands/µL Final    Immature Grans Absolute 0.02  0.00 - 0.20 Thousand/uL Final    Lymphocytes Absolute 2.94  0.60 - 4.47 Thousands/µL Final    Monocytes Absolute 0.63  0.17 - 1.22 Thousand/µL Final    Eosinophils Absolute 0.15  0.00 - 0.61 Thousand/µL Final    Basophils Absolute 0.09  0.00 - 0.10 Thousands/µL Final   COMPREHENSIVE METABOLIC PANEL    Sodium 135  135 - 147 mmol/L Final    Potassium 3.8  3.5 - 5.3 mmol/L Final    Chloride 103  96 - 108 mmol/L Final    CO2 25  21 - 32 mmol/L Final    ANION GAP 7  mmol/L Final    BUN 11  5 - 25 mg/dL Final    Creatinine 0.64  0.60 - 1.30 mg/dL Final    Comment: Standardized to IDMS reference method    Glucose 92  65 - 140 mg/dL Final    Comment: If the patient is fasting, the ADA then defines impaired fasting glucose as > 100 mg/dL and diabetes as > or equal to 123 mg/dL.    Calcium 8.9  8.4 - 10.2 mg/dL Final    AST 16  13 - 39 U/L Final    ALT 17  7 - 52 U/L Final    Comment: Specimen collection should occur prior to Sulfasalazine administration due to the potential for falsely depressed results.     Alkaline Phosphatase 49  34 - 104 U/L Final    Total Protein 7.1  6.4 - 8.4 g/dL Final    Albumin 4.2  3.5 - 5.0 g/dL Final    Total Bilirubin 0.35  0.20 - 1.00 mg/dL Final    Comment: Use of this assay is not recommended for patients undergoing treatment with eltrombopag due to the potential for falsely elevated results.  N-acetyl-p-benzoquinone imine (metabolite of Acetaminophen) will generate erroneously low results in samples for patients that have taken an overdose of Acetaminophen.    eGFR 111  ml/min/1.73sq m Final    Narrative:     National Kidney Disease Foundation guidelines for Chronic Kidney Disease (CKD):     Stage 1 with normal or high GFR (GFR > 90  "mL/min/1.73 square meters)    Stage 2 Mild CKD (GFR = 60-89 mL/min/1.73 square meters)    Stage 3A Moderate CKD (GFR = 45-59 mL/min/1.73 square meters)    Stage 3B Moderate CKD (GFR = 30-44 mL/min/1.73 square meters)    Stage 4 Severe CKD (GFR = 15-29 mL/min/1.73 square meters)    Stage 5 End Stage CKD (GFR <15 mL/min/1.73 square meters)  Note: GFR calculation is accurate only with a steady state creatinine       Imaging:     US pelvis complete w transvaginal   Final Result by Jack Woo MD (02/25 4036)      1.  Status post hysterectomy. No loculated collection or hematoma. Small amount of simple free fluid.   2.  No evidence of ovarian torsion.      Workstation performed: [id number]            All details of the evaluation and treatment plan were made clear and additionally all questions and concerns were addressed while under my care.    Portions of the record may have been created with voice recognition software. Occasional wrong word or \"sound a like\" substitutions may have occurred due to the inherent limitations of voice recognition software. Read the chart carefully and recognize, using context, where substitutions have occurred.    The attending physician physically available and evaluated the above patient alongside myself.      Jak Azul MD  02/26/24 2052    "

## 2024-02-27 ENCOUNTER — HOSPITAL ENCOUNTER (OUTPATIENT)
Dept: ULTRASOUND IMAGING | Facility: CLINIC | Age: 39
Discharge: HOME | End: 2024-02-27
Payer: COMMERCIAL

## 2024-02-27 ENCOUNTER — HOSPITAL ENCOUNTER (OUTPATIENT)
Dept: MAMMOGRAPHY | Facility: CLINIC | Age: 39
Discharge: HOME | End: 2024-02-27
Payer: COMMERCIAL

## 2024-02-27 ENCOUNTER — TELEPHONE (OUTPATIENT)
Dept: OBGYN CLINIC | Facility: CLINIC | Age: 39
End: 2024-02-27

## 2024-02-27 VITALS — BODY MASS INDEX: 34.23 KG/M2 | WEIGHT: 186 LBS | HEIGHT: 62 IN

## 2024-02-27 DIAGNOSIS — R92.8 ABNORMAL MAMMOGRAM: ICD-10-CM

## 2024-02-27 PROCEDURE — G0279 TOMOSYNTHESIS, MAMMO: HCPCS

## 2024-02-27 PROCEDURE — 76642 ULTRASOUND BREAST LIMITED: CPT

## 2024-02-27 PROCEDURE — 77065 DX MAMMO INCL CAD UNI: CPT

## 2024-02-27 RX ORDER — CLOTRIMAZOLE 1 %
1 CREAM WITH APPLICATOR VAGINAL
Qty: 45 G | Refills: 0 | Status: SHIPPED | OUTPATIENT
Start: 2024-02-27

## 2024-03-04 ENCOUNTER — OFFICE VISIT (OUTPATIENT)
Dept: OBGYN CLINIC | Facility: CLINIC | Age: 39
End: 2024-03-04

## 2024-03-04 ENCOUNTER — PATIENT OUTREACH (OUTPATIENT)
Dept: OBGYN CLINIC | Facility: CLINIC | Age: 39
End: 2024-03-04

## 2024-03-04 VITALS
HEIGHT: 62 IN | WEIGHT: 186 LBS | DIASTOLIC BLOOD PRESSURE: 89 MMHG | BODY MASS INDEX: 34.23 KG/M2 | HEART RATE: 68 BPM | SYSTOLIC BLOOD PRESSURE: 151 MMHG

## 2024-03-04 DIAGNOSIS — Z13.31 POSITIVE DEPRESSION SCREENING: ICD-10-CM

## 2024-03-04 DIAGNOSIS — Z09 POSTOPERATIVE EXAMINATION: Primary | ICD-10-CM

## 2024-03-04 PROCEDURE — 99024 POSTOP FOLLOW-UP VISIT: CPT | Performed by: OBSTETRICS & GYNECOLOGY

## 2024-03-04 RX ORDER — AMOXICILLIN AND CLAVULANATE POTASSIUM 250; 125 MG/1; MG/1
1 TABLET, FILM COATED ORAL EVERY 12 HOURS SCHEDULED
Qty: 20 TABLET | Refills: 0 | Status: SHIPPED | OUTPATIENT
Start: 2024-03-04 | End: 2024-03-14

## 2024-03-04 NOTE — PROGRESS NOTES
"David Paz is a 38 y.o. female who presents to the clinic for with c/o discharge from her left port site which has resolved. Patient is s/p [facility] BS with cystoscopy on 12/22/23.        Review of Systems  Pertinent items are noted in HPI.      Objective     /89   Pulse 68   Ht 5' 2\" (1.575 m)   Wt 84.4 kg (186 lb)   LMP 12/06/2023 (Exact Date)   BMI 34.02 kg/m²   General:  alert and oriented, in no acute distress   Abdomen: soft, bowel sounds active, non-tender   Incision:   healing well, no drainage, no erythema, no hernia, no seroma, no swelling, no dehiscence, incision well approximated         Assessment      Doing well postoperatively incision site is healed     Plan     1. Continue any current medications.  2. Wound care discussed.  3. Augmentin escribed   5. Keep scheduled up coming appt    Depression Screening Follow-up Plan: Patient's depression screening was positive with a PHQ-2 score of 4. Their PHQ-9 score was 14 .  referral.    "

## 2024-03-04 NOTE — PROGRESS NOTES
GUILLERMO Cyndi was referred by Dr. Peralta to see pt in the office today for a PHQ-9 of 14, no SI/HI. GUILLERMO HANNA reviewed chart, GUILLERMO Hanna spoke with pt one year ago for her depression screen and gave resources at this time. Since then pts insurance has changed from [employer] to [employer].     GUILLERMO CYNDI met with the pt, she is having difficulty in her 12 year relationship. She has no money to leave on her own. She has [facility] [employer] [employer] [facility] [employer]. Pt reports that she lives with her partner and two of her three children. Pt reports they live in her mother in laws home, her MIL is . Pt reports that her partners family members come through the house hold all hours of the night and that she does feel safe, however, she does feel like it is an invasion of her privacy, however, partner thinks the behavior is appropriate.     Pt is employed through [employer], she has health insurance through her employer.    Pt did see a therapist in . She has called [facility] but they had no openings for counseling, she declines to call them again now. GUILLERMO HANNA looked up bilingual  MH providers who accept her insurance on psychology today and sent this information to her via email.     Pt thanked GUILLERMO HANNA.     GUILLERMO HANNA will close this encounter at this time.

## 2024-03-15 ENCOUNTER — OFFICE VISIT (OUTPATIENT)
Dept: FAMILY MEDICINE CLINIC | Facility: CLINIC | Age: 39
End: 2024-03-15

## 2024-03-15 VITALS
TEMPERATURE: 97 F | SYSTOLIC BLOOD PRESSURE: 150 MMHG | OXYGEN SATURATION: 99 % | WEIGHT: 184.31 LBS | BODY MASS INDEX: 33.92 KG/M2 | RESPIRATION RATE: 18 BRPM | HEART RATE: 77 BPM | HEIGHT: 62 IN | DIASTOLIC BLOOD PRESSURE: 98 MMHG

## 2024-03-15 DIAGNOSIS — E66.9 CLASS 1 OBESITY WITHOUT SERIOUS COMORBIDITY WITH BODY MASS INDEX (BMI) OF 33.0 TO 33.9 IN ADULT, UNSPECIFIED OBESITY TYPE: ICD-10-CM

## 2024-03-15 DIAGNOSIS — E55.9 VITAMIN D DEFICIENCY: ICD-10-CM

## 2024-03-15 DIAGNOSIS — I10 PRIMARY HYPERTENSION: Primary | ICD-10-CM

## 2024-03-15 DIAGNOSIS — E78.5 HYPERLIPIDEMIA, UNSPECIFIED HYPERLIPIDEMIA TYPE: ICD-10-CM

## 2024-03-15 DIAGNOSIS — T14.8XXA SKIN WOUND FROM SURGICAL INCISION: ICD-10-CM

## 2024-03-15 RX ORDER — AMLODIPINE BESYLATE 10 MG/1
10 TABLET ORAL DAILY
Qty: 60 TABLET | Refills: 1 | Status: SHIPPED | OUTPATIENT
Start: 2024-03-15

## 2024-03-15 NOTE — ASSESSMENT & PLAN NOTE
Lab Results   Component Value Date    CHOLESTEROL 236 (H) 02/06/2023    CHOLESTEROL 218 (H) 04/26/2019     Lab Results   Component Value Date    HDL 52 02/06/2023    HDL 48 04/26/2019     Lab Results   Component Value Date    TRIG 244 (H) 02/06/2023    TRIG 143 04/26/2019     Lab Results   Component Value Date    NONHDLC 184 02/06/2023    NONHDLC 170 04/26/2019

## 2024-03-15 NOTE — ASSESSMENT & PLAN NOTE
2 incision scars from recent TLH BS with cystoscopy on the right side of the abdomen, well-healed  1 incision wound open, no signs of infection  No alarming symptoms    Continue Augmentin as previously prescribed  Avoidance of tight clothing which might cause friction on the wound  Avoidance of harsh soaps and perfumes  Keep area clean and dry  Discussed signs and symptoms of infection

## 2024-03-15 NOTE — PROGRESS NOTES
Name: Tyra Paz      : 1985      MRN: [id number]  Encounter Provider: Maximo Varela MD  Encounter Date: 3/15/2024   Encounter department: [facility]    Assessment & Plan     1. Primary hypertension  Assessment & Plan:  BP Readings from Last 3 Encounters:   03/15/24 150/98   24 151/89   24 140/98     Not at goal   Current regimen includes     Will start amlodipine 10 mg daily  Check blood pressure at home with upper arm cuff  Low-salt diet recommended  Moderate physical activity 150 minutes/week recommended  Follow-up in 3 months for blood pressure recheck    Orders:  -     amLODIPine (NORVASC) 10 mg tablet; Take 1 tablet (10 mg total) by mouth daily    2. Class 1 obesity without serious comorbidity with body mass index (BMI) of 33.0 to 33.9 in adult, unspecified obesity type  -     Hemoglobin A1C; Future    3. Hyperlipidemia, unspecified hyperlipidemia type  Assessment & Plan:  Lab Results   Component Value Date    CHOLESTEROL 236 (H) 2023    CHOLESTEROL 218 (H) 2019     Lab Results   Component Value Date    HDL 52 2023    HDL 48 2019     Lab Results   Component Value Date    TRIG 244 (H) 2023    TRIG 143 2019     Lab Results   Component Value Date    NONHDLC 184 2023    NONHDLC 170 2019         Orders:  -     Lipid Panel with Direct LDL reflex; Future    4. Vitamin D deficiency  -     Vitamin D 25 hydroxy; Future    5. Skin wound from surgical incision  Assessment & Plan:  2 incision scars from recent TLH BS with cystoscopy on the right side of the abdomen, well-healed  1 incision wound open, no signs of infection  No alarming symptoms    Continue Augmentin as previously prescribed  Avoidance of tight clothing which might cause friction on the wound  Avoidance of harsh soaps and perfumes  Keep area clean and dry  Discussed signs and symptoms of infection             Subjective       38-year-old female with a past medical history of hypertension, GERD, thyroid nodule, blepharospasm, RONALDO 3 spine degeneration, anxiety hyperlipidemia and vertigo comes to the office for follow-up of hypertension.  Patient reports her blood pressure readings have been elevated both at home and at the office.  She reports occasional headaches with elevation in blood pressure and reason pressure in the chest which is currently not present.  She had previously taken amlodipine 20 mg in the past however currently not on any medication.  She reports compliance to all of her other medications.  Patient is s/p [facility] BS with cystoscopy on 12/22/23 and was recently seen on 3/4/2024 by OB/GYN for complaints of discharge from her left port.  She currently reports that the wound has opened slightly since her last visit, however no swelling, pain or redness.  No constitutional symptoms      Review of Systems   Constitutional:  Negative for chills, fatigue and fever.   HENT:  Negative for congestion, ear pain, rhinorrhea and sore throat.    Eyes:  Negative for visual disturbance.   Respiratory:  Negative for cough, chest tightness and shortness of breath.    Cardiovascular:  Negative for chest pain and palpitations.   Gastrointestinal:  Negative for abdominal pain, constipation, diarrhea, nausea and vomiting.   Genitourinary:  Negative for difficulty urinating, dysuria and hematuria.   Musculoskeletal:  Negative for arthralgias and back pain.   Skin:  Negative for rash.   Neurological:  Negative for seizures, syncope, light-headedness and headaches.   All other systems reviewed and are negative.      Current Outpatient Medications on File Prior to Visit   Medication Sig    acetaminophen (TYLENOL) 650 mg CR tablet Take 1 tablet (650 mg total) by mouth every 8 (eight) hours as needed for mild pain    clotrimazole (GYNE-LOTRIMIN) 1 % vaginal cream Insert 1 applicator into the vagina daily at bedtime For 7 to 14 days    Cyanocobalamin  (VITAMIN B-12 PO) Take by mouth daily    Diclofenac Sodium (VOLTAREN) 1 % Apply 2 g topically 4 (four) times a day    dicyclomine (BENTYL) 10 mg capsule Take 10 mg by mouth 4 (four) times a day as needed    ergocalciferol (VITAMIN D2) 50,000 units Take 1 capsule (50,000 Units total) by mouth once a week    famotidine (PEPCID) 20 mg tablet Take 1 tablet (20 mg total) by mouth 2 (two) times a day    MAGNESIUM PO Take by mouth every evening    Menaquinone-7 (VITAMIN K2 PO) Take by mouth daily    multivitamin (THERAGRAN) TABS Take 1 tablet by mouth daily    omeprazole (PriLOSEC) 40 MG capsule Take 1 capsule (40 mg total) by mouth daily    [] amoxicillin-clavulanate (AUGMENTIN) 250-125 mg per tablet Take 1 tablet by mouth every 12 (twelve) hours for 10 days    [DISCONTINUED] diphenhydrAMINE (BENADRYL) 25 mg tablet Take 1 tablet (25 mg total) by mouth every 6 (six) hours as needed for itching for up to 5 days (Patient not taking: Reported on 2024)    [DISCONTINUED] hydrocortisone (ANUSOL-HC) 2.5 % rectal cream APPLY TO AFFECTED AREA TWICE A DAY (Patient not taking: Reported on 2023)    [DISCONTINUED] lidocaine (XYLOCAINE) 5 % ointment Apply topically as needed for mild pain (Patient not taking: Reported on 2024)    [DISCONTINUED] methylPREDNISolone 4 MG tablet therapy pack Use as directed on package (Patient not taking: Reported on 2024)    [DISCONTINUED] oxyCODONE (Roxicodone) 5 immediate release tablet Take 1 tablet (5 mg total) by mouth every 4 (four) hours as needed for moderate pain Max Daily Amount: 30 mg (Patient not taking: Reported on 2024)    [DISCONTINUED] polyethylene glycol (GOLYTELY) 4000 mL solution Take 4,000 mL by mouth once for 1 dose Take as directed by office instructions prior to colonoscopy. (Patient not taking: Reported on 2024)    [DISCONTINUED] predniSONE 10 mg tablet Prednisone taper: Take 6 tabs for 2 days, 5 tabs for 2 days, 4 tabs for 2 days, 3 tabs for 2  "days, 2 tabs for 2 days, 1 tabs for 2 days and then stop    [DISCONTINUED] rizatriptan (Maxalt) 10 mg tablet Take 1 tablet (10 mg total) by mouth as needed for migraine Take at the onset of migraine; if symptoms continue or return, may take another dose at least 2 hours after first dose. Take no more than 2 doses in a day. (Patient not taking: Reported on 1/2/2024)    [DISCONTINUED] venlafaxine (EFFEXOR-XR) 75 mg 24 hr capsule Take 1 capsule (75 mg total) by mouth daily       Objective     /98 (BP Location: Right arm, Patient Position: Sitting, Cuff Size: Standard)   Pulse 77   Temp (!) 97 °F (36.1 °C) (Temporal)   Resp 18   Ht 5' 2\" (1.575 m)   Wt 83.6 kg (184 lb 5 oz)   LMP 12/06/2023 (Exact Date)   SpO2 99%   BMI 33.71 kg/m²     Physical Exam  Vitals and nursing note reviewed.   Constitutional:       General: She is not in acute distress.     Appearance: Normal appearance. She is not toxic-appearing.   HENT:      Head: Normocephalic and atraumatic.      Right Ear: External ear normal.      Left Ear: External ear normal.      Nose: Nose normal. No congestion or rhinorrhea.      Mouth/Throat:      Mouth: Mucous membranes are moist.      Pharynx: Oropharynx is clear.   Eyes:      Extraocular Movements: Extraocular movements intact.      Pupils: Pupils are equal, round, and reactive to light.   Neck:      Vascular: No carotid bruit.   Cardiovascular:      Rate and Rhythm: Normal rate and regular rhythm.      Pulses: Normal pulses.      Heart sounds: Normal heart sounds. No murmur heard.     No gallop.   Pulmonary:      Effort: Pulmonary effort is normal. No respiratory distress.      Breath sounds: Normal breath sounds. No stridor. No wheezing.   Abdominal:      General: Abdomen is flat. Bowel sounds are normal. There is no distension.      Palpations: Abdomen is soft.      Tenderness: There is no abdominal tenderness.      Hernia: No hernia is present.   Musculoskeletal:         General: Normal range " of motion.      Cervical back: Normal range of motion and neck supple.      Right lower leg: No edema.      Left lower leg: No edema.   Lymphadenopathy:      Cervical: No cervical adenopathy.   Skin:     General: Skin is warm.      Coloration: Skin is not jaundiced.      Findings: No erythema or rash.      Comments: Less than 1 cm open wound in the left lower abdomen from incision. No surrounding erythema, swelling or drainage noted.   Neurological:      General: No focal deficit present.      Mental Status: She is alert and oriented to person, place, and time.       Maximo Varela MD

## 2024-03-15 NOTE — ASSESSMENT & PLAN NOTE
BP Readings from Last 3 Encounters:   03/15/24 150/98   03/04/24 151/89   02/26/24 140/98     Not at goal   Current regimen includes     Will start amlodipine 10 mg daily  Check blood pressure at home with upper arm cuff  Low-salt diet recommended  Moderate physical activity 150 minutes/week recommended  Follow-up in 3 months for blood pressure recheck

## 2024-03-17 DIAGNOSIS — R10.84 GENERALIZED ABDOMINAL PAIN: ICD-10-CM

## 2024-03-19 RX ORDER — OMEPRAZOLE 40 MG/1
40 CAPSULE, DELAYED RELEASE ORAL DAILY
Qty: 90 CAPSULE | Refills: 3 | Status: SHIPPED | OUTPATIENT
Start: 2024-03-19 | End: 2025-03-14

## 2024-03-26 ENCOUNTER — APPOINTMENT (OUTPATIENT)
Dept: LAB | Facility: HOSPITAL | Age: 39
End: 2024-03-26
Payer: COMMERCIAL

## 2024-03-26 DIAGNOSIS — E66.9 CLASS 1 OBESITY WITHOUT SERIOUS COMORBIDITY WITH BODY MASS INDEX (BMI) OF 33.0 TO 33.9 IN ADULT, UNSPECIFIED OBESITY TYPE: ICD-10-CM

## 2024-03-26 DIAGNOSIS — E78.5 HYPERLIPIDEMIA, UNSPECIFIED HYPERLIPIDEMIA TYPE: ICD-10-CM

## 2024-03-26 DIAGNOSIS — E55.9 VITAMIN D DEFICIENCY: ICD-10-CM

## 2024-03-26 LAB
25(OH)D3 SERPL-MCNC: 42.6 NG/ML (ref 30–100)
CHOLEST SERPL-MCNC: 245 MG/DL
EST. AVERAGE GLUCOSE BLD GHB EST-MCNC: 117 MG/DL
HBA1C MFR BLD: 5.7 %
HDLC SERPL-MCNC: 67 MG/DL
LDLC SERPL CALC-MCNC: 154 MG/DL (ref 0–100)
TRIGL SERPL-MCNC: 122 MG/DL

## 2024-03-26 PROCEDURE — 80061 LIPID PANEL: CPT

## 2024-03-26 PROCEDURE — 83036 HEMOGLOBIN GLYCOSYLATED A1C: CPT

## 2024-03-26 PROCEDURE — 82306 VITAMIN D 25 HYDROXY: CPT

## 2024-03-26 PROCEDURE — 36415 COLL VENOUS BLD VENIPUNCTURE: CPT

## 2024-04-07 DIAGNOSIS — I10 PRIMARY HYPERTENSION: ICD-10-CM

## 2024-04-08 RX ORDER — AMLODIPINE BESYLATE 10 MG/1
10 TABLET ORAL DAILY
Qty: 90 TABLET | Refills: 3 | Status: SHIPPED | OUTPATIENT
Start: 2024-04-08

## 2024-04-15 ENCOUNTER — OFFICE VISIT (OUTPATIENT)
Dept: FAMILY MEDICINE CLINIC | Facility: CLINIC | Age: 39
End: 2024-04-15

## 2024-04-15 VITALS
HEIGHT: 62 IN | HEART RATE: 102 BPM | SYSTOLIC BLOOD PRESSURE: 128 MMHG | RESPIRATION RATE: 18 BRPM | BODY MASS INDEX: 33.68 KG/M2 | DIASTOLIC BLOOD PRESSURE: 90 MMHG | WEIGHT: 183 LBS | OXYGEN SATURATION: 99 % | TEMPERATURE: 96.8 F

## 2024-04-15 DIAGNOSIS — R73.03 PREDIABETES: ICD-10-CM

## 2024-04-15 DIAGNOSIS — G89.29 CHRONIC BILATERAL LOW BACK PAIN WITH RIGHT-SIDED SCIATICA: Primary | ICD-10-CM

## 2024-04-15 DIAGNOSIS — M54.41 CHRONIC BILATERAL LOW BACK PAIN WITH RIGHT-SIDED SCIATICA: Primary | ICD-10-CM

## 2024-04-15 PROCEDURE — 99214 OFFICE O/P EST MOD 30 MIN: CPT | Performed by: FAMILY MEDICINE

## 2024-04-15 RX ORDER — METHOCARBAMOL 500 MG/1
500 TABLET, FILM COATED ORAL EVERY 12 HOURS PRN
Qty: 30 TABLET | Refills: 1 | Status: SHIPPED | OUTPATIENT
Start: 2024-04-15

## 2024-04-15 RX ORDER — LIDOCAINE 50 MG/G
1 PATCH TOPICAL DAILY
Qty: 14 PATCH | Refills: 0 | Status: SHIPPED | OUTPATIENT
Start: 2024-04-15

## 2024-04-15 NOTE — PROGRESS NOTES
Name: Tyra Paz      : 1985      MRN: [id number]  Encounter Provider: Alfonso Waldrop MD  Encounter Date: 4/15/2024   Encounter department: [facility]    Assessment & Plan     1. Chronic bilateral low back pain with right-sided sciatica  -     XR spine lumbar minimum 4 views non injury; Future; Expected date: 04/15/2024  -     Ambulatory Referral to Chiropractic; Future  -     methocarbamol (ROBAXIN) 500 mg tablet; Take 1 tablet (500 mg total) by mouth every 12 (twelve) hours as needed for muscle spasms  -     lidocaine (Lidoderm) 5 %; Apply 1 patch topically over 12 hours daily Remove & Discard patch within 12 hours or as directed by MD    2. Prediabetes  Assessment & Plan:  Reviewed labs with patient  Hemoglobin A1c of 5.7  Recommend low carbohydrate diet             Subjective      38-year-old female with a history of hypertension and anxiety who presents today for back pain.  Patient reports lower back pain for the past few months.  The pain has gotten worse a few days ago.  She denies any recent injury.  The pain does not radiate.  The pain is intermittent.  She has tried over-the-counter medication for pain relief without much improvement.  She reports that she has tried physical therapy in the past which did not help much.  She would like to see a chiropractor. She would also like to review some of her recent blood test.      Review of Systems   Constitutional:  Negative for chills, diaphoresis, fatigue and fever.   HENT:  Negative for sore throat.    Respiratory:  Negative for shortness of breath and wheezing.    Cardiovascular:  Negative for chest pain and palpitations.   Gastrointestinal:  Negative for abdominal pain, diarrhea, nausea and vomiting.   Genitourinary:  Negative for dysuria, hematuria and urgency.   Musculoskeletal:  Positive for back pain.   Skin:  Negative for rash.   Neurological:  Negative for seizures and headaches.  "  Hematological:  Negative for adenopathy.   Psychiatric/Behavioral:  Negative for confusion.        Current Outpatient Medications on File Prior to Visit   Medication Sig    acetaminophen (TYLENOL) 650 mg CR tablet Take 1 tablet (650 mg total) by mouth every 8 (eight) hours as needed for mild pain    amLODIPine (NORVASC) 10 mg tablet TAKE 1 TABLET BY MOUTH EVERY DAY    clotrimazole (GYNE-LOTRIMIN) 1 % vaginal cream Insert 1 applicator into the vagina daily at bedtime For 7 to 14 days    Cyanocobalamin (VITAMIN B-12 PO) Take by mouth daily    Diclofenac Sodium (VOLTAREN) 1 % Apply 2 g topically 4 (four) times a day    dicyclomine (BENTYL) 10 mg capsule Take 10 mg by mouth 4 (four) times a day as needed    ergocalciferol (VITAMIN D2) 50,000 units Take 1 capsule (50,000 Units total) by mouth once a week    famotidine (PEPCID) 20 mg tablet Take 1 tablet (20 mg total) by mouth 2 (two) times a day    MAGNESIUM PO Take by mouth every evening    Menaquinone-7 (VITAMIN K2 PO) Take by mouth daily    multivitamin (THERAGRAN) TABS Take 1 tablet by mouth daily    omeprazole (PriLOSEC) 40 MG capsule TAKE 1 CAPSULE (40 MG TOTAL) BY MOUTH DAILY.       Objective     /90 (BP Location: Right arm, Patient Position: Sitting, Cuff Size: Large)   Pulse 102   Temp (!) 96.8 °F (36 °C) (Temporal)   Resp 18   Ht 5' 2\" (1.575 m)   Wt 83 kg (183 lb)   LMP 12/06/2023 (Exact Date)   SpO2 99%   BMI 33.47 kg/m²     Physical Exam  Vitals reviewed.   Constitutional:       General: She is not in acute distress.     Appearance: Normal appearance. She is well-developed. She is not ill-appearing, toxic-appearing or diaphoretic.   HENT:      Head: Normocephalic and atraumatic.      Right Ear: External ear normal.      Left Ear: External ear normal.      Nose: Nose normal.      Mouth/Throat:      Mouth: Mucous membranes are moist.      Pharynx: No oropharyngeal exudate or posterior oropharyngeal erythema.   Eyes:      General: No scleral " icterus.        Right eye: No discharge.         Left eye: No discharge.      Extraocular Movements: Extraocular movements intact.      Conjunctiva/sclera: Conjunctivae normal.   Neck:      Thyroid: No thyromegaly.      Vascular: No JVD.      Trachea: No tracheal deviation.   Cardiovascular:      Rate and Rhythm: Normal rate and regular rhythm.      Heart sounds: Normal heart sounds. No murmur heard.     No friction rub. No gallop.   Pulmonary:      Effort: Pulmonary effort is normal. No respiratory distress.      Breath sounds: Normal breath sounds. No stridor. No wheezing or rhonchi.   Abdominal:      General: Bowel sounds are normal. There is no distension.      Palpations: Abdomen is soft. There is no mass.      Tenderness: There is no abdominal tenderness. There is no guarding.   Musculoskeletal:      Cervical back: Normal range of motion.      Lumbar back: Spasms and tenderness present.      Right lower leg: No edema.      Left lower leg: No edema.   Skin:     General: Skin is warm.      Capillary Refill: Capillary refill takes less than 2 seconds.   Neurological:      General: No focal deficit present.      Mental Status: She is alert and oriented to person, place, and time.      Motor: No abnormal muscle tone.      Gait: Gait normal.   Psychiatric:         Mood and Affect: Mood normal.         Behavior: Behavior normal.       Alfonso Waldrop MD

## 2024-04-16 ENCOUNTER — HOSPITAL ENCOUNTER (OUTPATIENT)
Dept: RADIOLOGY | Facility: HOSPITAL | Age: 39
Discharge: HOME | End: 2024-04-16
Payer: COMMERCIAL

## 2024-04-16 DIAGNOSIS — M54.41 CHRONIC BILATERAL LOW BACK PAIN WITH RIGHT-SIDED SCIATICA: ICD-10-CM

## 2024-04-16 DIAGNOSIS — G89.29 CHRONIC BILATERAL LOW BACK PAIN WITH RIGHT-SIDED SCIATICA: ICD-10-CM

## 2024-04-16 PROCEDURE — 72110 X-RAY EXAM L-2 SPINE 4/>VWS: CPT

## 2024-04-22 ENCOUNTER — TELEPHONE (OUTPATIENT)
Dept: FAMILY MEDICINE CLINIC | Facility: CLINIC | Age: 39
End: 2024-04-22

## 2024-04-22 NOTE — TELEPHONE ENCOUNTER
04/22/24    PCP SIGNATURE NEEDED FOR   [employer] / [employer] - MED Lidocaine     FORM RECEIVED VIA FAX AND PLACED IN PCP FOLDER TO BE DELIVERED AT ASSIGNED TIMES.

## 2024-05-07 ENCOUNTER — OFFICE VISIT (OUTPATIENT)
Age: 39
End: 2024-05-07
Payer: COMMERCIAL

## 2024-05-07 VITALS
WEIGHT: 183 LBS | DIASTOLIC BLOOD PRESSURE: 82 MMHG | HEART RATE: 85 BPM | HEIGHT: 62 IN | SYSTOLIC BLOOD PRESSURE: 138 MMHG | BODY MASS INDEX: 33.68 KG/M2 | OXYGEN SATURATION: 99 %

## 2024-05-07 DIAGNOSIS — M54.41 CHRONIC BILATERAL LOW BACK PAIN WITH RIGHT-SIDED SCIATICA: ICD-10-CM

## 2024-05-07 DIAGNOSIS — M99.08 SEGMENTAL AND SOMATIC DYSFUNCTION OF RIB CAGE: ICD-10-CM

## 2024-05-07 DIAGNOSIS — G89.29 CHRONIC BILATERAL LOW BACK PAIN WITH RIGHT-SIDED SCIATICA: ICD-10-CM

## 2024-05-07 DIAGNOSIS — M99.04 SEGMENTAL DYSFUNCTION OF SACRAL REGION: Primary | ICD-10-CM

## 2024-05-07 DIAGNOSIS — S29.019A THORACIC MYOFASCIAL STRAIN, INITIAL ENCOUNTER: ICD-10-CM

## 2024-05-07 DIAGNOSIS — M99.01 SEGMENTAL DYSFUNCTION OF CERVICAL REGION: ICD-10-CM

## 2024-05-07 PROCEDURE — 98941 CHIROPRACT MANJ 3-4 REGIONS: CPT | Performed by: CHIROPRACTOR

## 2024-05-07 PROCEDURE — 97110 THERAPEUTIC EXERCISES: CPT | Performed by: CHIROPRACTOR

## 2024-05-07 PROCEDURE — 99203 OFFICE O/P NEW LOW 30 MIN: CPT | Performed by: CHIROPRACTOR

## 2024-05-07 NOTE — PROGRESS NOTES
Initial date of service: 5/7/24    Diagnoses and all orders for this visit:    Segmental dysfunction of sacral region - Right    Chronic bilateral low back pain with right-sided sciatica  -     Ambulatory Referral to Chiropractic    Thoracic myofascial strain, initial encounter    Segmental and somatic dysfunction of rib cage    Segmental dysfunction of cervical region    No red flags, radiculopathy or neurologic deficit appreciated clinically. Pt's symptoms and exam findings consistent with mechanical back/chest pain secondary to repetitive st/sp injury, exacerbated by postural/ergonomic stressors.   Pt responded well to IASTM, traction, stretches and manual mobilization of the affected spinal and myofascial jt dysfunction, with increased ROM; trial of conservative tx recommended consisting of stretching, ther-ex, graded mobilization/manipulation of the affected spinal/myofascial tissues, postural/ergonomic education, and take home stretches/exercises.   If symptoms fail to improve with short trial of conservative care, appropriate imaging and referral will be coordinated.  Spent greater than 30 min c pt discussing hx, pe, ddx, tx options and reviewing notes/imaging    TREATMENT: 62199, [id number]  Fear avoidance behavior discussion, encouraged and reassured pt that natural course of condition is to improve over time with adherence to tx plan and home care strategies. Home care recommendations: avoid bed rest, walk (but avoid trails and uneven surfaces), gradual return to activity to tolerance (avoid anything that peripheralizes symptoms), ice 20 min on/off, watch for ice burn, call if symptoms peripheralize, worsen, or neurologic deficit progresses. Ther-ex: IASTM - discussed post procedure soreness and/or ecchymosis for up to 36 hrs, applied to affected mm hypertonicities; wall evette, axial retraction, upper trap stretch, SCM stretch, lat rows with t-band, lat pull down with t-band, abdominal bracing; greater than 15  "min spent performing above mentioned ther-ex to improve ROM/flexibility. Thoracic mobilization/manipulation: prone P-A mob, supine A-P manip; cervical mobilization/manipulation: traction, diversified supine graded mobilization; R SIJ - supine LAT    HPI:  Tyra Paz is a 38 y.o. 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 Pt's cardiopulmonary system has been checked as source of chest pain; wnl.  Xrays unremarkable    Back Pain  This is a new problem. The current episode started more than 1 month ago. The problem occurs daily. The problem has been waxing and waning since onset. The pain is present in the thoracic spine, lumbar spine and sacro-iliac. The quality of the pain is described as aching and burning. The pain does not radiate. Pain scale: 5-10/10. The symptoms are aggravated by bending and twisting (reaching, laying left side).   Neck Pain       Past Medical History:   Diagnosis Date    Anxiety     Arthritis     spine    Chest pain     pt denies today//saw cardiology and everything was \"normal\"/saw cardiology 7/2019 Dr CHARLES Bustos/JENNIFER MENESES    COVID     2021    Dental crown present     Dizziness     GERD (gastroesophageal reflux disease)     Hypertension     Migraines     Pelvic pain     PONV (postoperative nausea and vomiting)     Precancerous changes of the cervix       The following portions of the patient's history were reviewed and updated as appropriate: allergies, past family history, past medical history, past social history, past surgical history, and problem list.  Review of Systems   Musculoskeletal:  Positive for back pain and neck pain.     Physical " Exam  Eyes:      Extraocular Movements: Extraocular movements intact.   Cardiovascular:      Pulses: Normal pulses.   Musculoskeletal:        Arms:       Cervical back: Pain with movement and muscular tenderness present. Decreased range of motion.      Thoracic back: Spasms and tenderness present. Decreased range of motion.      Comments: C/S pnful and limited in [place], Brot, Blf   T/S in Fl, Ext, Brot, Llf  L/S in Ext, Llf   Lymphadenopathy:      Cervical: No cervical adenopathy.   Neurological:      Mental Status: She is alert and oriented to person, place, and time.      Cranial Nerves: Cranial nerves 2-12 are intact.      Sensory: Sensation is intact.      Motor: Motor function is intact.      Coordination: Coordination is intact.      Gait: Gait is intact. Gait and tandem walk normal.      Deep Tendon Reflexes: Reflexes normal. Babinski sign absent on the right side. Babinski sign absent on the left side.      Reflex Scores:       Tricep reflexes are 2+ on the right side and 2+ on the left side.       Bicep reflexes are 2+ on the right side and 2+ on the left side.       Brachioradialis reflexes are 2+ on the right side and 2+ on the left side.  Psychiatric:         Mood and Affect: Mood and affect normal.       SOFT TISSUE ASSESSMENT: Hypertonicity and tenderness palpated B C6-T7 erector spinae, B upper traps, L SCM, scalene, B rhomboid, R glute med/min JOINT RECTRICTIONS: C6-T7, B R5, R SIJ ORTHO: Yamel unremarkable for centralization/peripheralization; max foraminal comp elicits local np R/L; shoulder depression elicits stiffness in R/L upper trap; brachial plexus tension test elicits no neural tension in R/L UE; cervical distraction unremarkable; rib compression neg B; sitting root neg B; slump test neg B    Return in about 1 week (around 5/14/2024) for Next scheduled follow up.

## 2024-05-08 ENCOUNTER — ANNUAL EXAM (OUTPATIENT)
Dept: OBGYN CLINIC | Facility: CLINIC | Age: 39
End: 2024-05-08

## 2024-05-08 VITALS
DIASTOLIC BLOOD PRESSURE: 85 MMHG | HEIGHT: 62 IN | HEART RATE: 70 BPM | SYSTOLIC BLOOD PRESSURE: 131 MMHG | BODY MASS INDEX: 33.13 KG/M2 | WEIGHT: 180 LBS

## 2024-05-08 DIAGNOSIS — R10.2 PELVIC PAIN: ICD-10-CM

## 2024-05-08 DIAGNOSIS — Z01.419 ROUTINE GYNECOLOGICAL EXAMINATION: Primary | ICD-10-CM

## 2024-05-08 PROCEDURE — 99396 PREV VISIT EST AGE 40-64: CPT | Performed by: OBSTETRICS & GYNECOLOGY

## 2024-05-08 NOTE — PROGRESS NOTES
"ANNUAL GYNECOLOGICAL EXAMINATION    Tyra Paz is a 38 y.o. female who presents today for annual GYN exam left sided pelvic pain on occasion.  Her last pap smear was performed 10/25/21 and result was negative.  She reports no history of abnormal pap smears in her past.  Patient will be getting her 1st mammogram was performed .  She had HIV screening performed 23 and it was negative.  Patient's last menstrual period was 2023 (exact date).  Her general medical history has been reviewed and she reports it as follows:    Past Medical History:   Diagnosis Date    Anxiety     Arthritis     spine    Chest pain     pt denies today//saw cardiology and everything was \"normal\"/saw cardiology 2019 Dr CHARLES Bustos/JENNIFER MENESES    COVID         Dental crown present     Dizziness     GERD (gastroesophageal reflux disease)     Hypertension     Migraines     Pelvic pain     PONV (postoperative nausea and vomiting)     Precancerous changes of the cervix      Past Surgical History:   Procedure Laterality Date    CARPAL TUNNEL RELEASE Right     CYSTOSCOPY N/A 2023    Procedure: CYSTOSCOPY;  Surgeon: Yardlie Toussaint-Foster, DO;  Location: [facility];  Service: Gynecology    EXAMINATION UNDER ANESTHESIA N/A 10/4/2020    Procedure: EXAM UNDER ANESTHESIA (EUA), CAUTERIZATION OF CERVIX;  Surgeon: Vani Hayden MD;  Location: [facility];  Service: Gynecology    HYSTERECTOMY W/ SALPINGO-OOPHERECTOMY Bilateral 2023    Procedure: LTH W/ BILATERAL SALPINGECTOMY;  Surgeon: Yardlie Toussaint-Foster, DO;  Location: [facility];  Service: Gynecology    MO COLPOSCOPY CERVIX VAG LOOP ELTRD BX CERVIX N/A 10/2/2020    Procedure: BIOPSY LEEP CERVIX;  Surgeon: Cachorro Goddard MD;  Location: [facility];  Service: Gynecology    TUBAL LIGATION       OB History          3    Para   3    Term   3       0    AB        Living   3         SAB   0    IAB   0    Ectopic   0    Multiple   0 "    Live Births   3               Social History     Tobacco Use    Smoking status: Never    Smokeless tobacco: Never   Vaping Use    Vaping status: Never Used   Substance Use Topics    Alcohol use: Not Currently    Drug use: Never     Social History     Substance and Sexual Activity   Sexual Activity Not Currently    Partners: Male    Birth control/protection: Female Sterilization    Comment: Tubal     Cancer-related family history is negative for Breast cancer and Cancer.    Current Outpatient Medications   Medication Instructions    acetaminophen (TYLENOL) 650 mg, Oral, Every 8 hours PRN    amLODIPine (NORVASC) 10 mg, Oral, Daily    clotrimazole (GYNE-LOTRIMIN) 1 % vaginal cream 1 applicator, Vaginal, Daily at bedtime, For 7 to 14 days    Cyanocobalamin (VITAMIN B-12 PO) Oral, Daily    Diclofenac Sodium (VOLTAREN) 2 g, Topical, 4 times daily    dicyclomine (BENTYL) 10 mg, 4 times daily PRN    ergocalciferol (VITAMIN D2) 50,000 Units, Oral, Weekly    famotidine (PEPCID) 20 mg, Oral, 2 times daily    lidocaine (Lidoderm) 5 % 1 patch, Topical, Daily, Remove & Discard patch within 12 hours or as directed by MD    MAGNESIUM PO Oral, Every evening    Menaquinone-7 (VITAMIN K2 PO) Oral, Daily    methocarbamol (ROBAXIN) 500 mg, Oral, Every 12 hours PRN    multivitamin (THERAGRAN) TABS 1 tablet, Oral, Daily    omeprazole (PRILOSEC) 40 mg, Oral, Daily       Review of Systems:  Review of Systems   Genitourinary:  Negative for pelvic pain, vaginal bleeding and vaginal discharge.   All other systems reviewed and are negative.      Physical Exam:  LMP 12/06/2023 (Exact Date)   Physical Exam  Constitutional:       Appearance: Normal appearance.   Genitourinary:      Bladder and urethral meatus normal.      No lesions in the vagina.      Right Labia: No rash, tenderness or lesions.     Left Labia: No tenderness, lesions or rash.     No inguinal adenopathy present in the right or left side.     Vaginal cuff intact.     No vaginal  discharge.        Right Adnexa: not tender, not full and no mass present.     Left Adnexa: not tender, not full and no mass present.     Cervix is absent.      Uterus is absent.      No urethral tenderness or mass present.   HENT:      Head: Normocephalic and atraumatic.   Cardiovascular:      Rate and Rhythm: Normal rate and regular rhythm.   Pulmonary:      Effort: Pulmonary effort is normal.      Breath sounds: Normal breath sounds.   Abdominal:      General: Bowel sounds are normal.      Palpations: Abdomen is soft.      Hernia: There is no hernia in the left inguinal area or right inguinal area.   Musculoskeletal:         General: Normal range of motion.      Cervical back: Normal range of motion and neck supple.   Lymphadenopathy:      Lower Body: No right inguinal adenopathy. No left inguinal adenopathy.   Neurological:      Mental Status: She is alert and oriented to person, place, and time.   Skin:     General: Skin is warm and dry.   Psychiatric:         Mood and Affect: Mood normal.   Vitals reviewed.           Assessment/Plan:   1. Normal well-woman GYN exam.  2. Has  received HPV vaccine in the past.   3. STD screening:  Patient declines.   4. Breast cancer screening:  Normal breast exam.    Reviewed breast self-awareness.   5. Depression Screening: Patient's depression screening was assessed with a PHQ-2 score of 1. Their PHQ-9 score was 4. Clinically patient does not have depression. No treatment is required.     6. BMI Counseling: There is no height or weight on file to calculate BMI. Discussed the patient's BMI with her. The BMI is above normal. Nutrition recommendations include decreasing overall calorie intake.   7. Return to office 1yr/prn.   8. Pelvic sonogram ordered    Reviewed with patient that test results are available in [facility] immediately, but that they will not necessarily be reviewed by me immediately.  Explained that I will review results at my earliest opportunity and contact patient  appropriately.

## 2024-05-13 ENCOUNTER — HOSPITAL ENCOUNTER (OUTPATIENT)
Dept: ULTRASOUND IMAGING | Facility: HOSPITAL | Age: 39
Discharge: HOME | End: 2024-05-13
Payer: COMMERCIAL

## 2024-05-13 DIAGNOSIS — R10.2 PELVIC PAIN: ICD-10-CM

## 2024-05-13 PROCEDURE — 76830 TRANSVAGINAL US NON-OB: CPT

## 2024-05-13 PROCEDURE — 76856 US EXAM PELVIC COMPLETE: CPT

## 2024-05-30 ENCOUNTER — PROCEDURE VISIT (OUTPATIENT)
Age: 39
End: 2024-05-30
Payer: COMMERCIAL

## 2024-05-30 VITALS
SYSTOLIC BLOOD PRESSURE: 126 MMHG | HEIGHT: 62 IN | WEIGHT: 180 LBS | OXYGEN SATURATION: 99 % | HEART RATE: 69 BPM | BODY MASS INDEX: 33.13 KG/M2 | DIASTOLIC BLOOD PRESSURE: 82 MMHG

## 2024-05-30 DIAGNOSIS — M99.08 SEGMENTAL AND SOMATIC DYSFUNCTION OF RIB CAGE: ICD-10-CM

## 2024-05-30 DIAGNOSIS — M54.41 CHRONIC BILATERAL LOW BACK PAIN WITH RIGHT-SIDED SCIATICA: Primary | ICD-10-CM

## 2024-05-30 DIAGNOSIS — S29.019A THORACIC MYOFASCIAL STRAIN, INITIAL ENCOUNTER: ICD-10-CM

## 2024-05-30 DIAGNOSIS — M99.04 SEGMENTAL DYSFUNCTION OF SACRAL REGION: ICD-10-CM

## 2024-05-30 DIAGNOSIS — M99.01 SEGMENTAL DYSFUNCTION OF CERVICAL REGION: ICD-10-CM

## 2024-05-30 DIAGNOSIS — G89.29 CHRONIC BILATERAL LOW BACK PAIN WITH RIGHT-SIDED SCIATICA: Primary | ICD-10-CM

## 2024-05-30 PROCEDURE — 98941 CHIROPRACT MANJ 3-4 REGIONS: CPT | Performed by: CHIROPRACTOR

## 2024-05-30 PROCEDURE — 97110 THERAPEUTIC EXERCISES: CPT | Performed by: CHIROPRACTOR

## 2024-05-30 NOTE — PROGRESS NOTES
Initial date of service: 5/7/24    Diagnoses and all orders for this visit:    Chronic bilateral low back pain with right-sided sciatica    Segmental dysfunction of sacral region - Right    Thoracic myofascial strain, initial encounter    Segmental and somatic dysfunction of rib cage    Segmental dysfunction of cervical region      Pt improved; discussed importance of maintaining home exercise program    TREATMENT: 02410, 90410  Ther-ex: IASTM - discussed post procedure soreness and/or ecchymosis for up to 36 hrs, applied to affected mm hypertonicities; wall evette, axial retraction, upper trap stretch, SCM stretch, lat rows with t-band, lat pull down with t-band, abdominal bracing; greater than 15 min spent performing above mentioned ther-ex to improve ROM/flexibility. Thoracic mobilization/manipulation: prone P-A mob, supine A-P manip; cervical mobilization/manipulation: traction, diversified supine graded mobilization; R SIJ - supine LAT    HPI:  Tyra Paz is a 38 y.o. 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 Pt's cardiopulmonary system has been checked as source of chest pain; wnl.  Xrays unremarkable  5/30: Pt reports feeling better but didn't do stretches or exercises    Back Pain  This is a new problem. The current episode started more than 1 month ago. The problem occurs daily. The problem has been waxing and waning since onset. The pain is present in the thoracic spine, lumbar spine and sacro-iliac. The quality of the pain is described as aching and burning. The pain does not radiate. Pain scale: 5-10/10. The symptoms are aggravated by bending and twisting (reaching, laying left  "side).   Neck Pain     Past Medical History:   Diagnosis Date   • Anxiety    • Arthritis     spine   • Chest pain     pt denies today//saw cardiology and everything was \"normal\"/saw cardiology 7/2019 Dr CHARLES Bustos/JENNIFER MENESES   • COVID     2021   • Dental crown present    • Dizziness    • GERD (gastroesophageal reflux disease)    • Hypertension    • Migraines    • Pelvic pain    • PONV (postoperative nausea and vomiting)    • Precancerous changes of the cervix       The following portions of the patient's history were reviewed and updated as appropriate: allergies, past family history, past medical history, past social history, past surgical history, and problem list.  Review of Systems   Musculoskeletal:  Positive for back pain and neck pain.     Physical Exam  Musculoskeletal:        Arms:       Cervical back: Pain with movement and muscular tenderness present. Decreased range of motion.      Thoracic back: Spasms and tenderness present. Decreased range of motion.      Comments: [place]nful [place] in [place], [place], [place]   T/S in [place], [place], [place], [place]  L/S in Ext, Llf   Lymphadenopathy:      Cervical: No cervical adenopathy.   Neurological:      Mental Status: She is alert and oriented to person, place, and time.      Gait: Gait is intact.   Psychiatric:         Mood and Affect: Mood and affect normal.     SOFT TISSUE ASSESSMENT: Hypertonicity and tenderness palpated B C6-T7 erector spinae, B upper traps, L SCM, scalene, B rhomboid, R glute med/min JOINT RECTRICTIONS: C6-T7, B R5, R SIJ     Return in about 1 week (around 6/6/2024) for Next scheduled follow up.     "

## 2024-06-06 ENCOUNTER — PROCEDURE VISIT (OUTPATIENT)
Age: 39
End: 2024-06-06
Payer: COMMERCIAL

## 2024-06-06 VITALS
HEART RATE: 76 BPM | DIASTOLIC BLOOD PRESSURE: 80 MMHG | OXYGEN SATURATION: 99 % | BODY MASS INDEX: 33.13 KG/M2 | HEIGHT: 62 IN | WEIGHT: 180 LBS | SYSTOLIC BLOOD PRESSURE: 122 MMHG

## 2024-06-06 DIAGNOSIS — M99.04 SEGMENTAL DYSFUNCTION OF SACRAL REGION: ICD-10-CM

## 2024-06-06 DIAGNOSIS — M54.41 CHRONIC BILATERAL LOW BACK PAIN WITH RIGHT-SIDED SCIATICA: Primary | ICD-10-CM

## 2024-06-06 DIAGNOSIS — S29.019A THORACIC MYOFASCIAL STRAIN, INITIAL ENCOUNTER: ICD-10-CM

## 2024-06-06 DIAGNOSIS — M99.08 SEGMENTAL AND SOMATIC DYSFUNCTION OF RIB CAGE: ICD-10-CM

## 2024-06-06 DIAGNOSIS — G89.29 CHRONIC BILATERAL LOW BACK PAIN WITH RIGHT-SIDED SCIATICA: Primary | ICD-10-CM

## 2024-06-06 DIAGNOSIS — M99.01 SEGMENTAL DYSFUNCTION OF CERVICAL REGION: ICD-10-CM

## 2024-06-06 PROCEDURE — 98941 CHIROPRACT MANJ 3-4 REGIONS: CPT | Performed by: CHIROPRACTOR

## 2024-06-06 PROCEDURE — 97110 THERAPEUTIC EXERCISES: CPT | Performed by: CHIROPRACTOR

## 2024-06-06 NOTE — PROGRESS NOTES
Initial date of service: 5/7/24    Diagnoses and all orders for this visit:    Chronic bilateral low back pain with right-sided sciatica    Segmental dysfunction of sacral region - Right    Thoracic myofascial strain, initial encounter    Segmental and somatic dysfunction of rib cage    Segmental dysfunction of cervical region      Pt improved; discussed importance of maintaining home exercise program    TREATMENT: 50948, 74458  Ther-ex: IASTM - discussed post procedure soreness and/or ecchymosis for up to 36 hrs, applied to affected mm hypertonicities; wall evette, axial retraction, upper trap stretch, SCM stretch, lat rows with t-band, lat pull down with t-band, abdominal bracing; greater than 15 min spent performing above mentioned ther-ex to improve ROM/flexibility. Thoracic mobilization/manipulation: prone P-A mob, supine A-P manip; cervical mobilization/manipulation: traction, diversified supine graded mobilization; R SIJ - supine LAT    HPI:  Tyra Paz is a 38 y.o. 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 Pt's cardiopulmonary system has been checked as source of chest pain; wnl.  Xrays unremarkable  6/6: Pt reports feeling better than last visit    Back Pain  This is a new problem. The current episode started more than 1 month ago. The problem occurs daily. The problem has been waxing and waning since onset. The pain is present in the thoracic spine, lumbar spine and sacro-iliac. The quality of the pain is described as aching and burning. The pain does not radiate. Pain scale: 2-8/10. The symptoms are aggravated by bending and twisting (reaching, laying left side).   Neck Pain  "      Past Medical History:   Diagnosis Date    Anxiety     Arthritis     spine    Chest pain     pt denies today//saw cardiology and everything was \"normal\"/saw cardiology 7/2019 Dr CHARLES Bustos/JENNIFER MENESES    COVID     2021    Dental crown present     Dizziness     GERD (gastroesophageal reflux disease)     Hypertension     Migraines     Pelvic pain     PONV (postoperative nausea and vomiting)     Precancerous changes of the cervix       The following portions of the patient's history were reviewed and updated as appropriate: allergies, past family history, past medical history, past social history, past surgical history, and problem list.  Review of Systems   Musculoskeletal:  Positive for back pain and neck pain.     Physical Exam  Musculoskeletal:        Arms:       Cervical back: Pain with movement and muscular tenderness present. Decreased range of motion.      Thoracic back: Spasms and tenderness present. Decreased range of motion.      Comments: [place]   T/S in [place], [place], [place], [place]  L/S in Ext, Llf   Lymphadenopathy:      Cervical: No cervical adenopathy.   Neurological:      Mental Status: She is alert and oriented to person, place, and time.      Gait: Gait is intact.   Psychiatric:         Mood and Affect: Mood and affect normal.       SOFT TISSUE ASSESSMENT: Hypertonicity and tenderness palpated B C6-T7 erector spinae, B upper traps, L SCM, scalene, B rhomboid, R glute med/min JOINT RECTRICTIONS: C6-T7, B R5, R SIJ     Return in about 1 week (around 6/13/2024) for Next scheduled follow up.     "

## 2024-06-13 ENCOUNTER — PROCEDURE VISIT (OUTPATIENT)
Age: 39
End: 2024-06-13
Payer: COMMERCIAL

## 2024-06-13 VITALS
OXYGEN SATURATION: 99 % | HEART RATE: 85 BPM | DIASTOLIC BLOOD PRESSURE: 78 MMHG | BODY MASS INDEX: 33.13 KG/M2 | SYSTOLIC BLOOD PRESSURE: 124 MMHG | HEIGHT: 62 IN | WEIGHT: 180 LBS

## 2024-06-13 DIAGNOSIS — G89.29 CHRONIC BILATERAL LOW BACK PAIN WITH RIGHT-SIDED SCIATICA: Primary | ICD-10-CM

## 2024-06-13 DIAGNOSIS — M54.41 CHRONIC BILATERAL LOW BACK PAIN WITH RIGHT-SIDED SCIATICA: Primary | ICD-10-CM

## 2024-06-13 DIAGNOSIS — S29.019A THORACIC MYOFASCIAL STRAIN, INITIAL ENCOUNTER: ICD-10-CM

## 2024-06-13 DIAGNOSIS — M99.01 SEGMENTAL DYSFUNCTION OF CERVICAL REGION: ICD-10-CM

## 2024-06-13 DIAGNOSIS — M99.08 SEGMENTAL AND SOMATIC DYSFUNCTION OF RIB CAGE: ICD-10-CM

## 2024-06-13 DIAGNOSIS — M99.04 SEGMENTAL DYSFUNCTION OF SACRAL REGION: ICD-10-CM

## 2024-06-13 PROCEDURE — 98941 CHIROPRACT MANJ 3-4 REGIONS: CPT | Performed by: CHIROPRACTOR

## 2024-06-13 PROCEDURE — 97110 THERAPEUTIC EXERCISES: CPT | Performed by: CHIROPRACTOR

## 2024-06-13 NOTE — PROGRESS NOTES
Initial date of service: 5/7/24    Diagnoses and all orders for this visit:    Chronic bilateral low back pain with right-sided sciatica    Segmental dysfunction of sacral region - Right    Thoracic myofascial strain, initial encounter    Segmental dysfunction of cervical region    Segmental and somatic dysfunction of rib cage    Pt improved; discussed importance of maintaining home exercise program    TREATMENT: 32424, 78785  Ther-ex: IASTM - discussed post procedure soreness and/or ecchymosis for up to 36 hrs, applied to affected mm hypertonicities; wall evette, axial retraction, upper trap stretch, SCM stretch, lat rows with t-band, lat pull down with t-band, abdominal bracing; greater than 15 min spent performing above mentioned ther-ex to improve ROM/flexibility. Thoracic mobilization/manipulation: prone P-A mob, supine A-P manip; cervical mobilization/manipulation: traction, diversified supine graded mobilization; R SIJ - supine LAT    HPI:  Tyra Paz is a 38 y.o. 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 Pt's cardiopulmonary system has been checked as source of chest pain; wnl.  Xrays unremarkable  6/13: Pt reports feeling better than last visit    Back Pain  This is a new problem. The current episode started more than 1 month ago. The problem occurs daily. The problem has been waxing and waning since onset. The pain is present in the thoracic spine, lumbar spine and sacro-iliac. The quality of the pain is described as aching and burning. The pain does not radiate. Pain scale: 1-6/10. The symptoms are aggravated by bending and twisting (reaching, laying left side).   Neck Pain  "      Past Medical History:   Diagnosis Date    Anxiety     Arthritis     spine    Chest pain     pt denies today//saw cardiology and everything was \"normal\"/saw cardiology 7/2019 Dr CHARLES Bustos/JENNIFER MENESES    COVID     2021    Dental crown present     Dizziness     GERD (gastroesophageal reflux disease)     Hypertension     Migraines     Pelvic pain     PONV (postoperative nausea and vomiting)     Precancerous changes of the cervix       The following portions of the patient's history were reviewed and updated as appropriate: allergies, past family history, past medical history, past social history, past surgical history, and problem list.  Review of Systems   Musculoskeletal:  Positive for back pain and neck pain.     Physical Exam  Musculoskeletal:        Arms:       Cervical back: Pain with movement and muscular tenderness present. Decreased range of motion.      Thoracic back: Spasms and tenderness present. Decreased range of motion.      Comments: [place] in [place]   T/S in [place], Ext, Brot, Llf  L/S in Ext, Llf   Lymphadenopathy:      Cervical: No cervical adenopathy.   Neurological:      Mental Status: She is alert and oriented to person, place, and time.      Gait: Gait is intact.   Psychiatric:         Mood and Affect: Mood and affect normal.       SOFT TISSUE ASSESSMENT: Hypertonicity and tenderness palpated B C6-T7 erector spinae, B upper traps, L SCM, scalene, B rhomboid, R glute med/min JOINT RECTRICTIONS: C6-T7, B R5, R SIJ     Return in about 2 weeks (around 6/27/2024) for Next scheduled follow up.     "

## 2024-06-26 ENCOUNTER — APPOINTMENT (EMERGENCY)
Dept: RADIOLOGY | Facility: HOSPITAL | Age: 39
End: 2024-06-26
Payer: COMMERCIAL

## 2024-06-26 ENCOUNTER — HOSPITAL ENCOUNTER (EMERGENCY)
Facility: HOSPITAL | Age: 39
Discharge: HOME | End: 2024-06-26
Attending: EMERGENCY MEDICINE
Payer: COMMERCIAL

## 2024-06-26 VITALS
TEMPERATURE: 97.9 F | RESPIRATION RATE: 18 BRPM | HEART RATE: 63 BPM | DIASTOLIC BLOOD PRESSURE: 72 MMHG | SYSTOLIC BLOOD PRESSURE: 132 MMHG | BODY MASS INDEX: 32.82 KG/M2 | OXYGEN SATURATION: 100 % | WEIGHT: 179.45 LBS

## 2024-06-26 DIAGNOSIS — R51.9 HEADACHE: ICD-10-CM

## 2024-06-26 DIAGNOSIS — R42 DIZZINESS: Primary | ICD-10-CM

## 2024-06-26 LAB
ALBUMIN SERPL BCG-MCNC: 4.7 G/DL (ref 3.5–5)
ALP SERPL-CCNC: 51 U/L (ref 34–104)
ALT SERPL W P-5'-P-CCNC: 14 U/L (ref 7–52)
ANION GAP SERPL CALCULATED.3IONS-SCNC: 6 MMOL/L (ref 4–13)
AST SERPL W P-5'-P-CCNC: 12 U/L (ref 13–39)
BASOPHILS # BLD AUTO: 0.06 THOUSANDS/ÂΜL (ref 0–0.1)
BASOPHILS NFR BLD AUTO: 1 % (ref 0–1)
BILIRUB DIRECT SERPL-MCNC: 0.1 MG/DL (ref 0–0.2)
BILIRUB SERPL-MCNC: 0.55 MG/DL (ref 0.2–1)
BUN SERPL-MCNC: 10 MG/DL (ref 5–25)
CALCIUM SERPL-MCNC: 9.7 MG/DL (ref 8.4–10.2)
CARDIAC TROPONIN I PNL SERPL HS: <2 NG/L
CHLORIDE SERPL-SCNC: 105 MMOL/L (ref 96–108)
CO2 SERPL-SCNC: 27 MMOL/L (ref 21–32)
CREAT SERPL-MCNC: 0.65 MG/DL (ref 0.6–1.3)
EOSINOPHIL # BLD AUTO: 0.07 THOUSAND/ÂΜL (ref 0–0.61)
EOSINOPHIL NFR BLD AUTO: 2 % (ref 0–6)
ERYTHROCYTE [DISTWIDTH] IN BLOOD BY AUTOMATED COUNT: 13 % (ref 11.6–15.1)
GFR SERPL CREATININE-BSD FRML MDRD: 111 ML/MIN/1.73SQ M
GLUCOSE SERPL-MCNC: 137 MG/DL (ref 65–140)
GLUCOSE SERPL-MCNC: 90 MG/DL (ref 65–140)
HCT VFR BLD AUTO: 41.6 % (ref 34.8–46.1)
HGB BLD-MCNC: 14.3 G/DL (ref 11.5–15.4)
IMM GRANULOCYTES # BLD AUTO: 0.01 THOUSAND/UL (ref 0–0.2)
IMM GRANULOCYTES NFR BLD AUTO: 0 % (ref 0–2)
LYMPHOCYTES # BLD AUTO: 1.76 THOUSANDS/ÂΜL (ref 0.6–4.47)
LYMPHOCYTES NFR BLD AUTO: 40 % (ref 14–44)
MAGNESIUM SERPL-MCNC: 2.3 MG/DL (ref 1.9–2.7)
MCH RBC QN AUTO: 29.2 PG (ref 26.8–34.3)
MCHC RBC AUTO-ENTMCNC: 34.4 G/DL (ref 31.4–37.4)
MCV RBC AUTO: 85 FL (ref 82–98)
MONOCYTES # BLD AUTO: 0.33 THOUSAND/ÂΜL (ref 0.17–1.22)
MONOCYTES NFR BLD AUTO: 7 % (ref 4–12)
NEUTROPHILS # BLD AUTO: 2.2 THOUSANDS/ÂΜL (ref 1.85–7.62)
NEUTS SEG NFR BLD AUTO: 50 % (ref 43–75)
NRBC BLD AUTO-RTO: 0 /100 WBCS
PLATELET # BLD AUTO: 265 THOUSANDS/UL (ref 149–390)
PMV BLD AUTO: 9.9 FL (ref 8.9–12.7)
POTASSIUM SERPL-SCNC: 3.9 MMOL/L (ref 3.5–5.3)
PROT SERPL-MCNC: 7.4 G/DL (ref 6.4–8.4)
RBC # BLD AUTO: 4.89 MILLION/UL (ref 3.81–5.12)
SODIUM SERPL-SCNC: 138 MMOL/L (ref 135–147)
WBC # BLD AUTO: 4.43 THOUSAND/UL (ref 4.31–10.16)

## 2024-06-26 PROCEDURE — 82948 REAGENT STRIP/BLOOD GLUCOSE: CPT

## 2024-06-26 PROCEDURE — 84484 ASSAY OF TROPONIN QUANT: CPT | Performed by: EMERGENCY MEDICINE

## 2024-06-26 PROCEDURE — 83735 ASSAY OF MAGNESIUM: CPT | Performed by: EMERGENCY MEDICINE

## 2024-06-26 PROCEDURE — 80076 HEPATIC FUNCTION PANEL: CPT | Performed by: EMERGENCY MEDICINE

## 2024-06-26 PROCEDURE — 85025 COMPLETE CBC W/AUTO DIFF WBC: CPT | Performed by: EMERGENCY MEDICINE

## 2024-06-26 PROCEDURE — 80048 BASIC METABOLIC PNL TOTAL CA: CPT | Performed by: EMERGENCY MEDICINE

## 2024-06-26 PROCEDURE — 71045 X-RAY EXAM CHEST 1 VIEW: CPT

## 2024-06-26 PROCEDURE — 36415 COLL VENOUS BLD VENIPUNCTURE: CPT | Performed by: EMERGENCY MEDICINE

## 2024-06-26 PROCEDURE — 99285 EMERGENCY DEPT VISIT HI MDM: CPT | Performed by: EMERGENCY MEDICINE

## 2024-06-26 RX ORDER — METOCLOPRAMIDE 10 MG/1
10 TABLET ORAL EVERY 6 HOURS
Qty: 30 TABLET | Refills: 0 | Status: SHIPPED | OUTPATIENT
Start: 2024-06-26

## 2024-06-26 RX ORDER — KETOROLAC TROMETHAMINE 30 MG/ML
15 INJECTION, SOLUTION INTRAMUSCULAR; INTRAVENOUS ONCE
Status: COMPLETED | OUTPATIENT
Start: 2024-06-26 | End: 2024-06-26

## 2024-06-26 RX ORDER — NAPROXEN 500 MG/1
500 TABLET ORAL 2 TIMES DAILY WITH MEALS
Qty: 20 TABLET | Refills: 0 | Status: SHIPPED | OUTPATIENT
Start: 2024-06-26 | End: 2024-07-06

## 2024-06-26 RX ORDER — DIPHENHYDRAMINE HYDROCHLORIDE 50 MG/ML
25 INJECTION INTRAMUSCULAR; INTRAVENOUS ONCE
Status: COMPLETED | OUTPATIENT
Start: 2024-06-26 | End: 2024-06-26

## 2024-06-26 RX ADMIN — SODIUM CHLORIDE 1000 ML: 0.9 INJECTION, SOLUTION INTRAVENOUS at 15:39

## 2024-06-26 RX ADMIN — DIPHENHYDRAMINE HYDROCHLORIDE 25 MG: 50 INJECTION, SOLUTION INTRAMUSCULAR; INTRAVENOUS at 15:36

## 2024-06-26 RX ADMIN — KETOROLAC TROMETHAMINE 15 MG: 30 INJECTION, SOLUTION INTRAMUSCULAR; INTRAVENOUS at 15:35

## 2024-06-26 NOTE — DISCHARGE INSTRUCTIONS
Take the Naprosyn twice daily for the next 5-10 days.     You can take the Reglan as needed if your headache worsens.     Call your family doctor, let them know you were in the ER, you should be seen in the office for further evaluation and management if your symptoms persist.

## 2024-06-27 NOTE — ED PROVIDER NOTES
History  Chief Complaint   Patient presents with    Dizziness     Pt c/o dizziness, headache since yesterday. Pt reports not eating very much yesterday. Denies hx of diabetes     37 YO female presents with a headache for the last day. She states this has been constant, aching, primarily on the Left. She notes it is associated with dizziness, room spinning. She did have similar once in the past. She denies fevers. States she did not have a lot to eat yesterday and was concerned regarding her blood glucose. Patient states she has also had some chest discomfort that has been present for the last day, she denies known cardiac issues. Pt denies SOB/F/C/D/C, no dysuria, burning on urination or blood in urine.       History provided by:  Patient   used: No        Prior to Admission Medications   Prescriptions Last Dose Informant Patient Reported? Taking?   Cyanocobalamin (VITAMIN B-12 PO)  Self Yes No   Sig: Take by mouth daily   Diclofenac Sodium (VOLTAREN) 1 % Not Taking Self No No   Sig: Apply 2 g topically 4 (four) times a day   Patient not taking: Reported on 5/7/2024   MAGNESIUM PO  Self Yes No   Sig: Take by mouth every evening   Menaquinone-7 (VITAMIN K2 PO)  Self Yes No   Sig: Take by mouth daily   acetaminophen (TYLENOL) 650 mg CR tablet Not Taking Self No No   Sig: Take 1 tablet (650 mg total) by mouth every 8 (eight) hours as needed for mild pain   Patient not taking: Reported on 5/8/2024   amLODIPine (NORVASC) 10 mg tablet   No No   Sig: TAKE 1 TABLET BY MOUTH EVERY DAY   clotrimazole (GYNE-LOTRIMIN) 1 % vaginal cream Not Taking  No No   Sig: Insert 1 applicator into the vagina daily at bedtime For 7 to 14 days   Patient not taking: Reported on 5/7/2024   dicyclomine (BENTYL) 10 mg capsule Not Taking Self Yes No   Sig: Take 10 mg by mouth 4 (four) times a day as needed   Patient not taking: Reported on 5/7/2024   ergocalciferol (VITAMIN D2) 50,000 units  Self No No   Sig: Take 1 capsule  "(50,000 Units total) by mouth once a week   famotidine (PEPCID) 20 mg tablet  Self No No   Sig: Take 1 tablet (20 mg total) by mouth 2 (two) times a day   lidocaine (Lidoderm) 5 % Not Taking  No No   Sig: Apply 1 patch topically over 12 hours daily Remove & Discard patch within 12 hours or as directed by MD   Patient not taking: Reported on 5/7/2024   methocarbamol (ROBAXIN) 500 mg tablet   No No   Sig: Take 1 tablet (500 mg total) by mouth every 12 (twelve) hours as needed for muscle spasms   multivitamin (THERAGRAN) TABS  Self No Yes   Sig: Take 1 tablet by mouth daily   omeprazole (PriLOSEC) 40 MG capsule Not Taking  No No   Sig: TAKE 1 CAPSULE (40 MG TOTAL) BY MOUTH DAILY.   Patient not taking: Reported on 5/7/2024      Facility-Administered Medications: None       Past Medical History:   Diagnosis Date    Anxiety     Arthritis     spine    Chest pain     pt denies today//saw cardiology and everything was \"normal\"/saw cardiology 7/2019 Dr CHARLES Bustos/JENNIFER MENESES    COVID     2021    Dental crown present     Dizziness     GERD (gastroesophageal reflux disease)     Hypertension     Migraines     Pelvic pain     PONV (postoperative nausea and vomiting)     Precancerous changes of the cervix        Past Surgical History:   Procedure Laterality Date    CARPAL TUNNEL RELEASE Right     CYSTOSCOPY N/A 12/22/2023    Procedure: CYSTOSCOPY;  Surgeon: Yardlie Toussaint-Foster, DO;  Location: [facility];  Service: Gynecology    EXAMINATION UNDER ANESTHESIA N/A 10/4/2020    Procedure: EXAM UNDER ANESTHESIA (EUA), CAUTERIZATION OF CERVIX;  Surgeon: Vani Hayden MD;  Location: [facility];  Service: Gynecology    HYSTERECTOMY W/ SALPINGO-OOPHERECTOMY Bilateral 12/22/2023    Procedure: LTH W/ BILATERAL SALPINGECTOMY;  Surgeon: Yardlie Toussaint-Foster, DO;  Location: [facility];  Service: Gynecology    NC COLPOSCOPY CERVIX VAG LOOP ELTRD BX CERVIX N/A 10/2/2020    Procedure: BIOPSY LEEP CERVIX;  Surgeon: Cachorro" Corbin Goddard MD;  Location: [facility];  Service: Gynecology    TUBAL LIGATION         Family History   Problem Relation Age of Onset    Asthma Mother     Hypertension Mother     No Known Problems Father     No Known Problems Sister     No Known Problems Daughter     No Known Problems Maternal Grandmother     No Known Problems Maternal Grandfather     No Known Problems Paternal Grandmother     No Known Problems Paternal Grandfather     No Known Problems Maternal Aunt     No Known Problems Maternal Aunt     No Known Problems Paternal Aunt     No Known Problems Paternal Aunt     No Known Problems Paternal Aunt     Diabetes Paternal Aunt     Breast cancer Neg Hx     Cancer Neg Hx      I have reviewed and agree with the history as documented.    E-Cigarette/Vaping    E-Cigarette Use Never User      E-Cigarette/Vaping Substances    Nicotine No     THC No     CBD No     Flavoring No     Other No     Unknown No      Social History     Tobacco Use    Smoking status: Never    Smokeless tobacco: Never   Vaping Use    Vaping status: Never Used   Substance Use Topics    Alcohol use: Not Currently    Drug use: Never       Review of Systems   Constitutional:  Negative for chills, fatigue and fever.   HENT:  Negative for dental problem.    Eyes:  Negative for visual disturbance.   Respiratory:  Negative for shortness of breath.    Cardiovascular:  Positive for chest pain.   Gastrointestinal:  Negative for abdominal pain, diarrhea and vomiting.   Genitourinary:  Negative for dysuria and frequency.   Musculoskeletal:  Negative for arthralgias.   Skin:  Negative for rash.   Neurological:  Positive for dizziness and headaches. Negative for weakness and light-headedness.   Psychiatric/Behavioral:  Negative for agitation, behavioral problems and confusion.    All other systems reviewed and are negative.      Physical Exam  Physical Exam  Vitals and nursing note reviewed.   Constitutional:       Appearance: Normal appearance.   HENT:       Head: Normocephalic and atraumatic.   Eyes:      Extraocular Movements: Extraocular movements intact.      Conjunctiva/sclera: Conjunctivae normal.      Comments: No nystagmus.   Cardiovascular:      Rate and Rhythm: Normal rate and regular rhythm.      Pulses: Normal pulses.      Heart sounds: Normal heart sounds.   Pulmonary:      Effort: Pulmonary effort is normal.   Abdominal:      General: There is no distension.   Musculoskeletal:         General: Normal range of motion.      Cervical back: Normal range of motion.   Skin:     Findings: No rash.   Neurological:      General: No focal deficit present.      Mental Status: She is alert.      Cranial Nerves: No cranial nerve deficit.      Coordination: Coordination is intact. Coordination normal. Finger-Nose-Finger Test normal. Rapid alternating movements normal.      Gait: Gait is intact.   Psychiatric:         Mood and Affect: Mood normal.         Vital Signs  ED Triage Vitals   Temperature Pulse Respirations Blood Pressure SpO2   06/26/24 1403 06/26/24 1359 06/26/24 1359 06/26/24 1359 06/26/24 1359   97.9 °F (36.6 °C) 79 18 (!) 180/111 100 %      Temp src Heart Rate Source Patient Position - Orthostatic VS BP Location FiO2 (%)   -- 06/26/24 1359 06/26/24 1359 06/26/24 1359 --    Monitor Sitting Right arm       Pain Score       --                  Vitals:    06/26/24 1359 06/26/24 1618   BP: (!) 180/111 132/72   Pulse: 79 63   Patient Position - Orthostatic VS: Sitting Lying         Visual Acuity      ED Medications  Medications   sodium chloride 0.9 % bolus 1,000 mL (0 mL Intravenous Stopped 6/26/24 1639)   ketorolac (TORADOL) injection 15 mg (15 mg Intravenous Given 6/26/24 1535)   diphenhydrAMINE (BENADRYL) injection 25 mg (25 mg Intravenous Given 6/26/24 1536)       Diagnostic Studies  Results Reviewed       Procedure Component Value Units Date/Time    Hepatic function panel [[id number]]  (Abnormal) Collected: 06/26/24 1541    Lab Status: Final result  Specimen: Blood from Arm, Right Updated: 06/26/24 1657     Total Bilirubin 0.55 mg/dL      Bilirubin, Direct 0.10 mg/dL      Alkaline Phosphatase 51 U/L      AST 12 U/L      ALT 14 U/L      Total Protein 7.4 g/dL      Albumin 4.7 g/dL     Basic metabolic panel [[id number]] Collected: 06/26/24 1541    Lab Status: Final result Specimen: Blood from Arm, Right Updated: 06/26/24 1646     Sodium 138 mmol/L      Potassium 3.9 mmol/L      Chloride 105 mmol/L      CO2 27 mmol/L      ANION GAP 6 mmol/L      BUN 10 mg/dL      Creatinine 0.65 mg/dL      Glucose 90 mg/dL      Calcium 9.7 mg/dL      eGFR 111 ml/min/1.73sq m     Narrative:      National Kidney Disease Foundation guidelines for Chronic Kidney Disease (CKD):     Stage 1 with normal or high GFR (GFR > 90 mL/min/1.73 square meters)    Stage 2 Mild CKD (GFR = 60-89 mL/min/1.73 square meters)    Stage 3A Moderate CKD (GFR = 45-59 mL/min/1.73 square meters)    Stage 3B Moderate CKD (GFR = 30-44 mL/min/1.73 square meters)    Stage 4 Severe CKD (GFR = 15-29 mL/min/1.73 square meters)    Stage 5 End Stage CKD (GFR <15 mL/min/1.73 square meters)  Note: GFR calculation is accurate only with a steady state creatinine    Magnesium [[id number]]  (Normal) Collected: 06/26/24 1541    Lab Status: Final result Specimen: Blood from Arm, Right Updated: 06/26/24 1646     Magnesium 2.3 mg/dL     HS Troponin 0hr (reflex protocol) [[id number]]  (Normal) Collected: 06/26/24 1541    Lab Status: Final result Specimen: Blood from Arm, Right Updated: 06/26/24 1613     hs TnI 0hr <2 ng/L     CBC and differential [[id number]] Collected: 06/26/24 1541    Lab Status: Final result Specimen: Blood from Arm, Right Updated: 06/26/24 1547     WBC 4.43 Thousand/uL      RBC 4.89 Million/uL      Hemoglobin 14.3 g/dL      Hematocrit 41.6 %      MCV 85 fL      MCH 29.2 pg      MCHC 34.4 g/dL      RDW 13.0 %      MPV 9.9 fL      Platelets 265 Thousands/uL      nRBC 0 /100 WBCs      Segmented % 50 %      Immature  Grans % 0 %      Lymphocytes % 40 %      Monocytes % 7 %      Eosinophils Relative 2 %      Basophils Relative 1 %      Absolute Neutrophils 2.20 Thousands/µL      Absolute Immature Grans 0.01 Thousand/uL      Absolute Lymphocytes 1.76 Thousands/µL      Absolute Monocytes 0.33 Thousand/µL      Eosinophils Absolute 0.07 Thousand/µL      Basophils Absolute 0.06 Thousands/µL     Fingerstick Glucose (POCT) [[id number]]  (Normal) Collected: 06/26/24 1403    Lab Status: Final result Specimen: Blood Updated: 06/26/24 1404     POC Glucose 137 mg/dl                    XR chest 1 view portable   Final Result by Odessa Bañuelos MD (06/26 2047)      No acute cardiopulmonary disease.            Workstation performed: [id number]                    Procedures  Procedures         ED Course  ED Course as of 06/27/24 1505   Wed Jun 26, 2024   1705 ECG evaluated by myself, interpretation included in procedure section of note.     1705 Chest x-ray evaluated by me, interpretation:  No pneumonia   1714 Patient states she is currently feeling better.                               SBIRT 20yo+      Flowsheet Row Most Recent Value   Initial Alcohol Screen: US AUDIT-C     1. How often do you have a drink containing alcohol? 0 Filed at: 06/26/2024 1702   2. How many drinks containing alcohol do you have on a typical day you are drinking?  0 Filed at: 06/26/2024 1702   3b. FEMALE Any Age, or MALE 65+: How often do you have 4 or more drinks on one occassion? 0 Filed at: 06/26/2024 1702   Audit-C Score 0 Filed at: 06/26/2024 1702   SERGIO: How many times in the past year have you...    Used an illegal drug or used a prescription medication for non-medical reasons? Never Filed at: 06/26/2024 1702                      Medical Decision Making  1. Headache - Patient states similar in the past, though infrequent. She additionally notes some chest discomfort that radiates to the back, states she has been seeing a chiropractor for this. Will order ECG  and troponin to rule out acute MI, CBC for leukocytosis and anemia, metabolic panel for electrolyte abnormalities and dehydration, will treat headache with NSAIDs and Benadryl, reassess.    Problems Addressed:  Dizziness: acute illness or injury  Headache: acute illness or injury    Amount and/or Complexity of Data Reviewed  Labs: ordered.  Radiology: ordered.    Risk  Prescription drug management.             Disposition  Final diagnoses:   Dizziness   Headache     Time reflects when diagnosis was documented in both MDM as applicable and the Disposition within this note       Time User Action Codes Description Comment    6/26/2024  5:14 PM Kristain Elise Add [R42] Dizziness     6/26/2024  5:14 PM Kristian Elise Add [R51.9] Headache           ED Disposition       ED Disposition   Discharge    Condition   Stable    Date/Time   Wed Jun 26, 2024 1715    Comment   Tyra Paz discharge to home/self care.                   Follow-up Information    None         Discharge Medication List as of 6/26/2024  5:16 PM        START taking these medications    Details   metoclopramide (REGLAN) 10 mg tablet Take 1 tablet (10 mg total) by mouth every 6 (six) hours, Starting Wed 6/26/2024, Normal      naproxen (NAPROSYN) 500 mg tablet Take 1 tablet (500 mg total) by mouth 2 (two) times a day with meals for 10 days, Starting Wed 6/26/2024, Until Sat 7/6/2024, Normal           CONTINUE these medications which have NOT CHANGED    Details   multivitamin (THERAGRAN) TABS Take 1 tablet by mouth daily, Starting Fri 11/3/2023, Normal      acetaminophen (TYLENOL) 650 mg CR tablet Take 1 tablet (650 mg total) by mouth every 8 (eight) hours as needed for mild pain, Starting Fri 11/3/2023, Normal      amLODIPine (NORVASC) 10 mg tablet TAKE 1 TABLET BY MOUTH EVERY DAY, Starting Mon 4/8/2024, Normal      clotrimazole (GYNE-LOTRIMIN) 1 % vaginal cream Insert 1 applicator into the vagina daily at bedtime For 7 to 14 days, Starting Tue  2/27/2024, Normal      Cyanocobalamin (VITAMIN B-12 PO) Take by mouth daily, Historical Med      Diclofenac Sodium (VOLTAREN) 1 % Apply 2 g topically 4 (four) times a day, Starting Fri 12/8/2023, Normal      dicyclomine (BENTYL) 10 mg capsule Take 10 mg by mouth 4 (four) times a day as needed, Historical Med      ergocalciferol (VITAMIN D2) 50,000 units Take 1 capsule (50,000 Units total) by mouth once a week, Starting Fri 11/3/2023, Normal      famotidine (PEPCID) 20 mg tablet Take 1 tablet (20 mg total) by mouth 2 (two) times a day, Starting Fri 11/3/2023, Normal      lidocaine (Lidoderm) 5 % Apply 1 patch topically over 12 hours daily Remove & Discard patch within 12 hours or as directed by MD, Starting Mon 4/15/2024, Normal      MAGNESIUM PO Take by mouth every evening, Historical Med      Menaquinone-7 (VITAMIN K2 PO) Take by mouth daily, Historical Med      methocarbamol (ROBAXIN) 500 mg tablet Take 1 tablet (500 mg total) by mouth every 12 (twelve) hours as needed for muscle spasms, Starting Mon 4/15/2024, Normal      omeprazole (PriLOSEC) 40 MG capsule TAKE 1 CAPSULE (40 MG TOTAL) BY MOUTH DAILY., Starting Tue 3/19/2024, Until Fri 3/14/2025, Normal             No discharge procedures on file.    PDMP Review         Value Time User    PDMP Reviewed  Yes 12/22/2023  4:06 PM Yardlie Toussaint-Foster, DO            ED Provider  Electronically Signed by             Kristian Elise MD  06/27/24 9551

## 2024-08-06 ENCOUNTER — PATIENT MESSAGE (OUTPATIENT)
Dept: GASTROENTEROLOGY | Facility: CLINIC | Age: 39
End: 2024-08-06

## 2024-08-26 ENCOUNTER — ANESTHESIA (OUTPATIENT)
Dept: GASTROENTEROLOGY | Facility: HOSPITAL | Age: 39
End: 2024-08-26

## 2024-08-26 ENCOUNTER — HOSPITAL ENCOUNTER (OUTPATIENT)
Dept: GASTROENTEROLOGY | Facility: HOSPITAL | Age: 39
Setting detail: OUTPATIENT SURGERY
Discharge: HOME | End: 2024-08-26
Payer: COMMERCIAL

## 2024-08-26 ENCOUNTER — ANESTHESIA EVENT (OUTPATIENT)
Dept: GASTROENTEROLOGY | Facility: HOSPITAL | Age: 39
End: 2024-08-26

## 2024-08-26 VITALS
RESPIRATION RATE: 14 BRPM | TEMPERATURE: 98 F | HEART RATE: 67 BPM | OXYGEN SATURATION: 100 % | SYSTOLIC BLOOD PRESSURE: 121 MMHG | DIASTOLIC BLOOD PRESSURE: 77 MMHG

## 2024-08-26 DIAGNOSIS — R10.9 RIGHT SIDED ABDOMINAL PAIN: ICD-10-CM

## 2024-08-26 DIAGNOSIS — K62.89 RECTAL PAIN: ICD-10-CM

## 2024-08-26 PROCEDURE — 45378 DIAGNOSTIC COLONOSCOPY: CPT | Performed by: INTERNAL MEDICINE

## 2024-08-26 RX ORDER — PROPOFOL 10 MG/ML
INJECTION, EMULSION INTRAVENOUS AS NEEDED
Status: DISCONTINUED | OUTPATIENT
Start: 2024-08-26 | End: 2024-08-26

## 2024-08-26 RX ORDER — SODIUM CHLORIDE, SODIUM LACTATE, POTASSIUM CHLORIDE, CALCIUM CHLORIDE 600; 310; 30; 20 MG/100ML; MG/100ML; MG/100ML; MG/100ML
INJECTION, SOLUTION INTRAVENOUS CONTINUOUS PRN
Status: DISCONTINUED | OUTPATIENT
Start: 2024-08-26 | End: 2024-08-26

## 2024-08-26 RX ADMIN — PROPOFOL 100 MG: 10 INJECTION, EMULSION INTRAVENOUS at 07:36

## 2024-08-26 RX ADMIN — SODIUM CHLORIDE, SODIUM LACTATE, POTASSIUM CHLORIDE, AND CALCIUM CHLORIDE: .6; .31; .03; .02 INJECTION, SOLUTION INTRAVENOUS at 07:54

## 2024-08-26 RX ADMIN — PROPOFOL 100 MG: 10 INJECTION, EMULSION INTRAVENOUS at 07:38

## 2024-08-26 RX ADMIN — PROPOFOL 100 MG: 10 INJECTION, EMULSION INTRAVENOUS at 07:33

## 2024-08-26 RX ADMIN — SODIUM CHLORIDE, SODIUM LACTATE, POTASSIUM CHLORIDE, AND CALCIUM CHLORIDE: .6; .31; .03; .02 INJECTION, SOLUTION INTRAVENOUS at 07:13

## 2024-08-26 RX ADMIN — PROPOFOL 50 MG: 10 INJECTION, EMULSION INTRAVENOUS at 07:47

## 2024-08-26 RX ADMIN — PROPOFOL 100 MG: 10 INJECTION, EMULSION INTRAVENOUS at 07:42

## 2024-08-26 NOTE — ANESTHESIA POSTPROCEDURE EVALUATION
Post-Op Assessment Note    CV Status:  Stable  Pain Score: 0    Pain management: adequate       Mental Status:  Alert and awake   Hydration Status:  Euvolemic   PONV Controlled:  Controlled   Airway Patency:  Patent     Post Op Vitals Reviewed: Yes    No anethesia notable event occurred.    Staff: CRNA               BP   124/72   Temp   98   Pulse  79   Resp   20   SpO2   100

## 2024-08-26 NOTE — H&P
"History and Physical - [facility]  Tyra Paz 38 y.o. female MRN: [id number]                  HPI: Tyra Paz is a 38 y.o. year old female who presents for right side abdominal pain and rectal pain.      REVIEW OF SYSTEMS: Per the HPI, and otherwise unremarkable.    Historical Information   Past Medical History:   Diagnosis Date    Anxiety     Arthritis     spine    Chest pain     pt denies today//saw cardiology and everything was \"normal\"/saw cardiology 7/2019 Dr CHARLES Bustos/JENNIFER MENESES    COVID     2021    Dental crown present     Dizziness     GERD (gastroesophageal reflux disease)     Hypertension     Migraines     Pelvic pain     PONV (postoperative nausea and vomiting)     Precancerous changes of the cervix      Past Surgical History:   Procedure Laterality Date    CARPAL TUNNEL RELEASE Right     CYSTOSCOPY N/A 12/22/2023    Procedure: CYSTOSCOPY;  Surgeon: Yardlie Toussaint-Foster, DO;  [facility];  Service: Gynecology    EXAMINATION UNDER ANESTHESIA N/A 10/4/2020    Procedure: EXAM UNDER ANESTHESIA (EUA), CAUTERIZATION OF CERVIX;  Surgeon: Vani Hayden MD;  [facility];  Service: Gynecology    HYSTERECTOMY W/ SALPINGO-OOPHERECTOMY Bilateral 12/22/2023    Procedure: LTH W/ BILATERAL SALPINGECTOMY;  Surgeon: Yardlie Toussaint-Foster, DO;  [facility];  Service: Gynecology    VA COLPOSCOPY CERVIX VAG LOOP ELTRD BX CERVIX N/A 10/2/2020    Procedure: BIOPSY LEEP CERVIX;  Surgeon: Cachorro Goddard MD;  Location[facility];  Service: Gynecology    TUBAL LIGATION       Social History   Social History     Substance and Sexual Activity   Alcohol Use Not Currently     Social History     Substance and Sexual Activity   Drug Use Never     Social History     Tobacco Use   Smoking Status Never   Smokeless Tobacco Never     Family History   Problem Relation Age of Onset    Asthma Mother     Hypertension Mother     No Known " Problems Father     No Known Problems Sister     No Known Problems Daughter     No Known Problems Maternal Grandmother     No Known Problems Maternal Grandfather     No Known Problems Paternal Grandmother     No Known Problems Paternal Grandfather     No Known Problems Maternal Aunt     No Known Problems Maternal Aunt     No Known Problems Paternal Aunt     No Known Problems Paternal Aunt     No Known Problems Paternal Aunt     Diabetes Paternal Aunt     Breast cancer Neg Hx     Cancer Neg Hx        Meds/Allergies       Current Outpatient Medications:     acetaminophen (TYLENOL) 650 mg CR tablet    amLODIPine (NORVASC) 10 mg tablet    dicyclomine (BENTYL) 10 mg capsule    famotidine (PEPCID) 20 mg tablet    methocarbamol (ROBAXIN) 500 mg tablet    metoclopramide (REGLAN) 10 mg tablet    omeprazole (PriLOSEC) 40 MG capsule    clotrimazole (GYNE-LOTRIMIN) 1 % vaginal cream    Cyanocobalamin (VITAMIN B-12 PO)    Diclofenac Sodium (VOLTAREN) 1 %    ergocalciferol (VITAMIN D2) 50,000 units    lidocaine (Lidoderm) 5 %    MAGNESIUM PO    Menaquinone-7 (VITAMIN K2 PO)    multivitamin (THERAGRAN) TABS    naproxen (NAPROSYN) 500 mg tablet  No current facility-administered medications for this encounter.    Facility-Administered Medications Ordered in Other Encounters:     lactated ringers infusion, , Intravenous, Continuous PRN, New Bag at 08/26/24 0713    Allergies   Allergen Reactions    Drs Choice Skin Closure [Epimide] Hives    Wound Dressing Adhesive Hives       Objective     /88   Pulse 78   Temp 97.7 °F (36.5 °C) (Temporal)   Resp 18   LMP 12/06/2023 (Exact Date)   SpO2 98%       PHYSICAL EXAM    Gen: NAD  Head: NCAT  CV: RRR  CHEST: Clear  ABD: soft, NT/ND  EXT: no edema      ASSESSMENT/PLAN:  This is a 38 y.o. year old female here for colonoscopy, and she is stable and optimized for her procedure.

## 2024-08-26 NOTE — ANESTHESIA PREPROCEDURE EVALUATION
Procedure:  COLONOSCOPY    Relevant Problems   ANESTHESIA   (+) PONV (postoperative nausea and vomiting)      CARDIO   (+) Breast pain, left   (+) External hemorrhoid   (+) Hyperlipidemia   (+) Hypertension   (+) Rib pain on left side      GI/HEPATIC   (+) GERD (gastroesophageal reflux disease)      MUSCULOSKELETAL   (+) Acute bilateral low back pain with right-sided sciatica   (+) Blepharospasm      NEURO/PSYCH   (+) Anxiety   (+) Left temporal headache      Obstetrics/Gynecology   (+) RONALDO III (cervical intraepithelial neoplasia III)   (+) Fibrocystic breast disease      Orthopedic/Musculoskeletal   (+) Spine degeneration      Other   (+) Intermittent palpitations   (+) Vertigo      Lab Results   Component Value Date    SODIUM 138 06/26/2024    K 3.9 06/26/2024     06/26/2024    CO2 27 06/26/2024    AGAP 6 06/26/2024    BUN 10 06/26/2024    CREATININE 0.65 06/26/2024    GLUC 90 06/26/2024    GLUF 106 (H) 12/22/2023    CALCIUM 9.7 06/26/2024    AST 12 (L) 06/26/2024    ALT 14 06/26/2024    ALKPHOS 51 06/26/2024    TP 7.4 06/26/2024    TBILI 0.55 06/26/2024    EGFR 111 06/26/2024     Lab Results   Component Value Date    WBC 4.43 06/26/2024    HGB 14.3 06/26/2024    HCT 41.6 06/26/2024    MCV 85 06/26/2024     06/26/2024       Physical Exam    Airway    Mallampati score: II  TM Distance: >3 FB  Neck ROM: full     Dental       Cardiovascular      Pulmonary      Other Findings  post-pubertal.      Anesthesia Plan  ASA Score- 2     Anesthesia Type- IV sedation with anesthesia with ASA Monitors.         Additional Monitors:     Airway Plan:            Plan Factors-Exercise tolerance (METS): >4 METS.    Chart reviewed. EKG reviewed. Imaging results reviewed. Existing labs reviewed. Patient summary reviewed.                  Induction- intravenous.    Postoperative Plan-         Informed Consent- Anesthetic plan and risks discussed with patient.  I personally reviewed this patient with the CRNA. Discussed and  agreed on the Anesthesia Plan with the CRNA..

## 2024-08-28 ENCOUNTER — HOSPITAL ENCOUNTER (OUTPATIENT)
Dept: MAMMOGRAPHY | Facility: CLINIC | Age: 39
Discharge: HOME | End: 2024-08-28
Payer: COMMERCIAL

## 2024-08-28 ENCOUNTER — HOSPITAL ENCOUNTER (OUTPATIENT)
Dept: ULTRASOUND IMAGING | Facility: CLINIC | Age: 39
Discharge: HOME | End: 2024-08-28
Payer: COMMERCIAL

## 2024-08-28 VITALS — HEIGHT: 62 IN | BODY MASS INDEX: 34.23 KG/M2 | WEIGHT: 186 LBS

## 2024-08-28 DIAGNOSIS — R92.8 ABNORMAL FINDINGS ON DIAGNOSTIC IMAGING OF BREAST: ICD-10-CM

## 2024-08-28 PROCEDURE — 76642 ULTRASOUND BREAST LIMITED: CPT

## 2024-08-28 PROCEDURE — G0279 TOMOSYNTHESIS, MAMMO: HCPCS

## 2024-08-28 PROCEDURE — 77066 DX MAMMO INCL CAD BI: CPT

## 2024-10-24 NOTE — PROGRESS NOTES
Ambulatory Visit  Name: Tyra Paz      : 1985      MRN: [id number]  Encounter Provider: Maximo Varela MD  Encounter Date: 10/25/2024   Encounter department: [facility]    Assessment & Plan  Neck pain  Could be in the setting of cervical disc disease versus muscle spasm due to uncomfortable work position for prolonged period of time  Associated muscle spasm of the left upper thoracic region    Plan  X-ray spine cervical  Start naproxen 500 mg twice daily and Flexeril 5 mg every 8 hours as needed  Referral to comprehensive spine PT  Will try to represcribe Voltaren gel, however this was not covered by insurance last time, patient aware  Alarming symptoms discussed  Follow-up in 1 month for annual physical    Orders:    XR spine cervical complete 4 or 5 vw non injury; Future    naproxen (NAPROSYN) 500 mg tablet; Take 1 tablet (500 mg total) by mouth 2 (two) times a day with meals for 10 days    cyclobenzaprine (FLEXERIL) 5 mg tablet; Take 1 tablet (5 mg total) by mouth 3 (three) times a day as needed for muscle spasms for up to 10 days    Ambulatory Referral to Comprehensive Spine PT; Future    Diclofenac Sodium (VOLTAREN) 1 %; Apply 2 g topically 4 (four) times a day    Gastroesophageal reflux disease, unspecified whether esophagitis present  Well-controlled with Pepcid 20 mg twice daily  Orders:    famotidine (PEPCID) 20 mg tablet; Take 1 tablet (20 mg total) by mouth 2 (two) times a day    Foul smelling urine  Intermittent foul-smelling urine for the past several weeks  No changes in color, burning, pain with urination  No medications noted in the list which could be the cause  Could be asymptomatic bacteriuria    Plan  Will order urinalysis with urine culture    Orders:    Urine culture    Urinalysis with microscopic       History of Present Illness     39 yo female patient with a PMH of  GERD, HTN, thyroid nodule, low back pain, blepharospasm,  "RONALDO III, anxiety, fibrocystic breast changes and insomnia, HLD,prediabetes, Vitamin D deficiency comes to the office due to neck pain. She reports she saw a neurologist and had an MRI which was negative.  Patient reports she has been experiencing pain since she has been working in a specific position with her head bent down, occasionally having to move it left and right.  She reports that she is in this position throughout her workday.  She has been trying icing and Frankincense oil with some improvement in her symptoms.  Of note she also reports she has been having a varicose vein on her left foot.  It is currently asymptomatic.  She denies any swelling, erythema or pain of her left calf.  Denies pain or decreased range of movement of her foot.  Patient also reports has been having for the past several weeks foul-smelling urine.  She does not report any symptoms such as burning, pain with urination.           History obtained from : patient  Review of Systems   Constitutional:  Negative for chills, fatigue and fever.   HENT:  Negative for congestion, ear pain, rhinorrhea and sore throat.    Eyes:  Negative for visual disturbance.   Respiratory:  Negative for cough, chest tightness and shortness of breath.    Cardiovascular:  Negative for chest pain and palpitations.   Gastrointestinal:  Negative for abdominal pain, constipation, diarrhea, nausea and vomiting.   Genitourinary:  Positive for dysuria (Foul-smelling urine). Negative for difficulty urinating and hematuria.   Musculoskeletal:  Positive for back pain (Cervical and thoracic). Negative for arthralgias.   Skin:  Negative for rash.   Neurological:  Negative for seizures, syncope, light-headedness and headaches.   All other systems reviewed and are negative.          Objective     /80 (BP Location: Right arm, Patient Position: Sitting, Cuff Size: Large)   Pulse 80   Temp (!) 97.3 °F (36.3 °C) (Temporal)   Resp 16   Ht 5' 2\" (1.575 m)   Wt 83.9 kg " (185 lb)   LMP 12/06/2023 (Exact Date)   SpO2 99%   BMI 33.84 kg/m²     Physical Exam  Vitals and nursing note reviewed.   Constitutional:       General: She is not in acute distress.     Appearance: She is well-developed.   HENT:      Head: Normocephalic and atraumatic.   Eyes:      Conjunctiva/sclera: Conjunctivae normal.   Cardiovascular:      Rate and Rhythm: Normal rate and regular rhythm.      Pulses: Normal pulses.      Heart sounds: Normal heart sounds. No murmur heard.  Pulmonary:      Effort: Pulmonary effort is normal. No respiratory distress.      Breath sounds: Normal breath sounds.   Abdominal:      Palpations: Abdomen is soft.      Tenderness: There is no abdominal tenderness.   Musculoskeletal:         General: Tenderness (Tenderness to palpation of the cervical spine with left-sided muscle spasm) present. No swelling. Normal range of motion.      Cervical back: Neck supple.      Right lower leg: No edema.      Left lower leg: No edema.   Skin:     General: Skin is warm and dry.      Capillary Refill: Capillary refill takes less than 2 seconds.      Comments: small purpleish torturous vein palpable on the upper aspect of the foot, nontender.  Surrounding skin normal.   Neurological:      General: No focal deficit present.      Mental Status: She is alert and oriented to person, place, and time.      Cranial Nerves: No cranial nerve deficit.      Motor: No weakness.      Gait: Gait normal.   Psychiatric:         Mood and Affect: Mood normal.

## 2024-10-25 ENCOUNTER — HOSPITAL ENCOUNTER (OUTPATIENT)
Dept: RADIOLOGY | Facility: HOSPITAL | Age: 39
End: 2024-10-25
Payer: COMMERCIAL

## 2024-10-25 ENCOUNTER — OFFICE VISIT (OUTPATIENT)
Dept: FAMILY MEDICINE CLINIC | Facility: CLINIC | Age: 39
End: 2024-10-25

## 2024-10-25 ENCOUNTER — TELEPHONE (OUTPATIENT)
Dept: FAMILY MEDICINE CLINIC | Facility: CLINIC | Age: 39
End: 2024-10-25

## 2024-10-25 VITALS
RESPIRATION RATE: 16 BRPM | HEIGHT: 62 IN | HEART RATE: 80 BPM | SYSTOLIC BLOOD PRESSURE: 138 MMHG | WEIGHT: 185 LBS | TEMPERATURE: 97.3 F | DIASTOLIC BLOOD PRESSURE: 80 MMHG | BODY MASS INDEX: 34.04 KG/M2 | OXYGEN SATURATION: 99 %

## 2024-10-25 DIAGNOSIS — M54.2 NECK PAIN: Primary | ICD-10-CM

## 2024-10-25 DIAGNOSIS — M25.521 PAIN OF BOTH ELBOWS: ICD-10-CM

## 2024-10-25 DIAGNOSIS — M25.522 PAIN OF BOTH ELBOWS: ICD-10-CM

## 2024-10-25 DIAGNOSIS — K21.9 GASTROESOPHAGEAL REFLUX DISEASE, UNSPECIFIED WHETHER ESOPHAGITIS PRESENT: ICD-10-CM

## 2024-10-25 DIAGNOSIS — M54.9 MID BACK PAIN ON LEFT SIDE: ICD-10-CM

## 2024-10-25 DIAGNOSIS — R07.81 RIB PAIN ON LEFT SIDE: ICD-10-CM

## 2024-10-25 DIAGNOSIS — M54.2 NECK PAIN: ICD-10-CM

## 2024-10-25 DIAGNOSIS — R82.90 FOUL SMELLING URINE: ICD-10-CM

## 2024-10-25 DIAGNOSIS — R51.9 HEADACHE: ICD-10-CM

## 2024-10-25 DIAGNOSIS — M54.41 CHRONIC BILATERAL LOW BACK PAIN WITH RIGHT-SIDED SCIATICA: ICD-10-CM

## 2024-10-25 DIAGNOSIS — G89.29 CHRONIC BILATERAL LOW BACK PAIN WITH RIGHT-SIDED SCIATICA: ICD-10-CM

## 2024-10-25 PROBLEM — R91.1 PULMONARY NODULE: Status: ACTIVE | Noted: 2024-10-25

## 2024-10-25 LAB
BACTERIA UR QL AUTO: NORMAL /HPF
BILIRUB UR QL STRIP: NEGATIVE
CLARITY UR: CLEAR
COLOR UR: NORMAL
GLUCOSE UR STRIP-MCNC: NEGATIVE MG/DL
HGB UR QL STRIP.AUTO: NEGATIVE
KETONES UR STRIP-MCNC: NEGATIVE MG/DL
LEUKOCYTE ESTERASE UR QL STRIP: NEGATIVE
NITRITE UR QL STRIP: NEGATIVE
NON-SQ EPI CELLS URNS QL MICRO: NORMAL /HPF
PH UR STRIP.AUTO: 6.5 [PH]
PROT UR STRIP-MCNC: NEGATIVE MG/DL
RBC #/AREA URNS AUTO: NORMAL /HPF
SP GR UR STRIP.AUTO: 1.01 (ref 1–1.03)
UROBILINOGEN UR STRIP-ACNC: <2 MG/DL
WBC #/AREA URNS AUTO: NORMAL /HPF

## 2024-10-25 PROCEDURE — 87086 URINE CULTURE/COLONY COUNT: CPT

## 2024-10-25 PROCEDURE — 71046 X-RAY EXAM CHEST 2 VIEWS: CPT

## 2024-10-25 PROCEDURE — 72050 X-RAY EXAM NECK SPINE 4/5VWS: CPT

## 2024-10-25 PROCEDURE — 81001 URINALYSIS AUTO W/SCOPE: CPT

## 2024-10-25 RX ORDER — NAPROXEN 500 MG/1
500 TABLET ORAL 2 TIMES DAILY WITH MEALS
Qty: 20 TABLET | Refills: 0 | Status: SHIPPED | OUTPATIENT
Start: 2024-10-25 | End: 2024-11-04

## 2024-10-25 RX ORDER — LIDOCAINE 50 MG/G
1 PATCH TOPICAL DAILY
Qty: 14 PATCH | Refills: 0 | Status: SHIPPED | OUTPATIENT
Start: 2024-10-25

## 2024-10-25 RX ORDER — CYCLOBENZAPRINE HCL 5 MG
5 TABLET ORAL 3 TIMES DAILY PRN
Qty: 30 TABLET | Refills: 0 | Status: SHIPPED | OUTPATIENT
Start: 2024-10-25 | End: 2024-11-04

## 2024-10-25 RX ORDER — LIDOCAINE 50 MG/G
OINTMENT TOPICAL AS NEEDED
Qty: 50 G | Refills: 3 | Status: SHIPPED | OUTPATIENT
Start: 2024-10-25

## 2024-10-25 RX ORDER — FAMOTIDINE 20 MG/1
20 TABLET, FILM COATED ORAL 2 TIMES DAILY
Qty: 180 TABLET | Refills: 1 | Status: SHIPPED | OUTPATIENT
Start: 2024-10-25

## 2024-10-25 NOTE — TELEPHONE ENCOUNTER
Called patient to discuss cervical spine x-ray results.  No answer at this time, will try again another time.

## 2024-10-25 NOTE — ASSESSMENT & PLAN NOTE
Well-controlled with Pepcid 20 mg twice daily  Orders:    famotidine (PEPCID) 20 mg tablet; Take 1 tablet (20 mg total) by mouth 2 (two) times a day

## 2024-10-25 NOTE — TELEPHONE ENCOUNTER
Called patient again to discuss x-ray results.  Discussed that patient has degenerative changes of the cervical spine, along with a 1.5 cm nodule in the right lungs, which will need repeat imaging in 6 months.  Patient in agreement with plan to proceed with physical therapy for now due to the back.  Patient did ask for a stronger lidocaine cream if possible, will send lidocaine 5% ointment to her pharmacy.

## 2024-10-26 LAB — BACTERIA UR CULT: NORMAL

## 2024-11-01 ENCOUNTER — TELEPHONE (OUTPATIENT)
Dept: FAMILY MEDICINE CLINIC | Facility: CLINIC | Age: 39
End: 2024-11-01

## 2024-11-01 NOTE — TELEPHONE ENCOUNTER
PCP SIGNATURE NEEDED FOR Cover My Meds Prior Authorization FORM RECEIVED VIA FAX AND PLACED IN PCP FOLDER TO BE DELIVERED AT ASSIGNED TIMES.

## 2024-11-07 NOTE — TELEPHONE ENCOUNTER
FAXED ON 11/07/24 TO [facility] at [phone]. FAX CONFIRMATION RECEIVED.

## 2024-11-08 ENCOUNTER — TELEPHONE (OUTPATIENT)
Dept: FAMILY MEDICINE CLINIC | Facility: CLINIC | Age: 39
End: 2024-11-08

## 2024-11-08 NOTE — TELEPHONE ENCOUNTER
PCP SIGNATURE NEEDED FOR [facility]  FORM RECEIVED VIA FAX AND PLACED IN PCP FOLDER TO BE DELIVERED AT ASSIGNED TIMES.    Lidocaine Patch 5 %

## 2024-11-11 PROBLEM — G89.29 CHRONIC BILATERAL LOW BACK PAIN: Status: ACTIVE | Noted: 2024-11-11

## 2024-11-11 PROBLEM — M54.50 CHRONIC BILATERAL LOW BACK PAIN: Status: ACTIVE | Noted: 2024-11-11

## 2024-11-21 ENCOUNTER — OFFICE VISIT (OUTPATIENT)
Dept: FAMILY MEDICINE CLINIC | Facility: CLINIC | Age: 39
End: 2024-11-21

## 2024-11-21 VITALS
WEIGHT: 181 LBS | DIASTOLIC BLOOD PRESSURE: 70 MMHG | HEIGHT: 62 IN | SYSTOLIC BLOOD PRESSURE: 124 MMHG | TEMPERATURE: 98.5 F | OXYGEN SATURATION: 96 % | BODY MASS INDEX: 33.31 KG/M2 | RESPIRATION RATE: 18 BRPM | HEART RATE: 91 BPM

## 2024-11-21 DIAGNOSIS — Z00.00 ANNUAL PHYSICAL EXAM: Primary | ICD-10-CM

## 2024-11-21 DIAGNOSIS — I10 PRIMARY HYPERTENSION: ICD-10-CM

## 2024-11-21 DIAGNOSIS — Z28.21 IMMUNIZATION DECLINED: ICD-10-CM

## 2024-11-21 DIAGNOSIS — E78.2 MODERATE MIXED HYPERLIPIDEMIA NOT REQUIRING STATIN THERAPY: ICD-10-CM

## 2024-11-21 DIAGNOSIS — K21.9 GASTROESOPHAGEAL REFLUX DISEASE, UNSPECIFIED WHETHER ESOPHAGITIS PRESENT: ICD-10-CM

## 2024-11-21 PROBLEM — M54.41 ACUTE BILATERAL LOW BACK PAIN WITH RIGHT-SIDED SCIATICA: Status: RESOLVED | Noted: 2021-02-10 | Resolved: 2024-11-21

## 2024-11-21 PROCEDURE — 99396 PREV VISIT EST AGE 40-64: CPT | Performed by: INTERNAL MEDICINE

## 2024-11-21 PROCEDURE — 99213 OFFICE O/P EST LOW 20 MIN: CPT | Performed by: INTERNAL MEDICINE

## 2024-11-21 RX ORDER — AMLODIPINE BESYLATE 10 MG/1
10 TABLET ORAL DAILY
Qty: 90 TABLET | Refills: 3 | Status: SHIPPED | OUTPATIENT
Start: 2024-11-21

## 2024-11-21 RX ORDER — FAMOTIDINE 20 MG/1
20 TABLET, FILM COATED ORAL 2 TIMES DAILY
Qty: 180 TABLET | Refills: 1 | Status: SHIPPED | OUTPATIENT
Start: 2024-11-21

## 2024-11-21 NOTE — ASSESSMENT & PLAN NOTE
Well controlled with Pepcid 20 mg daily     Orders:    famotidine (PEPCID) 20 mg tablet; Take 1 tablet (20 mg total) by mouth 2 (two) times a day

## 2024-11-21 NOTE — PROGRESS NOTES
Adult Annual Physical  Name: Tyra Paz      : 1985      MRN: [id number]  Encounter Provider: Maximo Varela MD  Encounter Date: 2024   Encounter department: [facility]    Assessment & Plan  Annual physical exam       Primary hypertension  Blood Pressure: 124/70   At goal     Well controlled with amlodipine 10 mg daily   Continue with lifestyle modifications    Orders:    amLODIPine (NORVASC) 10 mg tablet; Take 1 tablet (10 mg total) by mouth daily    Gastroesophageal reflux disease, unspecified whether esophagitis present  Well controlled with Pepcid 20 mg daily     Orders:    famotidine (PEPCID) 20 mg tablet; Take 1 tablet (20 mg total) by mouth 2 (two) times a day    Moderate mixed hyperlipidemia not requiring statin therapy    Orders:    Lipid Panel with Direct LDL reflex; Future    Immunization declined         Immunizations and preventive care screenings were discussed with patient today. Appropriate education was printed on patient's after visit summary.    Counseling:  Alcohol/drug use: discussed moderation in alcohol intake, the recommendations for healthy alcohol use, and avoidance of illicit drug use.  Dental Health: discussed importance of regular tooth brushing, flossing, and dental visits.  Injury prevention: discussed safety/seat belts, safety helmets, smoke detectors, carbon monoxide detectors, and smoking near bedding or upholstery.  Sexual health: discussed sexually transmitted diseases, partner selection, use of condoms, avoidance of unintended pregnancy, and contraceptive alternatives.  Exercise: the importance of regular exercise/physical activity was discussed. Recommend exercise 3-5 times per week for at least 30 minutes.          History of Present Illness     Adult Annual Physical:  Patient presents for annual physical.     Diet and Physical Activity:  - Diet/Nutrition: well balanced diet.  - Exercise: no formal  "exercise. plans on starting    Depression Screening:  - PHQ-2 Score: 1    General Health:  - Sleep: sleeps well.  - Hearing: normal hearing bilateral ears.  - Vision: no vision problems.  - Dental: regular dental visits.    /GYN Health:  - Follows with GYN: yes.   - Menopause: postmenopausal.   - History of STDs: no    Review of Systems   Constitutional:  Negative for chills, fatigue and fever.   HENT:  Negative for congestion, ear pain, rhinorrhea and sore throat.    Eyes:  Negative for visual disturbance.   Respiratory:  Negative for cough, chest tightness and shortness of breath.    Cardiovascular:  Negative for chest pain and palpitations.   Gastrointestinal:  Negative for abdominal pain, constipation, diarrhea, nausea and vomiting.   Genitourinary:  Negative for difficulty urinating, dysuria and hematuria.   Musculoskeletal:  Negative for arthralgias and back pain.   Skin:  Negative for rash.   Neurological:  Negative for seizures, syncope, light-headedness and headaches.   All other systems reviewed and are negative.        Objective   /70 (BP Location: Right arm, Patient Position: Sitting, Cuff Size: Large)   Pulse 91   Temp 98.5 °F (36.9 °C) (Temporal)   Resp 18   Ht 5' 2\" (1.575 m)   Wt 82.1 kg (181 lb)   LMP 12/06/2023 (Exact Date)   SpO2 96%   BMI 33.11 kg/m²     Physical Exam  Vitals and nursing note reviewed.   Constitutional:       General: She is not in acute distress.     Appearance: She is well-developed.   HENT:      Head: Normocephalic and atraumatic.      Right Ear: Tympanic membrane, ear canal and external ear normal. There is no impacted cerumen.      Left Ear: Tympanic membrane, ear canal and external ear normal. There is no impacted cerumen.      Nose: Nose normal. No congestion or rhinorrhea.      Mouth/Throat:      Mouth: Mucous membranes are moist.      Pharynx: Oropharynx is clear. No posterior oropharyngeal erythema.   Eyes:      Conjunctiva/sclera: Conjunctivae normal. "   Cardiovascular:      Rate and Rhythm: Normal rate and regular rhythm.      Pulses: Normal pulses.      Heart sounds: Normal heart sounds. No murmur heard.  Pulmonary:      Effort: Pulmonary effort is normal. No respiratory distress.      Breath sounds: Normal breath sounds.   Abdominal:      General: Abdomen is flat.      Palpations: Abdomen is soft.      Tenderness: There is no abdominal tenderness.   Musculoskeletal:         General: No swelling. Normal range of motion.      Cervical back: Neck supple.      Right lower leg: No edema.      Left lower leg: No edema.   Skin:     General: Skin is warm and dry.      Capillary Refill: Capillary refill takes less than 2 seconds.   Neurological:      Mental Status: She is alert.   Psychiatric:         Mood and Affect: Mood normal.

## 2024-11-21 NOTE — ASSESSMENT & PLAN NOTE
Blood Pressure: 124/70   At goal     Well controlled with amlodipine 10 mg daily   Continue with lifestyle modifications    Orders:    amLODIPine (NORVASC) 10 mg tablet; Take 1 tablet (10 mg total) by mouth daily

## 2025-01-24 ENCOUNTER — OFFICE VISIT (OUTPATIENT)
Dept: FAMILY MEDICINE CLINIC | Facility: CLINIC | Age: 40
End: 2025-01-24

## 2025-01-24 VITALS
TEMPERATURE: 98.1 F | DIASTOLIC BLOOD PRESSURE: 80 MMHG | SYSTOLIC BLOOD PRESSURE: 122 MMHG | RESPIRATION RATE: 18 BRPM | WEIGHT: 183 LBS | HEART RATE: 83 BPM | BODY MASS INDEX: 33.68 KG/M2 | OXYGEN SATURATION: 98 % | HEIGHT: 62 IN

## 2025-01-24 DIAGNOSIS — N64.4 BREAST PAIN: ICD-10-CM

## 2025-01-24 DIAGNOSIS — M54.2 NECK PAIN: ICD-10-CM

## 2025-01-24 DIAGNOSIS — G89.29 CHRONIC UPPER BACK PAIN: Primary | ICD-10-CM

## 2025-01-24 DIAGNOSIS — M54.9 CHRONIC UPPER BACK PAIN: Primary | ICD-10-CM

## 2025-01-24 PROBLEM — T14.8XXA SKIN WOUND FROM SURGICAL INCISION: Status: RESOLVED | Noted: 2024-03-15 | Resolved: 2025-01-24

## 2025-01-24 RX ORDER — CYCLOBENZAPRINE HCL 10 MG
10 TABLET ORAL 3 TIMES DAILY PRN
Qty: 30 TABLET | Refills: 1 | Status: SHIPPED | OUTPATIENT
Start: 2025-01-24 | End: 2025-02-13

## 2025-01-24 NOTE — PROGRESS NOTES
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), specifically of the left arm, and left upper back   She also has associated left breast pain (see plan bellow)     Plan   Continue Tylenol 650 mg q8h as needed along with stretching exercises  Will add Flexeril to be used only as needed, due to associated muscle spasm of the thoracic region   We discussed the possibility of physical therapy which patient is currently not able to schedule due to personal scheduling conflicts    Orders:    cyclobenzaprine (FLEXERIL) 10 mg tablet; Take 1 tablet (10 mg total) by mouth 3 (three) times a day as needed for muscle spasms for up to 20 days    Breast pain  7 X 6 X 3 mm cyst at 3 o clock of the left breast on ultrasound for what patient follows with GYN and has regular mammographies and ultrasounds  Breast pain could be in the setting of the cyst versus repetitive movements at work with possible chafing of the nipple/periareolar skin  No signs of infection noted, no masses appreciated on examination    Plan  Apply Voltaren gel 4 times daily as needed on left breast.  Voltaren gel seems to not be covered by patient's insurance plan and recommended purchasing an OTC.  Instructions provided in AVS.   Follow-up in 2 weeks if symptoms fail to improve  Orders:    Diclofenac Sodium (VOLTAREN) 1 %; Apply 2 g topically 4 (four) times a day           History of Present Illness   39-year-old female patient with a past medical history of left breast cyst, hypertension, GERD, fibrocystic breast disease, prediabetes and vitamin D deficiency who comes to the office due to left breast pain.  The pain has been chronic however  "worsening recently.  Reported repetitive movements at work which worsen the pain.  She also has associated left upper thoracic back pain and muscle spasms.  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  She denies any weakness, sensory deficits, fever, chills.  She denies any nipple discharge, nausea, vomiting or urinary symptoms.      Review of Systems   Constitutional:  Negative for chills, fatigue and fever.   HENT:  Negative for congestion, ear pain, rhinorrhea and sore throat.    Eyes:  Negative for visual disturbance.   Respiratory:  Negative for cough, chest tightness and shortness of breath.    Cardiovascular:  Negative for chest pain and palpitations.   Gastrointestinal:  Negative for abdominal pain, constipation, diarrhea, nausea and vomiting.   Genitourinary:  Negative for difficulty urinating, dysuria and hematuria.   Musculoskeletal:  Negative for arthralgias and back pain.   Skin:  Negative for rash.   Neurological:  Negative for seizures, syncope, light-headedness and headaches.   All other systems reviewed and are negative.      Objective   /80 (BP Location: Right arm, Patient Position: Sitting, Cuff Size: Large)   Pulse 83   Temp 98.1 °F (36.7 °C) (Temporal)   Resp 18   Ht 5' 2\" (1.575 m)   Wt 83 kg (183 lb)   LMP 12/06/2023 (Exact Date)   SpO2 98%   Breastfeeding No   BMI 33.47 kg/m²      Physical Exam  Vitals and nursing note reviewed. Exam conducted with a chaperone present.   Constitutional:       General: She is not in acute distress.     Appearance: She is well-developed.   HENT:      Head: Normocephalic and atraumatic.   Eyes:      Conjunctiva/sclera: Conjunctivae normal.   Cardiovascular:      Rate and Rhythm: Normal rate and regular rhythm.      Heart sounds: No murmur heard.  Pulmonary:      Effort: Pulmonary effort is normal. No respiratory distress.      Breath sounds: Normal breath sounds.   Chest:      Chest wall: No mass, " lacerations, deformity, swelling, tenderness, crepitus or edema. There is no dullness to percussion.   Breasts:     Breasts are symmetrical.      Right: Normal. No swelling, bleeding, inverted nipple, mass, nipple discharge, skin change or tenderness.      Left: Normal. No swelling, bleeding, inverted nipple, mass, nipple discharge, skin change or tenderness.       Abdominal:      Palpations: Abdomen is soft.      Tenderness: There is no abdominal tenderness.   Musculoskeletal:         General: No swelling.      Cervical back: Neck supple.   Lymphadenopathy:      Upper Body:      Right upper body: No supraclavicular, axillary or pectoral adenopathy.      Left upper body: No supraclavicular, axillary or pectoral adenopathy.   Skin:     General: Skin is warm and dry.      Capillary Refill: Capillary refill takes less than 2 seconds.   Neurological:      Mental Status: She is alert.   Psychiatric:         Mood and Affect: Mood normal.

## 2025-01-24 NOTE — PATIENT INSTRUCTIONS
If not able to have this covered by insurance, please buy over the counter the gel - Voltaren gel (also named diclofenac) > you may apply this gel to your left breast up to 4 times daily as needed

## 2025-04-03 NOTE — ANESTHESIA PREPROCEDURE EVALUATION
Procedure:  COLONOSCOPY    Relevant Problems   ANESTHESIA   (+) PONV (postoperative nausea and vomiting)      CARDIO   (+) Breast pain, left   (+) Hyperlipidemia   (+) Hypertension   (+) Rib pain on left side      GI/HEPATIC   (+) GERD (gastroesophageal reflux disease)      MUSCULOSKELETAL   (+) Acute bilateral low back pain with right-sided sciatica   (+) Blepharospasm      NEURO/PSYCH   (+) Anxiety   (+) Left temporal headache                     no clubbing/no cyanosis/no pedal edema

## 2025-04-16 ENCOUNTER — OFFICE VISIT (OUTPATIENT)
Age: 40
End: 2025-04-16
Payer: COMMERCIAL

## 2025-04-16 VITALS
SYSTOLIC BLOOD PRESSURE: 140 MMHG | BODY MASS INDEX: 32.76 KG/M2 | HEART RATE: 80 BPM | HEIGHT: 62 IN | WEIGHT: 178 LBS | DIASTOLIC BLOOD PRESSURE: 100 MMHG

## 2025-04-16 DIAGNOSIS — M54.2 CERVICALGIA: Primary | ICD-10-CM

## 2025-04-16 PROCEDURE — 99215 OFFICE O/P EST HI 40 MIN: CPT | Performed by: PSYCHIATRY & NEUROLOGY

## 2025-04-16 RX ORDER — BACLOFEN 10 MG/1
10 TABLET ORAL
Qty: 60 TABLET | Refills: 1 | Status: SHIPPED | OUTPATIENT
Start: 2025-04-16

## 2025-04-16 NOTE — PROGRESS NOTES
NEUROLOGY OUTPATIENT - FOLLOW UP PATIENT VISIT NOTE        NAME: Tyra Paz  MRN: [id number]  : 1985     TODAY'S DATE: 25         HISTORY OF PRESENT ILLNESS (HPI):    Ms. Siria Paz is a 39 y.o. female presenting with Migraine      INTERIM HISTORY:     Frequency of headaches days : ***/wk  Intensity of the headaches days: ***    Medicine for headaches:   ***    Side effects from the medications.***    Failed medications:   ***    Any imaging including CTH or MRI of the brain: ***    Hydration: ***  Sleep hygiene: ***  Triggers for migraine headaches:***    PRIOR NOTES:  During her last clinic visit she was seen for headaches and recommended to get an MRI of the brain came back with in normal limits. She was started on Elavil for her headaches and Prednisone taper for headaches.  She never completed the prednisone taper and has been taking it on as-needed basis.  She also is using Pamelor 10 mg at bedtime but mentions that it is not helping with her symptoms.  Reports having 6 or 7 headache events over the last month over the left temporal area, which she describes as throbbing. She took rizatriptan with relief. Describes improvement in headache, but continues to endorse anxiety. .This usually works from 2 to 10 PM and then probably sleep for 4 to 5 hours on average.  She also has a lot of stress at home in regards to her boyfriend's family which might also be contributing towards the headaches.  She mentions that the pressure and the tension on the left side of her temple seem to be easing up as well.     HEADACHE DESCRIPTION:    Onset of headaches: gradual several years back--The left temple headaches started about 2 months back  Location of headaches: left-sided unilateral, temporal, and retro-orbital  Quality of the pain: throbbing  Intensity of the headache: At present: 3/10;  At its worst:  10/10  Duration of the headaches:hour(s)  Frequency of the headaches:daily  Presence of  aura:  No  Associated symptoms with headaches: no vomiting , +nausea, +light sensitivity , +sound sensitivity, and dizziness --She is complaining of numbness on the left V 2 distribution which is associated with the headache and vertigo which is associated with headache   Triggers:No   Positional component: Yes   Alleviating factors: No   Any specific time of the day when get the headaches: no particular time of day    Family history of migraine headaches: No  History of neck and head trauma: No  Smoking status: No  Oral contraceptive use: Yes    Life style factors:  -Hydration: adequate  -Sleep: inadequate--She is using the Trazodone to help sleep at night (50 mg) for 2 years  -Caffeine intake: 2 cups of caffeinated coffee per day(s)  -Alcohol intake: none     Impact of headches:  -Number of headaches in past 30 days: 30/30 days.   -Number of days missed per month because of headache: none days missed in the last 30 days.   -Number of ER visits for her headaches: No  in the last 30 days.       Treatment:  -Over the counter medications tried for headaches:   Tylenol been ineffective     -Prescription medications:  Abortive or Rescue Medications used:   Sumatriptan been helpful     Preventative or daily medications used for headaches:   Propranolol been ineffective         Red Flags for headache:  Age <5 or >50: No  First worst headaches: No  Maximal at intensity: No  Change in pattern: Yes  New headache in pregnancy, cancer or immunocompromised patient: No  Loss of consciousness or convulsions: No  Headache triggered by exertion, Valsalva maneuver or sexual activity: No  Abnormal exam: please see below in Neurological examination.       CURRENT OUTPATIENT MEDICATIONS:    Tyra ESTRELLA Paz has a current medication list which includes the following prescription(s): acetaminophen, amlodipine, cyanocobalamin, cyclobenzaprine, diclofenac sodium, ergocalciferol, famotidine, lidocaine, lidocaine, magnesium, and  [facility].       PHYSICAL EXAMINATION:   VITAL SIGNS:  height is 5' 2\" (1.575 m) and weight is 80.7 kg (178 lb).        NEUROLOGICAL EXAMINATION: some portions copied from prior notes and updated accordingly      MENTAL STATUS EXAM: Alert, oriented to time place and person     CRANIAL NERVE EXAMINATION:  I Not tested   II Normal visual fields to finger counting.   II, Ill,  IV, VI Pupils were symmetric, briskly reactive. No afferent pupillary defect.  Eye movements are full without nystagmus. No ptosis.   V Facial sensation intact    VII Facial movements were symmetrical   VIII Hearing was intact   IX, X Intact   XI Shoulder shrug and head turn was normal   XII Tongue protrusion is in the mid line.          MOTOR EXAM:        Arm Right  Left Leg Right  Left   Deltoid 5/5 5/5 lliopsoas 5/5 5/5   Biceps 5/5 5/5 Quads 5/5 5/5   Triceps 5/5 5/5 Hamstrings 5/5 5/5   Wrist Extension 5/5 5/5 Ankle Dorsi Flexion 5/5 5/5   Wrist Flexion 5/5 5/5 Ankle Plantar Flexion 5/5 5/5               DEEP TENDON REFLEXES:     Brachioradialis Biceps Triceps Patellar Achilles   Right 2+ 2+ 2+ 2+ 2+   Left 2+ 2+ 2+ 2+ 2+            SENSORY EXAMINATION:   Sensation to dull touch Intact     COORDINATION:   normal finger to nose testing     GAIT/STATION:   normal        DIAGNOSTIC STUDIES:   PERTINENT LABS:     Lab Results   Component Value Date    WBC 4.43 06/26/2024     Lab Results   Component Value Date    HGB 14.3 06/26/2024     Lab Results   Component Value Date     06/26/2024     Lab Results   Component Value Date    LYMPHSABS 1.76 06/26/2024     No results found for: \"LABLYMP\"  Lab Results   Component Value Date    EOSABS 0.07 06/26/2024     No components found for: \"NEUTROPHILS\"    No results found for: \"LABMONO\"         No results found for: \"LABGLUC\"  Lab Results   Component Value Date    CREATININE 0.65 06/26/2024     Lab Results   Component Value Date    AST 12 (L) 06/26/2024     Lab Results   Component Value Date    ALT " 14 06/26/2024     Lab Results   Component Value Date    ALKPHOS 51 06/26/2024     Lab Results   Component Value Date    AST 12 (L) 06/26/2024           Lab Results   Component Value Date    HEPBSAG Non-reactive 01/31/2023    HEPCAB Non-reactive 01/31/2023            NEUROIMAGING:  Results for orders placed during the hospital encounter of 12/17/23    MRI brain with and without contrast    Impression  No mass effect, acute intracranial hemorrhage or evidence of recent infarction. No abnormal parenchymal or leptomeningeal enhancement identified.    Workstation performed: [id number]          Narrative & Impression   CT BRAIN - WITHOUT CONTRAST 3/24/2023     INDICATION:   Dizziness, non-specific  dizzy.     COMPARISON:  None.     TECHNIQUE:  CT examination of the brain was performed.  Multiplanar 2D reformatted images were created from the source data.     Radiation dose length product (DLP) for this visit:  841 mGy-cm .  This examination, like all CT scans performed in the [facility], was performed utilizing techniques to minimize radiation dose exposure, including the use of iterative   reconstruction and automated exposure control.       IMAGE QUALITY:  Diagnostic.     FINDINGS:     PARENCHYMA:  No intracranial mass, mass effect or midline shift. No CT signs of acute infarction.  No acute parenchymal hemorrhage.     VENTRICLES AND EXTRA-AXIAL SPACES:  Normal for the patient's age.     VISUALIZED ORBITS: Normal visualized orbits.     PARANASAL SINUSES: Normal visualized paranasal sinuses.     CALVARIUM AND EXTRACRANIAL SOFT TISSUES:  Normal.     IMPRESSION:     No acute intracranial abnormality.             ASSESSMENT AND PLAN:    Ms. Siria Paz is a 39 y.o.female  presenting with follow up for left-sided temporal headache. Patient  has a past medical history of Acute bilateral low back pain with right-sided sciatica (02/10/2021), Anxiety, Arthritis, Chest pain, COVID, Dental crown present,  Dizziness, GERD (gastroesophageal reflux disease), Hypertension, Migraines, Pelvic pain, PONV (postoperative nausea and vomiting), PONV (postoperative nausea and vomiting), Precancerous changes of the cervix, and Skin wound from surgical incision (03/15/2024).. Neurological exam is concerning for left-sided upper motor neuron facial weakness (from prior exam), decreased sensation in the left V2 region (resolved). Neuroimaging including prior CT head was negative in March of this year.  MRI of the brain came back with in normal limits. I feel that the stress/tension and lack of sleep are contributing towards her headaches.        Tension headache/Left temporal headache/Left facial numbness    Due to her underlying anxiety and worsening headaches I would recommend starting her on Effexor 75 mg daily    I have also recommended to the patient that she should finish the course of the prednisone taper.  Prednisone taper: Take 6 tabs for 2 days, 5 tabs for 2 days, 4 tabs for 2 days, 3 tabs for 2 days, 2 tabs for 2 days, 1 tabs for 2 days and then stop     I am not sure if she is taking the Maxalt but I have recommended that she should start taking it on a regular basis    If the headache do not improve, we can consider Botox vs anti CRGP inhibitors.        Failed medications so far:     Pamelor (30 mg ) did not help much with her headaches.   Propranolol secondary to the side effectsincluding vertigo and low blood pressure?  Imitrex did not helped with her headaches.   Trazodone did not helped with her headaches.         The management plan was discussed in detail with the patient and all questions were answered.     Ms. Siria Paz was encouraged to contact our office with any questions or concerns and to contact the clinic or go to the nearest emergency room if symptoms change or worsen.       I have spent a total of 42 min in reviewing and/or ordering tests, medications, or procedures, performing an examination or  evaluation, reviewing pertinent history, counseling and educating the patient, referring and/or communicating with other health care professionals, documenting in the EMR and general coordination of care of Ms. Siriahai Paz  today.           Carmen Wheeler MD.   Staff Neurologist,   Neuroimmunology and Neuroinfectious disease  04/16/25     This report has been created through the use of voice recognition/text compilation software.  Typographical and content errors may occur with this process.  While efforts are made to detect and correct such errors, in some cases errors will persist.  For this reason, wording in this document should be considered in the proper context and not strictly verbatim.  If, when reviewing the document, an error is discovered, please let the office know at [phone]

## 2025-04-16 NOTE — PATIENT INSTRUCTIONS
"Headache Preventive Treatment:   Please keep in mind that it takes 4-6 weeks for the medication to start working well and 2-3 months at the appropriate dose before deciding if it will be useful or not. If it is not helping at all by this time, then we will discuss other medications to try. Supplements may take 3-6 months until you see full effect.     HEADACHE DIET: Foods and beverages which may trigger migraine  Note that only 20% of headache patients are food sensitive. You will know if you are food sensitive if you get a headache consistently 20 minutes to 2 hours after eating a certain food. Only cut out a food if it causes headaches, otherwise you might remove foods you enjoy! What matters most for diet is to eat a well balanced healthy diet full of vegetables and low fat protein, and to not miss meals.    Chocolate, other sweets    ALL cheeses except cottage and cream cheese    Dairy products, yogurt, sour cream, ice cream    Liver    Meat extracts (Bovril, Marmite, meat tenderizers)    Meats or fish which have undergone aging, fermenting, pickling or smoking. These include: Hotdogs,salami,Lox,sausage,  mortadellas,smoked salmon, pepperoni, Pickled herring    Pods of broad bean (English beans, Chinese pea pods, Italian (nacho) beans, lima and navy beans    Ripe avocado, ripe banana    Yeast extracts or active yeast preparations such as Stone's or Bennett's (commercial bakes goods are permitted)    Tomato based foods, pizza (lasagna, etc.)    MSG (monosodium glutamate) is disguised as many things; look for these common aliases:  · Monopotassium glutamate  · Autolysed yeast  · Hydrolysed protein  · Sodium caseinate  · “flavorings”  · “all natural preservatives\"    Nutrasweet    Avoid all other foods that convincingly provoke headaches.    Headache Prevention Strategies:    1. Maintain a headache diary; learn to identify and avoid triggers. Common triggers include:    Emotional triggers:  · Emotional/Upset " family or friends  · Emotional/Upset occupation  · Business reversal/success  · Anticipation anxiety  · Crisis-serious  · Post-crisis periodNew job/position    Physical triggers:  · Vacation Day  · Weekend  · Strenuous Exercise  · [place] Location  · New Move  · Menstrual Day  · Physical Illness  · Oversleep/Not enough sleep  · Weather changes  · Light: Photophobia or light sesnitivity treatment involves a balance between desensitization and reduction in overly strong input. Use dark polarized glasses outside, but not inside. Avoid bright or fluorescent light, but do not dim environment to the point that going into a normally lit room hurts. Consider FL-41 tint lenses, which reduce the most irritating wavelengths without blocking too much light. These can be obtained at [employer] or [employer]  · Foods: see list above.    2. Limit use of acute treatments (over-the-counter medications, triptans, etc.) to no more than 2 days per week or 10 days per month to prevent medication overuse headache (rebound headache).     3. Follow a regular schedule (including weekends and holidays):  · Don't skip meals. Eat a balanced diet.  · 8 hours of sleep nightly.  · Minimize stress.  · Exercise 30 minutes per day. Being overweight is associated with a 5 times increased risk of chronic migraine.  · Keep well hydrated and drink 6-8 glasses of water per day.    4. Initiate non-pharmacologic measures at the earliest onset of your headache.  · Rest and quiet environment.  · Relax and reduce stress. [id number] is a free marin that can instruct you on some simple relaxtion and breathing techniques. Http://"[employer] is a free website that provides teaching videos on relaxation. Also, there are many apps that can be downloaded for “mindful” relaxation. An marin called YOGA NIDRA will help walk you through mindfulness.  · Cold compresses.    5. Don't wait!! Take the maximum allowable dosage of prescribed medication at the  first sign of migraine.    6. Compliance: Take prescribed medication regularly as directed and at the first sign of a migraine.    7. Communicate: Call your physician when problems arise, especially if your headaches change, increase in frequency/severity, or become associated with neurological symptoms (weakness, numbness, slurred speech, etc.).    8. Headache/pain management therapies: Consider various complementary methods, including medication, behavioral therapy, psychological counselling, biofeedback, massage therapy, acupuncture, dry needling, and other modalities. Such measures may reduce the need for medications. Counseling for pain management, where patients learn to function and ignore/minimize their pain, seems to work very well.    9. Recommend changing family's attention and focus away from patient's headaches. Instead, emphasize daily activities. If first question of day is 'How are your headaches/Do you have a headache today?', then patient will constantly think about headaches, thus making them worse. Goal is to re-direct attention away from headaches, toward daily activities and other distractions.    10. Helpful Websites:  www.AmericanHeadacheSociety.org  www.migrainetrust.org  www.headaches.org  www.migraine.org.uk  www.achenet.org    11. HEADACHE EXPECTATIONS:  There are many types of headaches, and only a rare few in which complete relief can be expected. In general, there is no cure for headache, especially migraine based headaches. There is nothing available that completely prevents headaches from occurring, breaking through, or having periodic flare-ups and fluctuations. Regardless of what you are using on a daily basis for prevention, episodic headaches should still be expected, and periods where frequency may escalate and fluctuate are unavoidable.    There is no quick fix for most headaches. Furthermore, the longer you have had high frequency headaches (such as chronic daily headache),  "the longer it will likely take to expect any improvement. In fact, some people will never improve, regardless of how many medications or other treatments we try. Our treatment strategy is to evaluate for possible causes of your headache, although testing is usually always normal, even in cases of daily continuous headaches for years. Most types of headache such as migraine are electrical brain disorders (similar to how epilepsy is an electrical brain disorders). Therefore, there is no testing that will reveal this \"dysfunctional electrical circuitry\" such on MRI, or other testing. We try to find a medication that may help lessen the frequency and/or severity of your headaches. The goal is not to completely stop them from happening, although if that happens, great! Different people respond to different medications, and some people just don't respond to anything, so it's usually a matter of trying different options. We can not predict if or when exactly you will respond to a treatment that we provide.    Preventive headache medications take 4-6 weeks to start working, and 2-3 months to see full effect, assuming you reach an effective dose. Therefore, calling or messaging frequently because you have a headache flare prior to the 3 month jaleel is unlikely to change anything, and unfortunately there is nothing available that will expedite this, so please try to avoid this. Our recommendation will generally be to give it adequate time first. If you are unable to wait it out for medications to work, we can also try IV infusions for some temporary relief.     In general, the best that preventive medications or other treatments (including Botox) are able to offer in migraine management (variable in other headache types) is a 50% improvement in frequency and/or severity of headache. That is our goal, and any additional benefit is considered a bonus. Some people do significantly better than this, others do not get close to " this. Therefore, if your headaches are not improving by at least 3 months on your preventive strategy, contact us and we can discuss further adjustments. Keep in mind that complete headache cure is not a realistic expectation.    Our Team:  The nursing staff, and medical assistants are a major part of YOUR TREATMENT TEAM and will be handling your phone calls and inquiries, if any. Unless explicitly told otherwise at the time of your office visit, your study results and ensuing treatment plans will be released via [employer] and discussed during your follow-up appointment. Follow-up appointments are primarily provided by the Nurse Practioners and Physician’s Assistants in order to provide timely, accessible care.     MyChart: Please ask the schedulers to give you an activation code. The main way of communication is by [employer] rather than phone lines, so if you have not signed up, please do so. [employer] is also the way that you can review your labs and testing. We are not able to contact everyone to tell them results are normal. If you do not hear back from us regarding testing you have had, it should be considered normal or within normal range. If you have any questions about the results, you are free to message us.    [employer] is meant for simple questions regarding medications, possible side effects, or other simple straight forward questions in limited sentences, rather than multiple paragraphs of discussion. [employer] is not meant for, or efficient for these complex questions, extensive questions, extensive medication adjustments, complex new symptoms or concerns. These issues beyond simple questions require a follow up visit with myself, one of our physician assistants, nurse practitioners, or a Virtual Visit via computer or smart phone, as detailed further down.    Refills:  Please pay attention to when your refills will need to be renewed. Due to the volume of phone calls daily, this could potentially take a few  days, although we certainly try to honor your refill requests as soon as we can. You should call at least 1 week in advance of needing a refill to ensure you do not run out of medication. Keep in mind that refill requests on Fridays may not be filled until the following week.     The [facility] Section does not complete disability or any other insurance-related forms/documention. We will complete FMLA forms. All of the office notes, study results, and other pertinent documentation generated as part of your evaluation will be available to you and to your Primary Care Physician (PCP). Use of this material to complete such forms will be at the discretion of your PCP/referring physician.          Lifestyle adjustments. Irrespective of treatment modalities applied, trigger control and lifestyle modification are indispensable to the successful management of migraine. This is especially important in children, adolescents, or pregnant women where drug treatments must be especially limited.    Although there is no robust evidence for most of the recommendations, most are general health measures that, given the lack of adverse effects and the benefit for general well-being, we consider should be recommended in all patients.    Avoid triggers. Many patients attribute the onset or worsening of pain to specific triggers such as stress, sleep changes, food, or atmospheric changes, among others. It is even possible that the relationship occurs inversely, so that premonitory symptoms such as sleep disturbances and appetite 48 to 72 hours before the onset of pain can be misinterpreted by the patient as the trigger of migraine attacks. The therapeutic implications of this relationship are also unclear. There are possible triggers such as sleep deprivation, fasting, or certain foods that can be easily avoidable. But avoiding other triggers can lead to very restrictive lifestyles with a reduction in quality of life that  does not outweigh the potential beneficial.    Sleep. Another complex relationship is headache and sleep. An excess or lack of sleep can trigger migraine attacks and at the same time rest is one of the most used treatments to improve the symptoms of the migraine attack. Additionally, migraine and other headaches occur comorbidly with sleep disorders. Patients with chronic migraine have a higher prevalence of sleep disorders, specifically poor sleep habits and nonrestorative rest.    Regarding general sleep measures for patients with headache, it is recommended to define regular sleep schedules that allow 8 hours of rest per day, insisting that they remain constant also during the weekend; have dinner 4 hours before bedtime and avoid liquids in the last two hours; and eliminate naps and avoid using screens, television, reading, or listening to music in bed. A nonpharmacological intervention to improve sleep habits can improve headache frequency and even reverse chronic to episodic migraine.    Diet. In the scientific literature, but especially in the informative websites and magazines, multiple and varied diets are proposed that aim to reduce the frequency of headaches. There are two main approaches: elimination diets, which consist of suppressing potentially triggering foods such as chocolate, alcohol, cheese, nuts, or citrus fruits and diets that provide high or low amounts of certain components, ie, rich in vitamin B12, B6, or D or low in histamine, lactose, or fatty acids. The studies are not very rigorous and most do not have a control group (). In addition, it must be considered that food triggers were only associated with onset of headache in less than 10% of the participants.    Dietary recommendations for patients with migraine should be the same as for the general population with special emphasis on the prevention of obesity, which is a factor related to headache chronification. It is recommendable to have  a varied diet, eating five meals a day to avoid periods of prolonged fasting and incorporating water intake to reach around 2.5 liters per day, which should be increased in case of physical activity or increase in temperature or humidity. Specific diets should be recommended solely based on whether there are other comorbidities in the patient.    Caffeine at moderate doses (< 400mg/day: equivalent to two cups of coffee) does not seem to have a negative effect on headache frequency although it should be taken regularly to avoid withdrawal headaches.    Although patients with migraine headaches and cluster headaches may be more susceptible to alcohol as a precipitant, there is no evidence to recommend abstinence from alcohol in all patients. Individual predisposition and cultural factors must be considered.    Exercise. Aerobic exercise can prevent or reduce symptoms of multiple chronic diseases, including headache. With some methodological limitations, there are studies that demonstrate benefits of aerobic exercise as a therapeutic intervention to reduce the frequency and intensity of headaches, as well as the quality of life measured by questionnaires. Exercise can have a beneficial effect on headaches directly but also indirectly, improving sleep quality, mood, cardiovascular function, and preventing weight gain. In addition, it can improve the control of other diseases frequently comorbid with headache such as obesity, hypertension, anxiety, depression, or sleep disorders (). The clinical benefit of yoga as an add-on therapy in patients with episodic migraine has been demonstrated.       Natural supplements for headache:  Magnesium Oxide 500 mg at bed  Coenzyme Q10 300 mg in AM  Vitamin B2- 200 mg twice a day  Feverfew 50 mg twice a day    Vitamins and herbs that show potential    Magnesium: Magnesium (250 mg twice a day or 500 mg at bed) has a relaxant effect on smooth muscles such as blood vessels. Individuals  suffering from frequent or daily headache usually have low magnesium levels which can be increase with daily supplementation of 400-750 mg. Three trials found 40-90% average headache reduction when used as a preventative. Magnesium also demonstrated the benefit in menstrually related migraine. Magnesium is part of the messenger system in the serotonin cascade and it is a good muscle relaxant. It is also useful for constipation which can be a side effect of other medications used to treat migraine. Good sources include nuts, whole grains, and tomatoes.    Riboflavin (vitamin B 2) 200 mg twice a day. This vitamin assists nerve cells in the production of ATP a principal energy storing molecule. It is necessary for many chemical reactions in the body. There have been at least 3 clinical trials of riboflavin using 400 mg per day all of which suggested that migraine frequency can be decreased. All 3 trials showed significant improvement in over half of migraine sufferers. The supplement is found in bread, cereal, milk, meat, and poultry. Most Americans get more riboflavin than the recommended daily allowance, however riboflavin deficiency is not necessary for the supplements to help prevent headache.    Feverfew: Feverfew is a common garden herb native to [place] and popular [facility] as a treatment for disorders typically controlled by aspirin. The mechanism of action is unknown but is believed to be related to a chemical called parthenolide which helps the body use serotonin more effectively. Serotonin helps prevent migraine and assists with resolution when it occurs. Parthenolide also inhibits the release of histamine which is linked to pain and inflammation.    Consistency of active ingredients in different products can be a problem. Some formulations don't have the active ingredient (parthenolide) that prevents migraine. A parthenolide content of 0.2% is generally recommended. Typical dosage is one capsule 3  times a day.    Coenzyme Q10: This is present in almost all cells in the body and is critical component for the conversion of energy. Recent studies have shown that a nutritional supplement of CoQ10 can reduce the frequency of migraine attacks by improving the energy production of cells as with riboflavin. Doses of 150 mg twice a day have been shown to be effective.    Melatonin: Increasing evidence shows correlation between melatonin secretion and headache conditions. Melatonin supplementation has decreased headache intensity and duration. It is widely used as a sleep aid. Sleep is natures way of dealing with migraine. A dose of 3 mg is recommended to start for headaches including cluster headache. Higher doses up to 15 mg has been reviewed for use in Cluster headache and have been used.  The rationale behind using melatonin for cluster is that many theories regarding the cause of Cluster headache center around the disruption of the normal circadian rhythm in the brain. This helps restore the normal circadian rhythm.    Laya: Laya has a small amount of antihistamine and anti-inflammatory action which may help headache. It is primarily used for nausea and may aid in the absorption of other medications.           Topamax/Topiramate is an oral pill which is used for migraine headaches: We typical start it at 25 mg and then increase it slowly over the course of several weeks. Can cause following side effects including numbness, tingling, weight loss, no recommended if you are trying to get pregnant, specific kidney stone issues.     Elavil/Pamelor is an oral pill. It is used for migraine and tension type headaches. We typical start it at a low dose of 10 mg and increase it to 20 mg. Common side effects include Fatigue-Dry mouth-Weight gain-Constipation- Drowsiness. It can also help with mood.     Propranolol/Beta Blockers is an oral pill.  It is started around 40 mg every day and increased accordingly. Common side  effects not used if you have underlying asthma. Can cause low pulse and blood pressure.     Depakote is a pill which is used for migraine headaches and seizures. Usually once or twice a day. Common side effects include the following Nausea--Tiredness--Tremor--Dizziness--Weight gain--Hair loss--Birth defects    QULIPTA is an oral prescription medicine used for the preventive treatment of migraine in adults-Common side effects include, allergic reaction. If you have any kidney problems, liver problems, or plan to get pregnant than it is not recommended.       Ajovy is an injection, which is given once a month under you skin: AJOVY may cause allergic reactions, including itching, rash, and hives that can happen within hours and up to 1 month after receiving AJOVY. Call your healthcare provider or get emergency medical help right away if you have any symptoms of an allergic reaction: swelling of your face, mouth, tongue, throat, or if you have trouble breathing. Talk to your doctor about stopping AJOVY if you have an allergic reaction.    Aimovig is an injection, which is given once a month under you skin. The common side effects include Allergic reactions, including rash or swelling can happen after receiving Aimovig®. This can happen within hours to days after using Aimovig®. Call your HCP or get emergency medical help right away if you have any of the following symptoms of an allergic reaction: swelling of the face, mouth, tongue or throat, or trouble breathing. Constipation with serious complications. Severe constipation can happen after receiving Aimovig®. In some cases people have been hospitalized or needed surgery. Contact your HCP if you have severe constipation or constipation associated with symptoms such as severe or constant belly pain, vomiting, swelling of belly or bloating.High blood pressure. High blood pressure or worsening of high blood pressure can happen after receiving Aimovig®. Contact your  healthcare provider if you have an increase in blood pressure.    Emgality is an injection, which is given once a month under you skin. The initial does is 2 injections and then it is one injection a month there after. Common side effects include allergic reactions, such as itching, rash, hives, and trouble breathing. Allergic reactions can happen days after using Emgality. Call your healthcare provider or get emergency medical help right away if you have any of the following symptoms, which may be part of an allergic reaction: swelling of your face, mouth, tongue, or throat, or trouble breathing. Common side effects--The most common side effects of Emgality are injection site reactions.

## 2025-04-16 NOTE — PROGRESS NOTES
NEUROLOGY OUTPATIENT - FOLLOW UP PATIENT VISIT NOTE      Name: Tyra Paz       : 1985       MRN: [id number]   Encounter Provider: Carmen Childs MD   Encounter Date: 2025  Encounter department: [facility]       History of Present Illness     Ms. Siria Paz is a 39 y.o. female presenting for follow up regarding her tension type headaches. Following our previous visit she completed the prednisone taper and found that it helped, tried Effexor but stopped taking it due to fatigue.    The patient experiences persistent headaches attributed to muscle tension in the neck and shoulder area. The pain is localized to the neck, shoulders, and upper back, causing significant discomfort. Despite attempts to manage stress and adjust sleeping positions, the headaches persist.    Her work schedule is demanding, often exceeding regular hours with mandatory overtime. She works from 2:45 PM to 10:45 PM, with additional hours on , totaling approximately 32 extra hours every two weeks. This schedule is necessary to meet financial obligations, including rent.    Sleep is inadequate, averaging five to six hours per night. She has difficulty falling asleep and feels more tired after sleeping in on Sundays. Morning fatigue is exacerbated by responsibilities, including taking her daughter to school and preparing for work.    She has a history of arthritis, which was mentioned in the context of her current symptoms. An endoscopy was previously performed, revealing no significant findings related to her current complaints. Various self-care measures, including massages and the use of castor oil and frankincense, provide temporary relief.    Current medications include Flexeril (cyclobenzaprine) for muscle relaxation.        Previously her headaches were primarily in the left temporal region, described as throbbing. Today she describes tension most severely in the  area surrounding the external occipital protuberance radiating down the posterior neck and shoulders. Notices it following increased hours at work, where she spends significant time stooped over a table. Was previously in a stressful relationship, but left and is feeling less stressed. However, she is working ~65 hours a week in order to pay for her rent, feeling less stressed but her rest and sleep are compromised due to increased workload.      INTERIM HISTORY:    Frequency of headaches days : 7/wk  Intensity of the headaches days: 6 now, max 10. Reaches 10 every day, typically early afternoon     Medicine for headaches:   Tylenol 650mg, it works  Cyclobenzaprine 5 or 10 mg, depending on severity of headache    Side effects from the medications: None reported     Failed medications:   Pamelor  Propanolol  Imitrex  Trazodone    Imaging:  MRI Brain 12/2023: No mass effect, hemorrhage, infarct, or abnormalities identified  CT Head 03/2023: No acute intracranial abnormality    Hydration: Appropriate   Sleep hygiene: Drinks lots of caffeinated teas  Triggers for migraine headaches: None    PRIOR NOTES:  During her last clinic visit she was seen for headaches and recommended to get an MRI of the brain came back with in normal limits. She was started on Elavil for her headaches and Prednisone taper for headaches.  She never completed the prednisone taper and has been taking it on as-needed basis.  She also is using Pamelor 10 mg at bedtime but mentions that it is not helping with her symptoms.  Reports having 6 or 7 headache events over the last month over the left temporal area, which she describes as throbbing. She took rizatriptan with relief. Describes improvement in headache, but continues to endorse anxiety. .This usually works from 2 to 10 PM and then probably sleep for 4 to 5 hours on average.  She also has a lot of stress at home in regards to her boyfriend's family which might also be contributing towards the  headaches.  She mentions that the pressure and the tension on the left side of her temple seem to be easing up as well.     HEADACHE DESCRIPTION:    Onset of headaches: gradual several years back--The left temple headaches started about 2 months back  Location of headaches: left-sided unilateral, temporal, and retro-orbital  Quality of the pain: throbbing  Intensity of the headache: At present: 3/10;  At its worst:  10/10  Duration of the headaches:hour(s)  Frequency of the headaches:daily  Presence of aura:  No  Associated symptoms with headaches: no vomiting , +nausea, +light sensitivity , +sound sensitivity, and dizziness --She is complaining of numbness on the left V 2 distribution which is associated with the headache and vertigo which is associated with headache   Triggers:No   Positional component: Yes   Alleviating factors: No   Any specific time of the day when get the headaches: no particular time of day    Family history of migraine headaches: No  History of neck and head trauma: No  Smoking status: No  Oral contraceptive use: Yes    Life style factors:  -Hydration: adequate  -Sleep: inadequate--She is using the Trazodone to help sleep at night (50 mg) for 2 years  -Caffeine intake: 2 cups of caffeinated coffee per day(s)  -Alcohol intake: none     Impact of headches:  -Number of headaches in past 30 days: 30/30 days.   -Number of days missed per month because of headache: none days missed in the last 30 days.   -Number of ER visits for her headaches: No  in the last 30 days.         Red Flags for headache:  Age <5 or >50: No  First worst headaches: No  Maximal at intensity: No  Change in pattern: Yes  New headache in pregnancy, cancer or immunocompromised patient: No  Loss of consciousness or convulsions: No  Headache triggered by exertion, Valsalva maneuver or sexual activity: No  Abnormal exam: please see below in Neurological examination.       CURRENT OUTPATIENT MEDICATIONS:    Tyra Beverly  "Jackson has a current medication list which includes the following prescription(s): acetaminophen, amlodipine, cyanocobalamin, cyclobenzaprine, diclofenac sodium, ergocalciferol, famotidine, lidocaine, lidocaine, magnesium, and menaquinone-7.       PHYSICAL EXAMINATION:   VITAL SIGNS:  height is 5' 2\" (1.575 m) and weight is 80.7 kg (178 lb). Her blood pressure is 140/100 and her pulse is 80.        NEUROLOGICAL EXAMINATION: some portions copied from prior notes and updated accordingly      MENTAL STATUS EXAM: Alert, oriented to time place and person     CRANIAL NERVE EXAMINATION:  I Not tested   II Normal visual fields to finger counting.   II, Ill,  IV, VI Pupils were symmetric, briskly reactive. No afferent pupillary defect.  Eye movements are full without nystagmus. No ptosis.   V Facial sensation intact    VII Facial movements were symmetrical   VIII Hearing was intact   IX, X Intact   XI Shoulder shrug and head turn was normal   XII Tongue protrusion is in the mid line.          MOTOR EXAM:        Arm Right  Left Leg Right  Left   Deltoid 5/5 5/5 lliopsoas 5/5 5/5   Biceps 5/5 5/5 Quads 5/5 5/5   Triceps 5/5 5/5 Hamstrings 5/5 5/5   Wrist Extension 5/5 5/5 Ankle Dorsi Flexion 5/5 5/5   Wrist Flexion 5/5 5/5 Ankle Plantar Flexion 5/5 5/5               DEEP TENDON REFLEXES:     Brachioradialis Biceps Triceps Patellar Achilles   Right 2+ 2+ 2+ 2+ 2+   Left 2+ 2+ 2+ 2+ 2+            SENSORY EXAMINATION:   Sensation to dull touch Intact     COORDINATION:   normal finger to nose testing     GAIT/STATION:   normal        Assessment & Plan  Cervicalgia  Ms. Siria Paz is a very pleasant 39 y.o. female presenting with Chronic tension headaches likely due to muscle tension in the neck and shoulder area, exacerbated by work-related stress and inadequate sleep. Trigger point injections, involving local anesthetic and steroid, are preferred due to insurance approval and ease of administration. Botox, involving " multiple injections, is an alternative if trigger point injections are ineffective, though it may cause temporary muscle weakness. I feel that ergonomic workplace factors in addition to stress/tension and lack of sleep are contributing towards her headaches. She has failed conservative management with Pamelor, Imitrex, Trazodone, and propanolol. Due to the refractory nature of her headache we discussed Botox and trigger point injections, to which she was amenable. We will proceed with trigger point injection first, with the option to move forward with Botox in the future should that not work. We will discontinue Flexeril and start Baclofen instead. She has been instructed to take 10mg at bedtime, 20mg if 10mg does not help, and was educated on the potential risks of sudden discontinuation and asked to taper off the medication if she does not want to continue using it.           - Order trigger point injection for muscle relaxation  - Consider Botox if trigger point injection is ineffective  - Provide information on dietary triggers for headaches    Orders:    Ambulatory referral to Physical Therapy; Future    baclofen 10 mg tablet; Take 1 tablet (10 mg total) by mouth daily at bedtime      Return please schedule for TPI ASAP.      Administrative Statements     The management plan was discussed in detail with the patient and all questions were answered.     Ms. Siria Paz was encouraged to contact our office with any questions or concerns and to contact the clinic or go to the nearest emergency room if symptoms change or worsen.       I have spent a total of 41 min in reviewing and/or ordering tests, medications, or procedures, performing an examination or evaluation, reviewing pertinent history, counseling and educating the patient, referring and/or communicating with other health care professionals, documenting in the EMR and general coordination of care of Ms. Siria Paz  today.           Carmen  Tristian Wheeler MD.   Staff Neurologist,   Neuroimmunology and Neuroinfectious disease  04/16/25     This report has been created through the use of voice recognition/text compilation software.  Typographical and content errors may occur with this process.  While efforts are made to detect and correct such errors, in some cases errors will persist.  For this reason, wording in this document should be considered in the proper context and not strictly verbatim.  If, when reviewing the document, an error is discovered, please let the office know at [phone]

## 2025-04-17 ENCOUNTER — TELEPHONE (OUTPATIENT)
Age: 40
End: 2025-04-17

## 2025-04-21 ENCOUNTER — PROCEDURE VISIT (OUTPATIENT)
Age: 40
End: 2025-04-21
Payer: COMMERCIAL

## 2025-04-21 VITALS
DIASTOLIC BLOOD PRESSURE: 86 MMHG | HEART RATE: 84 BPM | TEMPERATURE: 97.9 F | SYSTOLIC BLOOD PRESSURE: 120 MMHG | OXYGEN SATURATION: 99 %

## 2025-04-21 DIAGNOSIS — M54.2 CERVICALGIA: Primary | ICD-10-CM

## 2025-04-21 PROCEDURE — 20553 NJX 1/MLT TRIGGER POINTS 3/>: CPT | Performed by: PSYCHIATRY & NEUROLOGY

## 2025-04-21 RX ORDER — LIDOCAINE HYDROCHLORIDE 10 MG/ML
4 INJECTION, SOLUTION INFILTRATION; PERINEURAL ONCE
Status: COMPLETED | OUTPATIENT
Start: 2025-04-21 | End: 2025-04-21

## 2025-04-21 RX ORDER — BUPIVACAINE HYDROCHLORIDE 5 MG/ML
INJECTION, SOLUTION PERINEURAL ONCE
Status: CANCELLED | OUTPATIENT
Start: 2025-04-21 | End: 2025-04-21

## 2025-04-21 RX ORDER — BUPIVACAINE HYDROCHLORIDE 5 MG/ML
4 INJECTION, SOLUTION PERINEURAL ONCE
Status: COMPLETED | OUTPATIENT
Start: 2025-04-21 | End: 2025-04-21

## 2025-04-21 RX ORDER — TRIAMCINOLONE ACETONIDE 40 MG/ML
40 INJECTION, SUSPENSION INTRA-ARTICULAR; INTRAMUSCULAR ONCE
Status: CANCELLED | OUTPATIENT
Start: 2025-04-21 | End: 2025-04-21

## 2025-04-21 RX ORDER — LIDOCAINE HYDROCHLORIDE 10 MG/ML
INJECTION, SOLUTION INFILTRATION; PERINEURAL ONCE
Status: CANCELLED | OUTPATIENT
Start: 2025-04-21 | End: 2025-04-21

## 2025-04-21 RX ORDER — TRIAMCINOLONE ACETONIDE 40 MG/ML
80 INJECTION, SUSPENSION INTRA-ARTICULAR; INTRAMUSCULAR ONCE
Status: COMPLETED | OUTPATIENT
Start: 2025-04-21 | End: 2025-04-21

## 2025-04-21 RX ADMIN — BUPIVACAINE HYDROCHLORIDE 4 ML: 5 INJECTION, SOLUTION PERINEURAL at 15:26

## 2025-04-21 RX ADMIN — LIDOCAINE HYDROCHLORIDE 4 ML: 10 INJECTION, SOLUTION INFILTRATION; PERINEURAL at 15:27

## 2025-04-21 RX ADMIN — TRIAMCINOLONE ACETONIDE 80 MG: 40 INJECTION, SUSPENSION INTRA-ARTICULAR; INTRAMUSCULAR at 15:28

## 2025-04-21 NOTE — PROGRESS NOTES
" Universal Protocol:  Procedure performed by:  Consent: Verbal consent obtained. Written consent obtained.  Risks and benefits: risks, benefits and alternatives were discussed  Consent given by: patient  Time out: Immediately prior to procedure a \"time out\" was called to verify the correct patient, procedure, equipment, support staff and site/side marked as required.  Timeout called at: 4/21/2025 9:20 AM.  Patient understanding: patient states understanding of the procedure being performed  Patient consent: the patient's understanding of the procedure matches consent given  Procedure consent: procedure consent matches procedure scheduled  Patient identity confirmed: verbally with patient  Supporting Documentation  Indications: pain   Trigger Point Injections: multiple trigger points: 3 or more muscle groups    Injection site identified by: palpation  Procedure Details  Location(s):    Neck/Upper Back: L upper trapezius, R upper trapezius, L cervical paraspinals, R cervical paraspinals, L occipital ridge and R occipital ridge     Prep: patient was prepped and draped in usual sterile fashion  Needle size: 30 G  Patient tolerance: patient tolerated the procedure well with no immediate complications  Additional procedure details: Bupivicaine (Marcaine) 0.5% 4cc   Kenalog 40mg/ml 2 cc  1% lidocaine w/o epi, 4 ml   using a 30 gauge, 1/2 inch needle   The patient tolerated the procedure well. No complications were noted.           Carmen Wheeler MD.   Staff Neurologist,   04/21/25  9:21 AM         "

## 2025-05-05 ENCOUNTER — TELEPHONE (OUTPATIENT)
Dept: FAMILY MEDICINE CLINIC | Facility: CLINIC | Age: 40
End: 2025-05-05

## 2025-06-09 ENCOUNTER — OFFICE VISIT (OUTPATIENT)
Dept: FAMILY MEDICINE CLINIC | Facility: CLINIC | Age: 40
End: 2025-06-09

## 2025-06-09 ENCOUNTER — APPOINTMENT (OUTPATIENT)
Dept: LAB | Facility: HOSPITAL | Age: 40
End: 2025-06-09
Payer: COMMERCIAL

## 2025-06-09 ENCOUNTER — HOSPITAL ENCOUNTER (OUTPATIENT)
Dept: RADIOLOGY | Facility: HOSPITAL | Age: 40
Discharge: HOME | End: 2025-06-09
Payer: COMMERCIAL

## 2025-06-09 VITALS
TEMPERATURE: 98 F | RESPIRATION RATE: 16 BRPM | WEIGHT: 177 LBS | OXYGEN SATURATION: 99 % | SYSTOLIC BLOOD PRESSURE: 120 MMHG | HEART RATE: 75 BPM | HEIGHT: 62 IN | DIASTOLIC BLOOD PRESSURE: 82 MMHG | BODY MASS INDEX: 32.57 KG/M2

## 2025-06-09 DIAGNOSIS — R73.03 PREDIABETES: ICD-10-CM

## 2025-06-09 DIAGNOSIS — E55.9 VITAMIN D DEFICIENCY: ICD-10-CM

## 2025-06-09 DIAGNOSIS — M54.41 CHRONIC BILATERAL LOW BACK PAIN WITH RIGHT-SIDED SCIATICA: ICD-10-CM

## 2025-06-09 DIAGNOSIS — K21.9 GASTROESOPHAGEAL REFLUX DISEASE, UNSPECIFIED WHETHER ESOPHAGITIS PRESENT: ICD-10-CM

## 2025-06-09 DIAGNOSIS — E78.2 MODERATE MIXED HYPERLIPIDEMIA NOT REQUIRING STATIN THERAPY: ICD-10-CM

## 2025-06-09 DIAGNOSIS — I10 PRIMARY HYPERTENSION: Primary | ICD-10-CM

## 2025-06-09 DIAGNOSIS — N64.4 BREAST PAIN: ICD-10-CM

## 2025-06-09 DIAGNOSIS — R91.1 PULMONARY NODULE: ICD-10-CM

## 2025-06-09 DIAGNOSIS — G89.29 CHRONIC BILATERAL LOW BACK PAIN WITH RIGHT-SIDED SCIATICA: ICD-10-CM

## 2025-06-09 DIAGNOSIS — Z98.890 S/P LEEP (LOOP ELECTROSURGICAL EXCISION PROCEDURE): ICD-10-CM

## 2025-06-09 DIAGNOSIS — R51.9 LEFT TEMPORAL HEADACHE: ICD-10-CM

## 2025-06-09 DIAGNOSIS — M47.9 SPINE DEGENERATION: ICD-10-CM

## 2025-06-09 DIAGNOSIS — M54.2 NECK PAIN: ICD-10-CM

## 2025-06-09 DIAGNOSIS — I10 PRIMARY HYPERTENSION: ICD-10-CM

## 2025-06-09 LAB
ALBUMIN SERPL BCG-MCNC: 4.8 G/DL (ref 3.5–5)
ALP SERPL-CCNC: 53 U/L (ref 34–104)
ALT SERPL W P-5'-P-CCNC: 17 U/L (ref 7–52)
ANION GAP SERPL CALCULATED.3IONS-SCNC: 8 MMOL/L (ref 4–13)
AST SERPL W P-5'-P-CCNC: 14 U/L (ref 13–39)
BILIRUB SERPL-MCNC: 0.58 MG/DL (ref 0.2–1)
BUN SERPL-MCNC: 12 MG/DL (ref 5–25)
CALCIUM SERPL-MCNC: 9.4 MG/DL (ref 8.4–10.2)
CHLORIDE SERPL-SCNC: 99 MMOL/L (ref 96–108)
CHOLEST SERPL-MCNC: 267 MG/DL (ref ?–200)
CO2 SERPL-SCNC: 32 MMOL/L (ref 21–32)
CREAT SERPL-MCNC: 0.63 MG/DL (ref 0.6–1.3)
EST. AVERAGE GLUCOSE BLD GHB EST-MCNC: 105 MG/DL
GFR SERPL CREATININE-BSD FRML MDRD: 111 ML/MIN/1.73SQ M
GLUCOSE P FAST SERPL-MCNC: 84 MG/DL (ref 65–99)
HBA1C MFR BLD: 5.3 %
HDLC SERPL-MCNC: 76 MG/DL
LDLC SERPL CALC-MCNC: 169 MG/DL (ref 0–100)
POTASSIUM SERPL-SCNC: 3.9 MMOL/L (ref 3.5–5.3)
PROT SERPL-MCNC: 7.3 G/DL (ref 6.4–8.4)
SODIUM SERPL-SCNC: 139 MMOL/L (ref 135–147)
TRIGL SERPL-MCNC: 109 MG/DL (ref ?–150)

## 2025-06-09 PROCEDURE — 36415 COLL VENOUS BLD VENIPUNCTURE: CPT

## 2025-06-09 PROCEDURE — 71046 X-RAY EXAM CHEST 2 VIEWS: CPT

## 2025-06-09 PROCEDURE — 80061 LIPID PANEL: CPT

## 2025-06-09 PROCEDURE — 83036 HEMOGLOBIN GLYCOSYLATED A1C: CPT

## 2025-06-09 PROCEDURE — 99213 OFFICE O/P EST LOW 20 MIN: CPT | Performed by: FAMILY MEDICINE

## 2025-06-09 PROCEDURE — 80053 COMPREHEN METABOLIC PANEL: CPT

## 2025-06-09 RX ORDER — LIDOCAINE 50 MG/G
OINTMENT TOPICAL AS NEEDED
Qty: 50 G | Refills: 3 | Status: SHIPPED | OUTPATIENT
Start: 2025-06-09

## 2025-06-09 RX ORDER — ERGOCALCIFEROL 1.25 MG/1
50000 CAPSULE, LIQUID FILLED ORAL WEEKLY
Qty: 12 CAPSULE | Refills: 3 | Status: SHIPPED | OUTPATIENT
Start: 2025-06-09

## 2025-06-09 RX ORDER — SENNOSIDES 8.6 MG
650 CAPSULE ORAL EVERY 8 HOURS PRN
Qty: 90 TABLET | Refills: 5 | Status: SHIPPED | OUTPATIENT
Start: 2025-06-09

## 2025-06-09 RX ORDER — FAMOTIDINE 20 MG/1
20 TABLET, FILM COATED ORAL 2 TIMES DAILY
Qty: 180 TABLET | Refills: 1 | Status: SHIPPED | OUTPATIENT
Start: 2025-06-09

## 2025-06-09 RX ORDER — AMLODIPINE BESYLATE 10 MG/1
10 TABLET ORAL DAILY
Qty: 90 TABLET | Refills: 3 | Status: SHIPPED | OUTPATIENT
Start: 2025-06-09

## 2025-06-09 RX ORDER — LIDOCAINE 50 MG/G
1 PATCH TOPICAL DAILY
Qty: 14 PATCH | Refills: 0 | Status: SHIPPED | OUTPATIENT
Start: 2025-06-09

## 2025-06-09 NOTE — PROGRESS NOTES
Name: Tyra Paz      : 1985      MRN: [id number]  Encounter Provider: Maximo Varela MD  Encounter Date: 2025   Encounter department: [facility]  :  Assessment & Plan  Primary hypertension  Well controlled with amlodipine 10 mg daily and lifestyle modifications     Orders:    amLODIPine (NORVASC) 10 mg tablet; Take 1 tablet (10 mg total) by mouth daily    Comprehensive metabolic panel; Future    Gastroesophageal reflux disease, unspecified whether esophagitis present  Well controlled with daily pepcid     Orders:    famotidine (PEPCID) 20 mg tablet; Take 1 tablet (20 mg total) by mouth 2 (two) times a day    Spine degeneration  Associated cervical and thoracic back pain  Chronic  Unrelieved with physical therapy prior    Plan  We discussed the possibility of going through PT again to which the patient seems hesitant but amenable with.  Orders placed  Referral to spine pain management  Continue with p.o. NSAIDs and Tylenol    Orders:    Ambulatory referral to Spine & Pain Management; Future    Ambulatory Referral to Comprehensive Spine PT; Future    Pulmonary nodule  Chest x-ray 10/25/2024-partially calcified lobular approximately 1.5 centimeter nodular density with recommended 6 months follow-up  Orders placed    Orders:    XR chest pa and lateral; Future           History of Present Illness   38 yo female pt with a PMH of HTN, thyroid nodule, chronic bilateral low back pain, fibrocystic breast changes, hyperlipidemia, prediabetes, vitamin D deficiency and headaches comes to the office for follow-up of her chronic conditions.    Review of Systems   Constitutional:  Negative for chills, fatigue and fever.   HENT:  Negative for congestion, ear pain, rhinorrhea and sore throat.    Eyes:  Negative for visual disturbance.   Respiratory:  Negative for cough, chest tightness and shortness of breath.    Cardiovascular:  Negative for chest pain and  "palpitations.   Gastrointestinal:  Negative for abdominal pain, constipation, diarrhea, nausea and vomiting.   Genitourinary:  Negative for difficulty urinating, dysuria and hematuria.   Musculoskeletal:  Negative for arthralgias and back pain.   Skin:  Negative for rash.   Neurological:  Negative for seizures, syncope, light-headedness and headaches.   All other systems reviewed and are negative.      Objective   /82 (BP Location: Right arm, Patient Position: Sitting, Cuff Size: Standard)   Pulse 75   Temp 98 °F (36.7 °C) (Temporal)   Resp 16   Ht 5' 2\" (1.575 m)   Wt 80.3 kg (177 lb)   LMP 12/06/2023 (Exact Date)   SpO2 99%   BMI 32.37 kg/m²      Physical Exam  Vitals and nursing note reviewed.   Constitutional:       General: She is not in acute distress.     Appearance: She is well-developed.   HENT:      Head: Normocephalic and atraumatic.      Right Ear: External ear normal.      Left Ear: External ear normal.     Eyes:      Conjunctiva/sclera: Conjunctivae normal.       Cardiovascular:      Rate and Rhythm: Normal rate and regular rhythm.      Heart sounds: No murmur heard.  Pulmonary:      Effort: Pulmonary effort is normal. No respiratory distress.      Breath sounds: Normal breath sounds.   Abdominal:      Palpations: Abdomen is soft.      Tenderness: There is no abdominal tenderness.     Musculoskeletal:         General: No swelling.      Cervical back: Neck supple.     Skin:     General: Skin is warm and dry.      Capillary Refill: Capillary refill takes less than 2 seconds.     Neurological:      Mental Status: She is alert.     Psychiatric:         Mood and Affect: Mood normal.       "

## 2025-06-09 NOTE — ASSESSMENT & PLAN NOTE
Associated cervical and thoracic back pain  Chronic  Unrelieved with physical therapy prior    Plan  We discussed the possibility of going through PT again to which the patient seems hesitant but amenable with.  Orders placed  Referral to spine pain management  Continue with p.o. NSAIDs and Tylenol    Orders:    Ambulatory referral to Spine & Pain Management; Future    Ambulatory Referral to Comprehensive Spine PT; Future

## 2025-06-09 NOTE — ASSESSMENT & PLAN NOTE
Well controlled with daily pepcid     Orders:    famotidine (PEPCID) 20 mg tablet; Take 1 tablet (20 mg total) by mouth 2 (two) times a day

## 2025-06-09 NOTE — ASSESSMENT & PLAN NOTE
Chest x-ray 10/25/2024-partially calcified lobular approximately 1.5 centimeter nodular density with recommended 6 months follow-up  Orders placed    Orders:    XR chest pa and lateral; Future

## 2025-06-09 NOTE — ASSESSMENT & PLAN NOTE
Well controlled with amlodipine 10 mg daily and lifestyle modifications     Orders:    amLODIPine (NORVASC) 10 mg tablet; Take 1 tablet (10 mg total) by mouth daily    Comprehensive metabolic panel; Future

## 2025-06-10 ENCOUNTER — TELEPHONE (OUTPATIENT)
Dept: FAMILY MEDICINE CLINIC | Facility: CLINIC | Age: 40
End: 2025-06-10

## 2025-06-10 NOTE — TELEPHONE ENCOUNTER
CoverMyMeds request prior-authorization for following medication.    Diclofenac Sodium (VOLTAREN) 1 %     Key: [id number]

## 2025-06-18 ENCOUNTER — EVALUATION (OUTPATIENT)
Dept: PHYSICAL THERAPY | Facility: CLINIC | Age: 40
End: 2025-06-18
Payer: COMMERCIAL

## 2025-06-18 DIAGNOSIS — M54.2 NECK PAIN: ICD-10-CM

## 2025-06-18 DIAGNOSIS — M47.9 SPINE DEGENERATION: ICD-10-CM

## 2025-06-18 DIAGNOSIS — G89.29 CHRONIC BILATERAL LOW BACK PAIN WITH RIGHT-SIDED SCIATICA: ICD-10-CM

## 2025-06-18 DIAGNOSIS — M54.41 CHRONIC BILATERAL LOW BACK PAIN WITH RIGHT-SIDED SCIATICA: ICD-10-CM

## 2025-06-18 PROCEDURE — 97112 NEUROMUSCULAR REEDUCATION: CPT | Performed by: PHYSICAL THERAPIST

## 2025-06-18 PROCEDURE — 97140 MANUAL THERAPY 1/> REGIONS: CPT | Performed by: PHYSICAL THERAPIST

## 2025-06-18 PROCEDURE — 97161 PT EVAL LOW COMPLEX 20 MIN: CPT | Performed by: PHYSICAL THERAPIST

## 2025-06-18 NOTE — PROGRESS NOTES
PT Evaluation     Today's date: 2025  Patient name: Tyra Paz  : 1985  MRN: [id number]  Referring provider: Maximo Varela*  Dx:   Encounter Diagnosis     ICD-10-CM    1. Spine degeneration  M47.9 Ambulatory Referral to Comprehensive Spine PT      2. Chronic bilateral low back pain with right-sided sciatica  G89.29 Ambulatory Referral to Comprehensive Spine PT    M54.41       3. Neck pain  M54.2 Ambulatory Referral to Comprehensive Spine PT                     Assessment  Impairments: abnormal muscle firing, abnormal or restricted ROM, abnormal movement, activity intolerance, impaired physical strength, lacks appropriate home exercise program, pain with function, poor body mechanics, participation limitations, activity limitations and endurance  Symptom irritability: high    Assessment details: Pt is a 39 y.o. year old female presenting to physical therapy for Spine degeneration, Chronic bilateral low back pain with right-sided sciatica, and Neck pain.  She presents via comp spine program and is high risk based on Start Back assessment tool.  She presents with the following impairments: limited cervical ROM, L UE weakness, L SCM, UT, and LS stiffness and TTP and (+) special tests for L cervical radiculopathy, as well as LE weakness, and poor TA activation affecting her function with bending, lifting, twisting, turning her neck, looking up/down, sleeping, carrying, and working.  Pt will benefit from skilled physical therapy to address functional limitations noted in evaluation and meet patient goals.    Goals  ST.    Pt will reduce pain to 5/10.  2.    Pt will demonstrate good TA activation.    LT.   Pt will improve cervical rotation to nil deficits for improved turning her neck.  2.   Pt will improve L shoulder ABD to 5/5 from improved working and lifting.  3.   Pt will meet projected FOTO score.      Plan  Patient would benefit from: skilled physical therapy and PT  eval  Planned modality interventions: unattended electrical stimulation, TENS, thermotherapy: hydrocollator packs, cryotherapy and electrical stimulation/Russian stimulation    Planned therapy interventions: abdominal trunk stabilization, joint mobilization, manual therapy, massage, body mechanics training, nerve gliding, neuromuscular re-education, postural training, strengthening, stretching, therapeutic activities, therapeutic exercise, flexibility, functional ROM exercises and home exercise program    Frequency: 1-2x week  Duration in weeks: 6        Subjective Evaluation    History of Present Illness  Mechanism of injury: Pt reports back and neck pain that is worse on her left side. She reports pain around her shoulder blade, with some pain in her lower back, but is much worse higher up.  She reports headaches due to her neck pain.  She works at [employer] and is sitting with her neck down for long periods.  She gets very tired especially when the pain is high.  She reports some pain and numbness and weakness in her left arm. She reports some shortness of breath and pain in her neck on occasion.  She reports these symptoms for the past 6-12 months.  She had an xray and reports that she has arthritis.  She also reports occasional radiating pain and numbness into her left leg.  She takes tylenol to help with her pain.  She has pain turning her neck and looking down.          Recurrent probem    Pain  Current pain ratin          Objective     Palpation   Left   Hypertonic in the levator scapulae, pectoralis major, scalenes, sternocleidomastoid and masseter.   Tenderness of the levator scapulae, sternocleidomastoid and masseter.   Trigger point to levator scapulae.     Right   No palpable tenderness to the scalenes and sternocleidomastoid.   Hypertonic in the levator scapulae, pectoralis major and masseter.   Tenderness of the levator scapulae and masseter.     Active Range of Motion   Cervical/Thoracic Spine        Cervical    Flexion:  with pain Restriction level: minimal  Extension:  WFL  Left lateral flexion:  WFL  Right lateral flexion:  WFL  Left rotation:  Restriction level: minimal  Right rotation:  with pain Restriction level: minimal    Thoracic    Flexion:  with pain  Extension:  WFL  Left lateral flexion:  WFL and with pain  Right lateral flexion:  WFL  Left rotation:  Restriction level: minimal  Right rotation:  WFL    Lumbar   Flexion:  WFL  Extension:  WFL  Left lateral flexion:  WFL  Right lateral flexion:  WFL  Left rotation:  WFL  Right rotation:  WFL    Joint Play   Joints within functional limits: C1, C2, C3, C4, C5, L1, L2, L3, L4, L5 and S1     Hypomobile: C6, C7, T1, T2, T3, T4, T5, T6, T7, T8, T9, T10, T11 and T12     Pain: C6, C7, T1, T2, T3, T4, T5, T6, L5 and S1     Strength/Myotome Testing     Left Shoulder     Planes of Motion   Abduction: 4   External rotation at 0°: 4   Internal rotation at 0°: 4+     Right Shoulder     Planes of Motion   Abduction: 4+   External rotation at 0°: 4+   Internal rotation at 0°: 4+     Left Elbow   Flexion: 4  Extension: 4    Right Elbow   Flexion: 4+  Extension: 4+    Left Wrist/Hand   Wrist extension: 4  Wrist flexion: 4    Right Wrist/Hand   Wrist extension: 4+  Wrist flexion: 4+    Left Hip   Planes of Motion   Flexion: 4    Right Hip   Planes of Motion   Flexion: 4+    Left Knee   Flexion: 4  Extension: 5    Right Knee   Flexion: 4+  Extension: 5    Muscle Activation   Patient unable to activate left transverse abdominals and right transverse abdominals.     Additional Muscle Activation Details  Poor TA activation    Tests   Cervical   Positive cervical distraction test and neck flexor muscle endurance test.    Left   Positive Spurling's Test A and cervical flexion-rotation test .     Right   Positive Spurling's Test A.   Negative cervical flexion-rotation test.     Left Shoulder   Positive ULTT1.     Right Shoulder   Negative ULTT1.     Lumbar     Left  "  Negative passive SLR and slump test.     Right   Negative passive SLR and slump test.     Left Hip   Positive AQUILES.     Right Hip   Positive AQUILES.              Precautions: high risk    Date 6/18            Visit # IE            FOTO IE             Re-eval IE              Manuals 6/18            CTJ mob Gr V            1st rib mob Gr IV on L            UT/LS STM w str SF            L median nerve glides SF            Neuro Re-Ed             C/s retractions 10x5\"            No money 2x10 GTB            Prone T's 10x            Pushup plus             Scap clocks             DNF endurance nv                         TA bracing             Mod side planks             Dead bugs             Bird dogs                          Ther Ex             Rows/ext             Pec str 3x30\"            SCM str             UT/LS str                                                                 Ther Activity                                       Gait Training                                       Modalities                                            "

## 2025-06-18 NOTE — HOME EXERCISE EDUCATION
Program_ID:[id number]   Access Code: [email or web address]  URL: [email or web address]  Date: 06-  Prepared By: Corona Hernandes    Program Notes      Exercises      - Prone Shoulder Horizontal Abduction with External Rotation - 1 x daily -  x weekly - 3 sets - 10 reps      - Doorway Pec Stretch at 60 Elevation - 1 x daily -  x weekly - 3 sets -  reps - 30 hold      - Shoulder External Rotation and Scapular Retraction with Resistance - 1 x daily -  x weekly - 3 sets - 10 reps      - Seated Cervical Retraction - 10 x daily -  x weekly -  sets - 10 reps - 5 hold

## 2025-06-19 ENCOUNTER — TELEPHONE (OUTPATIENT)
Dept: OBGYN CLINIC | Facility: CLINIC | Age: 40
End: 2025-06-19

## 2025-06-20 ENCOUNTER — CONSULT (OUTPATIENT)
Dept: PAIN MEDICINE | Facility: CLINIC | Age: 40
End: 2025-06-20
Payer: COMMERCIAL

## 2025-06-20 VITALS — WEIGHT: 177 LBS | BODY MASS INDEX: 32.57 KG/M2 | HEIGHT: 62 IN

## 2025-06-20 DIAGNOSIS — M79.18 MYOFASCIAL PAIN SYNDROME: ICD-10-CM

## 2025-06-20 DIAGNOSIS — M54.12 CERVICAL RADICULOPATHY: Primary | ICD-10-CM

## 2025-06-20 DIAGNOSIS — M54.81 OCCIPITAL NEURALGIA OF LEFT SIDE: ICD-10-CM

## 2025-06-20 DIAGNOSIS — M54.16 LUMBAR RADICULOPATHY: ICD-10-CM

## 2025-06-20 PROCEDURE — 99204 OFFICE O/P NEW MOD 45 MIN: CPT | Performed by: ANESTHESIOLOGY

## 2025-06-20 NOTE — PROGRESS NOTES
Name: Tyra Paz      : 1985      MRN: [id number]  Encounter Provider: Addi Seals MD  Encounter Date: 2025   Encounter department: [facility] [place]  Assessment & Plan  Cervical radiculopathy    Orders:    Ambulatory referral to Spine & Pain Management    MRI cervical spine wo contrast; Future    Lumbar radiculopathy    Orders:    Ambulatory referral to Spine & Pain Management    MRI lumbar spine wo contrast; Future    Occipital neuralgia of left side         Myofascial pain syndrome         Pain is multifactorial with components of myofascial pain syndrome, occipital neuralgia, cervical radiculitis, lumbar radiculitis, tension type/migraine headaches. Symptoms accompanied by consistent moderate to severe pain on the pain scale (5+/10) with inability to participate in IADLs for >6 weeks. Patient has participated with physical therapy, chiropractic therapy as well as home exercises and stretches.      Discussed various interventional modalities as she has failed ongoing conservative treatments including physical and chiropractic therapy.  She also previously had trigger point injections with her neurology office.    Can offer her occipital nerve blocks with ultrasound guidance.    Can offer repeat trigger point injections with ultrasound guidance.    Will first obtain cervical and lumbar MRI for further evaluation.  Patient will follow-up after MRIs.    Pending MRI results will discuss possible occipital nerve blocks, trigger point injections or epidural steroid injections.    Reviewed physical therapy, neurology, family medicine office notes      Discussed gabapentin trial.  Patient defer medications at this time.      Reviewed hemoglobin A1c, renal function, CBC and/or PT/INR prior to discussing/offering interventional modalities.    My impressions and treatment recommendations were discussed in detail with the patient who verbalized understanding and had no  further questions.  Discharge instructions were provided. I personally saw and examined the patient and I agree with the above discussed plan of care.    History of Present Illness     Tyra Paz is a 39 y.o. female presenting for consultation at [facility] for exam and evaluation of chronic neck and upper back pain, headaches and lower back pain radiating to the left leg pain for greater than 1 year, worsening over the past several months. Pain started without any precipitating injury or trauma. Over the past month, the intensity of pain has been Moderate to severe. Pain is currently 6-9/10. Pain does interfere with age appropriate activities of daily living. Pain is constant, described as burning, cramping, with numbness and pins/needles. Patient endorses weakness in the upper extremities. Assistance device used: None.    Worsening factors noted: lying down, sitting.   Improving factors noted: exercise, resting.    Treatments tried:   Heat/ice: yes  TENS: yes  PT: yes  Chiropractic therapy: yes  Injections: TPIs   Previous spine surgery: No    Anticoagulation: no    Medications tried:   Tylenol, NSAIDs, muscle relaxants, SNRI, TCAs    Review of Systems   Constitutional:  Negative for chills and fever.   HENT:  Negative for ear pain and sore throat.    Eyes:  Negative for pain and visual disturbance.   Respiratory:  Negative for cough and shortness of breath.    Cardiovascular:  Negative for chest pain and palpitations.   Gastrointestinal:  Negative for abdominal pain and vomiting.   Genitourinary:  Negative for dysuria and hematuria.   Musculoskeletal:  Positive for back pain and neck pain. Negative for arthralgias.   Skin:  Negative for color change and rash.   Neurological:  Positive for dizziness, weakness, numbness and headaches. Negative for seizures and syncope.   All other systems reviewed and are negative.    Medical History Reviewed by provider this encounter:      ".  Medications Ordered Prior to Encounter[1]      Objective   Ht 5' 2\" (1.575 m)   Wt 80.3 kg (177 lb)   LMP 12/06/2023 (Exact Date)   BMI 32.37 kg/m²      Pain Score:   6  Physical Exam  Constitutional: normal, well developed, well nourished, alert, in no distress and non-toxic and no overt pain behavior.  Eyes: anicteric  HEENT: grossly intact  Neck: supple, symmetric, trachea midline and no masses   Pulmonary: even and unlabored  Cardiovascular: No edema or pitting edema present  Skin: Normal without rashes or lesions and well hydrated  Psychiatric: Mood and affect appropriate  Neurologic:  Motor function is grossly intact with no focal neurologic deficits   Musculoskeletal:  TTP left cervical paraspinal, trapezius, rhomboid muscles    Radiology Results Review: I personally reviewed the following image studies in PACS and associated radiology reports: xray(s). My interpretation of the radiology images/reports is: Cervical x-rays showed left foraminal narrowing at C6-7 with degenerative changes.  Lumbar x-ray showed L5-S1 facet arthropathy with mild degenerative changes.           [1]   Current Outpatient Medications on File Prior to Visit   Medication Sig Dispense Refill    acetaminophen (TYLENOL) 650 mg CR tablet Take 1 tablet (650 mg total) by mouth every 8 (eight) hours as needed for mild pain 90 tablet 5    amLODIPine (NORVASC) 10 mg tablet Take 1 tablet (10 mg total) by mouth daily 90 tablet 3    baclofen 10 mg tablet Take 1 tablet (10 mg total) by mouth daily at bedtime 60 tablet 1    Cyanocobalamin (VITAMIN B-12 PO) Take by mouth in the morning.      Diclofenac Sodium (VOLTAREN) 1 % Apply 2 g topically 4 (four) times a day 150 g 1    ergocalciferol (VITAMIN D2) 50,000 units Take 1 capsule (50,000 Units total) by mouth once a week 12 capsule 3    famotidine (PEPCID) 20 mg tablet Take 1 tablet (20 mg total) by mouth 2 (two) times a day 180 tablet 1    lidocaine (Lidoderm) 5 % Apply 1 patch topically daily " over 12 hours Remove & Discard patch within 12 hours or as directed by MD Paez patch 0    lidocaine (XYLOCAINE) 5 % ointment Apply topically as needed for mild pain 50 g 3    MAGNESIUM PO Take by mouth every evening      Menaquinone-7 (VITAMIN K2 PO) Take by mouth in the morning.       No current facility-administered medications on file prior to visit.

## 2025-06-20 NOTE — ASSESSMENT & PLAN NOTE
Orders:    Ambulatory referral to Spine & Pain Management    MRI lumbar spine wo contrast; Future

## 2025-06-27 ENCOUNTER — APPOINTMENT (OUTPATIENT)
Dept: PHYSICAL THERAPY | Facility: CLINIC | Age: 40
End: 2025-06-27
Payer: COMMERCIAL

## 2025-07-02 ENCOUNTER — OFFICE VISIT (OUTPATIENT)
Dept: PHYSICAL THERAPY | Facility: CLINIC | Age: 40
End: 2025-07-02
Payer: COMMERCIAL

## 2025-07-02 DIAGNOSIS — G89.29 CHRONIC BILATERAL LOW BACK PAIN WITH RIGHT-SIDED SCIATICA: ICD-10-CM

## 2025-07-02 DIAGNOSIS — M47.9 SPINE DEGENERATION: Primary | ICD-10-CM

## 2025-07-02 DIAGNOSIS — M54.2 NECK PAIN: ICD-10-CM

## 2025-07-02 DIAGNOSIS — M54.41 CHRONIC BILATERAL LOW BACK PAIN WITH RIGHT-SIDED SCIATICA: ICD-10-CM

## 2025-07-02 PROCEDURE — 97112 NEUROMUSCULAR REEDUCATION: CPT

## 2025-07-02 PROCEDURE — 97110 THERAPEUTIC EXERCISES: CPT

## 2025-07-02 PROCEDURE — 97140 MANUAL THERAPY 1/> REGIONS: CPT

## 2025-07-02 NOTE — PROGRESS NOTES
"Daily Note     Today's date: 2025  Patient name: Tyra Paz  : 1985  MRN: [id number]  Referring provider: Karli Greene MD  Dx:   Encounter Diagnosis     ICD-10-CM    1. Spine degeneration  M47.9       2. Chronic bilateral low back pain with right-sided sciatica  G89.29     M54.41       3. Neck pain  M54.2           Start Time: 936          Subjective: \" I am having the same pain today it is a little less- I felt ok after last time\"       Objective: See treatment diary below      Assessment: Tyra presents to therapy with neck pain. Note continued restriction in the L UT and 1st rib mobs. Added MFD to tolerance noting gross L sided restriction throughout the cervical region- mild improvement post.  Improvement in pain noted post session will progress nv. Tolerated session without adverse effects. Recommend continued skilled therapy to improve overall strength and mobility for functional return with decreased compensation and pain.        Plan: Continue per plan of care.  Progress treatment as tolerated.       Precautions: high risk    Date            Visit # IE            FOTO IE             Re-eval IE              Manuals            CTJ mob Gr V            1st rib mob Gr IV on L GR IV KR            MFD   KR           UT/LS STM w str SF KR            L median nerve glides SF KR x 10           Neuro Re-Ed             C/s retractions 10x5\" 5\" x 10            No money 2x10 GTB            Prone T's 10x            Pushup plus             Scap clocks             DNF endurance nv                         TA bracing             Mod side planks             Dead bugs             Bird dogs             SNAGS  5x10\"            Ther Ex             Rows/ext             Pec str 3x30\"            SCM str             UT/LS str                                                                 Ther Activity                                       Gait Training                            "            Modalities

## 2025-07-09 ENCOUNTER — OFFICE VISIT (OUTPATIENT)
Dept: PHYSICAL THERAPY | Facility: CLINIC | Age: 40
End: 2025-07-09
Payer: COMMERCIAL

## 2025-07-09 DIAGNOSIS — M54.2 NECK PAIN: ICD-10-CM

## 2025-07-09 DIAGNOSIS — M47.9 SPINE DEGENERATION: Primary | ICD-10-CM

## 2025-07-09 DIAGNOSIS — G89.29 CHRONIC BILATERAL LOW BACK PAIN WITH RIGHT-SIDED SCIATICA: ICD-10-CM

## 2025-07-09 DIAGNOSIS — M54.41 CHRONIC BILATERAL LOW BACK PAIN WITH RIGHT-SIDED SCIATICA: ICD-10-CM

## 2025-07-09 PROCEDURE — 97112 NEUROMUSCULAR REEDUCATION: CPT | Performed by: PHYSICAL THERAPIST

## 2025-07-09 PROCEDURE — 97140 MANUAL THERAPY 1/> REGIONS: CPT | Performed by: PHYSICAL THERAPIST

## 2025-07-09 PROCEDURE — 97110 THERAPEUTIC EXERCISES: CPT | Performed by: PHYSICAL THERAPIST

## 2025-07-09 NOTE — PROGRESS NOTES
"Daily Note     Today's date: 2025  Patient name: Tyra Paz  : 1985  MRN: [id number]  Referring provider: Shahnaz Kerns MD  Dx:   Encounter Diagnosis     ICD-10-CM    1. Spine degeneration  M47.9       2. Chronic bilateral low back pain with right-sided sciatica  G89.29     M54.41       3. Neck pain  M54.2                      Subjective: Pt reports improvement in her neck pain.  She is using a new pillow which is helping.  She does have a slight headache.      Objective: See treatment diary below      Assessment: Pt does well starting session w manuals, w reduced tension in UT/LS muscles and improved thoracic mobility and cervical ROM.  She is challenged w rows and hor abd w some L scapular discomfort.  Patient demonstrated fatigue post treatment, exhibited good technique with therapeutic exercises, and would benefit from continued PT      Plan: Continue per plan of care.  Progress treatment as tolerated.       Precautions: high risk    Date           Visit # IE  3          FOTO IE             Re-eval IE              Manuals           CTJ mob Gr V  Gr V          1st rib mob Gr IV on L GR IV KR  Gr IV SF          MFD   KR nv --         UT/LS STM w str SF KR  SF          L median nerve glides SF KR x 10 SF          Neuro Re-Ed             C/s retractions 10x5\" 5\" x 10            No money 2x10 GTB  2x10 GTB          Prone T's 10x  2x10 GTB hor abd          Pushup plus             Scap clocks             DNF endurance nv                         TA bracing             Mod side planks             Dead bugs             Bird dogs             SNAGS  5x10\"            Ther Ex             Rows/ext   2x15 purple          Pec str 3x30\"  3x30\"          SCM str             UT/LS str                                                                 Ther Activity                                       Gait Training                                       Modalities              "

## 2025-07-10 ENCOUNTER — TELEPHONE (OUTPATIENT)
Dept: OBGYN CLINIC | Facility: MEDICAL CENTER | Age: 40
End: 2025-07-10

## 2025-07-10 NOTE — TELEPHONE ENCOUNTER
Brandon for pt informing her we are cancelling her appt tomorrow since the MRI is scheduled for 7/20 and tomorrow is an MRI fu. Stated that she can call back to r/s tomorrows appt to a later date after 7/20.

## 2025-07-16 ENCOUNTER — OFFICE VISIT (OUTPATIENT)
Dept: PHYSICAL THERAPY | Facility: CLINIC | Age: 40
End: 2025-07-16
Payer: COMMERCIAL

## 2025-07-16 DIAGNOSIS — G89.29 CHRONIC BILATERAL LOW BACK PAIN WITH RIGHT-SIDED SCIATICA: ICD-10-CM

## 2025-07-16 DIAGNOSIS — M54.41 CHRONIC BILATERAL LOW BACK PAIN WITH RIGHT-SIDED SCIATICA: ICD-10-CM

## 2025-07-16 DIAGNOSIS — M47.9 SPINE DEGENERATION: Primary | ICD-10-CM

## 2025-07-16 DIAGNOSIS — M54.2 NECK PAIN: ICD-10-CM

## 2025-07-16 PROCEDURE — 97140 MANUAL THERAPY 1/> REGIONS: CPT | Performed by: PHYSICAL THERAPIST

## 2025-07-16 PROCEDURE — 97110 THERAPEUTIC EXERCISES: CPT | Performed by: PHYSICAL THERAPIST

## 2025-07-16 NOTE — PROGRESS NOTES
"Daily Note     Today's date: 2025  Patient name: Tyra Paz  : 1985  MRN: [id number]  Referring provider: Maximo Varela*  Dx:   Encounter Diagnosis     ICD-10-CM    1. Spine degeneration  M47.9       2. Chronic bilateral low back pain with right-sided sciatica  G89.29     M54.41       3. Neck pain  M54.2                      Subjective: Pt reports less neck pain and no headaches.  She is doing better, but still has stiffness and tension in her shoulders.      Objective: See treatment diary below      Assessment:  Pt does well w today's progression and is challenged appropriately.  She has some UT/LS stiffness that improves w stretching and mobilization. Patient demonstrated fatigue post treatment, exhibited good technique with therapeutic exercises, and would benefit from continued PT.      Plan: Continue per plan of care.  Progress treatment as tolerated.       Precautions: high risk    Date          Visit # IE 2 3 4         FOTO IE             Re-eval IE              Manuals          CTJ mob Gr V  Gr V Gr V         1st rib mob Gr IV on L GR IV KR  Gr IV SF          MFD   KR nv SF         UT/LS STM w str SF KR  SF SF         L median nerve glides SF KR x 10 SF SF         Neuro Re-Ed             C/s retractions 10x5\" 5\" x 10            No money 2x10 GTB  2x10 GTB 2x10 GTB         Prone T's 10x  2x10 GTB hor abd          Pushup plus             Scap clocks             DNF endurance nv                         TA bracing             Mod side planks             Dead bugs             Bird dogs             SNAGS  5x10\"            Ther Ex             Rows/ext   2x15 purple 2x15 purple         Pec str 3x30\"  3x30\" 3x30\"         SCM str             UT/LS str                                                                 Ther Activity                                       Gait Training                                       Modalities              "

## 2025-07-20 ENCOUNTER — HOSPITAL ENCOUNTER (OUTPATIENT)
Dept: MRI IMAGING | Facility: HOSPITAL | Age: 40
Discharge: HOME | End: 2025-07-20
Attending: ANESTHESIOLOGY
Payer: COMMERCIAL

## 2025-07-20 DIAGNOSIS — M54.16 LUMBAR RADICULOPATHY: ICD-10-CM

## 2025-07-20 DIAGNOSIS — M54.12 CERVICAL RADICULOPATHY: ICD-10-CM

## 2025-07-20 PROCEDURE — 72148 MRI LUMBAR SPINE W/O DYE: CPT

## 2025-07-20 PROCEDURE — 72141 MRI NECK SPINE W/O DYE: CPT

## 2025-07-23 ENCOUNTER — OFFICE VISIT (OUTPATIENT)
Dept: PHYSICAL THERAPY | Facility: CLINIC | Age: 40
End: 2025-07-23
Payer: COMMERCIAL

## 2025-07-23 DIAGNOSIS — G89.29 CHRONIC BILATERAL LOW BACK PAIN WITH RIGHT-SIDED SCIATICA: ICD-10-CM

## 2025-07-23 DIAGNOSIS — M54.41 CHRONIC BILATERAL LOW BACK PAIN WITH RIGHT-SIDED SCIATICA: ICD-10-CM

## 2025-07-23 DIAGNOSIS — M54.2 NECK PAIN: ICD-10-CM

## 2025-07-23 DIAGNOSIS — M47.9 SPINE DEGENERATION: Primary | ICD-10-CM

## 2025-07-23 PROCEDURE — 97140 MANUAL THERAPY 1/> REGIONS: CPT | Performed by: PHYSICAL THERAPIST

## 2025-07-23 PROCEDURE — 97112 NEUROMUSCULAR REEDUCATION: CPT | Performed by: PHYSICAL THERAPIST

## 2025-07-23 PROCEDURE — 97110 THERAPEUTIC EXERCISES: CPT | Performed by: PHYSICAL THERAPIST

## 2025-07-23 NOTE — PROGRESS NOTES
"Daily Note     Today's date: 2025  Patient name: Tyra Paz  : 1985  MRN: [id number]  Referring provider: Karli Greene MD  Dx:   Encounter Diagnosis     ICD-10-CM    1. Spine degeneration  M47.9       2. Chronic bilateral low back pain with right-sided sciatica  G89.29     M54.41       3. Neck pain  M54.2                      Subjective: Pt reports improved neck pain but has some shoulder pain and low back pain w working.      Objective: See treatment diary below      Assessment:  Pt does well w today's session and is challenged appropriately.  She needs some cueing for proper scap retraction with rows and trial of pushup plus.  She is fatigued w prone T's. Patient demonstrated fatigue post treatment, exhibited good technique with therapeutic exercises, and would benefit from continued PT      Plan: Continue per plan of care.  Progress treatment as tolerated.       Precautions: high risk    Date         Visit # IE 2 3 4 5        FOTO IE     xx        Re-eval IE              Manuals         CTJ mob Gr V  Gr V Gr V Gr V        1st rib mob Gr IV on L GR IV KR  Gr IV SF Gr IV SF         MFD   KR nv SF SF        UT/LS STM w str SF KR  SF SF SF        L median nerve glides SF KR x 10 SF SF SF        Neuro Re-Ed             C/s retractions 10x5\" 5\" x 10            No money 2x10 GTB  2x10 GTB 2x10 GTB 2x15 GTB        Prone T's 10x  2x10 GTB hor abd  2x10 2#        Pushup plus     20x        Scap clocks             DNF endurance nv                         TA bracing             Mod side planks             Dead bugs             Bird dogs             SNAGS  5x10\"            Ther Ex             Rows/ext   2x15 purple 2x15 purple 3x10 purple        Pec str 3x30\"  3x30\" 3x30\" 3x30\"        SCM str             UT/LS str                                                                 Ther Activity                                       Gait Training   "                                     Modalities

## 2025-07-30 ENCOUNTER — APPOINTMENT (OUTPATIENT)
Dept: PHYSICAL THERAPY | Facility: CLINIC | Age: 40
End: 2025-07-30
Payer: COMMERCIAL

## (undated) DEVICE — DRAPE EQUIPMENT RF WAND

## (undated) DEVICE — 3000CC GUARDIAN II: Brand: GUARDIAN

## (undated) DEVICE — SPECIMEN CONTAINER STERILE PEEL PACK

## (undated) DEVICE — GLOVE INDICATOR PI UNDERGLOVE SZ 6.5 BLUE

## (undated) DEVICE — GLOVE PI ULTRA TOUCH SZ.7.0

## (undated) DEVICE — ADHESIVE SKIN HIGH VISCOSITY EXOFIN 1ML

## (undated) DEVICE — TROCAR: Brand: KII SLEEVE

## (undated) DEVICE — TUBING SUCTION 5MM X 12 FT

## (undated) DEVICE — SCD SEQUENTIAL COMPRESSION COMFORT SLEEVE MEDIUM KNEE LENGTH: Brand: KENDALL SCD

## (undated) DEVICE — GLOVE PI ULTRA TOUCH SZ 6

## (undated) DEVICE — SURGIFLO ENDOSCOPIC APPICATOR: Brand: ETHICON

## (undated) DEVICE — PENCIL ELECTROSURG E-Z CLEAN -0035H

## (undated) DEVICE — SUT SILK 2-0 SH 30 IN K833H

## (undated) DEVICE — TRAY FOLEY 16FR URIMETER SILICONE SURESTEP

## (undated) DEVICE — GLOVE SRG LF STRL BGL SKNSNS 5.5 PF

## (undated) DEVICE — STERILE 8 INCH PROCTO SWAB: Brand: CARDINAL HEALTH

## (undated) DEVICE — DRAPE TOWEL: Brand: CONVERTORS

## (undated) DEVICE — PREMIUM DRY TRAY LF: Brand: MEDLINE INDUSTRIES, INC.

## (undated) DEVICE — IRRIG ENDO FLO TUBING

## (undated) DEVICE — HEMOSTATIC MATRIX SURGIFLO 8ML W/THROMBIN

## (undated) DEVICE — LAPAROSCOPIC SMOKE EVAC TUBING

## (undated) DEVICE — NEEDLE 25G X 1 1/2

## (undated) DEVICE — IV EXTENSION TUBING 33 IN

## (undated) DEVICE — NEEDLE COUNTER LG W/RULER

## (undated) DEVICE — ELECTRODE BALL E-Z CLEAN 5 IN -0009

## (undated) DEVICE — GLOVE INDICATOR PI UNDERGLOVE SZ 7 BLUE

## (undated) DEVICE — ENDOPATH 5MM ENDOSCOPIC BLUNT TIP DISSECTORS (12 POUCHES CONTAINING 3 DISSECTORS EACH): Brand: ENDOPATH

## (undated) DEVICE — SUT MONOCRYL 4-0 PS-2 27 IN Y426H

## (undated) DEVICE — ASTOUND STANDARD SURGICAL GOWN, XL: Brand: CONVERTORS

## (undated) DEVICE — BETHLEHEM UNIVERSAL MINOR VAG: Brand: CARDINAL HEALTH

## (undated) DEVICE — ENSEAL X1 TISSUE SEALER, CURVED JAW, 37 CM SHAFT LENGTH: Brand: ENSEAL

## (undated) DEVICE — ELECTRODE LAP J HOOK SPLIT STEM E-Z CLEAN 33CM -0021S

## (undated) DEVICE — SUT STRATAFIX SPIRAL 2-0 CT-1 30 CM SXPP1B410

## (undated) DEVICE — TROCAR: Brand: KII FIOS FIRST ENTRY

## (undated) DEVICE — BETHLEHEM UNIVERSAL GYN LAP PK: Brand: CARDINAL HEALTH

## (undated) DEVICE — SPINAL NEEDLE 22 G X 2 1/2

## (undated) DEVICE — LLETZ LOOP 20 X 15MM MEGADYNE

## (undated) DEVICE — SYRINGE 50ML LL

## (undated) DEVICE — 2000CC GUARDIAN II: Brand: GUARDIAN

## (undated) DEVICE — INTENDED FOR TISSUE SEPARATION, AND OTHER PROCEDURES THAT REQUIRE A SHARP SURGICAL BLADE TO PUNCTURE OR CUT.: Brand: BARD-PARKER SAFETY BLADES SIZE 11, STERILE

## (undated) DEVICE — UTERINE MANIPULATOR RUMI 6.7 X 8 CM

## (undated) DEVICE — MAYO STAND COVER: Brand: CONVERTORS

## (undated) DEVICE — [HIGH FLOW INSUFFLATOR,  DO NOT USE IF PACKAGE IS DAMAGED,  KEEP DRY,  KEEP AWAY FROM SUNLIGHT,  PROTECT FROM HEAT AND RADIOACTIVE SOURCES.]: Brand: PNEUMOSURE

## (undated) DEVICE — METZENBAUM ADTEC SINGLE USE DISSECTING SCISSORS, SHAFT ONLY, MONOPOLAR, CURVED TO LEFT, WORKING LENGTH: 12 1/4", (310 MM), DIAM. 5 MM, INSULATED, DOUBLE ACTION, STERILE, DISPOSABLE, PACKAGE OF 10 PIECES: Brand: AESCULAP

## (undated) DEVICE — BLUE HEAT SCOPE WARMER

## (undated) DEVICE — OCCLUDER COLPO-PNEUMO

## (undated) DEVICE — GLOVE INDICATOR UNDERGLOVE SZ 6 BLUE

## (undated) DEVICE — SYRINGE 10ML LL CONTROL TOP

## (undated) DEVICE — DRAPE LAPAROTOMY W/POUCHES

## (undated) DEVICE — GLOVE PI ULTRA TOUCH SZ.6.5

## (undated) DEVICE — PVC URETHRAL CATHETER: Brand: DOVER

## (undated) DEVICE — SMOKE EVACUATION TUBING WITH 7/8 IN TO 1/4 IN REDUCER: Brand: BUFFALO FILTER

## (undated) DEVICE — 5 MM CURVED DISSECTORS WITH MONOPOLAR CAUTERY: Brand: ENDOPATH